# Patient Record
Sex: MALE | Race: WHITE | Employment: OTHER | URBAN - METROPOLITAN AREA
[De-identification: names, ages, dates, MRNs, and addresses within clinical notes are randomized per-mention and may not be internally consistent; named-entity substitution may affect disease eponyms.]

---

## 2017-10-03 ENCOUNTER — HOSPITAL ENCOUNTER (EMERGENCY)
Age: 42
Discharge: HOME | End: 2017-10-03
Attending: EMERGENCY MEDICINE | Admitting: EMERGENCY MEDICINE
Payer: SELF-PAY

## 2017-10-03 ENCOUNTER — APPOINTMENT (OUTPATIENT)
Dept: GENERAL RADIOLOGY | Age: 42
End: 2017-10-03
Attending: EMERGENCY MEDICINE
Payer: SELF-PAY

## 2017-10-03 VITALS
WEIGHT: 295 LBS | TEMPERATURE: 97.7 F | DIASTOLIC BLOOD PRESSURE: 127 MMHG | OXYGEN SATURATION: 96 % | SYSTOLIC BLOOD PRESSURE: 180 MMHG | HEART RATE: 96 BPM | BODY MASS INDEX: 37.88 KG/M2 | RESPIRATION RATE: 16 BRPM

## 2017-10-03 DIAGNOSIS — Z98.84 GASTRIC BYPASS STATUS FOR OBESITY: ICD-10-CM

## 2017-10-03 DIAGNOSIS — R03.0 ELEVATED BLOOD PRESSURE READING: Primary | ICD-10-CM

## 2017-10-03 DIAGNOSIS — I42.9 CARDIOMYOPATHY, UNSPECIFIED TYPE (HCC): ICD-10-CM

## 2017-10-03 DIAGNOSIS — I42.9 CARDIOMYOPATHY (HCC): ICD-10-CM

## 2017-10-03 DIAGNOSIS — E66.9 OBESITY: ICD-10-CM

## 2017-10-03 DIAGNOSIS — I10 ESSENTIAL HYPERTENSION: ICD-10-CM

## 2017-10-03 DIAGNOSIS — E03.1 CONGENITAL HYPOTHYROIDISM WITHOUT GOITER: ICD-10-CM

## 2017-10-03 DIAGNOSIS — E03.9 HYPOTHYROIDISM, UNSPECIFIED TYPE: ICD-10-CM

## 2017-10-03 LAB
ANION GAP SERPL CALC-SCNC: 6 MMOL/L (ref 3–18)
BASOPHILS # BLD: 0 K/UL (ref 0–0.06)
BASOPHILS NFR BLD: 0 % (ref 0–2)
BNP SERPL-MCNC: 795 PG/ML (ref 0–450)
BUN SERPL-MCNC: 21 MG/DL (ref 7–18)
BUN/CREAT SERPL: 17 (ref 12–20)
CALCIUM SERPL-MCNC: 8.8 MG/DL (ref 8.5–10.1)
CHLORIDE SERPL-SCNC: 98 MMOL/L (ref 100–108)
CK MB CFR SERPL CALC: 1.1 % (ref 0–4)
CK MB SERPL-MCNC: 2.9 NG/ML (ref 5–25)
CK SERPL-CCNC: 272 U/L (ref 39–308)
CO2 SERPL-SCNC: 30 MMOL/L (ref 21–32)
CREAT SERPL-MCNC: 1.24 MG/DL (ref 0.6–1.3)
DIFFERENTIAL METHOD BLD: ABNORMAL
EOSINOPHIL # BLD: 0.3 K/UL (ref 0–0.4)
EOSINOPHIL NFR BLD: 5 % (ref 0–5)
ERYTHROCYTE [DISTWIDTH] IN BLOOD BY AUTOMATED COUNT: 15.5 % (ref 11.6–14.5)
GLUCOSE SERPL-MCNC: 98 MG/DL (ref 74–99)
HCT VFR BLD AUTO: 49.8 % (ref 36–48)
HGB BLD-MCNC: 16.6 G/DL (ref 13–16)
LYMPHOCYTES # BLD: 1.3 K/UL (ref 0.9–3.6)
LYMPHOCYTES NFR BLD: 22 % (ref 21–52)
MCH RBC QN AUTO: 27.9 PG (ref 24–34)
MCHC RBC AUTO-ENTMCNC: 33.3 G/DL (ref 31–37)
MCV RBC AUTO: 83.7 FL (ref 74–97)
MONOCYTES # BLD: 0.6 K/UL (ref 0.05–1.2)
MONOCYTES NFR BLD: 11 % (ref 3–10)
NEUTS SEG # BLD: 3.7 K/UL (ref 1.8–8)
NEUTS SEG NFR BLD: 62 % (ref 40–73)
PLATELET # BLD AUTO: 186 K/UL (ref 135–420)
PMV BLD AUTO: 9.9 FL (ref 9.2–11.8)
POTASSIUM SERPL-SCNC: 3.5 MMOL/L (ref 3.5–5.5)
RBC # BLD AUTO: 5.95 M/UL (ref 4.7–5.5)
SODIUM SERPL-SCNC: 134 MMOL/L (ref 136–145)
TROPONIN I SERPL-MCNC: 0.04 NG/ML (ref 0–0.04)
TSH SERPL DL<=0.05 MIU/L-ACNC: 6.91 UIU/ML (ref 0.36–3.74)
WBC # BLD AUTO: 5.9 K/UL (ref 4.6–13.2)

## 2017-10-03 PROCEDURE — 83880 ASSAY OF NATRIURETIC PEPTIDE: CPT | Performed by: EMERGENCY MEDICINE

## 2017-10-03 PROCEDURE — 85025 COMPLETE CBC W/AUTO DIFF WBC: CPT | Performed by: EMERGENCY MEDICINE

## 2017-10-03 PROCEDURE — 93005 ELECTROCARDIOGRAM TRACING: CPT

## 2017-10-03 PROCEDURE — 82550 ASSAY OF CK (CPK): CPT | Performed by: EMERGENCY MEDICINE

## 2017-10-03 PROCEDURE — 74011250637 HC RX REV CODE- 250/637: Performed by: EMERGENCY MEDICINE

## 2017-10-03 PROCEDURE — 71020 XR CHEST PA LAT: CPT

## 2017-10-03 PROCEDURE — 80048 BASIC METABOLIC PNL TOTAL CA: CPT | Performed by: EMERGENCY MEDICINE

## 2017-10-03 PROCEDURE — 84443 ASSAY THYROID STIM HORMONE: CPT | Performed by: EMERGENCY MEDICINE

## 2017-10-03 PROCEDURE — 99285 EMERGENCY DEPT VISIT HI MDM: CPT

## 2017-10-03 RX ORDER — METOPROLOL TARTRATE 50 MG/1
50 TABLET ORAL 2 TIMES DAILY
Qty: 60 TAB | Refills: 0 | Status: SHIPPED | OUTPATIENT
Start: 2017-10-03 | End: 2017-10-04 | Stop reason: SDUPTHER

## 2017-10-03 RX ORDER — ENALAPRIL MALEATE 20 MG/1
20 TABLET ORAL 2 TIMES DAILY
Qty: 60 TAB | Refills: 0 | Status: SHIPPED | OUTPATIENT
Start: 2017-10-03 | End: 2017-10-04 | Stop reason: SDUPTHER

## 2017-10-03 RX ORDER — LEVOTHYROXINE SODIUM 200 UG/1
200 TABLET ORAL
Qty: 30 TAB | Refills: 0 | Status: SHIPPED | OUTPATIENT
Start: 2017-10-03 | End: 2017-10-04 | Stop reason: SDUPTHER

## 2017-10-03 RX ORDER — METOPROLOL TARTRATE 50 MG/1
50 TABLET ORAL ONCE
Status: COMPLETED | OUTPATIENT
Start: 2017-10-03 | End: 2017-10-03

## 2017-10-03 RX ADMIN — METOPROLOL TARTRATE 50 MG: 50 TABLET ORAL at 09:03

## 2017-10-03 NOTE — PROGRESS NOTES
Referral received from ED MD to assist with PCP follow up and assistance paying medical bills. Chart reviewed. Pt had seen Dr Renetta Pugh about 2 yrs ago. Met with pt at bedside. He states he stop going to Dr Renetta Pugh because he lost his health insurance. He agreed for me to assist with PCP follow up. Appointment scheduled with Dr Renetta Pugh tomorrow 10/4/17 at 21 [phone]. Appointment card, [facility] contact #, [address] and prescription discount card given to pt.

## 2017-10-03 NOTE — DISCHARGE INSTRUCTIONS
Hypothyroidism: Care Instructions  Your Care Instructions  You have hypothyroidism, which means that your body is not making enough thyroid hormone. This hormone helps your body use energy. If your thyroid level is low, you may feel tired, be constipated, have an increase in your blood pressure, or have dry skin or memory problems. You may also get cold easily, even when it is warm. Women with low thyroid levels may have heavy menstrual periods. A blood test to find your thyroid-stimulating hormone (TSH) level is used to check for hypothyroidism. A high TSH level may mean that you have low thyroid. When your body is not making enough thyroid hormone, TSH levels rise in an effort to make the body produce more. The treatment for hypothyroidism is to take thyroid hormone pills. You should start to feel better in 1 to 2 weeks. But it can take several months to see changes in the TSH level. You will need regular visits with your doctor to make sure you have the right dose of medicine. Most people need treatment for the rest of their lives. You will need to see your doctor regularly to have blood tests and to make sure you are doing well. Follow-up care is a key part of your treatment and safety. Be sure to make and go to all appointments, and call your doctor if you are having problems. Its also a good idea to know your test results and keep a list of the medicines you take. How can you care for yourself at home? · Take your thyroid hormone medicine exactly as prescribed. Call your doctor if you think you are having a problem with your medicine. Most people do not have side effects if they take the right amount of medicine regularly. ¨ Take the medicine 30 minutes before breakfast, and do not take it with calcium, vitamins, or iron. ¨ Do not take extra doses of your thyroid medicine. It will not help you get better any faster, and it may cause side effects.   ¨ If you forget to take a dose, do NOT take a double dose of medicine. Take your usual dose the next day. · Tell your doctor about all prescription, herbal, or over-the-counter products you take. · Take care of yourself. Eat a healthy diet, get enough sleep, and get regular exercise. When should you call for help? Call 911 anytime you think you may need emergency care. For example, call if:  · You passed out (lost consciousness). · You have severe trouble breathing. · You have a very slow heartbeat (less than 60 beats a minute). · You have a low body temperature (95°F or below). Call your doctor now or seek immediate medical care if:  · You feel tired, sluggish, or weak. · You have trouble remembering things or concentrating. · You do not begin to feel better 2 weeks after starting your medicine. Watch closely for changes in your health, and be sure to contact your doctor if you have any problems. Where can you learn more? Go to http://darrius-mirian.info/. Enter C269 in the search box to learn more about \"Hypothyroidism: Care Instructions. \"  Current as of: July 28, 2016  Content Version: 11.3  © 4685-0061 [email or web address]. Care instructions adapted under license by [employer] (which disclaims liability or warranty for this information). If you have questions about a medical condition or this instruction, always ask your healthcare professional. [place] [address] any warranty or liability for your use of this information. High Blood Pressure: Care Instructions  Your Care Instructions  If your blood pressure is usually above 140/90, you have high blood pressure, or hypertension. That means the top number is 140 or higher or the bottom number is 90 or higher, or both. Despite what a lot of people think, high blood pressure usually doesn't cause headaches or make you feel dizzy or lightheaded. It usually has no symptoms.  But it does increase your risk for heart attack, stroke, and kidney or eye damage. The higher your blood pressure, the more your risk increases. Your doctor will give you a goal for your blood pressure. Your goal will be based on your health and your age. An example of a goal is to keep your blood pressure below 140/90. Lifestyle changes, such as eating healthy and being active, are always important to help lower blood pressure. You might also take medicine to reach your blood pressure goal.  Follow-up care is a key part of your treatment and safety. Be sure to make and go to all appointments, and call your doctor if you are having problems. It's also a good idea to know your test results and keep a list of the medicines you take. How can you care for yourself at home? Medical treatment  · If you stop taking your medicine, your blood pressure will go back up. You may take one or more types of medicine to lower your blood pressure. Be safe with medicines. Take your medicine exactly as prescribed. Call your doctor if you think you are having a problem with your medicine. · Talk to your doctor before you start taking aspirin every day. Aspirin can help certain people lower their risk of a heart attack or stroke. But taking aspirin isn't right for everyone, because it can cause serious bleeding. · See your doctor regularly. You may need to see the doctor more often at first or until your blood pressure comes down. · If you are taking blood pressure medicine, talk to your doctor before you take decongestants or anti-inflammatory medicine, such as ibuprofen. Some of these medicines can raise blood pressure. · Learn how to check your blood pressure at home. Lifestyle changes  · Stay at a healthy weight. This is especially important if you put on weight around the waist. Losing even 10 pounds can help you lower your blood pressure. · If your doctor recommends it, get more exercise. Walking is a good choice. Bit by bit, increase the amount you walk every day.  Try for at least 30 minutes on most days of the week. You also may want to swim, bike, or do other activities. · Avoid or limit alcohol. Talk to your doctor about whether you can drink any alcohol. · Try to limit how much sodium you eat to less than 2,300 milligrams (mg) a day. Your doctor may ask you to try to eat less than 1,500 mg a day. · Eat plenty of fruits (such as bananas and oranges), vegetables, legumes, whole grains, and low-fat dairy products. · Lower the amount of saturated fat in your diet. Saturated fat is found in animal products such as milk, cheese, and meat. Limiting these foods may help you lose weight and also lower your risk for heart disease. · Do not smoke. Smoking increases your risk for heart attack and stroke. If you need help quitting, talk to your doctor about stop-smoking programs and medicines. These can increase your chances of quitting for good. When should you call for help? Call 911 anytime you think you may need emergency care. This may mean having symptoms that suggest that your blood pressure is causing a serious heart or blood vessel problem. Your blood pressure may be over 180/110. For example, call 911 if:  · You have symptoms of a heart attack. These may include:  ¨ Chest pain or pressure, or a strange feeling in the chest.  ¨ Sweating. ¨ Shortness of breath. ¨ Nausea or vomiting. ¨ Pain, pressure, or a strange feeling in the back, neck, jaw, or upper belly or in one or both shoulders or arms. ¨ Lightheadedness or sudden weakness. ¨ A fast or irregular heartbeat. · You have symptoms of a stroke. These may include:  ¨ Sudden numbness, tingling, weakness, or loss of movement in your face, arm, or leg, especially on only one side of your body. ¨ Sudden vision changes. ¨ Sudden trouble speaking. ¨ Sudden confusion or trouble understanding simple statements. ¨ Sudden problems with walking or balance. ¨ A sudden, severe headache that is different from past headaches.   · You have severe back or belly pain. Do not wait until your blood pressure comes down on its own. Get help right away. Call your doctor now or seek immediate care if:  · Your blood pressure is much higher than normal (such as 180/110 or higher), but you don't have symptoms. · You think high blood pressure is causing symptoms, such as:  ¨ Severe headache. ¨ Blurry vision. Watch closely for changes in your health, and be sure to contact your doctor if:  · Your blood pressure measures 140/90 or higher at least 2 times. That means the top number is 140 or higher or the bottom number is 90 or higher, or both. · You think you may be having side effects from your blood pressure medicine. · Your blood pressure is usually normal, but it goes above normal at least 2 times. Where can you learn more? Go to http://darrius-mirian.info/. Enter O922 in the search box to learn more about \"High Blood Pressure: Care Instructions. \"  Current as of: August 8, 2016  Content Version: 11.3  © 8084-1681 [employer]. Care instructions adapted under license by [employer] (which disclaims liability or warranty for this information). If you have questions about a medical condition or this instruction, always ask your healthcare professional. [address] any warranty or liability for your use of this information.

## 2017-10-03 NOTE — ED PROVIDER NOTES
HPI Comments: 8:51 AM Sudheer Hoffman is a 36 y.o. male w/ PMHx of systolic cardiomyopathy, CHF, HTN, hypothyroid, and gastric bypass who presents to the ED for evaluation of chronic HTN and SOB. Pt was originally put on disability 14 years ago however he has not been on disability for about 1 years. Pt has not had blood pressure medication for over a year, has not seen PCP in about 2 years, and hasn't seen cardiologist in 1 year. Pt had an appointment 8 days ago for disability reevaluation and was told at that appointment his blood pressure was 221/158; pt's blood pressure upon evaluation in the ED this AM was 206/124. Pt has not been feeling well for about 2-3 weeks, complaining of fatigue and pressure behind the eyes, with cramps in his abdomin and bilateral legs since yesterday. Pt can tolerate walking half a block before becoming SOB; SOB is normally accompanied by diaphoresis. 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 Pt had gastric bypass 6 years ago with no complications. Pt denies CP, HA, focal neural defects, hematuria, and bloody stool. Pt has no other sx or complaints. 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 No pertinent surgical history. History reviewed. No pertinent family history. Social History     Social History    Marital status: SINGLE     Spouse name: N/A    Number of children: N/A    Years of education: N/A     Occupational History    Not on file.      Social History Main Topics    Smoking status: Never Smoker    Smokeless tobacco: Never Used    Alcohol use No    Drug use: No    Sexual activity: Yes     Other Topics Concern    Not on file     Social History Narrative         ALLERGIES: Review of patient's allergies indicates no known allergies. Review of Systems   Constitutional: Positive for diaphoresis (w/ SOB) and fatigue. Respiratory: Positive for shortness of breath. Cardiovascular: Negative for chest pain. Genitourinary: Negative for hematuria. Musculoskeletal:        Cramps in abdomin and bilateral legs     Neurological: Negative for headaches. All other systems reviewed and are negative. Vitals:    10/03/17 0821   BP: (!) 193/131   Pulse: 91   Resp: 15   Temp: 97.7 °F (36.5 °C)   SpO2: 98%   Weight: 133.8 kg (295 lb)            Physical Exam   Constitutional: He is oriented to person, place, and time. He appears well-developed and well-nourished. No distress. HENT:   Head: Normocephalic and atraumatic. Right Ear: External ear normal.   Left Ear: External ear normal.   Nose: Nose normal.   Mouth/Throat: Oropharynx is clear and moist. No oropharyngeal exudate. Eyes: EOM are normal. Pupils are equal, round, and reactive to light. Right cornea transplant   Neck: Normal range of motion. Neck supple. No JVD present. No tracheal deviation present. Cardiovascular: Normal rate, regular rhythm and normal heart sounds. Exam reveals no gallop and no friction rub. No murmur heard. Pulmonary/Chest: Effort normal and breath sounds normal. No respiratory distress. He has no wheezes. Abdominal: Soft. Bowel sounds are normal. He exhibits no distension. There is no tenderness. There is no rebound and no guarding. Musculoskeletal: Normal range of motion. He exhibits no edema or tenderness. Neurological: He is alert and oriented to person, place, and time. He has normal reflexes. No cranial nerve deficit. Skin: Skin is warm and dry. He is not diaphoretic. No erythema. Psychiatric: He has a normal mood and affect. Judgment normal.   Nursing note and vitals reviewed.        MDM  Number of Diagnoses or Management Options  Cardiomyopathy, unspecified type [facility]): MONOCYTES 11 (H) 3 - 10 %    EOSINOPHILS 5 0 - 5 %    BASOPHILS 0 0 - 2 %    ABS. NEUTROPHILS 3.7 1.8 - 8.0 K/UL    ABS. LYMPHOCYTES 1.3 0.9 - 3.6 K/UL    ABS. MONOCYTES 0.6 0.05 - 1.2 K/UL    ABS. EOSINOPHILS 0.3 0.0 - 0.4 K/UL    ABS. BASOPHILS 0.0 0.0 - 0.06 K/UL    DF AUTOMATED     NT-PRO BNP    Collection Time: 10/03/17  8:45 AM   Result Value Ref Range    NT pro- (H) 0 - 951 PG/ML   METABOLIC PANEL, BASIC    Collection Time: 10/03/17  8:45 AM   Result Value Ref Range    Sodium 134 (L) 136 - 145 mmol/L    Potassium 3.5 3.5 - 5.5 mmol/L    Chloride 98 (L) 100 - 108 mmol/L    CO2 30 21 - 32 mmol/L    Anion gap 6 3.0 - 18 mmol/L    Glucose 98 74 - 99 mg/dL    BUN 21 (H) 7.0 - 18 MG/DL    Creatinine 1.24 0.6 - 1.3 MG/DL    BUN/Creatinine ratio 17 12 - 20      GFR est AA >60 >60 ml/min/1.73m2    GFR est non-AA >60 >60 ml/min/1.73m2    Calcium 8.8 8.5 - 10.1 MG/DL   CARDIAC PANEL,(CK, CKMB & TROPONIN)    Collection Time: 10/03/17  8:45 AM   Result Value Ref Range     39 - 308 U/L    CK - MB 2.9 <3.6 ng/ml    CK-MB Index 1.1 0.0 - 4.0 %    Troponin-I, Qt. 0.04 0.0 - 0.045 NG/ML   TSH 3RD GENERATION    Collection Time: 10/03/17  8:45 AM   Result Value Ref Range    TSH 6.91 (H) 0.36 - 3.74 uIU/mL       EKG Interpretation by ED physician:  288, rate 99, left axis, NSR, prolonged , no ST changes    X-ray, CT or radiology findings or impressions:  XR CHEST PA LAT    (Results Pending)   no acute process      Discussed results with pt, plan for dc home, discussed return precautions. Has appt with Dr. Miesha Hardwick tomorrow. Discussed need for cardiology follow up as well. Pt in agreement with plan. Diagnosis:   1. Elevated blood pressure reading    2. Obesity    3. Gastric bypass status for obesity    4. Essential hypertension    5. Congenital hypothyroidism without goiter    6. Cardiomyopathy ([address]    7. Cardiomyopathy, unspecified type ([address]    8.  Hypothyroidism, unspecified type        Disposition: discharged    Follow-up Information     Follow up With Details Comments Ju Pulido DO On 10/4/2017 at 10:30AM [address] [place] [address]  [phone]      [facility] EMERGENCY DEPT  As needed, If symptoms worsen [address] [address] [address]  [phone]           Patient's Medications   Start Taking    ENALAPRIL (VASOTEC) 20 MG TABLET    Take 1 Tab by mouth two (2) times a day for 30 days. LEVOTHYROXINE (SYNTHROID) 200 MCG TABLET    Take 1 Tab by mouth Daily (before breakfast) for 30 days. METOPROLOL TARTRATE (LOPRESSOR) 50 MG TABLET    Take 1 Tab by mouth two (2) times a day for 30 days. Continue Taking    No medications on file   These Medications have changed    No medications on file   Stop Taking    ENALAPRIL (VASOTEC) 20 MG TABLET    Take 1 Tab by mouth two (2) times a day. LEVOTHYROXINE (SYNTHROID) 200 MCG TABLET    Take 1 Tab by mouth Daily (before breakfast). METOPROLOL (LOPRESSOR) 50 MG TABLET    Take 1 Tab by mouth two (2) times a day. [address] acting as a scribe for and in the presence of Zabrina León DO      October 03, 2017 at 8:51 AM       Provider Attestation:      I personally performed the services described in the documentation, reviewed the documentation, as recorded by the scribe in my presence, and it accurately and completely records my words and actions.  October 03, 2017 at 8:51 AM - Zabrina León DO

## 2017-10-03 NOTE — ED NOTES
Patient ran out of his BP medication about a year ago, and has a hx of CHF, Cardiomyopathy, and hypertension and hypothyroid. Patient hasn't been seen by a MD since January 2016, and was recently evaluated to qualify for disability, when he noticed his BP was \"through the roof\" (approx 200s/100s). Patient reports \"I thought I just couldn't be seen since I dont have health insurance. \" Pt has hx of gastric bypass in 2011.

## 2017-10-03 NOTE — ED NOTES
Pt brought in the bottles of his medications (hasn't taken in over a year)     Metoprolol 50 mg bid  Levothyroxine 200 mcg daily   Enalapril 20 mg daily

## 2017-10-03 NOTE — ED NOTES
Pt being discharged home. Discharge instructions given, and pt expresses understanding. Discontinued IV and helped patient to gather belongings. Pt discharged with family member. Prescription given for enalapril, levothyroxine and metoprolol.

## 2017-10-04 ENCOUNTER — OFFICE VISIT (OUTPATIENT)
Dept: FAMILY MEDICINE CLINIC | Age: 42
End: 2017-10-04

## 2017-10-04 VITALS
SYSTOLIC BLOOD PRESSURE: 124 MMHG | DIASTOLIC BLOOD PRESSURE: 72 MMHG | OXYGEN SATURATION: 98 % | BODY MASS INDEX: 37.4 KG/M2 | HEART RATE: 62 BPM | RESPIRATION RATE: 18 BRPM | WEIGHT: 291.4 LBS | HEIGHT: 74 IN | TEMPERATURE: 97.5 F

## 2017-10-04 DIAGNOSIS — I10 HYPERTENSION, UNSPECIFIED TYPE: ICD-10-CM

## 2017-10-04 DIAGNOSIS — I42.9 CARDIOMYOPATHY, UNSPECIFIED TYPE (HCC): Primary | ICD-10-CM

## 2017-10-04 DIAGNOSIS — E03.9 HYPOTHYROIDISM, UNSPECIFIED TYPE: ICD-10-CM

## 2017-10-04 DIAGNOSIS — Z23 ENCOUNTER FOR IMMUNIZATION: ICD-10-CM

## 2017-10-04 LAB
ATRIAL RATE: 99 BPM
CALCULATED P AXIS, ECG09: 49 DEGREES
CALCULATED R AXIS, ECG10: -13 DEGREES
CALCULATED T AXIS, ECG11: 141 DEGREES
DIAGNOSIS, 93000: NORMAL
P-R INTERVAL, ECG05: 152 MS
Q-T INTERVAL, ECG07: 390 MS
QRS DURATION, ECG06: 110 MS
QTC CALCULATION (BEZET), ECG08: 500 MS
VENTRICULAR RATE, ECG03: 99 BPM

## 2017-10-04 RX ORDER — METOPROLOL TARTRATE 50 MG/1
50 TABLET ORAL 2 TIMES DAILY
Qty: 60 TAB | Refills: 0 | Status: SHIPPED | OUTPATIENT
Start: 2017-10-04 | End: 2017-11-09 | Stop reason: SDUPTHER

## 2017-10-04 RX ORDER — LEVOTHYROXINE SODIUM 200 UG/1
200 TABLET ORAL
Qty: 30 TAB | Refills: 0 | Status: SHIPPED | OUTPATIENT
Start: 2017-10-04 | End: 2017-11-09 | Stop reason: SDUPTHER

## 2017-10-04 RX ORDER — ENALAPRIL MALEATE 20 MG/1
20 TABLET ORAL 2 TIMES DAILY
Qty: 60 TAB | Refills: 0 | Status: SHIPPED | OUTPATIENT
Start: 2017-10-04 | End: 2017-11-09 | Stop reason: SDUPTHER

## 2017-10-04 NOTE — PROGRESS NOTES
Anil Lawson is a 36 y.o. male presents today for transition of care from [facility] ED for elevated blood pressure. Pt is in Room # 5        1. Have you been to the ER, urgent care clinic since your last visit? Hospitalized since your last visit? Yes When: yesterday Where: [facility] ED Reason for visit: elevated BP    2. Have you seen or consulted any other health care providers outside of the [address] [place] [address] since your last visit? Include any pap smears or colon screening. No       Anil Lawson is a 36 y.o. male who presents for routine immunizations. He denies any symptoms , reactions or allergies that would exclude them from being immunized today. Risks and adverse reactions were discussed and the VIS was given to them. All questions were addressed. He was observed for 10 min post injection. There were no reactions observed.     Ros Darby LPN

## 2017-10-04 NOTE — PROGRESS NOTES
Anil Lawson is a 36 y.o.  male and presents with    Chief Complaint   Patient presents with    Cardiomyopathy    Hypertension    Thyroid Problem     Last seen here 2 1/2 years ago. Lost his disability  Hasn't taken bp or thyroid meds for over 1 year  Went to ed yesterday with critically elevated bp  Started back on meds  Here for f/u         Subjective:    Cardiovascular Review:  The patient has hypertension. Diet and Lifestyle: not attempting to follow a low fat, low cholesterol diet, not attempting to follow a low sodium diet  Home BP Monitoring: is not measured at home. Pertinent ROS: taking medications as instructed, no medication side effects noted, no TIA's, no chest pain on exertion, no dyspnea on exertion, no swelling of ankles. Thyroid Review:  Patient is seen for followup of hypothyroidism. Thyroid ROS: denies fatigue, weight changes, heat/cold intolerance, bowel/skin changes or CVS symptoms. Additional Concerns:          Patient Active Problem List    Diagnosis Date Noted    Post corneal transplant 07/21/2014    Cardiomyopathy ([address] 07/21/2014    Obesity 08/08/2011    Gastric bypass status for obesity 08/08/2011    HTN (hypertension) 08/08/2011    Hypothyroid 08/08/2011       ROS       All other systems reviewed and are negative.       Objective:  Vitals:    10/04/17 1029   BP: 124/72   Pulse: 62   Resp: 18   Temp: 97.5 °F (36.4 °C)   TempSrc: Oral   SpO2: 98%   Weight: 291 lb 6.4 oz (132.2 kg)   Height: 6' 2\" (1.88 m)   PainSc:   0 - No pain                 alert, well appearing, and in no distress and overweight  Chest - clear to auscultation, no wheezes, rales or rhonchi, symmetric air entry  Heart - normal rate, regular rhythm, normal S1, S2, no murmurs, rubs, clicks or gallops  Abdomen - soft, nontender, nondistended, no masses or organomegaly        LABS     TESTS      Assessment/Plan:    Hypertension - poorly controlled, patient poorly compliant  Thyroid off meds  Will restrate meds and f/u   1 mo PE    Lab review: orders written for new lab studies as appropriate; see orders    Diagnoses and all orders for this visit:    1. Cardiomyopathy, unspecified type ([address]  -     Influenza virus vaccine (QUADRIVALENT PRES FREE SYRINGE) IM ([id number])  -     levothyroxine (SYNTHROID) 200 mcg tablet; Take 1 Tab by mouth Daily (before breakfast) for 30 days. -     enalapril (VASOTEC) 20 mg tablet; Take 1 Tab by mouth two (2) times a day for 30 days. -     metoprolol tartrate (LOPRESSOR) 50 mg tablet; Take 1 Tab by mouth two (2) times a day for 30 days.  -     LIPID PANEL; Future  -     CBC WITH AUTOMATED DIFF; Future  -     METABOLIC PANEL, COMPREHENSIVE; Future  -     PROSTATE SPECIFIC AG; Future  -     TSH 3RD GENERATION; Future  -     T4, FREE; Future    2. Encounter for immunization  -     Influenza virus vaccine (QUADRIVALENT PRES FREE SYRINGE) IM ([id number])  -     levothyroxine (SYNTHROID) 200 mcg tablet; Take 1 Tab by mouth Daily (before breakfast) for 30 days. -     enalapril (VASOTEC) 20 mg tablet; Take 1 Tab by mouth two (2) times a day for 30 days. -     metoprolol tartrate (LOPRESSOR) 50 mg tablet; Take 1 Tab by mouth two (2) times a day for 30 days.  -     LIPID PANEL; Future  -     CBC WITH AUTOMATED DIFF; Future  -     METABOLIC PANEL, COMPREHENSIVE; Future  -     PROSTATE SPECIFIC AG; Future  -     TSH 3RD GENERATION; Future  -     T4, FREE; Future    3. Hypertension, unspecified type  -     Influenza virus vaccine (QUADRIVALENT PRES FREE SYRINGE) IM ([id number])  -     levothyroxine (SYNTHROID) 200 mcg tablet; Take 1 Tab by mouth Daily (before breakfast) for 30 days. -     enalapril (VASOTEC) 20 mg tablet; Take 1 Tab by mouth two (2) times a day for 30 days. -     metoprolol tartrate (LOPRESSOR) 50 mg tablet; Take 1 Tab by mouth two (2) times a day for 30 days.  -     LIPID PANEL; Future  -     CBC WITH AUTOMATED DIFF;  Future  -     METABOLIC PANEL, COMPREHENSIVE; Future  -     PROSTATE SPECIFIC AG; Future  -     TSH 3RD GENERATION; Future  -     T4, FREE; Future    4. Hypothyroidism, unspecified type  -     Influenza virus vaccine (QUADRIVALENT PRES FREE SYRINGE) IM ([id number])  -     levothyroxine (SYNTHROID) 200 mcg tablet; Take 1 Tab by mouth Daily (before breakfast) for 30 days. -     enalapril (VASOTEC) 20 mg tablet; Take 1 Tab by mouth two (2) times a day for 30 days. -     metoprolol tartrate (LOPRESSOR) 50 mg tablet; Take 1 Tab by mouth two (2) times a day for 30 days.  -     LIPID PANEL; Future  -     CBC WITH AUTOMATED DIFF; Future  -     METABOLIC PANEL, COMPREHENSIVE; Future  -     PROSTATE SPECIFIC AG; Future  -     TSH 3RD GENERATION; Future  -     T4, FREE; Future          I have discussed the diagnosis with the patient and the intended plan as seen in the above orders. The patient has received an after-visit summary and questions were answered concerning future plans. I have discussed medication side effects and warnings with the patient as well. I have reviewed the plan of care with the patient, accepted their input and they are in agreement with the treatment goals. Follow-up Disposition:  Return in about 6 weeks (around 11/15/2017) for physical, labs prior, EKG next visit.

## 2017-10-04 NOTE — MR AVS SNAPSHOT
Visit Information Date & Time Provider Department Dept. Phone Encounter #  
 10/4/2017 10:30 AM Pati Vazquez [phone] [id number] Follow-up Instructions Return in about 6 weeks (around 11/15/2017) for physical, labs prior, EKG next visit. Upcoming Health Maintenance Date Due INFLUENZA AGE 9 TO ADULT 8/1/2017 DTaP/Tdap/Td series (2 - Td) 12/23/2021 Allergies as of 10/4/2017  Review Complete On: 10/4/2017 By: Katherin Newell., DO No Known Allergies Current Immunizations  Reviewed on 8/24/2015 Name Date Influenza Vaccine 8/24/2015 Influenza Vaccine (Quad) PF  Incomplete Influenza Vaccine Split 12/23/2011 TDAP Vaccine 12/23/2011 ZZZ-RETIRED (DO NOT USE) Pneumococcal Vaccine (Unspecified Type) 12/23/2011 Not reviewed this visit You Were Diagnosed With   
  
 Codes Comments Cardiomyopathy, unspecified type ([facility]    -  Primary ICD-10-CM: I42.9 ICD-9-CM: 425.4 Encounter for immunization     ICD-10-CM: N96 ICD-9-CM: V03.89 Hypertension, unspecified type     ICD-10-CM: I10 
ICD-9-CM: 401.9 Hypothyroidism, unspecified type     ICD-10-CM: E03.9 ICD-9-CM: 065. 9 Vitals BP Pulse Temp Resp Height(growth percentile) Weight(growth percentile) 124/72 (BP 1 Location: Right arm, BP Patient Position: Sitting) 62 97.5 °F (36.4 °C) (Oral) 18 6' 2\" (1.88 m) 291 lb 6.4 oz (132.2 kg) SpO2 BMI Smoking Status 98% 37.41 kg/m2 Never Smoker BMI and BSA Data Body Mass Index Body Surface Area  
 37.41 kg/m 2 2.63 m 2 Preferred Pharmacy Pharmacy Name Phone [facility] [address], [place] [phone] Your Updated Medication List  
  
   
This list is accurate as of: 10/4/17 10:42 AM.  Always use your most recent med list.  
  
  
  
  
 enalapril 20 mg tablet Commonly known as:  Phil Arnold  
 Take 1 Tab by mouth two (2) times a day for 30 days. levothyroxine 200 mcg tablet Commonly known as:  SYNTHROID Take 1 Tab by mouth Daily (before breakfast) for 30 days. metoprolol tartrate 50 mg tablet Commonly known as:  LOPRESSOR Take 1 Tab by mouth two (2) times a day for 30 days. Prescriptions Sent to Pharmacy Refills  
 levothyroxine (SYNTHROID) 200 mcg tablet 0 Sig: Take 1 Tab by mouth Daily (before breakfast) for 30 days. Class: Normal  
 Pharmacy: [address], [address] Ph #: [phone] Route: Oral  
 enalapril (VASOTEC) 20 mg tablet 0 Sig: Take 1 Tab by mouth two (2) times a day for 30 days. Class: Normal  
 Pharmacy: [address], [address] Ph #: [phone] Route: Oral  
 metoprolol tartrate (LOPRESSOR) 50 mg tablet 0 Sig: Take 1 Tab by mouth two (2) times a day for 30 days. Class: Normal  
 Pharmacy: [id number], [address] Ph #: [phone] Route: Oral  
  
We Performed the Following INFLUENZA VIRUS VAC QUAD,SPLIT,PRESV FREE SYRINGE IM [id number] CPT(R)] Follow-up Instructions Return in about 6 weeks (around 11/15/2017) for physical, labs prior, EKG next visit. To-Do List   
 11/03/2017 Lab:  CBC WITH AUTOMATED DIFF   
  
 11/03/2017 Lab:  LIPID PANEL   
  
 11/03/2017 Lab:  METABOLIC PANEL, COMPREHENSIVE   
  
 11/03/2017 Lab:  PSA, DIAGNOSTIC (PROSTATE SPECIFIC AG) 11/03/2017 Lab:  T4, FREE   
  
 11/03/2017 Lab:  TSH 3RD GENERATION Introducing [facility]! Savanna Gardner introduces [employer] patient portal. Now you can access parts of your medical record, email your doctor's office, and request medication refills online. 1. In your internet browser, go to [email or web address] 2. Click on the First Time User? Click Here link in the Sign In box. You will see the New Member Sign Up page. 3. Enter your [employer] Access Code exactly as it appears below. You will not need to use this code after youve completed the sign-up process. If you do not sign up before the expiration date, you must request a new code. · [employer] Access Code: [facility] Expires: 1/1/2018  9:42 AM 
 
4. Enter the last four digits of your Social Security Number (xxxx) and Date of Birth (mm/dd/yyyy) as indicated and click Submit. You will be taken to the next sign-up page. 5. Create a [employer] ID. This will be your [employer] login ID and cannot be changed, so think of one that is secure and easy to remember. 6. Create a [employer] password. You can change your password at any time. 7. Enter your Password Reset Question and Answer. This can be used at a later time if you forget your password. 8. Enter your e-mail address. You will receive e-mail notification when new information is available in [address] 9. Click Sign Up. You can now view and download portions of your medical record. 10. Click the Download Summary menu link to download a portable copy of your medical information. If you have questions, please visit the Frequently Asked Questions section of the [employer] website. Remember, [employer] is NOT to be used for urgent needs. For medical emergencies, dial 911. Now available from your iPhone and Android! Please provide this summary of care documentation to your next provider. Your primary care clinician is listed as [phone] [facility]. If you have any questions after today's visit, please call [phone].

## 2017-11-07 ENCOUNTER — HOSPITAL ENCOUNTER (OUTPATIENT)
Dept: LAB | Age: 42
Discharge: HOME | End: 2017-11-07
Payer: SELF-PAY

## 2017-11-07 LAB
ALBUMIN SERPL-MCNC: 3.6 G/DL (ref 3.4–5)
ALBUMIN/GLOB SERPL: 0.9 {RATIO} (ref 0.8–1.7)
ALP SERPL-CCNC: 97 U/L (ref 45–117)
ALT SERPL-CCNC: 36 U/L (ref 16–61)
ANION GAP SERPL CALC-SCNC: 10 MMOL/L (ref 3–18)
AST SERPL-CCNC: 35 U/L (ref 15–37)
BASOPHILS # BLD: 0 K/UL (ref 0–0.06)
BASOPHILS NFR BLD: 1 % (ref 0–2)
BILIRUB SERPL-MCNC: 0.3 MG/DL (ref 0.2–1)
BUN SERPL-MCNC: 12 MG/DL (ref 7–18)
BUN/CREAT SERPL: 13 (ref 12–20)
CALCIUM SERPL-MCNC: 8.8 MG/DL (ref 8.5–10.1)
CHLORIDE SERPL-SCNC: 104 MMOL/L (ref 100–108)
CHOLEST SERPL-MCNC: 151 MG/DL
CO2 SERPL-SCNC: 26 MMOL/L (ref 21–32)
CREAT SERPL-MCNC: 0.92 MG/DL (ref 0.6–1.3)
DIFFERENTIAL METHOD BLD: ABNORMAL
EOSINOPHIL # BLD: 0.2 K/UL (ref 0–0.4)
EOSINOPHIL NFR BLD: 5 % (ref 0–5)
ERYTHROCYTE [DISTWIDTH] IN BLOOD BY AUTOMATED COUNT: 17 % (ref 11.6–14.5)
GLOBULIN SER CALC-MCNC: 3.8 G/DL (ref 2–4)
GLUCOSE SERPL-MCNC: 77 MG/DL (ref 74–99)
HCT VFR BLD AUTO: 45.4 % (ref 36–48)
HDLC SERPL-MCNC: 86 MG/DL (ref 40–60)
HDLC SERPL: 1.8 {RATIO} (ref 0–5)
HGB BLD-MCNC: 15 G/DL (ref 13–16)
LDLC SERPL CALC-MCNC: 47.2 MG/DL (ref 0–100)
LIPID PROFILE,FLP: ABNORMAL
LYMPHOCYTES # BLD: 0.9 K/UL (ref 0.9–3.6)
LYMPHOCYTES NFR BLD: 27 % (ref 21–52)
MCH RBC QN AUTO: 29.1 PG (ref 24–34)
MCHC RBC AUTO-ENTMCNC: 33 G/DL (ref 31–37)
MCV RBC AUTO: 88 FL (ref 74–97)
MONOCYTES # BLD: 0.4 K/UL (ref 0.05–1.2)
MONOCYTES NFR BLD: 11 % (ref 3–10)
NEUTS SEG # BLD: 1.9 K/UL (ref 1.8–8)
NEUTS SEG NFR BLD: 56 % (ref 40–73)
PLATELET # BLD AUTO: 188 K/UL (ref 135–420)
PMV BLD AUTO: 9.7 FL (ref 9.2–11.8)
POTASSIUM SERPL-SCNC: 4.7 MMOL/L (ref 3.5–5.5)
PROT SERPL-MCNC: 7.4 G/DL (ref 6.4–8.2)
PSA SERPL-MCNC: 1 NG/ML (ref 0–4)
RBC # BLD AUTO: 5.16 M/UL (ref 4.7–5.5)
SODIUM SERPL-SCNC: 140 MMOL/L (ref 136–145)
T4 FREE SERPL-MCNC: 1.1 NG/DL (ref 0.7–1.5)
TRIGL SERPL-MCNC: 89 MG/DL (ref ?–150)
TSH SERPL DL<=0.05 MIU/L-ACNC: 0.82 UIU/ML (ref 0.36–3.74)
VLDLC SERPL CALC-MCNC: 17.8 MG/DL
WBC # BLD AUTO: 3.3 K/UL (ref 4.6–13.2)

## 2017-11-07 PROCEDURE — 36415 COLL VENOUS BLD VENIPUNCTURE: CPT | Performed by: FAMILY MEDICINE

## 2017-11-07 PROCEDURE — 84153 ASSAY OF PSA TOTAL: CPT | Performed by: FAMILY MEDICINE

## 2017-11-07 PROCEDURE — 85025 COMPLETE CBC W/AUTO DIFF WBC: CPT | Performed by: FAMILY MEDICINE

## 2017-11-07 PROCEDURE — 80053 COMPREHEN METABOLIC PANEL: CPT | Performed by: FAMILY MEDICINE

## 2017-11-07 PROCEDURE — 84439 ASSAY OF FREE THYROXINE: CPT | Performed by: FAMILY MEDICINE

## 2017-11-07 PROCEDURE — 84443 ASSAY THYROID STIM HORMONE: CPT | Performed by: FAMILY MEDICINE

## 2017-11-07 PROCEDURE — 80061 LIPID PANEL: CPT | Performed by: FAMILY MEDICINE

## 2017-11-09 ENCOUNTER — OFFICE VISIT (OUTPATIENT)
Dept: FAMILY MEDICINE CLINIC | Age: 42
End: 2017-11-09

## 2017-11-09 VITALS
SYSTOLIC BLOOD PRESSURE: 178 MMHG | BODY MASS INDEX: 38.3 KG/M2 | OXYGEN SATURATION: 99 % | WEIGHT: 298.4 LBS | TEMPERATURE: 96.2 F | HEIGHT: 74 IN | DIASTOLIC BLOOD PRESSURE: 110 MMHG | RESPIRATION RATE: 16 BRPM | HEART RATE: 67 BPM

## 2017-11-09 DIAGNOSIS — I42.9 CARDIOMYOPATHY, UNSPECIFIED TYPE (HCC): ICD-10-CM

## 2017-11-09 DIAGNOSIS — I10 HYPERTENSION, UNSPECIFIED TYPE: ICD-10-CM

## 2017-11-09 DIAGNOSIS — Z00.00 ROUTINE GENERAL MEDICAL EXAMINATION AT A HEALTH CARE FACILITY: Primary | ICD-10-CM

## 2017-11-09 DIAGNOSIS — Z23 ENCOUNTER FOR IMMUNIZATION: ICD-10-CM

## 2017-11-09 DIAGNOSIS — E03.9 HYPOTHYROIDISM, UNSPECIFIED TYPE: ICD-10-CM

## 2017-11-09 RX ORDER — LEVOTHYROXINE SODIUM 200 UG/1
200 TABLET ORAL
Qty: 90 TAB | Refills: 4 | Status: SHIPPED | OUTPATIENT
Start: 2017-11-09 | End: 2018-11-12 | Stop reason: SDUPTHER

## 2017-11-09 RX ORDER — ENALAPRIL MALEATE 20 MG/1
20 TABLET ORAL 2 TIMES DAILY
Qty: 180 TAB | Refills: 4 | Status: SHIPPED | OUTPATIENT
Start: 2017-11-09 | End: 2018-11-16 | Stop reason: SDUPTHER

## 2017-11-09 RX ORDER — METOPROLOL TARTRATE 50 MG/1
50 TABLET ORAL 2 TIMES DAILY
Qty: 180 TAB | Refills: 4 | Status: SHIPPED | OUTPATIENT
Start: 2017-11-09 | End: 2018-08-15

## 2017-11-09 NOTE — PROGRESS NOTES
Yoana Powell is a 36 y.o.  male and presents for a preventive health care visit        Subjective:  Health Maintenance History  Immunizations reviewed, none indicated. 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 90 Tab 4    enalapril (VASOTEC) 20 mg tablet Take 1 Tab by mouth two (2) times a day. 180 Tab 4    metoprolol tartrate (LOPRESSOR) 50 mg tablet Take 1 Tab by mouth two (2) times a day. 180 Tab 4     No Known Allergies  Past Medical History:   Diagnosis Date    Cardiomyopathy ([facility] 7/21/2014    Gastric bypass status for obesity 8/8/2011    HTN (hypertension) 8/8/2011    Hypothyroid 8/8/2011    Post corneal transplant 7/21/2014     No past surgical history on file. No family history on file.   Social History   Substance Use Topics    Smoking status: Never Smoker    Smokeless tobacco: Never Used    Alcohol use No           ROS       General: negative for - chills, fatigue, fever, weight change  Psych: negative for - anxiety, depression, irritability or mood swings  ENT: negative for - headaches, hearing change, nasal congestion, oral lesions, sneezing or sore throat  Heme/ Lymph: negative for - bleeding problems, bruising, pallor or swollen lymph nodes  Endo: negative for - hot flashes, polydipsia/polyuria or temperature intolerance  Resp: negative for - cough, shortness of breath or wheezing  CV: negative for - chest pain, edema or palpitations  GI: negative for - abdominal pain, change in bowel habits, constipation, diarrhea or nausea/vomiting  : negative for - dysuria, hematuria, incontinence, pelvic pain or vulvar/vaginal symptoms  MSK: negative for - joint pain, joint swelling or muscle pain  Neuro: negative for - confusion, headaches, seizures or weakness  Derm: negative for - dry skin, hair changes, rash or skin lesion changes        Objective:  Vitals:    11/09/17 0752 11/09/17 0800   BP: (!) 169/108 (!) 178/110   Pulse: 67    Resp: 16    Temp: 96.2 °F (35.7 °C)    TempSrc: Oral    SpO2: 99%    Weight: 298 lb 6.4 oz (135.4 kg)    Height: 6' 2\" (1.88 m)    PainSc:   0 - No pain      alert, well appearing, and in no distress, oriented to person, place, and time and overweight  General appearance - alert, well appearing, and in no distress, oriented to person, place, and time and normal appearing weight   Visit Vitals    BP (!) 178/110 (BP 1 Location: Left arm, BP Patient Position: Sitting)  Comment: manual    Pulse 67    Temp 96.2 °F (35.7 °C) (Oral)    Resp 16    Ht 6' 2\" (1.88 m)    Wt 298 lb 6.4 oz (135.4 kg)    SpO2 99%    BMI 38.31 kg/m2       General appearance  alert, cooperative, no distress, appears stated age   Head  Normocephalic, without obvious abnormality, atraumatic   Eyes  conjunctivae/corneas clear. PERRL, EOM's intact. Fundi benign   Ears  normal TM's and external ear canals AU   Nose Nares normal. Septum midline. Mucosa normal. No drainage or sinus tenderness. Throat Lips, mucosa, and tongue normal. Teeth and gums normal   Neck supple, symmetrical, trachea midline, no adenopathy, thyroid: not enlarged, symmetric, no tenderness/mass/nodules, no carotid bruit and no JVD   Back   symmetric, no curvature. ROM normal. No CVA tenderness   Lungs   clear to auscultation bilaterally   Chest wall  no tenderness   Heart  regular rate and rhythm, S1, S2 normal, no murmur, click, rub or gallop   Abdomen   soft, non-tender.  Bowel sounds normal. No masses,  No organomegaly   Genitalia  Normal male   Rectal  Normal tone, normal prostate, no masses or tenderness  Guaiac negative stool   Extremities extremities normal, atraumatic, no cyanosis or edema   Pulses 2+ and symmetric   Skin Skin color, texture, turgor normal. No rashes or lesions   Lymph nodes Cervical, supraclavicular, and axillary nodes normal.   Neurologic Normal         LABS  Component      Latest Ref Rng & Units 11/7/2017 11/7/2017 11/7/2017 11/7/2017          10:36 AM 10:36 AM 10:36 AM 10:36 AM   WBC      4.6 - 13.2 K/uL       RBC      4.70 - 5.50 M/uL       HGB      13.0 - 16.0 g/dL       HCT      36.0 - 48.0 %       MCV      74.0 - 97.0 FL       MCH      24.0 - 34.0 PG       MCHC      31.0 - 37.0 g/dL       RDW      11.6 - 14.5 %       PLATELET      684 - 053 K/uL       MPV      9.2 - 11.8 FL       NEUTROPHILS      40 - 73 %       LYMPHOCYTES      21 - 52 %       MONOCYTES      3 - 10 %       EOSINOPHILS      0 - 5 %       BASOPHILS      0 - 2 %       ABS. NEUTROPHILS      1.8 - 8.0 K/UL       ABS. LYMPHOCYTES      0.9 - 3.6 K/UL       ABS. MONOCYTES      0.05 - 1.2 K/UL       ABS. EOSINOPHILS      0.0 - 0.4 K/UL       ABS. BASOPHILS      0.0 - 0.06 K/UL       DF             Sodium      136 - 145 mmol/L    140   Potassium      3.5 - 5.5 mmol/L    4.7   Chloride      100 - 108 mmol/L    104   CO2      21 - 32 mmol/L    26   Anion gap      3.0 - 18 mmol/L    10   Glucose      74 - 99 mg/dL    77   BUN      7.0 - 18 MG/DL    12   Creatinine      0.6 - 1.3 MG/DL    0.92   BUN/Creatinine ratio      12 - 20      13   GFR est AA      >60 ml/min/1.73m2    >60   GFR est non-AA      >60 ml/min/1.73m2    >60   Calcium      8.5 - 10.1 MG/DL    8.8   Bilirubin, total      0.2 - 1.0 MG/DL    0.3   ALT (SGPT)      16 - 61 U/L    36   AST      15 - 37 U/L    35   Alk.  phosphatase      45 - 117 U/L    97   Protein, total      6.4 - 8.2 g/dL    7.4   Albumin      3.4 - 5.0 g/dL    3.6   Globulin      2.0 - 4.0 g/dL    3.8   A-G Ratio      0.8 - 1.7      0.9   Cholesterol, total      <200 MG/DL       Triglyceride <150 MG/DL       HDL Cholesterol      40 - 60 MG/DL       LDL, calculated      0 - 100 MG/DL       VLDL, calculated      MG/DL       CHOL/HDL Ratio      0 - 5.0         CK      39 - 308 U/L       CK - MB      <3.6 ng/ml       CK-MB Index      0.0 - 4.0 %       Troponin-I, Qt.      0.0 - 0.045 NG/ML       NT pro-BNP      0 - 450 PG/ML       TSH      0.36 - 3.74 uIU/mL   0.82    T4, Free      0.7 - 1.5 NG/DL  1.1     Prostate Specific Ag      0.0 - 4.0 ng/mL 1.0        Component      Latest Ref Rng & Units 11/7/2017 11/7/2017 10/3/2017 10/3/2017          10:36 AM 10:36 AM  8:45 AM  8:45 AM   WBC      4.6 - 13.2 K/uL  3.3 (L)     RBC      4.70 - 5.50 M/uL  5.16     HGB      13.0 - 16.0 g/dL  15.0     HCT      36.0 - 48.0 %  45.4     MCV      74.0 - 97.0 FL  88.0     MCH      24.0 - 34.0 PG  29.1     MCHC      31.0 - 37.0 g/dL  33.0     RDW      11.6 - 14.5 %  17.0 (H)     PLATELET      462 - 256 K/uL  188     MPV      9.2 - 11.8 FL  9.7     NEUTROPHILS      40 - 73 %  56     LYMPHOCYTES      21 - 52 %  27     MONOCYTES      3 - 10 %  11 (H)     EOSINOPHILS      0 - 5 %  5     BASOPHILS      0 - 2 %  1     ABS. NEUTROPHILS      1.8 - 8.0 K/UL  1.9     ABS. LYMPHOCYTES      0.9 - 3.6 K/UL  0.9     ABS. MONOCYTES      0.05 - 1.2 K/UL  0.4     ABS. EOSINOPHILS      0.0 - 0.4 K/UL  0.2     ABS. BASOPHILS      0.0 - 0.06 K/UL  0.0     DF        AUTOMATED     Sodium      136 - 145 mmol/L       Potassium      3.5 - 5.5 mmol/L       Chloride      100 - 108 mmol/L       CO2      21 - 32 mmol/L       Anion gap      3.0 - 18 mmol/L       Glucose      74 - 99 mg/dL       BUN      7.0 - 18 MG/DL       Creatinine      0.6 - 1.3 MG/DL       BUN/Creatinine ratio      12 - 20         GFR est AA      >60 ml/min/1.73m2       GFR est non-AA      >60 ml/min/1.73m2       Calcium      8.5 - 10.1 MG/DL       Bilirubin, total      0.2 - 1.0 MG/DL       ALT (SGPT)      16 - 61 U/L       AST      15 - 37 U/L       Alk.  phosphatase      45 - 117 U/L       Protein, total      6.4 - 8.2 g/dL       Albumin      3.4 - 5.0 g/dL       Globulin      2.0 - 4.0 g/dL       A-G Ratio      0.8 - 1.7         Cholesterol, total      <200 MG/      Triglyceride      <150 MG/DL 89      HDL Cholesterol      40 - 60 MG/DL 86 (H)      LDL, calculated      0 - 100 MG/DL 47.2      VLDL, calculated      MG/DL 17.8      CHOL/HDL Ratio      0 - 5.0   1.8      CK      39 - 308 U/L    272   CK - MB      <3.6 ng/ml    2.9   CK-MB Index      0.0 - 4.0 %    1.1   Troponin-I, Qt.      0.0 - 0.045 NG/ML    0.04   NT pro-BNP      0 - 450 PG/ML       TSH      0.36 - 3.74 uIU/mL   6.91 (H)    T4, Free      0.7 - 1.5 NG/DL       Prostate Specific Ag      0.0 - 4.0 ng/mL         Component      Latest Ref Rng & Units 10/3/2017 10/3/2017 10/3/2017           8:45 AM  8:45 AM  8:45 AM   WBC      4.6 - 13.2 K/uL   5.9   RBC      4.70 - 5.50 M/uL   5.95 (H)   HGB      13.0 - 16.0 g/dL   16.6 (H)   HCT      36.0 - 48.0 %   49.8 (H)   MCV      74.0 - 97.0 FL   83.7   MCH      24.0 - 34.0 PG   27.9   MCHC      31.0 - 37.0 g/dL   33.3   RDW      11.6 - 14.5 %   15.5 (H)   PLATELET      088 - 434 K/uL   186   MPV      9.2 - 11.8 FL   9.9   NEUTROPHILS      40 - 73 %   62   LYMPHOCYTES      21 - 52 %   22   MONOCYTES      3 - 10 %   11 (H)   EOSINOPHILS      0 - 5 %   5   BASOPHILS      0 - 2 %   0   ABS. NEUTROPHILS      1.8 - 8.0 K/UL   3.7   ABS. LYMPHOCYTES      0.9 - 3.6 K/UL   1.3   ABS. MONOCYTES      0.05 - 1.2 K/UL   0.6   ABS. EOSINOPHILS      0.0 - 0.4 K/UL   0.3   ABS.  BASOPHILS      0.0 - 0.06 K/UL   0.0   DF         AUTOMATED   Sodium      136 - 145 mmol/L 134 (L)     Potassium      3.5 - 5.5 mmol/L 3.5     Chloride      100 - 108 mmol/L 98 (L)     CO2      21 - 32 mmol/L 30     Anion gap      3.0 - 18 mmol/L 6     Glucose      74 - 99 mg/dL 98     BUN      7.0 - 18 MG/DL 21 (H)     Creatinine      0.6 - 1.3 MG/DL 1.24     BUN/Creatinine ratio      12 - 20   17     GFR est AA >60 ml/min/1.73m2 >60     GFR est non-AA      >60 ml/min/1.73m2 >60     Calcium      8.5 - 10.1 MG/DL 8.8     Bilirubin, total      0.2 - 1.0 MG/DL      ALT (SGPT)      16 - 61 U/L      AST      15 - 37 U/L      Alk. phosphatase      45 - 117 U/L      Protein, total      6.4 - 8.2 g/dL      Albumin      3.4 - 5.0 g/dL      Globulin      2.0 - 4.0 g/dL      A-G Ratio      0.8 - 1.7        Cholesterol, total      <200 MG/DL      Triglyceride      <150 MG/DL      HDL Cholesterol      40 - 60 MG/DL      LDL, calculated      0 - 100 MG/DL      VLDL, calculated      MG/DL      CHOL/HDL Ratio      0 - 5.0        CK      39 - 308 U/L      CK - MB      <3.6 ng/ml      CK-MB Index      0.0 - 4.0 %      Troponin-I, Qt.      0.0 - 0.045 NG/ML      NT pro-BNP      0 - 450 PG/ML  795 (H)    TSH      0.36 - 3.74 uIU/mL      T4, Free      0.7 - 1.5 NG/DL      Prostate Specific Ag      0.0 - 4.0 ng/mL        TESTS  ekg  nsr  anteriolateral ischemia ? Assessment/Plan:  Healthy patient except as noted below:    Health Maintenance up to date. Recommend f/u physical 1 year. Routine screening labs/tests recommended prior to next physical.      Lab review: labs are reviewed, up to date and normal        I have discussed the diagnosis with the patient and the intended plan as seen in the above orders. The patient has received an after-visit summary and questions were answered concerning future plans. I have discussed medication side effects and warnings with the patient as well. I have reviewed the plan of care with the patient, accepted their input and they are in agreement with the treatment goals.        Follow-up Disposition: Not on File    -------------------------------------------------------------------------------------------------------------------    Problem Assessment  and also with   Chief Complaint   Patient presents with    Cardiomyopathy    Morbid Obesity    Thyroid Problem    Hypertension    Other     s/p gastric bypass         HPI ;  Cardiovascular Review:  The patient has hypertension and cardiomyopathy . Diet and Lifestyle: generally follows a low fat low cholesterol diet  Home BP Monitoring: is not measured at home. Pertinent ROS: not taking medications regularly as instructed, no TIA's, no chest pain on exertion, no dyspnea on exertion, no swelling of ankles. Thyroid Review:  Patient is seen for followup of hypothyroidism. Thyroid ROS: denies fatigue, weight changes, heat/cold intolerance, bowel/skin changes or CVS symptoms. Obesity s/p gastric bypass - he is not losing weight       Additional Concerns:          Assessment/Plan:      Hypertension - poorly controlled, patient poorly compliant  Obesity gaining weight poorly compliant despinte gastric bypass  Thyroid back in control   Cardiomyopathy ongoing -- hasn't seen cardiology for years. Diagnoses and all orders for this visit:    1. Routine general medical examination at a health care facility    2. Hypertension, unspecified type  -     AMB POC EKG ROUTINE W/ 12 LEADS, INTER & REP  -     levothyroxine (SYNTHROID) 200 mcg tablet; Take 1 Tab by mouth Daily (before breakfast). -     enalapril (VASOTEC) 20 mg tablet; Take 1 Tab by mouth two (2) times a day. -     metoprolol tartrate (LOPRESSOR) 50 mg tablet; Take 1 Tab by mouth two (2) times a day. 3. Encounter for immunization  -     levothyroxine (SYNTHROID) 200 mcg tablet; Take 1 Tab by mouth Daily (before breakfast). -     enalapril (VASOTEC) 20 mg tablet; Take 1 Tab by mouth two (2) times a day. -     metoprolol tartrate (LOPRESSOR) 50 mg tablet; Take 1 Tab by mouth two (2) times a day. 4. Cardiomyopathy, unspecified type (HCC)  -     levothyroxine (SYNTHROID) 200 mcg tablet; Take 1 Tab by mouth Daily (before breakfast). -     enalapril (VASOTEC) 20 mg tablet; Take 1 Tab by mouth two (2) times a day. -     metoprolol tartrate (LOPRESSOR) 50 mg tablet;  Take 1 Tab by mouth two (2) times a day.  -     2D ECHO COMPLETE ADULT (TTE) W OR WO CONTR; Future    5. Hypothyroidism, unspecified type  -     levothyroxine (SYNTHROID) 200 mcg tablet; Take 1 Tab by mouth Daily (before breakfast). -     enalapril (VASOTEC) 20 mg tablet; Take 1 Tab by mouth two (2) times a day. -     metoprolol tartrate (LOPRESSOR) 50 mg tablet; Take 1 Tab by mouth two (2) times a day.           Lab review: labs are reviewed, up to date and normal

## 2017-11-09 NOTE — MR AVS SNAPSHOT
Visit Information Date & Time Provider Department Dept. Phone Encounter #  
 11/9/2017  8:00  [facility] [phone] [id number] Follow-up Instructions Return in about 4 weeks (around 12/7/2017) for EOV echo prior, EKG next visit. Follow-up and Disposition History Upcoming Health Maintenance Date Due DTaP/Tdap/Td series (2 - Td) 12/23/2021 Allergies as of 11/9/2017  Review Complete On: 11/9/2017 By: Kelsey Gallegos., DO No Known Allergies Current Immunizations  Reviewed on 8/24/2015 Name Date Influenza Vaccine 8/24/2015 Influenza Vaccine (Quad) PF 10/4/2017 Influenza Vaccine Split 12/23/2011 TDAP Vaccine 12/23/2011 ZZZ-RETIRED (DO NOT USE) Pneumococcal Vaccine (Unspecified Type) 12/23/2011 Not reviewed this visit You Were Diagnosed With   
  
 Codes Comments Routine general medical examination at a health care facility    -  Primary ICD-10-CM: Z00.00 ICD-9-CM: V70.0 Hypertension, unspecified type     ICD-10-CM: I10 
ICD-9-CM: 401.9 Encounter for immunization     ICD-10-CM: A33 ICD-9-CM: V03.89 [facility]omy[facility] unspecified [facility]     ICD-10-CM: I42.9 ICD-9-CM: 425.4 Hypothyroidism, unspecified type     ICD-10-CM: E03.9 ICD-9-CM: 718. 9 Vitals BP Pulse Temp Resp Height(growth percentile) Weight(growth percentile) (!) 178/110 (BP 1 Location: Left arm, BP Patient Position: Sitting) 67 96.2 °F (35.7 °C) (Oral) 16 6' 2\" (1.88 m) 298 lb 6.4 oz (135.4 kg) SpO2 BMI Smoking Status 99% 38.31 kg/m2 Never Smoker Vitals History BMI and BSA Data Body Mass Index Body Surface Area  
 38.31 kg/m 2 2.66 m 2 Preferred Pharmacy Pharmacy Name Phone [facility] [address], [address] [place] [address] [phone] Your Updated Medication List  
  
   
This list is accurate as of: 11/9/17  8:36 AM.  Always use your most recent med list.  
  
  
  
  
 enalapril 20 mg tablet Commonly known as:  [place] Take 1 Tab by mouth two (2) times a day. levothyroxine 200 mcg tablet Commonly known as:  SYNTHROID Take 1 Tab by mouth Daily (before breakfast). metoprolol tartrate 50 mg tablet Commonly known as:  LOPRESSOR Take 1 Tab by mouth two (2) times a day. Prescriptions Sent to Pharmacy Refills  
 levothyroxine (SYNTHROID) 200 mcg tablet 4 Sig: Take 1 Tab by mouth Daily (before breakfast). Class: Normal  
 Pharmacy: [id number], [address] Ph #: [phone] Route: Oral  
 enalapril (VASOTEC) 20 mg tablet 4 Sig: Take 1 Tab by mouth two (2) times a day. Class: Normal  
 Pharmacy: [id number], [address] Ph #: [phone] Route: Oral  
 metoprolol tartrate (LOPRESSOR) 50 mg tablet 4 Sig: Take 1 Tab by mouth two (2) times a day. Class: Normal  
 Pharmacy: [id number], [address] Ph #: [phone] Route: Oral  
  
We Performed the Following AMB POC EKG ROUTINE W/ 12 LEADS, INTER & REP [02106 CPT(R)] Follow-up Instructions Return in about 4 weeks (around 12/7/2017) for EOV echo prior, EKG next visit. To-Do List   
 11/09/2017 ECHO:  2D ECHO COMPLETE ADULT (TTE) W OR WO CONTR Introducing [facility] & HEALTH SERVICES! [facility] introduces BioTime patient portal. Now you can access parts of your medical record, email your doctor's office, and request medication refills online. 1. In your internet browser, go to [email or web address] 2. Click on the First Time User? Click Here link in the Sign In box. You will see the New Member Sign Up page. 3. Enter your BioTime Access Code exactly as it appears below. You will not need to use this code after youve completed the sign-up process.  If you do not sign up before the expiration date, you must request a new code. · SNAPCARD Access Code: [facility] Expires: 1/1/2018  8:42 AM 
 
4. Enter the last four digits of your Social Security Number (xxxx) and Date of Birth (mm/dd/yyyy) as indicated and click Submit. You will be taken to the next sign-up page. 5. Create a SNAPCARD ID. This will be your SNAPCARD login ID and cannot be changed, so think of one that is secure and easy to remember. 6. Create a SNAPCARD password. You can change your password at any time. 7. Enter your Password Reset Question and Answer. This can be used at a later time if you forget your password. 8. Enter your e-mail address. You will receive e-mail notification when new information is available in [address] 9. Click Sign Up. You can now view and download portions of your medical record. 10. Click the Download Summary menu link to download a portable copy of your medical information. If you have questions, please visit the Frequently Asked Questions section of the SNAPCARD website. Remember, SNAPCARD is NOT to be used for urgent needs. For medical emergencies, dial 911. Now available from your iPhone and Android! Please provide this summary of care documentation to your next provider. Your primary care clinician is listed as [id number] [facility]. If you have any questions after today's visit, please call [phone].

## 2018-07-26 ENCOUNTER — OFFICE VISIT (OUTPATIENT)
Dept: FAMILY MEDICINE CLINIC | Age: 43
End: 2018-07-26

## 2018-07-26 ENCOUNTER — HOSPITAL ENCOUNTER (OUTPATIENT)
Dept: LAB | Age: 43
Discharge: HOME | End: 2018-07-26
Payer: SELF-PAY

## 2018-07-26 VITALS
OXYGEN SATURATION: 98 % | RESPIRATION RATE: 20 BRPM | HEIGHT: 74 IN | TEMPERATURE: 97.8 F | DIASTOLIC BLOOD PRESSURE: 140 MMHG | WEIGHT: 315 LBS | HEART RATE: 82 BPM | BODY MASS INDEX: 40.43 KG/M2 | SYSTOLIC BLOOD PRESSURE: 200 MMHG

## 2018-07-26 DIAGNOSIS — I10 HYPERTENSION, UNSPECIFIED TYPE: ICD-10-CM

## 2018-07-26 DIAGNOSIS — I42.9 CARDIOMYOPATHY, UNSPECIFIED TYPE (HCC): ICD-10-CM

## 2018-07-26 DIAGNOSIS — E03.9 HYPOTHYROIDISM, UNSPECIFIED TYPE: ICD-10-CM

## 2018-07-26 DIAGNOSIS — E66.01 OBESITY, MORBID (HCC): ICD-10-CM

## 2018-07-26 DIAGNOSIS — I10 HYPERTENSION, UNSPECIFIED TYPE: Primary | ICD-10-CM

## 2018-07-26 LAB
ALBUMIN SERPL-MCNC: 3.5 G/DL (ref 3.4–5)
ALBUMIN/GLOB SERPL: 0.9 {RATIO} (ref 0.8–1.7)
ALP SERPL-CCNC: 82 U/L (ref 45–117)
ALT SERPL-CCNC: 22 U/L (ref 16–61)
ANION GAP SERPL CALC-SCNC: 5 MMOL/L (ref 3–18)
APPEARANCE UR: CLEAR
AST SERPL-CCNC: 20 U/L (ref 15–37)
BACTERIA URNS QL MICRO: NEGATIVE /HPF
BASOPHILS # BLD: 0 K/UL (ref 0–0.1)
BASOPHILS NFR BLD: 0 % (ref 0–2)
BILIRUB SERPL-MCNC: 0.4 MG/DL (ref 0.2–1)
BILIRUB UR QL: NEGATIVE
BUN SERPL-MCNC: 17 MG/DL (ref 7–18)
BUN/CREAT SERPL: 16 (ref 12–20)
CALCIUM SERPL-MCNC: 8.5 MG/DL (ref 8.5–10.1)
CHLORIDE SERPL-SCNC: 105 MMOL/L (ref 100–108)
CHOLEST SERPL-MCNC: 128 MG/DL
CO2 SERPL-SCNC: 28 MMOL/L (ref 21–32)
COLOR UR: YELLOW
CREAT SERPL-MCNC: 1.04 MG/DL (ref 0.6–1.3)
DIFFERENTIAL METHOD BLD: ABNORMAL
EOSINOPHIL # BLD: 0.2 K/UL (ref 0–0.4)
EOSINOPHIL NFR BLD: 3 % (ref 0–5)
EPITH CASTS URNS QL MICRO: NORMAL /LPF (ref 0–5)
ERYTHROCYTE [DISTWIDTH] IN BLOOD BY AUTOMATED COUNT: 15.3 % (ref 11.6–14.5)
GLOBULIN SER CALC-MCNC: 3.9 G/DL (ref 2–4)
GLUCOSE SERPL-MCNC: 92 MG/DL (ref 74–99)
GLUCOSE UR STRIP.AUTO-MCNC: NEGATIVE MG/DL
HCT VFR BLD AUTO: 42.5 % (ref 36–48)
HDLC SERPL-MCNC: 54 MG/DL (ref 40–60)
HDLC SERPL: 2.4 {RATIO} (ref 0–5)
HGB BLD-MCNC: 13.9 G/DL (ref 13–16)
HGB UR QL STRIP: NEGATIVE
KETONES UR QL STRIP.AUTO: NEGATIVE MG/DL
LDLC SERPL CALC-MCNC: 62.6 MG/DL (ref 0–100)
LEUKOCYTE ESTERASE UR QL STRIP.AUTO: NEGATIVE
LIPID PROFILE,FLP: NORMAL
LYMPHOCYTES # BLD: 1.8 K/UL (ref 0.9–3.6)
LYMPHOCYTES NFR BLD: 28 % (ref 21–52)
MCH RBC QN AUTO: 28.3 PG (ref 24–34)
MCHC RBC AUTO-ENTMCNC: 32.7 G/DL (ref 31–37)
MCV RBC AUTO: 86.6 FL (ref 74–97)
MONOCYTES # BLD: 0.6 K/UL (ref 0.05–1.2)
MONOCYTES NFR BLD: 10 % (ref 3–10)
NEUTS SEG # BLD: 3.7 K/UL (ref 1.8–8)
NEUTS SEG NFR BLD: 59 % (ref 40–73)
NITRITE UR QL STRIP.AUTO: NEGATIVE
PH UR STRIP: 5.5 [PH] (ref 5–8)
PLATELET # BLD AUTO: 252 K/UL (ref 135–420)
PMV BLD AUTO: 9.8 FL (ref 9.2–11.8)
POTASSIUM SERPL-SCNC: 3.8 MMOL/L (ref 3.5–5.5)
PROT SERPL-MCNC: 7.4 G/DL (ref 6.4–8.2)
PROT UR STRIP-MCNC: 30 MG/DL
RBC # BLD AUTO: 4.91 M/UL (ref 4.7–5.5)
RBC #/AREA URNS HPF: 0 /HPF (ref 0–5)
SODIUM SERPL-SCNC: 138 MMOL/L (ref 136–145)
SP GR UR REFRACTOMETRY: 1.02 (ref 1–1.03)
T4 FREE SERPL-MCNC: 0.8 NG/DL (ref 0.7–1.5)
TRIGL SERPL-MCNC: 57 MG/DL (ref ?–150)
TSH SERPL DL<=0.05 MIU/L-ACNC: 9.3 UIU/ML (ref 0.36–3.74)
UROBILINOGEN UR QL STRIP.AUTO: 1 EU/DL (ref 0.2–1)
VLDLC SERPL CALC-MCNC: 11.4 MG/DL
WBC # BLD AUTO: 6.3 K/UL (ref 4.6–13.2)
WBC URNS QL MICRO: NORMAL /HPF (ref 0–4)

## 2018-07-26 PROCEDURE — 81001 URINALYSIS AUTO W/SCOPE: CPT | Performed by: FAMILY MEDICINE

## 2018-07-26 PROCEDURE — 80053 COMPREHEN METABOLIC PANEL: CPT | Performed by: FAMILY MEDICINE

## 2018-07-26 PROCEDURE — 80061 LIPID PANEL: CPT | Performed by: FAMILY MEDICINE

## 2018-07-26 PROCEDURE — 85025 COMPLETE CBC W/AUTO DIFF WBC: CPT | Performed by: FAMILY MEDICINE

## 2018-07-26 PROCEDURE — 84439 ASSAY OF FREE THYROXINE: CPT | Performed by: FAMILY MEDICINE

## 2018-07-26 PROCEDURE — 36415 COLL VENOUS BLD VENIPUNCTURE: CPT | Performed by: FAMILY MEDICINE

## 2018-07-26 PROCEDURE — 84443 ASSAY THYROID STIM HORMONE: CPT | Performed by: FAMILY MEDICINE

## 2018-07-26 RX ORDER — AMLODIPINE BESYLATE 10 MG/1
10 TABLET ORAL DAILY
Qty: 30 TAB | Refills: 1 | Status: SHIPPED | OUTPATIENT
Start: 2018-07-26 | End: 2018-08-21 | Stop reason: SDUPTHER

## 2018-07-26 NOTE — PROGRESS NOTES
Sudheer Hoffman is a 36 y.o.  male and presents with    Chief Complaint   Patient presents with    Cardiomyopathy    Hypertension    Thyroid Problem    Morbid Obesity           Subjective:  Here for f/u . Missed appt 5 mo ago. Never got echo. Never seen by cardiology. Has been out of town. Cardiovascular Review:  The patient has hypertension and cardiomyopathy. Diet and Lifestyle: not attempting to follow a low fat, low cholesterol diet, not attempting to follow a low sodium diet  Home BP Monitoring: is not measured at home. Pertinent ROS: taking medications as instructed, no medication side effects noted, no TIA's, no chest pain on exertion, no dyspnea on exertion, no swelling of ankles. Thyroid Review:  Patient is seen for followup of hypothyroidism. Thyroid ROS: denies fatigue, weight changes, heat/cold intolerance, bowel/skin changes or CVS symptoms. 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 90 Tab 4    enalapril (VASOTEC) 20 mg tablet Take 1 Tab by mouth two (2) times a day. 180 Tab 4    metoprolol tartrate (LOPRESSOR) 50 mg tablet Take 1 Tab by mouth two (2) times a day. 180 Tab 4     No Known Allergies  Past Medical History:   Diagnosis Date    [facility] 7/21/2014    Gastric bypass status for obesity 8/8/2011    HTN (hypertension) 8/8/2011    Hypothyroid 8/8/2011    Post corneal transplant 7/21/2014     History reviewed. No pertinent surgical history. History reviewed.  No pertinent family history. Social History   Substance Use Topics    Smoking status: Never Smoker    Smokeless tobacco: Never Used    Alcohol use No       ROS       All other systems reviewed and are negative. Objective:  Vitals:    07/26/18 0752 07/26/18 0757   BP: (!) 211/139 (!) 200/140   Pulse: 80 82   Resp: 20    Temp: 97.8 °F (36.6 °C)    TempSrc: Oral    SpO2: 98%    Weight: 315 lb 3.2 oz (143 kg)    Height: 6' 2\" (1.88 m)    PainSc:   8    PainLoc: Back                  alert, well appearing, and in no distress, oriented to person, place, and time and overweight  Chest - clear to auscultation, no wheezes, rales or rhonchi, symmetric air entry  Heart - normal rate, regular rhythm, normal S1, S2, no murmurs, rubs, clicks or gallops  Abdomen - soft, nontender, nondistended, no masses or organomegaly        LABS     TESTS  Sinus  Rhythm   -Left atrial enlargement.    -Nonspecific ST depression   +   Negative T-waves  -Nondiagnostic  -Possible  Lateral  ischemia. Assessment/Plan:    Hypertension - poorly controlled, patient poorly compliant, will try adding norvasc and f/u 1 mo  Thyroid check labs today  Obesity ongiong  cardiomypathy ongiong check echo, consult cardio f/u 1 mo    Lab review: orders written for new lab studies as appropriate; see orders    Diagnoses and all orders for this visit:    1. Hypertension, unspecified type  -     AMB POC EKG ROUTINE W/ 12 LEADS, INTER & REP  -     CBC WITH AUTOMATED DIFF; Future  -     LIPID PANEL; Future  -     METABOLIC PANEL, COMPREHENSIVE; Future  -     TSH 3RD GENERATION; Future  -     T4, FREE; Future  -     URINALYSIS W/ RFLX MICROSCOPIC; Future  -     ECHO ADULT COMPLETE; Future  -     REFERRAL TO CARDIOLOGY    2. Cardiomyopathy, unspecified type (HCC)  -     AMB POC EKG ROUTINE W/ 12 LEADS, INTER & REP  -     CBC WITH AUTOMATED DIFF; Future  -     LIPID PANEL; Future  -     METABOLIC PANEL, COMPREHENSIVE; Future  -     TSH 3RD GENERATION;  Future  -     T4, FREE; Future  -     URINALYSIS W/ RFLX MICROSCOPIC; Future  -     ECHO ADULT COMPLETE; Future  -     REFERRAL TO CARDIOLOGY    3. Obesity, morbid (HCC)  -     AMB POC EKG ROUTINE W/ 12 LEADS, INTER & REP  -     CBC WITH AUTOMATED DIFF; Future  -     LIPID PANEL; Future  -     METABOLIC PANEL, COMPREHENSIVE; Future  -     TSH 3RD GENERATION; Future  -     T4, FREE; Future  -     URINALYSIS W/ RFLX MICROSCOPIC; Future  -     ECHO ADULT COMPLETE; Future  -     REFERRAL TO CARDIOLOGY    4. Class 3 obesity with serious comorbidity and body mass index (BMI) of 45.0 to 49.9 in adult, unspecified obesity type (HCC)  -     AMB POC EKG ROUTINE W/ 12 LEADS, INTER & REP  -     CBC WITH AUTOMATED DIFF; Future  -     LIPID PANEL; Future  -     METABOLIC PANEL, COMPREHENSIVE; Future  -     TSH 3RD GENERATION; Future  -     T4, FREE; Future  -     URINALYSIS W/ RFLX MICROSCOPIC; Future  -     ECHO ADULT COMPLETE; Future  -     REFERRAL TO CARDIOLOGY    5. Hypothyroidism, unspecified type  -     AMB POC EKG ROUTINE W/ 12 LEADS, INTER & REP  -     CBC WITH AUTOMATED DIFF; Future  -     LIPID PANEL; Future  -     METABOLIC PANEL, COMPREHENSIVE; Future  -     TSH 3RD GENERATION; Future  -     T4, FREE; Future  -     URINALYSIS W/ RFLX MICROSCOPIC; Future  -     ECHO ADULT COMPLETE; Future  -     REFERRAL TO CARDIOLOGY          I have discussed the diagnosis with the patient and the intended plan as seen in the above orders. The patient has received an after-visit summary and questions were answered concerning future plans. I have discussed medication side effects and warnings with the patient as well. I have reviewed the plan of care with the patient, accepted their input and they are in agreement with the treatment goals. Follow-up Disposition:  Return in about 4 weeks (around 8/23/2018) for EOV, labs today, echo prior.

## 2018-07-26 NOTE — MR AVS SNAPSHOT
[address] [place] [address] [address] [place] [id number] 
[phone] Patient: Emily Guevara MRN: [id number] ADD:1/89/8814 Visit Information Date & Time Provider Department Dept. Phone Encounter #  
 7/26/2018  8:00 AM [facility] Vazquez [phone] [id number] Follow-up Instructions Return in about 4 weeks (around 8/23/2018) for EOV, labs today, echo prior. Follow-up and Disposition History Upcoming Health Maintenance Date Due Influenza Age 5 to Adult 8/1/2018 DTaP/Tdap/Td series (2 - Td) 12/23/2021 Allergies as of 7/26/2018  Review Complete On: 7/26/2018 By: Krysta Gutierrez, DO No Known Allergies Current Immunizations  Reviewed on 8/24/2015 Name Date Influenza Vaccine 8/24/2015 Influenza Vaccine (Quad) PF 10/4/2017 Influenza Vaccine Split 12/23/2011 TDAP Vaccine 12/23/2011 ZZZ-RETIRED (DO NOT USE) Pneumococcal Vaccine (Unspecified Type) 12/23/2011 Not reviewed this visit You Were Diagnosed With   
  
 Codes Comments Hypertension, unspecified type    -  Primary ICD-10-CM: I10 
ICD-9-CM: 401.9 Cardiomyopathy, unspecified [facility] ([facility]     ICD-10-CM: I42.9 ICD-9-CM: 425.4 Obesity, morbid ([facility]     ICD-10-CM: E66.01 
ICD-9-CM: 278.01 Class 3 obesity with serious comorbidity and body mass index (BMI) of 45.0 to 49.9 in adult, unspecified obesity type ([facility]     ICD-10-CM: E66.9, Z68.42 
ICD-9-CM: 278.00, V85.42 Hypothyroidism, unspecified type     ICD-10-CM: E03.9 ICD-9-CM: 394. 9 Vitals BP Pulse Temp Resp Height(growth percentile) Weight(growth percentile) (!) 200/140 (BP 1 Location: Right arm, BP Patient Position: Sitting) 82 97.8 °F (36.6 °C) (Oral) 20 6' 2\" (1.88 m) 315 lb 3.2 oz (143 kg) SpO2 BMI Smoking Status 98% 40.47 kg/m2 Never Smoker Vitals History BMI and BSA Data Body Mass Index Body Surface Area 40.47 kg/m 2 2.73 m 2 Preferred Pharmacy Pharmacy Name Phone [address], [address] [phone] Your Updated Medication List  
  
   
This list is accurate as of 7/26/18  8:19 AM.  Always use your most recent med list.  
  
  
  
  
 enalapril 20 mg tablet Commonly known as:  Francenia Big Take 1 Tab by mouth two (2) times a day. levothyroxine 200 mcg tablet Commonly known as:  SYNTHROID Take 1 Tab by mouth Daily (before breakfast). metoprolol tartrate 50 mg tablet Commonly known as:  LOPRESSOR Take 1 Tab by mouth two (2) times a day. We Performed the Following AMB POC EKG ROUTINE W/ 12 LEADS, INTER & REP [72907 CPT(R)] REFERRAL TO CARDIOLOGY [AIP18 Custom] Comments:  
 Please evaluate patient for malignant htn and cardiomyopathy Follow-up Instructions Return in about 4 weeks (around 8/23/2018) for EOV, labs today, echo prior. To-Do List   
 07/26/2018 Lab:  CBC WITH AUTOMATED DIFF   
  
 07/26/2018 Echocardiography:  ECHO ADULT COMPLETE   
  
 07/26/2018 Lab:  LIPID PANEL   
  
 07/26/2018 Lab:  METABOLIC PANEL, COMPREHENSIVE   
  
 07/26/2018 Lab:  T4, FREE   
  
 07/26/2018 Lab:  TSH 3RD GENERATION   
  
 07/26/2018 Lab:  URINALYSIS W/ RFLX MICROSCOPIC Referral Information Referral ID Referred By Referred To  
  
 [id number] Rosario Patino, [address] [place] [address], MD   
   [address] [place] [address] [address] [facility] Phone: [phone] Fax: [phone] Visits Status Start Date End Date 1 New Request 7/26/18 7/26/19 If your referral has a status of pending review or denied, additional information will be sent to support the outcome of this decision. Introducing [facility] & HEALTH SERVICES!    
 [employer] introduces [employer] patient portal. Now you can access parts of your medical record, email your doctor's office, and request medication refills online. 1. In your internet browser, go to [email or web address] 2. Click on the First Time User? Click Here link in the Sign In box. You will see the New Member Sign Up page. 3. Enter your [employer] Access Code exactly as it appears below. You will not need to use this code after youve completed the sign-up process. If you do not sign up before the expiration date, you must request a new code. · [employer] Access Code: [email or web address] Expires: 10/24/2018  7:39 AM 
 
4. Enter the last four digits of your Social Security Number (xxxx) and Date of Birth (mm/dd/yyyy) as indicated and click Submit. You will be taken to the next sign-up page. 5. Create a Causes ID. This will be your [employer]auses login ID and cannot be changed, so think of one that is secure and easy to remember. 6. Create a Causes password. You can change your password at any time. 7. Enter your Password Reset Question and Answer. This can be used at a later time if you forget your password. 8. Enter your e-mail address. You will receive e-mail notification when new information is available in [address] 9. Click Sign Up. You can now view and download portions of your medical record. 10. Click the Download Summary menu link to download a portable copy of your medical information. If you have questions, please visit the Frequently Asked Questions section of the [employer] website. Remember, Causes is NOT to be used for urgent needs. For medical emergencies, dial 911. Now available from your iPhone and Android! Please provide this summary of care documentation to your next provider. Your primary care clinician is listed as [address] If you have any questions after today's visit, please call [phone].

## 2018-07-26 NOTE — PROGRESS NOTES
Room #      SUBJECTIVE:    Sandy Slater is a 36 y.o. male who presents today for follow up visit patient c/o sharp pain in back and ache to right knee  1. Have you been to the ER, urgent care clinic since your last visit? Hospitalized since your last visit? NO    2. Have you seen or consulted any other health care providers outside of the [address] [place] [address] since your last visit? Include any pap smears or colon screening. NO  When :  Reason:    Health Maintenance reviewed Up to date    There are no preventive care reminders to display for this patient.

## 2018-08-03 ENCOUNTER — HOSPITAL ENCOUNTER (OUTPATIENT)
Dept: NON INVASIVE DIAGNOSTICS | Age: 43
Discharge: HOME | End: 2018-08-03
Attending: FAMILY MEDICINE
Payer: SELF-PAY

## 2018-08-03 DIAGNOSIS — E03.9 HYPOTHYROIDISM, UNSPECIFIED TYPE: ICD-10-CM

## 2018-08-03 DIAGNOSIS — I10 HYPERTENSION, UNSPECIFIED TYPE: ICD-10-CM

## 2018-08-03 DIAGNOSIS — E66.01 OBESITY, MORBID (HCC): ICD-10-CM

## 2018-08-03 DIAGNOSIS — I42.9 CARDIOMYOPATHY, UNSPECIFIED TYPE (HCC): ICD-10-CM

## 2018-08-03 LAB
ECHO AO ASC DIAM: 4.26 CM
ECHO AO ROOT DIAM: 3.71 CM
ECHO AV AREA PEAK VELOCITY: -6.5 CM2
ECHO AV AREA/BSA PEAK VELOCITY: -2.4 CM2/M2
ECHO AV PEAK GRADIENT: 1 MMHG
ECHO AV PEAK VELOCITY: -48.77 CM/S
ECHO LA VOL 2C: 75.53 ML (ref 18–58)
ECHO LA VOL 4C: 106.76 ML (ref 18–58)
ECHO LV EDV A2C: 202.6 ML
ECHO LV EDV A4C: 213.8 ML
ECHO LV EDV BP: 210 ML (ref 67–155)
ECHO LV EJECTION FRACTION A2C: 47 %
ECHO LV EJECTION FRACTION A4C: 46 %
ECHO LV EJECTION FRACTION BIPLANE: 46.5 % (ref 55–100)
ECHO LV ESV A2C: 106.9 ML
ECHO LV ESV A4C: 115.4 ML
ECHO LV ESV BP: 112.3 ML (ref 22–58)
ECHO LV INTERNAL DIMENSION DIASTOLIC: 5.74 CM (ref 4.2–5.9)
ECHO LV INTERNAL DIMENSION SYSTOLIC: 4.2 CM
ECHO LV IVSD: 0.99 CM (ref 0.6–1)
ECHO LV MASS 2D: 319.6 G (ref 88–224)
ECHO LV POSTERIOR WALL DIASTOLIC: 1.26 CM (ref 0.6–1)
ECHO LVOT DIAM: 2.1 CM
ECHO LVOT PEAK GRADIENT: 3.4 MMHG
ECHO LVOT PEAK VELOCITY: 91.95 CM/S
ECHO MV A VELOCITY: 86.74 CM/S
ECHO MV AREA PHT: 3.3 CM2
ECHO MV E DECELERATION TIME (DT): 231.5 MS
ECHO MV E VELOCITY: 0.68 CM/S
ECHO MV E/A RATIO: 0.01
ECHO MV MAX VELOCITY: 101.15 CM/S
ECHO MV MEAN GRADIENT: 1.2 MMHG
ECHO MV PEAK GRADIENT: 4.1 MMHG
ECHO MV PRESSURE HALF TIME (PHT): 67.1 MS
ECHO MV VTI: 3.05 CM
ECHO TV REGURGITANT MAX VELOCITY: -82.15 CM/S
ECHO TV REGURGITANT PEAK GRADIENT: 2.7 MMHG

## 2018-08-03 PROCEDURE — 93306 TTE W/DOPPLER COMPLETE: CPT

## 2018-08-13 ENCOUNTER — APPOINTMENT (OUTPATIENT)
Dept: GENERAL RADIOLOGY | Age: 43
DRG: 247 | End: 2018-08-13
Attending: EMERGENCY MEDICINE
Payer: MEDICARE

## 2018-08-13 ENCOUNTER — HOSPITAL ENCOUNTER (INPATIENT)
Age: 43
LOS: 2 days | Discharge: HOME | DRG: 247 | End: 2018-08-15
Attending: EMERGENCY MEDICINE | Admitting: FAMILY MEDICINE
Payer: MEDICARE

## 2018-08-13 DIAGNOSIS — I16.0 HYPERTENSIVE URGENCY: ICD-10-CM

## 2018-08-13 DIAGNOSIS — I21.3 STEMI (ST ELEVATION MYOCARDIAL INFARCTION) (HCC): ICD-10-CM

## 2018-08-13 DIAGNOSIS — I21.3 ST ELEVATION MYOCARDIAL INFARCTION (STEMI), UNSPECIFIED ARTERY (HCC): Primary | ICD-10-CM

## 2018-08-13 LAB
ANION GAP SERPL CALC-SCNC: 7 MMOL/L (ref 3–18)
BASOPHILS # BLD: 0 K/UL (ref 0–0.1)
BASOPHILS NFR BLD: 0 % (ref 0–2)
BNP SERPL-MCNC: 777 PG/ML (ref 0–450)
BUN SERPL-MCNC: 15 MG/DL (ref 7–18)
BUN/CREAT SERPL: 14 (ref 12–20)
CALCIUM SERPL-MCNC: 9.5 MG/DL (ref 8.5–10.1)
CHLORIDE SERPL-SCNC: 102 MMOL/L (ref 100–108)
CK MB CFR SERPL CALC: 1.6 % (ref 0–4)
CK MB SERPL-MCNC: 2.3 NG/ML (ref 5–25)
CK SERPL-CCNC: 144 U/L (ref 39–308)
CO2 SERPL-SCNC: 29 MMOL/L (ref 21–32)
CRD SYSTOLIC BP: 157
CREAT SERPL-MCNC: 1.06 MG/DL (ref 0.6–1.3)
DIFFERENTIAL METHOD BLD: ABNORMAL
END DIASTOLIC PRESSURE: 10
EOSINOPHIL # BLD: 0.1 K/UL (ref 0–0.4)
EOSINOPHIL NFR BLD: 2 % (ref 0–5)
ERYTHROCYTE [DISTWIDTH] IN BLOOD BY AUTOMATED COUNT: 14.9 % (ref 11.6–14.5)
GLUCOSE SERPL-MCNC: 143 MG/DL (ref 74–99)
HCT VFR BLD AUTO: 47.1 % (ref 36–48)
HGB BLD-MCNC: 15.8 G/DL (ref 13–16)
LYMPHOCYTES # BLD: 1 K/UL (ref 0.9–3.6)
LYMPHOCYTES NFR BLD: 14 % (ref 21–52)
MCH RBC QN AUTO: 28.6 PG (ref 24–34)
MCHC RBC AUTO-ENTMCNC: 33.5 G/DL (ref 31–37)
MCV RBC AUTO: 85.2 FL (ref 74–97)
MONOCYTES # BLD: 0.6 K/UL (ref 0.05–1.2)
MONOCYTES NFR BLD: 8 % (ref 3–10)
NEUTS SEG # BLD: 5.6 K/UL (ref 1.8–8)
NEUTS SEG NFR BLD: 76 % (ref 40–73)
PLATELET # BLD AUTO: 205 K/UL (ref 135–420)
PMV BLD AUTO: 9.3 FL (ref 9.2–11.8)
POTASSIUM SERPL-SCNC: 3.9 MMOL/L (ref 3.5–5.5)
RBC # BLD AUTO: 5.53 M/UL (ref 4.7–5.5)
SODIUM SERPL-SCNC: 138 MMOL/L (ref 136–145)
TROPONIN I BLD-MCNC: 0.14 NG/ML (ref 0–0.08)
TROPONIN I SERPL-MCNC: 0.19 NG/ML (ref 0–0.04)
WBC # BLD AUTO: 7.3 K/UL (ref 4.6–13.2)

## 2018-08-13 PROCEDURE — B2151ZZ FLUOROSCOPY OF LEFT HEART USING LOW OSMOLAR CONTRAST: ICD-10-PCS | Performed by: ANESTHESIOLOGY

## 2018-08-13 PROCEDURE — 80048 BASIC METABOLIC PNL TOTAL CA: CPT | Performed by: NURSE PRACTITIONER

## 2018-08-13 PROCEDURE — 83880 ASSAY OF NATRIURETIC PEPTIDE: CPT | Performed by: NURSE PRACTITIONER

## 2018-08-13 PROCEDURE — 74011250637 HC RX REV CODE- 250/637

## 2018-08-13 PROCEDURE — 74011250636 HC RX REV CODE- 250/636: Performed by: INTERNAL MEDICINE

## 2018-08-13 PROCEDURE — 77030012597: Performed by: INTERNAL MEDICINE

## 2018-08-13 PROCEDURE — 93458 L HRT ARTERY/VENTRICLE ANGIO: CPT | Performed by: INTERNAL MEDICINE

## 2018-08-13 PROCEDURE — 99153 MOD SED SAME PHYS/QHP EA: CPT | Performed by: INTERNAL MEDICINE

## 2018-08-13 PROCEDURE — C1894 INTRO/SHEATH, NON-LASER: HCPCS | Performed by: INTERNAL MEDICINE

## 2018-08-13 PROCEDURE — 77030008543 HC TBNG MON PRSS MRTM -A: Performed by: INTERNAL MEDICINE

## 2018-08-13 PROCEDURE — 74011250636 HC RX REV CODE- 250/636: Performed by: NURSE PRACTITIONER

## 2018-08-13 PROCEDURE — 99152 MOD SED SAME PHYS/QHP 5/>YRS: CPT | Performed by: INTERNAL MEDICINE

## 2018-08-13 PROCEDURE — 77030012468 HC VLV BLEEDBK CNTRL ABBT -B: Performed by: INTERNAL MEDICINE

## 2018-08-13 PROCEDURE — 77030004534 HC CATH ANGI DX INFN CARD -A: Performed by: INTERNAL MEDICINE

## 2018-08-13 PROCEDURE — 74011250636 HC RX REV CODE- 250/636

## 2018-08-13 PROCEDURE — 74011000250 HC RX REV CODE- 250

## 2018-08-13 PROCEDURE — 85347 COAGULATION TIME ACTIVATED: CPT

## 2018-08-13 PROCEDURE — 74011250637 HC RX REV CODE- 250/637: Performed by: INTERNAL MEDICINE

## 2018-08-13 PROCEDURE — 84484 ASSAY OF TROPONIN QUANT: CPT | Performed by: NURSE PRACTITIONER

## 2018-08-13 PROCEDURE — 77030013797 HC KT TRNSDUC PRSSR EDWD -A: Performed by: INTERNAL MEDICINE

## 2018-08-13 PROCEDURE — B2111ZZ FLUOROSCOPY OF MULTIPLE CORONARY ARTERIES USING LOW OSMOLAR CONTRAST: ICD-10-PCS | Performed by: ANESTHESIOLOGY

## 2018-08-13 PROCEDURE — 77030013519 HC DEV INFL BASIX MRTM -B: Performed by: INTERNAL MEDICINE

## 2018-08-13 PROCEDURE — 77030013761 HC KT HRT LFT ANGI -B: Performed by: INTERNAL MEDICINE

## 2018-08-13 PROCEDURE — 82550 ASSAY OF CK (CPK): CPT | Performed by: NURSE PRACTITIONER

## 2018-08-13 PROCEDURE — 93005 ELECTROCARDIOGRAM TRACING: CPT

## 2018-08-13 PROCEDURE — 65610000006 HC RM INTENSIVE CARE

## 2018-08-13 PROCEDURE — C1725 CATH, TRANSLUMIN NON-LASER: HCPCS | Performed by: INTERNAL MEDICINE

## 2018-08-13 PROCEDURE — 4A023N7 MEASUREMENT OF CARDIAC SAMPLING AND PRESSURE, LEFT HEART, PERCUTANEOUS APPROACH: ICD-10-PCS | Performed by: ANESTHESIOLOGY

## 2018-08-13 PROCEDURE — 99284 EMERGENCY DEPT VISIT MOD MDM: CPT

## 2018-08-13 PROCEDURE — 74011000258 HC RX REV CODE- 258: Performed by: INTERNAL MEDICINE

## 2018-08-13 PROCEDURE — 92941 PRQ TRLML REVSC TOT OCCL AMI: CPT | Performed by: INTERNAL MEDICINE

## 2018-08-13 PROCEDURE — 85025 COMPLETE CBC W/AUTO DIFF WBC: CPT | Performed by: NURSE PRACTITIONER

## 2018-08-13 PROCEDURE — 74011250637 HC RX REV CODE- 250/637: Performed by: PHYSICIAN ASSISTANT

## 2018-08-13 PROCEDURE — C1769 GUIDE WIRE: HCPCS | Performed by: INTERNAL MEDICINE

## 2018-08-13 PROCEDURE — 77030003629 HC NDL PERC VASC COOK -A: Performed by: INTERNAL MEDICINE

## 2018-08-13 PROCEDURE — 027034Z DILATION OF CORONARY ARTERY, ONE ARTERY WITH DRUG-ELUTING INTRALUMINAL DEVICE, PERCUTANEOUS APPROACH: ICD-10-PCS | Performed by: INTERNAL MEDICINE

## 2018-08-13 PROCEDURE — 74011250636 HC RX REV CODE- 250/636: Performed by: PHYSICIAN ASSISTANT

## 2018-08-13 PROCEDURE — C1887 CATHETER, GUIDING: HCPCS | Performed by: INTERNAL MEDICINE

## 2018-08-13 PROCEDURE — C1874 STENT, COATED/COV W/DEL SYS: HCPCS | Performed by: INTERNAL MEDICINE

## 2018-08-13 DEVICE — STENT RSINT40022UX MICROTRAC 4.00X22RX
Type: IMPLANTABLE DEVICE | Status: FUNCTIONAL
Brand: RESOLUTE INTEGRITY™

## 2018-08-13 RX ORDER — METOPROLOL TARTRATE 50 MG/1
50 TABLET ORAL 2 TIMES DAILY
Status: DISCONTINUED | OUTPATIENT
Start: 2018-08-13 | End: 2018-08-13 | Stop reason: SDUPTHER

## 2018-08-13 RX ORDER — METOPROLOL TARTRATE 50 MG/1
50 TABLET ORAL EVERY 12 HOURS
Status: DISCONTINUED | OUTPATIENT
Start: 2018-08-13 | End: 2018-08-14

## 2018-08-13 RX ORDER — GUAIFENESIN 100 MG/5ML
LIQUID (ML) ORAL
Status: COMPLETED
Start: 2018-08-13 | End: 2018-08-13

## 2018-08-13 RX ORDER — LIDOCAINE HYDROCHLORIDE 10 MG/ML
INJECTION, SOLUTION EPIDURAL; INFILTRATION; INTRACAUDAL; PERINEURAL AS NEEDED
Status: DISCONTINUED | OUTPATIENT
Start: 2018-08-13 | End: 2018-08-13 | Stop reason: HOSPADM

## 2018-08-13 RX ORDER — MORPHINE SULFATE 4 MG/ML
2 INJECTION INTRAVENOUS
Status: DISCONTINUED | OUTPATIENT
Start: 2018-08-13 | End: 2018-08-15 | Stop reason: HOSPADM

## 2018-08-13 RX ORDER — HEPARIN SODIUM 5000 [USP'U]/ML
4000 INJECTION, SOLUTION INTRAVENOUS; SUBCUTANEOUS
Status: DISCONTINUED | OUTPATIENT
Start: 2018-08-13 | End: 2018-08-13

## 2018-08-13 RX ORDER — MIDAZOLAM HYDROCHLORIDE 1 MG/ML
INJECTION, SOLUTION INTRAMUSCULAR; INTRAVENOUS AS NEEDED
Status: DISCONTINUED | OUTPATIENT
Start: 2018-08-13 | End: 2018-08-13 | Stop reason: HOSPADM

## 2018-08-13 RX ORDER — GUAIFENESIN 100 MG/5ML
324 LIQUID (ML) ORAL
Status: DISCONTINUED | OUTPATIENT
Start: 2018-08-13 | End: 2018-08-13

## 2018-08-13 RX ORDER — NITROGLYCERIN 0.4 MG/1
0.4 TABLET SUBLINGUAL AS NEEDED
Status: DISCONTINUED | OUTPATIENT
Start: 2018-08-13 | End: 2018-08-15 | Stop reason: HOSPADM

## 2018-08-13 RX ORDER — METOPROLOL TARTRATE 50 MG/1
50 TABLET ORAL 2 TIMES DAILY
Status: DISCONTINUED | OUTPATIENT
Start: 2018-08-13 | End: 2018-08-13

## 2018-08-13 RX ORDER — HEPARIN SODIUM 10000 [USP'U]/100ML
INJECTION, SOLUTION INTRAVENOUS
Status: DISPENSED
Start: 2018-08-13 | End: 2018-08-14

## 2018-08-13 RX ORDER — NITROGLYCERIN 0.4 MG/1
TABLET SUBLINGUAL
Status: COMPLETED
Start: 2018-08-13 | End: 2018-08-13

## 2018-08-13 RX ORDER — SODIUM CHLORIDE 0.9 % (FLUSH) 0.9 %
5-10 SYRINGE (ML) INJECTION EVERY 8 HOURS
Status: DISCONTINUED | OUTPATIENT
Start: 2018-08-13 | End: 2018-08-15 | Stop reason: HOSPADM

## 2018-08-13 RX ORDER — NITROGLYCERIN 40 MG/100ML
INJECTION INTRAVENOUS
Status: COMPLETED
Start: 2018-08-13 | End: 2018-08-13

## 2018-08-13 RX ORDER — SODIUM CHLORIDE 0.9 % (FLUSH) 0.9 %
5-10 SYRINGE (ML) INJECTION AS NEEDED
Status: DISCONTINUED | OUTPATIENT
Start: 2018-08-13 | End: 2018-08-15 | Stop reason: HOSPADM

## 2018-08-13 RX ORDER — NITROGLYCERIN 40 MG/100ML
0-20 INJECTION INTRAVENOUS
Status: DISCONTINUED | OUTPATIENT
Start: 2018-08-13 | End: 2018-08-13

## 2018-08-13 RX ORDER — HEPARIN SODIUM 1000 [USP'U]/ML
INJECTION, SOLUTION INTRAVENOUS; SUBCUTANEOUS
Status: COMPLETED
Start: 2018-08-13 | End: 2018-08-13

## 2018-08-13 RX ORDER — ASPIRIN 81 MG/1
81 TABLET ORAL DAILY
Status: DISCONTINUED | OUTPATIENT
Start: 2018-08-14 | End: 2018-08-15 | Stop reason: HOSPADM

## 2018-08-13 RX ORDER — HYDRALAZINE HYDROCHLORIDE 20 MG/ML
25 INJECTION INTRAMUSCULAR; INTRAVENOUS
Status: DISCONTINUED | OUTPATIENT
Start: 2018-08-13 | End: 2018-08-15 | Stop reason: HOSPADM

## 2018-08-13 RX ORDER — ATORVASTATIN CALCIUM 40 MG/1
80 TABLET, FILM COATED ORAL
Status: DISCONTINUED | OUTPATIENT
Start: 2018-08-13 | End: 2018-08-15 | Stop reason: HOSPADM

## 2018-08-13 RX ORDER — SODIUM CHLORIDE 9 MG/ML
100 INJECTION, SOLUTION INTRAVENOUS CONTINUOUS
Status: DISPENSED | OUTPATIENT
Start: 2018-08-13 | End: 2018-08-13

## 2018-08-13 RX ORDER — AMLODIPINE BESYLATE 10 MG/1
10 TABLET ORAL DAILY
Status: DISCONTINUED | OUTPATIENT
Start: 2018-08-13 | End: 2018-08-15 | Stop reason: HOSPADM

## 2018-08-13 RX ORDER — SODIUM CHLORIDE 9 MG/ML
100 INJECTION, SOLUTION INTRAVENOUS CONTINUOUS
Status: DISCONTINUED | OUTPATIENT
Start: 2018-08-13 | End: 2018-08-13

## 2018-08-13 RX ORDER — FENTANYL CITRATE 50 UG/ML
INJECTION, SOLUTION INTRAMUSCULAR; INTRAVENOUS AS NEEDED
Status: DISCONTINUED | OUTPATIENT
Start: 2018-08-13 | End: 2018-08-13 | Stop reason: HOSPADM

## 2018-08-13 RX ORDER — SODIUM CHLORIDE 9 MG/ML
50 INJECTION, SOLUTION INTRAVENOUS CONTINUOUS
Status: DISCONTINUED | OUTPATIENT
Start: 2018-08-13 | End: 2018-08-13

## 2018-08-13 RX ORDER — BIVALIRUDIN 250 MG/5ML
INJECTION, POWDER, LYOPHILIZED, FOR SOLUTION INTRAVENOUS AS NEEDED
Status: DISCONTINUED | OUTPATIENT
Start: 2018-08-13 | End: 2018-08-13

## 2018-08-13 RX ADMIN — HYDRALAZINE HYDROCHLORIDE 25 MG: 20 INJECTION INTRAMUSCULAR; INTRAVENOUS at 17:30

## 2018-08-13 RX ADMIN — AMLODIPINE BESYLATE 10 MG: 10 TABLET ORAL at 15:37

## 2018-08-13 RX ADMIN — METOPROLOL TARTRATE 50 MG: 50 TABLET ORAL at 22:14

## 2018-08-13 RX ADMIN — Medication 10 ML: at 15:41

## 2018-08-13 RX ADMIN — HEPARIN SODIUM 4000 UNITS: 1000 INJECTION, SOLUTION INTRAVENOUS; SUBCUTANEOUS at 12:29

## 2018-08-13 RX ADMIN — MORPHINE SULFATE 2 MG: 4 INJECTION INTRAVENOUS at 20:01

## 2018-08-13 RX ADMIN — METOPROLOL TARTRATE 50 MG: 50 TABLET ORAL at 15:38

## 2018-08-13 RX ADMIN — ASPIRIN 81 MG 324 MG: 81 TABLET ORAL at 12:25

## 2018-08-13 RX ADMIN — Medication 10 ML: at 22:15

## 2018-08-13 RX ADMIN — ATORVASTATIN CALCIUM 80 MG: 40 TABLET, FILM COATED ORAL at 22:14

## 2018-08-13 RX ADMIN — NITROGLYCERIN: 0.4 TABLET SUBLINGUAL at 12:33

## 2018-08-13 RX ADMIN — SODIUM CHLORIDE 100 ML/HR: 900 INJECTION, SOLUTION INTRAVENOUS at 18:00

## 2018-08-13 RX ADMIN — SODIUM CHLORIDE 50 ML/HR: 900 INJECTION, SOLUTION INTRAVENOUS at 15:40

## 2018-08-13 RX ADMIN — NITROGLYCERIN: 0.4 TABLET SUBLINGUAL at 12:27

## 2018-08-13 RX ADMIN — NITROGLYCERIN: 40 INJECTION INTRAVENOUS at 12:34

## 2018-08-13 NOTE — Clinical Note
Lesion 1. Balloon inserted. Balloon inflated using single inflation technique. Lesion 1: Pressure = 10 maxi; Duration = 30 sec.

## 2018-08-13 NOTE — Clinical Note
Lesion 1. Balloon inserted. Balloon inflated using single inflation technique. Lesion 1: Pressure = 12 maxi; Duration = 30 sec.

## 2018-08-13 NOTE — ED NOTES
All valuables given to gladys per the patient    Patient transported to cath lab with the cardiac cath team

## 2018-08-13 NOTE — ED NOTES
Patient states the chest pain started at 0900 with diaphoesis, states he has had CP intermittently for the past few days.      Patient states he has had a cardiac cath several years ago with no blockages found    States he had aHX of diabetes but not being treated as he had gastric bypass

## 2018-08-13 NOTE — ED PROVIDER NOTES
EMERGENCY DEPARTMENT HISTORY AND PHYSICAL EXAM    12:29 PM      Date: 8/13/2018  Patient Name: Corie Paul    History of Presenting Illness     Chief Complaint   Patient presents with    Chest Pain         History Provided By: Patient and Patient's Niece    Chief Complaint: Patient  Duration: 3 Hours ago  Timing:  Worsening  Location: Right sided  Quality: Aching  Severity: Severe  Modifying Factors: None noted  Associated Symptoms: denies any other associated signs or symptoms      Additional History (Context): Corie Paul is a 36 y.o. male with HTN, cardiomyopathy, CHF who presents for evaluation of worsening aching chest pain with onset about 3 hours ago while patient was sitting on a couch. He states that the pain began \"out of the blue. \" Patient notes that the pain is mainly right sided, but radiates up his neck and down his right arm. He notes that he occasionally experiences left sided chest pains as well. Patient states the pain was worse a few hours ago at a 10/10, but is currently a 7/10. Pain started while at rest. Patient reports that he has a hx of cardiomegaly. He states that two weeks ago he had the same pain but that it was waxing and waning, every 20 mins. Patient reports that he takes his blood pressure medication regularly, twice a day. Patient's niece states that patient was placed on Norvasc because patient's blood pressure was high (\"220/100 something\"), but he stopped taking it because he was having chest pains and thought it was due to that. Niece notes that patient had an Echo done last week and is scheduled for an appointment with a cardiologist on the 21st and the 22nd of this month here, but is unsure the name of the doctor. Patient denies drug use, nausea, vomiting, diarrhea, or being on any other medications. He notes drinking a 6 pack of beer everyday but denies any alcohol use today.      PCP: Polina Lal., DO    Current Facility-Administered Medications Medication Dose Route Frequency Provider Last Rate Last Dose    heparin 25,000 units in D5W 250 ml 25,000 unit/250 mL(100 unit/mL) infusion        Stopped at 08/13/18 1230    lidocaine (PF) (XYLOCAINE) 10 mg/mL (1 %) injection    PRN Jose Antonio Lambert MD   18 mL at 08/13/18 1301    fentaNYL citrate (PF) injection    PRN Jose Antonio Lambert MD   50 mcg at 08/13/18 1302    midazolam (VERSED) injection    PRN Jose Antonio Lambert MD   2 mg at 08/13/18 1302    aspirin chewable tablet 324 mg  324 mg Oral NOW Magdi Sanchez MD        heparin (porcine) injection 4,000 Units  4,000 Units IntraVENous NOW Magdi Sanchez MD        nitroglycerin (NITROSTAT) tablet 0.4 mg  0.4 mg SubLINGual PRN Grecia Gilbert MD        nitroglycerin (TRIDIL) 400 mcg/ml infusion  0-20 mcg/min IntraVENous TITRATE Magdi Sanchez MD        bivalirudin AV[facility]) injection    PRN Jose Antonio Lambert MD   107.175 mg at 08/13/18 1307    bivalirudin (ANGIOMAX) 250 mg in 0.9% sodium chloride (MBP/ADV) 50 mL infusion  250 mg  CONTINUOUS Jose Antonio Lambert MD 50 mL/hr at 08/13/18 1310 1.75 mg/kg/hr at 08/13/18 1310       Past History     Past Medical History:  Past Medical History:   Diagnosis Date    Cardiomyopathy ([facility] 7/21/2014    Gastric bypass status for obesity 8/8/2011    Heart failure ([facility]     HTN (hypertension) 8/8/2011    Hypothyroid 8/8/2011    Post corneal transplant 7/21/2014       Past Surgical History:  Past Surgical History:   Procedure Laterality Date    ABDOMEN SURGERY PROC UNLISTED         Family History:  History reviewed. No pertinent family history. Social History:  Social History   Substance Use Topics    Smoking status: Never Smoker    Smokeless tobacco: Never Used    Alcohol use Yes      Comment: 6 pk daily       Allergies:  No Known Allergies      Review of Systems       Review of Systems   Constitutional: Negative for chills. HENT: Negative for congestion and sore throat.     Respiratory: Negative for cough and shortness of breath. Cardiovascular: Positive for chest pain (right sided, radiating up neck and down right arm. sometimes left sided). Gastrointestinal: Negative for abdominal pain, diarrhea, nausea and vomiting. Genitourinary: Negative for dysuria. Musculoskeletal: Negative for back pain. Skin: Negative for rash. Neurological: Negative for dizziness and headaches. All other systems reviewed and are negative. Physical Exam     Visit Vitals    BP (!) 187/122    Pulse 89    Temp 97.8 °F (36.6 °C)    Resp 19    Ht 6' 2\" (1.88 m)    Wt 142.9 kg (315 lb)    SpO2 100%    BMI 40.44 kg/m2         Physical Exam  General Exam: Patient is a well developed and well nourished in mild distress. Patient does not appear acutely ill or toxic. Eye Exam: Lids and conjunctiva are normal  ENT Exam: The general head and facial exam is normal.  The neck is supple without meningeal signs. No significant adenopathy. Pulmonary Exam: No respiratory distress. The respiratory rate is normal.  No stridor. The breath sounds are equal bilaterally. There are no wheezes, rales, or rhonchi noted. Cardiac Exam: The cardiac rate and rhythm are normal.  No significant murmurs, rubs, or gallops. The peripheral pulses are normal.  Abdominal Exam: Abdomen is soft and non-distended. No pulsatile masses. There is no local tenderness. There is no rebound or guarding noted. Skin and Soft Tissue: The skin is warm and dry, without significant abnormality. Good color. Musculoskeletal Exam: No peripheral edema. The musculoskeletal exam of the lower extremities is normal without significant local tenderness. Psychiatric: Normal adult with appropriate demeanor and interpersonal interaction. Is oriented to person, place, and time.     Diagnostic Study Results     Labs -  Recent Results (from the past 12 hour(s))   EKG, 12 LEAD, INITIAL    Collection Time: 08/13/18 12:15 PM   Result Value Ref Range    Ventricular Rate 69 BPM    Atrial Rate 69 BPM    P-R Interval 168 ms    QRS Duration 110 ms    Q-T Interval 420 ms    QTC Calculation (Bezet) 450 ms    Calculated P Axis 54 degrees    Calculated R Axis 3 degrees    Calculated T Axis 123 degrees    Diagnosis       Normal sinus rhythm  Left ventricular hypertrophy with repolarization abnormality  ST elevation, consider inferior injury or acute infarct  ACUTE MI  Consider right ventricular involvement in acute inferior infarct  Abnormal ECG  When compared with ECG of 03-OCT-2017 08:33,  ST elevation now present in Inferior leads  QT has shortened     CBC WITH AUTOMATED DIFF    Collection Time: 08/13/18 12:25 PM   Result Value Ref Range    WBC 7.3 4.6 - 13.2 K/uL    RBC 5.53 (H) 4.70 - 5.50 M/uL    HGB 15.8 13.0 - 16.0 g/dL    HCT 47.1 36.0 - 48.0 %    MCV 85.2 74.0 - 97.0 FL    MCH 28.6 24.0 - 34.0 PG    MCHC 33.5 31.0 - 37.0 g/dL    RDW 14.9 (H) 11.6 - 14.5 %    PLATELET 229 257 - 545 K/uL    MPV 9.3 9.2 - 11.8 FL    NEUTROPHILS 76 (H) 40 - 73 %    LYMPHOCYTES 14 (L) 21 - 52 %    MONOCYTES 8 3 - 10 %    EOSINOPHILS 2 0 - 5 %    BASOPHILS 0 0 - 2 %    ABS. NEUTROPHILS 5.6 1.8 - 8.0 K/UL    ABS. LYMPHOCYTES 1.0 0.9 - 3.6 K/UL    ABS. MONOCYTES 0.6 0.05 - 1.2 K/UL    ABS. EOSINOPHILS 0.1 0.0 - 0.4 K/UL    ABS.  BASOPHILS 0.0 0.0 - 0.1 K/UL    DF AUTOMATED     METABOLIC PANEL, BASIC    Collection Time: 08/13/18 12:25 PM   Result Value Ref Range    Sodium 138 136 - 145 mmol/L    Potassium 3.9 3.5 - 5.5 mmol/L    Chloride 102 100 - 108 mmol/L    CO2 29 21 - 32 mmol/L    Anion gap 7 3.0 - 18 mmol/L    Glucose 143 (H) 74 - 99 mg/dL    BUN 15 7.0 - 18 MG/DL    Creatinine 1.06 0.6 - 1.3 MG/DL    BUN/Creatinine ratio 14 12 - 20      GFR est AA >60 >60 ml/min/1.73m2    GFR est non-AA >60 >60 ml/min/1.73m2    Calcium 9.5 8.5 - 10.1 MG/DL   CARDIAC PANEL,(CK, CKMB & TROPONIN)    Collection Time: 08/13/18 12:25 PM   Result Value Ref Range     39 - 308 U/L    CK - MB 2.3 <3.6 ng/ml    CK-MB Index 1.6 0.0 - 4.0 %    Troponin-I, Qt. 0.19 (H) 0.0 - 0.045 NG/ML   NT-PRO BNP    Collection Time: 08/13/18 12:25 PM   Result Value Ref Range    NT pro- (H) 0 - 450 PG/ML   POC TROPONIN-I    Collection Time: 08/13/18 12:27 PM   Result Value Ref Range    Troponin-I (POC) 0.14 (HH) 0.00 - 0.08 ng/mL       Radiologic Studies -   XR CHEST PORT    (Results Pending)       Medical Decision Making   I am the first provider for this patient. I reviewed the vital signs, available nursing notes, past medical history, past surgical history, family history and social history. Vital Signs-Reviewed the patient's vital signs. Pulse Oximetry Analysis -  100% on room air (Normal)    Cardiac Monitor:  Rate: 88 bpm  Rhythm:  Normal Sinus Rhythm     EKG: Interpreted by the EP. Time Interpreted: 12:15pm   Rate: 69 bpm   Rhythm: Normal Sinus Rhythm    Interpretation: STEMI, ST elevation in 3 and minimal in AVF. ST depression in 1 and AVL. Repeat EKG: Interpreted by the EP. Time Interpreted: 12:18pm   Rate: 74 bpm   Rhythm: Normal Sinus Rhythm    Interpretation: STEMI    Comparison: No significant change from previous EKG, elevation present in 3 and AVF with  depressions in 1 and AVL    Records Reviewed: Nursing Notes, Old Medical Records, Previous electrocardiograms, Previous Radiology Studies and Previous Laboratory Studies (Time of Review: 12:29 PM)    ED Course: Progress Notes, Reevaluation, and Consults:    12:21 PM Code STEMI called. 12:26 PM Consult: I discussed care with Dr. Hanley Schilder Cardiologist). It was a standard discussion including patient history, chief complaint, available diagnostic results, and predicted treatment course. He is currently at the [facility] but will be heading in to see the patient. 12:37 PM Consult: I discussed care with Yasmeen Henry (JASEN).   It was a standard discussion including patient history, chief complaint, available diagnostic results, and predicted treatment course. He is at bedside to see patient. He will admit patient under his hospitalist attending and states he will contact them so we do not need to call. 12:46 PM Dr. Garcia Wilson is at bedside. Patient to Cath lab with Cath team.    Provider Notes (Medical Decision Making): Pt with CP, EKG with STEMI. Hypertensive on arrival. Code STEMI initiated. Pt given ASA, nitro with reduction in pain and improvement in SBP, and heparin. Dr. Tracy Cota consulted and will take the pt for Cath. Trop elevated. Pt also started on nitro gtt for BP control. Critical Care Time: Critical Care Time:  The services I provided to this patient were to treat and/or prevent clinically significant deterioration that could result in the failure of one or more body systems and/or organ systems due to STEMI. Services included the following:  -reviewing nursing notes and old charts  -vital sign assessments  -direct patient care  -medication orders and management  -interpreting and reviewing diagnostic studies/labs  -re-evaluations  -documentation time    Aggregate critical care time was 40 minutes, which includes only time during which I was engaged in work directly related to the patient's care as described above, whether I was at bedside or elsewhere in the Emergency Department. It did not include time spent performing other reported procedures or the services of residents, students, nurses, or advance practice providers. Matheus Echevarria MD    12:47 PM    For Hospitalized Patients:    1. Hospitalization Decision Time:  The decision to hospitalize the patient was made by Dr. Martins Shown at 12:17pm on 8/13/2018    2. Aspirin: Aspirin was given    Diagnosis     Clinical Impression:   1.  STEMI (ST elevation myocardial infarction) ([facility] [address]        Disposition: Admit    Follow-up Information     None           Current Discharge Medication List      CONTINUE these medications which have NOT CHANGED    Details   enalapril (VASOTEC) 20 mg tablet Take 1 Tab by mouth two (2) times a day. Qty: 180 Tab, Refills: 4    Associated Diagnoses: Encounter for immunization; Cardiomyopathy, unspecified type ([address] Hypertension, unspecified type; Hypothyroidism, unspecified type      amLODIPine (NORVASC) 10 mg tablet Take 1 Tab by mouth daily. Qty: 30 Tab, Refills: 1    Associated Diagnoses: Hypertension, unspecified type; Cardiomyopathy, unspecified type ([address]      levothyroxine (SYNTHROID) 200 mcg tablet Take 1 Tab by mouth Daily (before breakfast). Qty: 90 Tab, Refills: 4    Associated Diagnoses: Encounter for immunization; Cardiomyopathy, unspecified type ([address] Hypertension, unspecified type; Hypothyroidism, unspecified type      metoprolol tartrate (LOPRESSOR) 50 mg tablet Take 1 Tab by mouth two (2) times a day. Qty: 180 Tab, Refills: 4    Associated Diagnoses: Encounter for immunization; Cardiomyopathy, unspecified type ([address] Hypertension, unspecified type; Hypothyroidism, unspecified type           _______________________________    Attestations:  Flip [address] [place] [address] acting as a scribe for and in the presence of Nicolas Alfonso MD      August 13, 2018 at 12:29 PM       Provider Attestation:      I personally performed the services described in the documentation, reviewed the documentation, as recorded by the scribe in my presence, and it accurately and completely records my words and actions.  August 13, 2018 at 12:29 PM - Nicolas Alfonso MD    _______________________________

## 2018-08-13 NOTE — PROGRESS NOTES
completed the initial Spiritual Assessment of the patient in bed 6 of the emergency room  and offered Pastoral Care support to patient and family member Patient was going to the cath lab as he came in as a Code Stemi. Patient does not have any Amish/cultural needs that will affect patients preferences in health care. Chaplains will continue to follow and will provide pastoral care on an as needed/requested basis.     [facility]  [phone]

## 2018-08-13 NOTE — PROGRESS NOTES
Problem: Falls - Risk of  Goal: *Absence of Falls  Document [place] Fall Risk and appropriate interventions in the flowsheet.    Outcome: Progressing Towards Goal  Fall Risk Interventions:            Medication Interventions: Teach patient to arise slowly    Elimination Interventions: Bed/chair exit alarm, Call light in reach

## 2018-08-13 NOTE — H&P
GENERAL GENERIC H&P/CONSULT    Marco Antonio Record is 36 y.o. Male with hx of CMO, gastric Bypass, HTN, CHF, ETOH abuse (6 pack every other day), hypothyroidism who presents with midsternal CP/right side CP onset this morning around 9am. Patient apparently had been having this intermittent CP x2 weeks however would last for 20 min with no correlation. Patient had visited his PCP and was found to have HTN subsequently was placed on Norvasc several weeks ago. He notes CP occurred after being started on Norvasc. No radiation or SOB. He reported to his provider CP after Norvasc so this was discontinues however he notes CP persisted. He notes this morning that his CP occurred however would not stop. He notes CP is a 6/10 however improving currently with nitro. No n/v, sob, fever, chills, or any other acute complaints at this time. Upon arrival patient was found to have have STEMI with elevation slightly in II, with prominent ST elevation in III AVF with some reciprocal changes 1 and AVL possibly Inferior STEMI along with  systolic. He will be taken for Cardiac cath. 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 Authorizing Provider   enalapril (VASOTEC) 20 mg tablet Take 1 Tab by mouth two (2) times a day. 11/9/17  Yes Shauna Promise., DO   amLODIPine (NORVASC) 10 mg tablet Take 1 Tab by mouth daily. 7/26/18   Shauna Promise., DO   levothyroxine (SYNTHROID) 200 mcg tablet Take 1 Tab by mouth Daily (before breakfast). 11/9/17   Shauna Promise., DO   metoprolol tartrate (LOPRESSOR) 50 mg tablet Take 1 Tab by mouth two (2) times a day.  11/9/17 Esperanza Swain, DO     No Known Allergies   Social History   Substance Use Topics    Smoking status: Never Smoker    Smokeless tobacco: Never Used    Alcohol use Yes      Comment: 6 pk daily      History reviewed. No pertinent family history. Review of Systems   Constitutional: Negative. Negative for diaphoresis, fatigue and fever. HENT: Negative. Eyes: Negative. Respiratory: Positive for chest tightness. Negative for apnea, cough, choking, shortness of breath, wheezing and stridor. Cardiovascular: Positive for chest pain. Negative for palpitations and leg swelling. Gastrointestinal: Negative. Negative for abdominal distention, abdominal pain, anal bleeding and blood in stool. Genitourinary: Negative. Musculoskeletal: Negative. Neurological: Negative. Hematological: Negative.         Objective:          Patient Vitals for the past 8 hrs:   BP Temp Pulse Resp SpO2 Height Weight   08/13/18 1233 (!) 187/122 - 89 19 - - -   08/13/18 1230 (!) 223/117 - 97 19 - - -   08/13/18 1229 (!) 226/126 - - - - - -   08/13/18 1227 (!) 228/126 97.8 °F (36.6 °C) 89 15 - 6' 2\" (1.88 m) 142.9 kg (315 lb)   08/13/18 1226 (!) 229/127 - 85 14 - - -   08/13/18 1223 - - 88 16 100 % - -     Physical Exam     Labs:    Recent Results (from the past 24 hour(s))   EKG, 12 LEAD, INITIAL    Collection Time: 08/13/18 12:15 PM   Result Value Ref Range    Ventricular Rate 69 BPM    Atrial Rate 69 BPM    P-R Interval 168 ms    QRS Duration 110 ms    Q-T Interval 420 ms    QTC Calculation (Bezet) 450 ms    Calculated P Axis 54 degrees    Calculated R Axis 3 degrees    Calculated T Axis 123 degrees    Diagnosis       Normal sinus rhythm  Left ventricular hypertrophy with repolarization abnormality  ST elevation, consider inferior injury or acute infarct  ACUTE MI  Consider right ventricular involvement in acute inferior infarct  Abnormal ECG  When compared with ECG of 03-OCT-2017 08:33,  ST elevation now present in Inferior leads  QT has shortened     CBC WITH AUTOMATED DIFF    Collection Time: 08/13/18 12:25 PM   Result Value Ref Range    WBC 7.3 4.6 - 13.2 K/uL    RBC 5.53 (H) 4.70 - 5.50 M/uL    HGB 15.8 13.0 - 16.0 g/dL    HCT 47.1 36.0 - 48.0 %    MCV 85.2 74.0 - 97.0 FL    MCH 28.6 24.0 - 34.0 PG    MCHC 33.5 31.0 - 37.0 g/dL    RDW 14.9 (H) 11.6 - 14.5 %    PLATELET 817 006 - 140 K/uL    MPV 9.3 9.2 - 11.8 FL    NEUTROPHILS 76 (H) 40 - 73 %    LYMPHOCYTES 14 (L) 21 - 52 %    MONOCYTES 8 3 - 10 %    EOSINOPHILS 2 0 - 5 %    BASOPHILS 0 0 - 2 %    ABS. NEUTROPHILS 5.6 1.8 - 8.0 K/UL    ABS. LYMPHOCYTES 1.0 0.9 - 3.6 K/UL    ABS. MONOCYTES 0.6 0.05 - 1.2 K/UL    ABS. EOSINOPHILS 0.1 0.0 - 0.4 K/UL    ABS.  BASOPHILS 0.0 0.0 - 0.1 K/UL    DF AUTOMATED     POC TROPONIN-I    Collection Time: 08/13/18 12:27 PM   Result Value Ref Range    Troponin-I (POC) 0.14 (HH) 0.00 - 0.08 ng/mL       Assessment:  Principal Problem:    [facility] (8/13/2018)    Active Problems:    Gastric bypass status for obesity (8/8/2011)      HTN (hypertension) (8/8/2011)      Hypothyroid (8/8/2011)      [facility] (7/21/2014)        Plan:    STEMI  -III, AVF with reciprocal changes likely RCA  -undergoing Cath   -defer to Cards  -asa, statin, BB  -noted could have component of HTN emergency given BP in 220's  -nitro gtt  -heparin gtt    Hx of CMO/CHF  -EF 45%  -compensated  -chest x-ray   -monitor weight I&O's    HTN Emergency?  -nitro gtt    Hypothyroidism  -synthyroid    DVT prophayxlis  -scd/teds      Signed:  Rosy Cid NP 8/13/2018

## 2018-08-13 NOTE — PROGRESS NOTES
Physical Exam   Skin: Skin is warm and dry. Abrasion noted.         Primary Nurse Una Rubio RN and Yamilex Dawson RN performed a dual skin assessment on this patient Impairment noted- see wound doc flow sheet  Brian score is 23

## 2018-08-13 NOTE — IP AVS SNAPSHOT
[address] [address] 
[phone] Patient: Katerina Tracey MRN: [id number] ETB:[birth year] A check yves indicates which time of day the medication should be taken. My Medications START taking these medications Instructions Each Dose to Equal  
 Morning Noon Evening Bedtime  
 aspirin delayed-release 81 mg tablet Start taking on:  8/16/2018 Your last dose was: Your next dose is: Take 1 Tab by mouth daily. 81 mg  
    
   
   
   
  
 atorvastatin 80 mg tablet Commonly known as:  LIPITOR Your last dose was: Your next dose is: Take 1 Tab by mouth nightly. 80 mg  
    
   
   
   
  
 clopidogrel 75 mg Tab Commonly known as:  PLAVIX Your last dose was: Your next dose is: Take 1 Tab by mouth daily. 75 mg CHANGE how you take these medications Instructions Each Dose to Equal  
 Morning Noon Evening Bedtime  
 metoprolol tartrate 100 mg IR tablet Commonly known as:  LOPRESSOR What changed:   
- medication strength 
- how much to take - when to take this Your last dose was: Your next dose is: Take 1 Tab by mouth every twelve (12) hours. 100 mg CONTINUE taking these medications Instructions Each Dose to Equal  
 Morning Noon Evening Bedtime  
 amLODIPine 10 mg tablet Commonly known as:  Trinh Colon Your last dose was: Your next dose is: Take 1 Tab by mouth daily. 10 mg  
    
   
   
   
  
 enalapril 20 mg tablet Commonly known as:  James Snider Your last dose was: Your next dose is: Take 1 Tab by mouth two (2) times a day. 20 mg  
    
   
   
   
  
 levothyroxine 200 mcg tablet Commonly known as:  SYNTHROID Your last dose was: Your next dose is: Take 1 Tab by mouth Daily (before breakfast). 200 mcg Where to Get Your Medications These medications were sent to [address], [address] [address] [place] [address] [address], [address] [id number] Phone:  [phone]  
  clopidogrel 75 mg Tab Information on where to get these meds will be given to you by the nurse or doctor. ! Ask your nurse or doctor about these medications  
  aspirin delayed-release 81 mg tablet  
 atorvastatin 80 mg tablet  
 metoprolol tartrate 100 mg IR tablet

## 2018-08-13 NOTE — Clinical Note
Lesion 1. Balloon re-inflated. Balloon inflated using single inflation technique. Lesion 1: Pressure = 10 maxi; Duration = 30 sec.

## 2018-08-13 NOTE — PROGRESS NOTES
(1400) TRANSFER - IN REPORT:    Verbal report received from cath lab RN(name) on Corie Paul  being received from cath lab(unit) for routine post - op      Report consisted of patients Situation, Background, Assessment and   Recommendations(SBAR). Information from the following report(s) SBAR, Procedure Summary and MAR was reviewed with the receiving nurse. Opportunity for questions and clarification was provided. Assessment completed upon patients arrival to unit and care assumed. ([phone]) Family brought to bedside and updated on care. [phone] PRISCILLA Vazquez paged for BP parameters for nitro gtt. (9698) Patient placed on bedpan and urinal given. ([phone]) PA has not answered page. Dr. Katelynn Mccoy paged for nitro parameters. [phone] with PRISCILLA Vazquez. D/c nitro gtt and restart home BP meds. ([phone] from cath came to pull sheath. BP too high at this time. This RN will call when BP is <150/90.     (0792) Damir cath lab RN at bedside to remove sheath. Patient can sit up to 30 degrees 2 hours post sheath removal    (7066) 25mg of hydralazine given for BP >150 per PRISCILLA Chawla.     (2850) Received diet order for patient. Sheath pulled and site checked with LINDSAY Reich.      (1496) Bedside and Verbal shift change report given to Mckenzie Armas RN (oncoming nurse) by Chepe Tomas RN (offgoing nurse). Report included the following information SBAR, Intake/Output, MAR, Recent Results, Cardiac Rhythm 1st HB w/ SB and Alarm Parameters .

## 2018-08-13 NOTE — IP AVS SNAPSHOT
[address] [address] 
[phone] Patient: Natalee Cruz MRN: [id number] VN:[id number] About your hospitalization You were admitted on:  August 13, 2018 You last received care in the:  [address] [place] [address] You were discharged on:  August 15, 2018 Why you were hospitalized Your primary diagnosis was:  Stemi (St Elevation Myocardial Infarction) (Hcc) Your diagnoses also included:  Cardiomyopathy (Hcc), Htn (Hypertension), Hypothyroid, Obesity Follow-up Information Follow up With Details Comments Contact Info Sharyn Lucio DO On 8/22/2018 7:30am [address] [facility] [address] [facility] [address] 
[phone] Mitzy Avery MD In 1 week  [id number] [place] [address] [address] [facility] [address] 
[phone] Your Scheduled Appointments Tuesday August 21, 2018  9:00 AM EDT New Patient with Corey Kingston MD  
Cardio Specialist at [facility] [address] [place] [address] [address] [id number]  
[phone] Wednesday August 22, 2018  7:30 AM EDT [place] [address] [id number] [place] [address] [address] [place] [address] [address]  
[phone] Discharge Orders None A check yves indicates which time of day the medication should be taken. My Medications START taking these medications Instructions Each Dose to Equal  
 Morning Noon Evening Bedtime  
 aspirin delayed-release 81 mg tablet Start taking on:  8/16/2018 Your last dose was: Your next dose is: Take 1 Tab by mouth daily. 81 mg  
    
   
   
   
  
 atorvastatin 80 mg tablet Commonly known as:  LIPITOR Your last dose was: Your next dose is: Take 1 Tab by mouth nightly.   
 80 mg  
    
   
   
   
  
 clopidogrel 75 mg Tab Commonly known as:  PLAVIX Your last dose was: Your next dose is: Take 1 Tab by mouth daily. 75 mg CHANGE how you take these medications Instructions Each Dose to Equal  
 Morning Noon Evening Bedtime  
 metoprolol tartrate 100 mg IR tablet Commonly known as:  LOPRESSOR What changed:   
- medication strength 
- how much to take - when to take this Your last dose was: Your next dose is: Take 1 Tab by mouth every twelve (12) hours. 100 mg CONTINUE taking these medications Instructions Each Dose to Equal  
 Morning Noon Evening Bedtime  
 amLODIPine 10 mg tablet Commonly known as:  Sim Catfredy Your last dose was: Your next dose is: Take 1 Tab by mouth daily. 10 mg  
    
   
   
   
  
 enalapril 20 mg tablet Commonly known as:  Phil Arnold Your last dose was: Your next dose is: Take 1 Tab by mouth two (2) times a day. 20 mg  
    
   
   
   
  
 levothyroxine 200 mcg tablet Commonly known as:  SYNTHROID Your last dose was: Your next dose is: Take 1 Tab by mouth Daily (before breakfast). 200 mcg Where to Get Your Medications These medications were sent to [address] [place], [place] [address]y I, [address] [facility]. [address]  . [address], [address] [address] Phone:  [phone]  
  clopidogrel 75 mg Tab Information on where to get these meds will be given to you by the nurse or doctor. ! Ask your nurse or doctor about these medications  
  aspirin delayed-release 81 mg tablet  
 atorvastatin 80 mg tablet  
 metoprolol tartrate 100 mg IR tablet Discharge Instructions DISCHARGE SUMMARY from Nurse PATIENT INSTRUCTIONS: 
 
 
F-face looks uneven A-arms unable to move or move unevenly S-speech slurred or non-existent T-time-call 911 as soon as signs and symptoms begin-DO NOT go Back to bed or wait to see if you get better-TIME IS BRAIN. Warning Signs of HEART ATTACK Call 911 if you have these symptoms: 
? Chest discomfort. Most heart attacks involve discomfort in the center of the chest that lasts more than a few minutes, or that goes away and comes back. It can feel like uncomfortable pressure, squeezing, fullness, or pain. ? Discomfort in other areas of the upper body. Symptoms can include pain or discomfort in one or both arms, the back, neck, jaw, or stomach. ? Shortness of breath with or without chest discomfort. ? Other signs may include breaking out in a cold sweat, nausea, or lightheadedness. Don't wait more than five minutes to call [address]! Fast action can save your life. Calling 911 is almost always the fastest way to get lifesaving treatment. Emergency Medical Services staff can begin treatment when they arrive  up to an hour sooner than if someone gets to the hospital by car. The discharge information has been reviewed with the patient. The patient verbalized understanding. Discharge medications reviewed with the patient and appropriate educational materials and side effects teaching were provided. ___________________________________________________________________________________________________________________________________ MyChart Announcement We are excited to announce that we are making your provider's discharge notes available to you in [employer].   You will see these notes when they are completed and signed by the physician that discharged you from your recent hospital stay. If you have any questions or concerns about any information you see in [employer] please call the Health Information Department where you were seen or reach out to your Primary Care Provider for more information about your plan of care. Introducing [facility] SERVICES! Kirt Bell introduces [employer] patient portal. Now you can access parts of your medical record, email your doctor's office, and request medication refills online. 1. In your internet browser, go to [email or web address] 2. Click on the First Time User? Click Here link in the Sign In box. You will see the New Member Sign Up page. 3. Enter your [employer] Access Code exactly as it appears below. You will not need to use this code after youve completed the sign-up process. If you do not sign up before the expiration date, you must request a new code. · [employer] Access Code: [email or web address] Expires: 10/24/2018  7:39 AM 
 
4. Enter the last four digits of your Social Security Number (xxxx) and Date of Birth (mm/dd/yyyy) as indicated and click Submit. You will be taken to the next sign-up page. 5. Create a [employer] ID. This will be your [employer] login ID and cannot be changed, so think of one that is secure and easy to remember. 6. Create a [employer] password. You can change your password at any time. 7. Enter your Password Reset Question and Answer. This can be used at a later time if you forget your password. 8. Enter your e-mail address. You will receive e-mail notification when new information is available in [address] 9. Click Sign Up. You can now view and download portions of your medical record. 10. Click the Download Summary menu link to download a portable copy of your medical information.  
 
If you have questions, please visit the Frequently Asked Questions section of the [facility]. Remember, MyChart is NOT to be used for urgent needs. For medical emergencies, dial 911. Now available from your iPhone and Android! Introducing Louie Fernandez As a [employer] [facility] patient, I wanted to make you aware of our electronic visit tool called Louie Fernandez. [employer] allows you to connect within minutes with a medical provider 24 hours a day, seven days a week via a mobile device or tablet or logging into a secure website from your computer. You can access Louie Fernandez from anywhere in the [place]. A virtual visit might be right for you when you have a simple condition and feel like you just dont want to get out of bed, or cant get away from work for an appointment, when your regular [facility] provider is not available (evenings, weekends or holidays), or when youre out of town and need minor care. Electronic visits cost only $49 and if the [employer] provider determines a prescription is needed to treat your condition, one can be electronically transmitted to a nearby pharmacy*. Please take a moment to enroll today if you have not already done so. The enrollment process is free and takes just a few minutes. To enroll, please download the [employer] camilo to your tablet or phone, or visit [email or web address] to enroll on your computer. And, as an [address] [place] [address] patient with a Virtual Web account, the results of your visits will be scanned into your electronic medical record and your primary care provider will be able to view the scanned results. We urge you to continue to see your regular [facility] provider for your ongoing medical care. And while your primary care provider may not be the one available when you seek a Louie Fernandez virtual visit, the peace of mind you get from getting a real diagnosis real time can be priceless. For more information on Louie Fernandez, view our Frequently Asked Questions (FAQs) at [email or web address]. Sincerely, 
 
Valentino Milks, MD 
Chief Medical Officer Tere Vazquez *:  certain medications cannot be prescribed via Louie Jedraleigh Unresulted Labs-Please follow up with your PCP about these lab tests Order Current Status EKG, 12 LEAD, SUBSEQUENT Preliminary result Providers Seen During Your Hospitalization Provider Specialty Primary office phone Judy Germain MD Emergency Medicine [phone] Pal Marie MD Emergency Medicine [phone] Murali Esparza MD [facility] Practice [phone] Jerson Molina MD Internal Medicine [phone] Your Primary Care Physician (PCP) Primary Care Physician Office Phone Office Fax Minhl Crossfredy [phone] You are allergic to the following No active allergies Recent Documentation Height Weight BMI Smoking Status 1.88 m 142.9 kg 40.45 kg/m2 Never Smoker Emergency Contacts Name Discharge Info Relation Home Work Mobile [address] [facility] CAREGIVER [3] Friend [5] [phone] [place] Ace DISCHARGE CAREGIVER [3] Mother [14] [phone] Patient Belongings The following personal items are in your possession at time of discharge: 
  Dental Appliances: None  Visual Aid: None      Home Medications: None   Jewelry: None  Clothing: Sent home    Other Valuables: None Discharge Instructions Attachments/References HEART ATTACK: MYOCARDIAL INFARCTION OR ACUTE CORONARY SYNDROME (ENGLISH) ASPIRIN (BY MOUTH) (ENGLISH) ATORVASTATIN (BY MOUTH) (ENGLISH) METOPROLOL (BY MOUTH) (ENGLISH) TICAGRELOR (BY MOUTH) (ENGLISH) Patient Handouts Heart Attack: Care Instructions Your Care Instructions A heart attack (myocardial infarction, or MI) occurs when one or more of the coronary arteries, which supply the heart with oxygen-rich blood, is blocked. A blockage usually occurs when plaque inside the artery breaks open and a blood clot forms in the artery. After a heart attack, you may be worried about your future. Over the next several weeks, your heart will start to heal. Though it can be hard to break old habits, you can prevent another heart attack by making some lifestyle changes and by taking medicines. You may use this information for ideas about what to do at home to speed your recovery. Follow-up care is a key part of your treatment and safety. Be sure to make and go to all appointments, and call your doctor if you are having problems. It's also a good idea to know your test results and keep a list of the medicines you take. How can you care for yourself at home? Activity 
  · Until your doctor says it is okay, do not do strenuous exercise. And do not lift, pull, or push anything heavy. Ask your doctor what types of activities are safe for you.  
  · If your doctor has not set you up with a cardiac rehabilitation (rehab) program, talk to him or her about whether that is right for you. Cardiac rehab includes supervised exercise. It also includes help with diet and lifestyle changes and emotional support. It may reduce your risk of future heart problems.  
  · Increase your activities slowly. Take short rest breaks when you get tired.  
  · If your doctor recommends it, get more exercise. Walking is a good choice. Bit by bit, increase the amount you walk every day. Try for at least 30 minutes on most days of the week. You also may want to swim, bike, or do other activities. Talk with your doctor before you start an exercise program to make sure it is safe for you.  
  · Ask your doctor when you can drive, go back to work, and do other daily activities again.  
  · You can have sex as soon as you feel ready for it.  Often this means when you can easily walk around or climb stairs. Talk with your doctor if you have any concerns. If you are taking nitroglycerin, do not take erection-enhancing medicine such as sildenafil (Viagra), tadalafil (Cialis), or vardenafil (Levitra) .  
 Lifestyle changes 
  · Do not smoke. Smoking increases your risk of another heart attack. If you need help quitting, talk to your doctor about stop-smoking programs and medicines. These can increase your chances of quitting for good.  
  · Eat a heart-healthy diet that is low in saturated fat and salt, and is full of fruits, vegetables and whole-grains. Eat at least two servings of fish each week. You may get more details about how to eat healthy. But these tips can help you get started.  
  · Stay at a healthy weight, or lose weight if you need to. Medicines 
  · Be safe with medicines. Take your medicines exactly as prescribed. Call your doctor if you think you are having a problem with your medicine. You will get more details on the specific medicines your doctor prescribes. Do not stop taking your medicine unless your doctor tells you to. Not taking your medicine might raise your risk of having another heart attack.  
  · You may need several medicines to help lower your risk of another heart attack. These include: ¨ Blood pressure medicines such as angiotensin-converting enzyme (ACE) inhibitors, ARBs (angiotensin II receptor blockers), and beta-blockers. ¨ Cholesterol medicine called statins. ¨ Aspirin and other blood thinners. These prevent blood clots that can cause a heart attack.  
  · If your doctor has given you nitroglycerin, keep it with you at all times. If you have angina symptoms, such as chest pain or pressure, sit down and rest. Take the first dose of nitroglycerin as directed. If symptoms get worse or are not getting better within 5 minutes, call 911 right away. Stay on the phone. The emergency  will tell you what to do.   · Do not take any over-the-counter medicines, vitamins, or herbal products without talking to your doctor first.  
 Staying healthy 
  · Manage other health conditions such as high blood pressure and diabetes.  
  · Avoid colds and flu. Get a pneumococcal vaccine shot. If you have had one before, ask your doctor whether you need another dose. Get the flu vaccine every year. If you must be around people with colds or flu, wash your hands often.  
  · Be sure to tell your doctor about any angina symptoms you have had, even if they went away. Pay attention to your angina symptoms. Know what is typical for you and learn how to control it. Know when to call for help.  
  · Talk to your family, friends, or a counselor about your feelings. It is normal to feel frightened, angry, hopeless, helpless, and even guilty. Talking openly about bad feelings can help you cope. If you have symptoms of depression, talk to your doctor. When should you call for help? Call 911 anytime you think you may need emergency care. For example, call if: 
  · You have symptoms of a heart attack. These may include: ¨ Chest pain or pressure, or a strange feeling in the chest. 
¨ Sweating. ¨ Shortness of breath. ¨ Nausea or vomiting. ¨ Pain, pressure, or a strange feeling in the back, neck, jaw, or upper belly or in one or both shoulders or arms. ¨ Lightheadedness or sudden weakness. ¨ A fast or irregular heartbeat. After you call 911, the  may tell you to chew 1 adult-strength or 2 to 4 low-dose aspirin. Wait for an ambulance. Do not try to drive yourself.  
  · You have angina symptoms (such as chest pain or pressure) that do not go away with rest or are not getting better within 5 minutes after you take a dose of nitroglycerin.  
  · You passed out (lost consciousness).  
  · You feel like you are having another heart attack.  
 Call your doctor now or seek immediate medical care if:   · You are having angina symptoms, such as chest pain or pressure, more often than usual, or the symptoms are different or worse than usual.  
  · You have new or increased shortness of breath.  
  · You are dizzy or lightheaded, or you feel like you may faint.  
 Watch closely for changes in your health, and be sure to contact your doctor if you have any problems. Where can you learn more? Go to http://darrius-mirian.info/. Enter [id number] in the search box to learn more about \"Heart Attack: Care Instructions. \" Current as of: December 6, 2017 Content Version: 11.7 © 9628-2050 [employer]. Care instructions adapted under license by [employer] (which disclaims liability or warranty for this information). If you have questions about a medical condition or this instruction, always ask your healthcare professional. [address] [address] any warranty or liability for your use of this information. Aspirin (By mouth) Aspirin (AS-pir-in) Treats pain, fever, and inflammation. May lower risk of heart attack and stroke. Brand Name(s): Ascriptin Regular Strength, Aspergum, Aspir Low, Aspirin Adult Low Dose, Aspirin Low Dose, Ely Aspirin Children's, Ely Aspirin Regimen, Ely Extra Strength, Ely Genuine Aspirin, Ely Low Dose, Bufferin, Bufferin Low Dose, Durlaza, Ecotrin, Ecpirin There may be other brand names for this medicine. When This Medicine Should Not Be Used: This medicine is not right for everyone. Do not use it if you had an allergic reaction to aspirin or other NSAIDs, or if you have a history of asthma with nasal polyps and rhinitis. How to Use This Medicine:  
Delayed Release Capsule, Long Acting Capsule, Gum, Tablet, Chewable Tablet, Fizzy Tablet, Coated Tablet, Long Acting Tablet, 24 Hour Capsule · Your doctor will tell you how much medicine to use. Do not use more than directed. · It is best to take this medicine with food or milk. · Capsule, tablet, or coated tablet: Swallow whole. Do not crush, break, or chew it. · Chewable tablet: You may chew it completely or swallow it whole. · Gum: Chew completely to make sure you get as much medicine as possible. Drink a full glass (8 ounces) of water after chewing the gum. · Swallow the extended-release capsule whole. Do not crush, break, or chew it. Take the capsule with a full glass of water at the same time each day. · Follow the instructions on the medicine label if you are using this medicine without a prescription. · Missed dose: If you miss a dose of Durlaza, skip the missed dose and go back to your regular dosing schedule. Do not take extra medicine to make up for a missed dose. · Store the medicine in a closed container at room temperature, away from heat, moisture, and direct light. Drugs and Foods to Avoid: Ask your doctor or pharmacist before using any other medicine, including over-the-counter medicines, vitamins, and herbal products. · Some foods and medicines can affect how aspirin works. Tell your doctor if you are using any of the following: ¨ Dipyridamole, methotrexate, probenecid, sulfinpyrazone, ticlopidine ¨ Blood thinner (including clopidogrel, prasugrel, ticagrelor, warfarin) ¨ Blood pressure medicine ¨ Medicine to treat seizures (including phenytoin, valproic acid) ¨ NSAID pain or arthritis medicine (including celecoxib, diclofenac, ibuprofen, naproxen) ¨ Steroid medicine (including dexamethasone, hydrocortisone, methylprednisolone, prednisolone, prednisone) · Do not take Durlaza 2 hours before or 1 hour after you drink alcohol or take medicines that contain alcohol. Warnings While Using This Medicine: · Tell your doctor if you are pregnant or breastfeeding. Do not use this medicine during the later part of a pregnancy unless your doctor tells you to. · Tell your doctor if you have kidney disease, liver disease, high blood pressure, heart disease, or a history of stomach bleeding or ulcers. · This medicine may increase your risk for bleeding, including stomach ulcers. · Do not give aspirin to a child or teenager who has chickenpox or flu symptoms, unless the doctor says it is okay. Aspirin can cause a life-threatening reaction called Reye syndrome. · Tell any doctor or dentist who treats you that you are using this medicine. This medicine may affect certain medical test results. · Keep all medicine out of the reach of children. Never share your medicine with anyone. Possible Side Effects While Using This Medicine:  
Call your doctor right away if you notice any of these side effects: · Allergic reaction: Itching or hives, swelling in your face or hands, swelling or tingling in your mouth or throat, chest tightness, trouble breathing · Bloody or black stools, bloody vomit or vomit that looks like coffee grounds · Chest tightness, wheezing · Ringing in the ears · Severe stomach pain · Unusual bleeding, bruising, or weakness If you notice other side effects that you think are caused by this medicine, tell your doctor. Call your doctor for medical advice about side effects. You may report side effects to FDA at 7-237-FDA-2957 © 2017 [facility] Information is for End User's use only and may not be sold, redistributed or otherwise used for commercial purposes. The above information is an  only. It is not intended as medical advice for individual conditions or treatments. Talk to your doctor, nurse or pharmacist before following any medical regimen to see if it is safe and effective for you. Atorvastatin (By mouth) Atorvastatin (a-tor-va-STAT-in) Treats high cholesterol and triglyceride levels. Reduces the risk of angina, stroke, heart attack, or certain heart and blood vessel problems. This medicine is a statin. Brand Name(s): Lipitor There may be other brand names for this medicine. When This Medicine Should Not Be Used: This medicine is not right for everyone. Do not use it if you had an allergic reaction to atorvastatin, if you have active liver disease, or if you are pregnant or breastfeeding. How to Use This Medicine:  
Tablet · Take your medicine as directed. Your dose may need to be changed several times to find what works best for you. · Take this medicine at the same time each day. · Swallow the tablet whole. Do not break it. · Missed dose: Take a dose as soon as you remember. If it is less than 12 hours until your next dose, skip the missed dose and take the next dose at the regular time. Do not take 2 doses of this medicine within 12 hours. · Read and follow the patient instructions that come with this medicine. Talk to your doctor or pharmacist if you have any questions. · Store the medicine in a closed container at room temperature, away from heat, moisture, and direct light. Drugs and Foods to Avoid: Ask your doctor or pharmacist before using any other medicine, including over-the-counter medicines, vitamins, and herbal products. · Some medicines can affect how atorvastatin works. Tell your doctor if you also use birth control pills, boceprevir, cimetidine, colchicine, cyclosporine, digoxin, niacin, rifampin, spironolactone, telaprevir, medicine to treat an infection, or medicine to treat HIV/AIDS. Warnings While Using This Medicine: · It is not safe to take this medicine during pregnancy. It could harm an unborn baby. Tell your doctor right away if you become pregnant. · Tell your doctor if you have kidney disease, diabetes, muscle pain or weakness, thyroid problems, have recently had a stroke or TIA (transient ischemic attack), or have a history of liver disease. Tell your doctor if you drink grapefruit juice or drink alcohol regularly. · This medicine can cause muscle problems, which can lead to kidney problems. · Tell any doctor or dentist who treats you that you use this medicine. You may need to stop using it if you have surgery, have an injury, or develop serious health problems. · Your doctor will do lab tests at regular visits to check on the effects of this medicine. Keep all appointments. · Keep all medicine out of the reach of children. Never share your medicine with anyone. Possible Side Effects While Using This Medicine:  
Call your doctor right away if you notice any of these side effects: · Allergic reaction: Itching or hives, swelling in your face or hands, swelling or tingling in your mouth or throat, chest tightness, trouble breathing · Blistering, peeling, red skin rash · Change in how much or how often you urinate · Dark urine or pale stools, nausea, vomiting, loss of appetite, stomach pain, yellow skin or eyes · Fever · Muscle pain, tenderness, or weakness · Unusual tiredness If you notice these less serious side effects, talk with your doctor: · Diarrhea · Joint pain If you notice other side effects that you think are caused by this medicine, tell your doctor. Call your doctor for medical advice about side effects. You may report side effects to FDA at 2-390-FDA-9178 © 2017 [address] Information is for End User's use only and may not be sold, redistributed or otherwise used for commercial purposes. The above information is an  only. It is not intended as medical advice for individual conditions or treatments. Talk to your doctor, nurse or pharmacist before following any medical regimen to see if it is safe and effective for you. Metoprolol (By mouth) Metoprolol (met-oh-PROE-lol) Treats high blood pressure, angina (chest pain), and heart failure. May lower the risk of death after a heart attack. This medicine is a beta-blocker. Brand Name(s): Lopressor, Toprol XL There may be other brand names for this medicine. When This Medicine Should Not Be Used: This medicine is not right for everyone. Do not use if you had an allergic reaction to metoprolol or similar medicines. Do not use this medicine if you have certain blood circulation or heart problems. Ask your doctor about these problems. How to Use This Medicine:  
Tablet, Long Acting Tablet · Take your medicine as directed. Your dose may need to be changed several times to find what works best for you. · Take this medicine with a meal or right after a meal. Take this medicine the same way every day, at the same time. · Swallow the tablet whole with a glass of water. You may break the extended-release tablet in half, but do not chew or crush it. · Missed dose: Take a dose as soon as you remember. If it is almost time for your next dose, wait until then and take a regular dose. Do not take extra medicine to make up for a missed dose. · Store the medicine in a closed container at room temperature, away from heat, moisture, and direct light. Drugs and Foods to Avoid: Ask your doctor or pharmacist before using any other medicine, including over-the-counter medicines, vitamins, and herbal products. · Some medicines can affect how metoprolol works. Tell your doctor if you are taking any of the following: ¨ Digoxin, dipyridamole, hydralazine, hydroxychloroquine, methyldopa, quinidine ¨ Medicine to treat depression (such as bupropion, clomipramine, desipramine, fluoxetine, fluvoxamine, paroxetine, sertraline), medicine to treat mental illness (such as chlorpromazine, fluphenazine, haloperidol, thioridazine), medicine for heart rhythm problems (such as propafenone), HIV/AIDS medicine (such as ritonavir), medicine to treat a fungus infection (such as terbinafine), a monoamine oxidase inhibitor (MAOI), an ergot medicine for headaches, a calcium channel blocker (such as amlodipine, diltiazem, verapamil), or an alpha blocker (such as clonidine, prazosin, reserpine, guanethidine) Warnings While Using This Medicine: · Tell your doctor if you are pregnant or breastfeeding, or if you have blood vessel, heart, or circulation problems (such as heart failure, rhythm problems, or slow heartbeat). Tell your doctor if you have kidney disease, liver disease, diabetes, lung disease (such as asthma), an overactive thyroid, or a history of allergies. · This medicine may cause worse symptoms of heart failure while the dose is being adjusted. · Do not stop using this medicine suddenly. Your doctor will need to slowly decrease your dose before you stop it completely. · Tell any doctor or dentist who treats you that you are using this medicine. You may need to stop using this medicine several days before you have surgery or medical tests. · This medicine could lower your blood pressure too much, especially when you first use it or if you are dehydrated. Stand or sit up slowly if you feel lightheaded or dizzy. · This medicine may make you dizzy or drowsy. Do not drive or do anything else that could be dangerous until you know how this medicine affects you. · Your doctor will check your progress and the effects of this medicine at regular visits. Keep all appointments. · Keep all medicine out of the reach of children. Never share your medicine with anyone. Possible Side Effects While Using This Medicine:  
Call your doctor right away if you notice any of these side effects: · Allergic reaction: Itching or hives, swelling in your face or hands, swelling or tingling in your mouth or throat, chest tightness, trouble breathing · Lightheadedness, dizziness, or fainting · Slow heartbeat · Swelling in your hands, ankles, or feet, trouble breathing, tiredness · Worsening chest pain If you notice these less serious side effects, talk with your doctor: · Diarrhea · Mild dizziness or tiredness If you notice other side effects that you think are caused by this medicine, tell your doctor. Call your doctor for medical advice about side effects. You may report side effects to FDA at 5-335-FDA-4986 © 2017 [address] Information is for End User's use only and may not be sold, redistributed or otherwise used for commercial purposes. The above information is an  only. It is not intended as medical advice for individual conditions or treatments. Talk to your doctor, nurse or pharmacist before following any medical regimen to see if it is safe and effective for you. Ticagrelor (By mouth) Ticagrelor ([id number]-UX-wkob-or) Helps prevent stroke, heart attack, and other heart problems. This medicine is a blood thinner. Brand Name(s): Leighannilinsteve There may be other brand names for this medicine. When This Medicine Should Not Be Used: This medicine is not right for everyone. Do not use it if you had an allergic reaction to ticagrelor, or if you have bleeding problems (such as a bleeding stomach ulcer) or a history of bleeding in your brain. How to Use This Medicine:  
Tablet · Your doctor will tell you how much medicine to use. Do not use more than directed. Take this medicine at the same time every day. · Your doctor may tell you to take aspirin with this medicine. Do not use more than 100 milligrams of aspirin per day. Check the labels of other medicines to make sure they do not contain aspirin. · If you cannot swallow the tablet, you may do this: ¨ Crush the tablet and mix it in a glass of water. Drink it right away. Rinse the glass with more water and drink that too, so you get all the medicine. ¨ You may give the tablet and water mixture through a nasogastric tube. Flush the tube with more water so you receive all the medicine. · This medicine should come with a Medication Guide.  Ask your pharmacist for a copy if you do not have one. · Missed dose: Skip the missed dose and take your next dose as usual. Do not take extra medicine to make up for a missed dose. · Store the medicine in a closed container at room temperature, away from heat, moisture, and direct light. Drugs and Foods to Avoid: Ask your doctor or pharmacist before using any other medicine, including over-the-counter medicines, vitamins, and herbal products. · Some medicines can affect how ticagrelor works. Tell your doctor if you are using any of the following: ¨ Atazanavir, carbamazepine, clarithromycin, digoxin, indinavir, itraconazole, ketoconazole, lovastatin, nefazodone, nelfinavir, phenobarbital, phenytoin, rifampin, ritonavir, saquinavir, simvastatin, telithromycin, or voriconazole ¨ Blood thinner (including warfarin or heparin) ¨ NSAID pain or arthritis medicine (including celecoxib, diclofenac, ibuprofen, naproxen) Warnings While Using This Medicine: · Tell your doctor if you are pregnant or breastfeeding, or if you have liver disease, heart rhythm problems (including slow heartbeat), lung or breathing problems (such as asthma or COPD), or a history of bleeding problems. · This medicine may cause you to bleed and bruise more easily, and it may take longer than usual for bleeding to stop. Be careful to avoid injuries. · Do not stop using this medicine unless your doctor tells you to. To stop it may increase your risk of a heart attack, blood clot, or other serious problem. · Tell any doctor or dentist who treats you that you are using this medicine. With your doctor's permission, you may need to stop using this medicine several days before you have surgery to reduce the risk of bleeding problems. Follow your doctor's instructions carefully. · Your doctor will do lab tests at regular visits to check on the effects of this medicine. Keep all appointments. · Keep all medicine out of the reach of children.  Never share your medicine with anyone. Possible Side Effects While Using This Medicine:  
Call your doctor right away if you notice any of these side effects: · Allergic reaction: Itching or hives, swelling in your face or hands, swelling or tingling in your mouth or throat, chest tightness, trouble breathing · Bloody or black, tarry stools, red or dark brown urine · Fast, slow, or pounding heartbeat · Trouble breathing · Unusual bleeding, bruising, or weakness · Vomiting of blood or material that looks like coffee grounds If you notice other side effects that you think are caused by this medicine, tell your doctor. Call your doctor for medical advice about side effects. You may report side effects to FDA at 8-582-FDA-0316 © 2017 [address] Information is for End User's use only and may not be sold, redistributed or otherwise used for commercial purposes. The above information is an  only. It is not intended as medical advice for individual conditions or treatments. Talk to your doctor, nurse or pharmacist before following any medical regimen to see if it is safe and effective for you. Please provide this summary of care documentation to your next provider. Signatures-by signing, you are acknowledging that this After Visit Summary has been reviewed with you and you have received a copy. Patient Signature:  ____________________________________________________________ Date:  ____________________________________________________________  
  
Bonnie Bruno Provider Signature:  ____________________________________________________________ Date:  ____________________________________________________________

## 2018-08-13 NOTE — Clinical Note
TRANSFER - OUT REPORT:  
 
Verbal report given to: Sussy MONTOYA. Report consisted of patient's Situation, Background, Assessment and  
Recommendations(SBAR). Opportunity for questions and clarification was provided. Patient transported with a Cardiac Cath Tech / Patient Care Tech. Patient transported to: 2700.

## 2018-08-13 NOTE — PROGRESS NOTES
[facility]:      12 Spoke with primary nurse Diana Duvall of pt condition. Arrived at pt bedside. Pt in reverse Trendelenburg position. Pt alert and denies pain. .     1708 6-[place] sheath removed. Manual pressure being held. 1738 Hemostasis achieved. Clotting pad with Tegaderm dressing applied. No bleeding or hematoma present. 2072 Site assessment performed with Diana Duvall. No bleeding or hematoma present. Ice pack applied for comfort. 6 hours of bedrest. HOB may be at 30 degrees in 1 hour. Pt instructed to keep head on the pillow with right leg straight and flat. Pt and RN gave verbal understanding.

## 2018-08-13 NOTE — CONSULTS
[facility] - Consult Note    Consultation request by Dr. Staci Driver for advice/opinion related to evaluating STEMI    Date of  Admission: 8/13/2018 12:16 PM   Primary Care Physician:  Jessica Mahmood., DO  Patient seen and examined independently. ECG consistent with acute inferior wall MI. Will proceed directly to the cath lab for emergency angiography and possible intervention. Tani Freitas MD   Assessment:     -Inferior STEMI, reciprocal changes in I and aVL, initial troponin 0.19  -Hx cardiomyopathy  -HTN, uncontrolled, /127 on presentation  -Hx DM  -Hypothyroidism  -Hx gastric bypass     Plan:     Pt presented to hospital with chest pain onset 09:00 initially associated with diaphoresis. EKG consistent with inferior STEMI. Will take emergently to cardiac catheterization. History of Present Illness: This is a 36 y.o. male admitted for STEMI (ST elevation myocardial infarction) ([facility] [address] [I21.3]. Patient complains of:  Chest pain    Nallely Gillis is a 36 y.o. male with PMHx as noted above, who presented to the hospital due to chest pain ongoing since 09:00 this morning that was initially associated with diaphoresis. He denies any associated shortness of breath, N/V or dizziness. He reports that he has been having intermittent chest pain lasting for approximately 20 minutes each episode over the last few days. He reports Hx cardiac cath several years ago.       Cardiac risk factors: family history, diabetes mellitus, obesity, hypertension      Review of Symptoms:  Except as stated above include:  Constitutional:  As per HPI  Respiratory:  negative  Cardiovascular:  As per HPI  Gastrointestinal: negative  Genitourinary:  negative  Musculoskeletal:  Negative  Neurological:  Negative  Dermatological:  Negative  Endocrinological: Negative  Psychological:  Negative       Past Medical History:     Past Medical History:   Diagnosis Date    Cardiomyopathy ([facility] [address] 7/21/2014    Gastric bypass status for obesity 8/8/2011    Heart failure ([facility] [address]     HTN (hypertension) 8/8/2011    Hypothyroid 8/8/2011    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 No pertinent family history. Medications:   No Known Allergies     Current Facility-Administered Medications   Medication Dose Route Frequency    heparin 25,000 units in D5W 250 ml 25,000 unit/250 mL(100 unit/mL) infusion        lidocaine (PF) (XYLOCAINE) 10 mg/mL (1 %) injection    PRN         Physical Exam:     Visit Vitals    BP (!) 187/122    Pulse 89    Temp 97.8 °F (36.6 °C)    Resp 19    Ht 6' 2\" (1.88 m)    Wt 315 lb (142.9 kg)    SpO2 100%    BMI 40.44 kg/m2     BP Readings from Last 3 Encounters:   08/13/18 (!) 187/122   07/26/18 (!) 200/140   11/09/17 (!) 178/110     Pulse Readings from Last 3 Encounters:   08/13/18 89   07/26/18 82   11/09/17 67     Wt Readings from Last 3 Encounters:   08/13/18 315 lb (142.9 kg)   07/26/18 315 lb 3.2 oz (143 kg)   11/09/17 298 lb 6.4 oz (135.4 kg)       General:  alert, cooperative, no distress, appears stated age  Neck:  No JVD  Lungs:  Non-labored respirations  Heart:  Regular rate and rhythm  Abdomen:  abdomen is soft without significant tenderness, masses, organomegaly or guarding  Extremities:  no edema  Skin: Warm and dry.    Neuro: alert, oriented x3, affect appropriate, no focal neurological deficits, moves all extremities well, no involuntary movements  Psych: non focal     Data Review:     Recent Labs      08/13/18   1225   WBC  7.3   HGB  15.8   HCT 47.1   PLT  205     Recent Labs      08/13/18   1225   NA  138   K  3.9   CL  102   CO2  29   GLU  143*   BUN  15   CREA  1.06   CA  9.5       Results for orders placed or performed during the hospital encounter of 08/13/18   EKG, 12 LEAD, INITIAL   Result Value Ref Range    Ventricular Rate 69 BPM    Atrial Rate 69 BPM    P-R Interval 168 ms    QRS Duration 110 ms    Q-T Interval 420 ms    QTC Calculation (Bezet) 450 ms    Calculated P Axis 54 degrees    Calculated R Axis 3 degrees    Calculated T Axis 123 degrees    Diagnosis       Normal sinus rhythm  Left ventricular hypertrophy with repolarization abnormality  ST elevation, consider inferior injury or acute infarct  ACUTE MI  Consider right ventricular involvement in acute inferior infarct  Abnormal ECG  When compared with ECG of 03-OCT-2017 08:33,  ST elevation now present in Inferior leads  QT has shortened         All Cardiac Markers in the last 24 hours:    Lab Results   Component Value Date/Time     08/13/2018 12:25 PM    CKMB 2.3 08/13/2018 12:25 PM    CKND1 1.6 08/13/2018 12:25 PM    TROIQ 0.19 (H) 08/13/2018 12:25 PM    TNIPOC 0.14 ([facility]) 08/13/2018 12:27 PM       Last Lipid:    Lab Results   Component Value Date/Time    Cholesterol, total 128 07/26/2018 08:20 AM    HDL Cholesterol 54 07/26/2018 08:20 AM    LDL, calculated 62.6 07/26/2018 08:20 AM    Triglyceride 57 07/26/2018 08:20 AM    CHOL/HDL Ratio 2.4 07/26/2018 08:20 AM       Signed By: Bijal Tracey PA-C     August 13, 2018

## 2018-08-14 ENCOUNTER — APPOINTMENT (OUTPATIENT)
Dept: GENERAL RADIOLOGY | Age: 43
DRG: 247 | End: 2018-08-14
Attending: EMERGENCY MEDICINE
Payer: MEDICARE

## 2018-08-14 LAB
ALBUMIN SERPL-MCNC: 3.1 G/DL (ref 3.4–5)
ALBUMIN/GLOB SERPL: 0.9 {RATIO} (ref 0.8–1.7)
ALP SERPL-CCNC: 96 U/L (ref 45–117)
ALT SERPL-CCNC: 42 U/L (ref 16–61)
ANION GAP SERPL CALC-SCNC: 9 MMOL/L (ref 3–18)
AST SERPL-CCNC: 246 U/L (ref 15–37)
ATRIAL RATE: 69 BPM
ATRIAL RATE: 74 BPM
BASOPHILS # BLD: 0 K/UL (ref 0–0.1)
BASOPHILS NFR BLD: 0 % (ref 0–2)
BILIRUB SERPL-MCNC: 1.1 MG/DL (ref 0.2–1)
BUN SERPL-MCNC: 13 MG/DL (ref 7–18)
BUN/CREAT SERPL: 15 (ref 12–20)
CALCIUM SERPL-MCNC: 8.3 MG/DL (ref 8.5–10.1)
CALCULATED P AXIS, ECG09: 35 DEGREES
CALCULATED P AXIS, ECG09: 54 DEGREES
CALCULATED R AXIS, ECG10: -4 DEGREES
CALCULATED R AXIS, ECG10: 3 DEGREES
CALCULATED T AXIS, ECG11: 117 DEGREES
CALCULATED T AXIS, ECG11: 123 DEGREES
CHLORIDE SERPL-SCNC: 107 MMOL/L (ref 100–108)
CHOLEST SERPL-MCNC: 104 MG/DL
CO2 SERPL-SCNC: 25 MMOL/L (ref 21–32)
CREAT SERPL-MCNC: 0.88 MG/DL (ref 0.6–1.3)
DIAGNOSIS, 93000: NORMAL
DIAGNOSIS, 93000: NORMAL
DIFFERENTIAL METHOD BLD: ABNORMAL
EOSINOPHIL # BLD: 0.1 K/UL (ref 0–0.4)
EOSINOPHIL NFR BLD: 2 % (ref 0–5)
ERYTHROCYTE [DISTWIDTH] IN BLOOD BY AUTOMATED COUNT: 15.4 % (ref 11.6–14.5)
EST. AVERAGE GLUCOSE BLD GHB EST-MCNC: 105 MG/DL
GLOBULIN SER CALC-MCNC: 3.4 G/DL (ref 2–4)
GLUCOSE BLD STRIP.AUTO-MCNC: 103 MG/DL (ref 70–110)
GLUCOSE BLD STRIP.AUTO-MCNC: 114 MG/DL (ref 70–110)
GLUCOSE BLD STRIP.AUTO-MCNC: 148 MG/DL (ref 70–110)
GLUCOSE BLD STRIP.AUTO-MCNC: 183 MG/DL (ref 70–110)
GLUCOSE SERPL-MCNC: 131 MG/DL (ref 74–99)
HBA1C MFR BLD: 5.3 % (ref 4.2–5.6)
HCT VFR BLD AUTO: 37.7 % (ref 36–48)
HDLC SERPL-MCNC: 45 MG/DL (ref 40–60)
HDLC SERPL: 2.3 {RATIO} (ref 0–5)
HGB BLD-MCNC: 12.6 G/DL (ref 13–16)
LDLC SERPL CALC-MCNC: 42.4 MG/DL (ref 0–100)
LIPID PROFILE,FLP: NORMAL
LYMPHOCYTES # BLD: 1.3 K/UL (ref 0.9–3.6)
LYMPHOCYTES NFR BLD: 16 % (ref 21–52)
MCH RBC QN AUTO: 28.4 PG (ref 24–34)
MCHC RBC AUTO-ENTMCNC: 33.4 G/DL (ref 31–37)
MCV RBC AUTO: 84.9 FL (ref 74–97)
MONOCYTES # BLD: 0.6 K/UL (ref 0.05–1.2)
MONOCYTES NFR BLD: 7 % (ref 3–10)
NEUTS SEG # BLD: 6.1 K/UL (ref 1.8–8)
NEUTS SEG NFR BLD: 75 % (ref 40–73)
P-R INTERVAL, ECG05: 164 MS
P-R INTERVAL, ECG05: 168 MS
PLATELET # BLD AUTO: 196 K/UL (ref 135–420)
PMV BLD AUTO: 9.5 FL (ref 9.2–11.8)
POTASSIUM SERPL-SCNC: 4 MMOL/L (ref 3.5–5.5)
PROT SERPL-MCNC: 6.5 G/DL (ref 6.4–8.2)
Q-T INTERVAL, ECG07: 420 MS
Q-T INTERVAL, ECG07: 430 MS
QRS DURATION, ECG06: 110 MS
QRS DURATION, ECG06: 124 MS
QTC CALCULATION (BEZET), ECG08: 450 MS
QTC CALCULATION (BEZET), ECG08: 477 MS
RBC # BLD AUTO: 4.44 M/UL (ref 4.7–5.5)
SODIUM SERPL-SCNC: 141 MMOL/L (ref 136–145)
TRIGL SERPL-MCNC: 83 MG/DL (ref ?–150)
VENTRICULAR RATE, ECG03: 69 BPM
VENTRICULAR RATE, ECG03: 74 BPM
VLDLC SERPL CALC-MCNC: 16.6 MG/DL
WBC # BLD AUTO: 8.1 K/UL (ref 4.6–13.2)

## 2018-08-14 PROCEDURE — 71045 X-RAY EXAM CHEST 1 VIEW: CPT

## 2018-08-14 PROCEDURE — 85025 COMPLETE CBC W/AUTO DIFF WBC: CPT | Performed by: FAMILY MEDICINE

## 2018-08-14 PROCEDURE — 74011250637 HC RX REV CODE- 250/637: Performed by: NURSE PRACTITIONER

## 2018-08-14 PROCEDURE — 93005 ELECTROCARDIOGRAM TRACING: CPT

## 2018-08-14 PROCEDURE — 36415 COLL VENOUS BLD VENIPUNCTURE: CPT | Performed by: PHYSICIAN ASSISTANT

## 2018-08-14 PROCEDURE — 80053 COMPREHEN METABOLIC PANEL: CPT | Performed by: FAMILY MEDICINE

## 2018-08-14 PROCEDURE — 77030021352 HC CBL LD SYS DISP COVD -B

## 2018-08-14 PROCEDURE — 74011250637 HC RX REV CODE- 250/637: Performed by: INTERNAL MEDICINE

## 2018-08-14 PROCEDURE — 82962 GLUCOSE BLOOD TEST: CPT

## 2018-08-14 PROCEDURE — 74011636637 HC RX REV CODE- 636/637: Performed by: FAMILY MEDICINE

## 2018-08-14 PROCEDURE — 83036 HEMOGLOBIN GLYCOSYLATED A1C: CPT | Performed by: FAMILY MEDICINE

## 2018-08-14 PROCEDURE — 74011250637 HC RX REV CODE- 250/637: Performed by: PHYSICIAN ASSISTANT

## 2018-08-14 PROCEDURE — 80061 LIPID PANEL: CPT | Performed by: PHYSICIAN ASSISTANT

## 2018-08-14 PROCEDURE — 65660000000 HC RM CCU STEPDOWN

## 2018-08-14 RX ORDER — INSULIN LISPRO 100 [IU]/ML
INJECTION, SOLUTION INTRAVENOUS; SUBCUTANEOUS
Status: DISCONTINUED | OUTPATIENT
Start: 2018-08-14 | End: 2018-08-15 | Stop reason: HOSPADM

## 2018-08-14 RX ORDER — MAGNESIUM SULFATE 100 %
4 CRYSTALS MISCELLANEOUS AS NEEDED
Status: DISCONTINUED | OUTPATIENT
Start: 2018-08-14 | End: 2018-08-15 | Stop reason: HOSPADM

## 2018-08-14 RX ORDER — METOPROLOL TARTRATE 50 MG/1
100 TABLET ORAL EVERY 12 HOURS
Status: DISCONTINUED | OUTPATIENT
Start: 2018-08-14 | End: 2018-08-15 | Stop reason: HOSPADM

## 2018-08-14 RX ORDER — DEXTROSE 50 % IN WATER (D50W) INTRAVENOUS SYRINGE
25-50 AS NEEDED
Status: DISCONTINUED | OUTPATIENT
Start: 2018-08-14 | End: 2018-08-15 | Stop reason: HOSPADM

## 2018-08-14 RX ADMIN — LEVOTHYROXINE SODIUM 200 MCG: 100 TABLET ORAL at 07:29

## 2018-08-14 RX ADMIN — Medication 10 ML: at 22:00

## 2018-08-14 RX ADMIN — ASPIRIN 81 MG: 81 TABLET, COATED ORAL at 10:20

## 2018-08-14 RX ADMIN — METOPROLOL TARTRATE 100 MG: 50 TABLET ORAL at 21:08

## 2018-08-14 RX ADMIN — AMLODIPINE BESYLATE 10 MG: 10 TABLET ORAL at 10:20

## 2018-08-14 RX ADMIN — TICAGRELOR 90 MG: 90 TABLET ORAL at 21:08

## 2018-08-14 RX ADMIN — TICAGRELOR 90 MG: 90 TABLET ORAL at 01:42

## 2018-08-14 RX ADMIN — METOPROLOL TARTRATE 50 MG: 50 TABLET ORAL at 10:20

## 2018-08-14 RX ADMIN — Medication 10 ML: at 07:32

## 2018-08-14 RX ADMIN — Medication 10 ML: at 18:47

## 2018-08-14 RX ADMIN — TICAGRELOR 90 MG: 90 TABLET ORAL at 18:46

## 2018-08-14 RX ADMIN — INSULIN LISPRO 2 UNITS: 100 INJECTION, SOLUTION INTRAVENOUS; SUBCUTANEOUS at 18:46

## 2018-08-14 NOTE — PROGRESS NOTES
Physical Exam   Skin: Skin is warm and dry. Abrasion noted.         Primary Nurse Tiera Gorman RN and Ricardo Hernandez RN performed a dual skin assessment on this patient Impairment noted- see wound doc flow sheet  Brian score is 23

## 2018-08-14 NOTE — PROGRESS NOTES
[facility]  -  Progress Note      Patient: Geovany Avalos MRN: [id number]  SSN: [id number]    YOB: 1975  Age: 36 y.o. Sex: male      Admit Date: 8/13/2018  Patient seen and examined independently. No CP. BP remains elevated. Will uptitrate the BB. Okay to move to floor. Agree with assessment and plan as noted below. Ruddy Bernheim MD  Assessment:     -Inferior STEMI, reciprocal changes in I and aVL, initial troponin 0.19  -Hx cardiomyopathy  -HTN, uncontrolled, /127 on presentation  -Hx DM  -Hypothyroidism  -Hx gastric bypass    Plan:     -Continue ASA, high-dose Lipitor, Brilinta. -Will increase Lopressor to 100 mg BID starting this evening. Subjective:     No new complaints.       Objective:      Patient Vitals for the past 8 hrs:   Temp Pulse Resp BP SpO2   08/14/18 0800 97.9 °F (36.6 °C) 77 16 (!) 146/93 99 %   08/14/18 0700 - 76 16 (!) 134/91 99 %   08/14/18 0600 - 64 15 (!) 131/91 100 %   08/14/18 0500 - 73 15 122/79 99 %   08/14/18 0400 97.9 °F (36.6 °C) 72 17 128/85 100 %         Patient Vitals for the past 96 hrs:   Weight   08/13/18 1400 302 lb 0.5 oz (137 kg)   08/13/18 1227 315 lb (142.9 kg)         Intake/Output Summary (Last 24 hours) at 08/14/18 1141  Last data filed at 08/14/18 0600   Gross per 24 hour   Intake          1201.95 ml   Output             1300 ml   Net           -98.05 ml       Physical Exam:  General:  alert, cooperative, no distress, appears stated age  Neck:  No JVD  Lungs:  clear to auscultation bilaterally  Heart:  Regular rate and rhythm  Abdomen:  abdomen is soft without significant tenderness, masses, organomegaly or guarding  Extremities:  Cath site to R groin without tenderness or hematoma, no edema     Data Review:     Labs: Results:       Chemistry Recent Labs      08/14/18   0740  08/13/18   1225   GLU  131*  143*   NA  141  138   K  4.0  3.9   CL  107  102   CO2  25  29   BUN  13  15   CREA  0.88  1.06   CA  8.3*  9.5   AGAP 9  7   BUCR  15  14   AP  96   --    TP  6.5   --    ALB  3.1*   --    GLOB  3.4   --    AGRAT  0.9   --       CBC w/Diff Recent Labs      08/14/18   0740  08/13/18   1225   WBC  8.1  7.3   RBC  4.44*  5.53*   HGB  12.6*  15.8   HCT  37.7  47.1   PLT  196  205   GRANS  75*  76*   LYMPH  16*  14*   EOS  2  2      Cardiac Enzymes Lab Results   Component Value Date/Time     08/13/2018 12:25 PM    CKMB 2.3 08/13/2018 12:25 PM    CKND1 1.6 08/13/2018 12:25 PM    TROIQ 0.19 (H) 08/13/2018 12:25 PM    TNIPOC 0.14 ([facility]) 08/13/2018 12:27 PM      Lipid Panel Lab Results   Component Value Date/Time    Cholesterol, total 104 08/14/2018 03:55 AM    HDL Cholesterol 45 08/14/2018 03:55 AM    LDL, calculated 42.4 08/14/2018 03:55 AM    VLDL, calculated 16.6 08/14/2018 03:55 AM    Triglyceride 83 08/14/2018 03:55 AM    CHOL/HDL Ratio 2.3 08/14/2018 03:55 AM      Liver Enzymes Recent Labs      08/14/18   0740   TP  6.5   ALB  3.1*   AP  96   SGOT  246*      Thyroid Studies Lab Results   Component Value Date/Time    TSH 9.30 (H) 07/26/2018 08:20 AM

## 2018-08-14 NOTE — PROGRESS NOTES
Progress Note      Patient: Corie Paul               Sex: male          DOA: 8/13/2018       YOB: 1975      Age:  36 y.o.        LOS:  LOS: 1 day               Subjective / Interval Hx  I    Corie Paul is a 36 y.o. male  who presents with chest pain. EKG showed STEMI. Patient had cardiac cath and was noted to have 100% occlusion RCA. Has stent. Patient denies chest pain now. His BP is improving . Metoprolol dose increased. Patient denies chest pain and sob today. Objective:      Visit Vitals    BP (!) 144/96 (BP 1 Location: Right arm, BP Patient Position: At rest)    Pulse 72    Temp 97.5 °F (36.4 °C)    Resp 16    Ht 6' 2\" (1.88 m)    Wt 137 kg (302 lb 0.5 oz)    SpO2 97%    BMI 38.78 kg/m2             Physical Exam   Constitutional: He is oriented to person, place, and time. He appears well-developed and well-nourished. No distress. obese   HENT:   Head: Normocephalic and atraumatic. Mouth/Throat: No oropharyngeal exudate. Eyes: Conjunctivae are normal. No scleral icterus. Neck: Neck supple. Cardiovascular: Normal rate, regular rhythm and normal heart sounds. No murmur heard. Pulmonary/Chest: Effort normal and breath sounds normal. No respiratory distress. He has no wheezes. He has no rales. Abdominal: Soft. Bowel sounds are normal. He exhibits no distension. There is no tenderness. There is no rebound. Musculoskeletal: He exhibits no edema. Neurological: He is alert and oriented to person, place, and time. Skin: Skin is warm. No rash noted. No erythema. No pallor. Psychiatric: He has a normal mood and affect. Intake and Output:  Current Shift:     Last three shifts:  08/13 0701 - 08/14 1900  In: 4798 [P.O.:600;  I.V.:842]  Out: 1300 [Urine:1300]    Recent Results (from the past 48 hour(s))   EKG, 12 LEAD, INITIAL    Collection Time: 08/13/18 12:15 PM   Result Value Ref Range    Ventricular Rate 74 BPM    Atrial Rate 74 BPM    P-R Interval 164 ms    QRS Duration 124 ms    Q-T Interval 430 ms    QTC Calculation (Bezet) 477 ms    Calculated P Axis 35 degrees    Calculated R Axis -4 degrees    Calculated T Axis 117 degrees    Diagnosis       Normal sinus rhythm  Left ventricular hypertrophy with QRS widening and repolarization abnormality  ** ** ACUTE MI / STEMI ** **  When compared with ECG of 13-AUG-2018 12:15,  No significant change was found  Confirmed by Maylin Lincoln ([id number]) on 8/14/2018 3:11:19 PM     EKG, 12 LEAD, INITIAL    Collection Time: 08/13/18 12:15 PM   Result Value Ref Range    Ventricular Rate 69 BPM    Atrial Rate 69 BPM    P-R Interval 168 ms    QRS Duration 110 ms    Q-T Interval 420 ms    QTC Calculation (Bezet) 450 ms    Calculated P Axis 54 degrees    Calculated R Axis 3 degrees    Calculated T Axis 123 degrees    Diagnosis       Normal sinus rhythm  Left ventricular hypertrophy with repolarization abnormality  ST elevation, consider inferior injury or acute infarct  ACUTE MI  Consider right ventricular involvement in acute inferior infarct  Abnormal ECG  When compared with ECG of 03-OCT-2017 08:33,  ST elevation now present in Inferior leads  Confirmed by Maylin Lincoln ([id number]) on 8/14/2018 3:10:31 PM  Also confirmed by Maylin Lincoln [id number]),  Jacoby Lo ([id number])  on   8/14/2018 3:37:29 PM     CBC WITH AUTOMATED DIFF    Collection Time: 08/13/18 12:25 PM   Result Value Ref Range    WBC 7.3 4.6 - 13.2 K/uL    RBC 5.53 (H) 4.70 - 5.50 M/uL    HGB 15.8 13.0 - 16.0 g/dL    HCT 47.1 36.0 - 48.0 %    MCV 85.2 74.0 - 97.0 FL    MCH 28.6 24.0 - 34.0 PG    MCHC 33.5 31.0 - 37.0 g/dL    RDW 14.9 (H) 11.6 - 14.5 %    PLATELET 768 675 - 603 K/uL    MPV 9.3 9.2 - 11.8 FL    NEUTROPHILS 76 (H) 40 - 73 %    LYMPHOCYTES 14 (L) 21 - 52 %    MONOCYTES 8 3 - 10 %    EOSINOPHILS 2 0 - 5 %    BASOPHILS 0 0 - 2 %    ABS. NEUTROPHILS 5.6 1.8 - 8.0 K/UL    ABS. LYMPHOCYTES 1.0 0.9 - 3.6 K/UL    ABS.  MONOCYTES 0.6 0.05 - 1.2 K/UL    ABS. EOSINOPHILS 0.1 0.0 - 0.4 K/UL    ABS.  BASOPHILS 0.0 0.0 - 0.1 K/UL    DF AUTOMATED     METABOLIC PANEL, BASIC    Collection Time: 08/13/18 12:25 PM   Result Value Ref Range    Sodium 138 136 - 145 mmol/L    Potassium 3.9 3.5 - 5.5 mmol/L    Chloride 102 100 - 108 mmol/L    CO2 29 21 - 32 mmol/L    Anion gap 7 3.0 - 18 mmol/L    Glucose 143 (H) 74 - 99 mg/dL    BUN 15 7.0 - 18 MG/DL    Creatinine 1.06 0.6 - 1.3 MG/DL    BUN/Creatinine ratio 14 12 - 20      GFR est AA >60 >60 ml/min/1.73m2    GFR est non-AA >60 >60 ml/min/1.73m2    Calcium 9.5 8.5 - 10.1 MG/DL   CARDIAC PANEL,(CK, CKMB & TROPONIN)    Collection Time: 08/13/18 12:25 PM   Result Value Ref Range     39 - 308 U/L    CK - MB 2.3 <3.6 ng/ml    CK-MB Index 1.6 0.0 - 4.0 %    Troponin-I, Qt. 0.19 (H) 0.0 - 0.045 NG/ML   NT-PRO BNP    Collection Time: 08/13/18 12:25 PM   Result Value Ref Range    NT pro- (H) 0 - 450 PG/ML   POC TROPONIN-I    Collection Time: 08/13/18 12:27 PM   Result Value Ref Range    Troponin-I (POC) 0.14 (HH) 0.00 - 0.08 ng/mL   CARDIAC PROCEDURE    Collection Time: 08/13/18  1:46 PM   Result Value Ref Range         EDP 10    LIPID PANEL    Collection Time: 08/14/18  3:55 AM   Result Value Ref Range    LIPID PROFILE          Cholesterol, total 104 <200 MG/DL    Triglyceride 83 <150 MG/DL    HDL Cholesterol 45 40 - 60 MG/DL    LDL, calculated 42.4 0 - 100 MG/DL    VLDL, calculated 16.6 MG/DL    CHOL/HDL Ratio 2.3 0 - 5.0     GLUCOSE, POC    Collection Time: 08/14/18  7:31 AM   Result Value Ref Range    Glucose (POC) 148 (H) 70 - 293 mg/dL   METABOLIC PANEL, COMPREHENSIVE    Collection Time: 08/14/18  7:40 AM   Result Value Ref Range    Sodium 141 136 - 145 mmol/L    Potassium 4.0 3.5 - 5.5 mmol/L    Chloride 107 100 - 108 mmol/L    CO2 25 21 - 32 mmol/L    Anion gap 9 3.0 - 18 mmol/L    Glucose 131 (H) 74 - 99 mg/dL    BUN 13 7.0 - 18 MG/DL    Creatinine 0.88 0.6 - 1.3 MG/DL    BUN/Creatinine ratio 15 12 - 20      GFR est AA >60 >60 ml/min/1.73m2    GFR est non-AA >60 >60 ml/min/1.73m2    Calcium 8.3 (L) 8.5 - 10.1 MG/DL    Bilirubin, total 1.1 (H) 0.2 - 1.0 MG/DL    ALT (SGPT) 42 16 - 61 U/L    AST (SGOT) 246 (H) 15 - 37 U/L    Alk. phosphatase 96 45 - 117 U/L    Protein, total 6.5 6.4 - 8.2 g/dL    Albumin 3.1 (L) 3.4 - 5.0 g/dL    Globulin 3.4 2.0 - 4.0 g/dL    A-G Ratio 0.9 0.8 - 1.7     HEMOGLOBIN A1C WITH EAG    Collection Time: 08/14/18  7:40 AM   Result Value Ref Range    Hemoglobin A1c 5.3 4.2 - 5.6 %    Est. average glucose 105 mg/dL   CBC WITH AUTOMATED DIFF    Collection Time: 08/14/18  7:40 AM   Result Value Ref Range    WBC 8.1 4.6 - 13.2 K/uL    RBC 4.44 (L) 4.70 - 5.50 M/uL    HGB 12.6 (L) 13.0 - 16.0 g/dL    HCT 37.7 36.0 - 48.0 %    MCV 84.9 74.0 - 97.0 FL    MCH 28.4 24.0 - 34.0 PG    MCHC 33.4 31.0 - 37.0 g/dL    RDW 15.4 (H) 11.6 - 14.5 %    PLATELET 947 797 - 869 K/uL    MPV 9.5 9.2 - 11.8 FL    NEUTROPHILS 75 (H) 40 - 73 %    LYMPHOCYTES 16 (L) 21 - 52 %    MONOCYTES 7 3 - 10 %    EOSINOPHILS 2 0 - 5 %    BASOPHILS 0 0 - 2 %    ABS. NEUTROPHILS 6.1 1.8 - 8.0 K/UL    ABS. LYMPHOCYTES 1.3 0.9 - 3.6 K/UL    ABS. MONOCYTES 0.6 0.05 - 1.2 K/UL    ABS. EOSINOPHILS 0.1 0.0 - 0.4 K/UL    ABS.  BASOPHILS 0.0 0.0 - 0.1 K/UL    DF AUTOMATED     GLUCOSE, POC    Collection Time: 08/14/18 11:53 AM   Result Value Ref Range    Glucose (POC) 103 70 - 110 mg/dL   EKG, 12 LEAD, SUBSEQUENT    Collection Time: 08/14/18  5:21 PM   Result Value Ref Range    Ventricular Rate 75 BPM    Atrial Rate 75 BPM    P-R Interval 166 ms    QRS Duration 100 ms    Q-T Interval 444 ms    QTC Calculation (Bezet) 495 ms    Calculated P Axis 35 degrees    Calculated R Axis -19 degrees    Calculated T Axis 121 degrees    Diagnosis       Normal sinus rhythm  Left ventricular hypertrophy with repolarization abnormality  Inferior infarct , age undetermined  Abnormal ECG  When compared with ECG of 13-AUG-2018 12:18,  Inferior infarct is now present  ST less elevated in Inferior leads  ST less depressed in Lateral leads  T wave inversion now evident in Inferior leads     GLUCOSE, POC    Collection Time: 08/14/18  6:29 PM   Result Value Ref Range    Glucose (POC) 183 (H) 70 - 110 mg/dL       Lab/Data Reviewed: All lab results for the last 24 hours reviewed.     XRays were reviewed in past 24 hours    Medications Reviewed            Assessment/Plan     Principal Problem:    STEMI (ST elevation myocardial infarction) ([facility] (8/13/2018)    Active Problems:    Obesity (8/8/2011)      HTN (hypertension) (8/8/2011)      Hypothyroid (8/8/2011)      Cardiomyopathy ([facility] (7/21/2014)      PLAN     STEMI   - Aspirin , metoprolol, Brilinta, Lipitor    HTN  - uncontrolled  - Norvasc   - metoprolol     Hypothyroid   - Synthyroid     Hx CMO  - BB, Aspirin, Lipitor    Obesity  - weight loss encouraged    DVT prophylaxis     Full code       Farheen Velasco MD  August 14, 2018

## 2018-08-14 NOTE — PROGRESS NOTES
Bedside and Verbal shift change report given to Luana (oncoming nurse) by Kellie Eisenmenger (offgoing nurse). Report included the following information SBAR, Kardex and MAR.

## 2018-08-14 NOTE — DIABETES MGMT
Nutrition/Glycemic Control: Pt screened for nutritional status. He is 159% of his ideal weight with a BMI =38.8kg/m2. Pt meets criteria for obesity. He had a gastric bypass in the past. I observed his intake of the diabetic diet at the breakfast meal-90%. Pt is well nourished at this time.    Kathren Severe, RD

## 2018-08-14 NOTE — PROGRESS NOTES
(0331) Received report from Estrella Brothers, [facility]. Assumed care of patient. (8662) Patient ate 100% of breakfast. He remains pain free at this time. (5034) Attempted to call report. Patient not assigned yet. (1153) TRANSFER - OUT REPORT:    Verbal report given to AJ(name) on Emily Guevara  being transferred to (unit) for routine progression of care       Report consisted of patients Situation, Background, Assessment and   Recommendations(SBAR). Information from the following report(s) SBAR, Intake/Output, MAR, Recent Results, Cardiac Rhythm 1st degree HB to NSR and Alarm Parameters  was reviewed with the receiving nurse. Lines:   Peripheral IV 08/13/18 Left Antecubital (Active)   Site Assessment Clean, dry, & intact 8/14/2018 10:00 AM   Phlebitis Assessment 0 8/14/2018  8:00 AM   Infiltration Assessment 0 8/14/2018  8:00 AM   Dressing Status Clean, dry, & intact 8/14/2018  8:00 AM   Dressing Type Transparent 8/14/2018  8:00 AM   Hub Color/Line Status Green;Flushed;Patent;Capped 8/14/2018  8:00 AM   Action Taken Open ports on tubing capped 8/14/2018  8:00 AM   Alcohol Cap Used Yes 8/14/2018  8:00 AM       Peripheral IV 08/13/18 Right Wrist (Active)   Site Assessment Clean, dry, & intact 8/14/2018 10:00 AM   Phlebitis Assessment 0 8/14/2018  8:00 AM   Infiltration Assessment 0 8/14/2018  8:00 AM   Dressing Status Clean, dry, & intact 8/14/2018  8:00 AM   Dressing Type Transparent 8/14/2018  8:00 AM   Hub Color/Line Status Pink;Flushed;Patent;Capped 8/14/2018  8:00 AM   Action Taken Open ports on tubing capped 8/14/2018  8:00 AM   Alcohol Cap Used Yes 8/14/2018  8:00 AM        Opportunity for questions and clarification was provided.       Patient transported with:   Registered Nurse

## 2018-08-14 NOTE — PROGRESS NOTES
2000 Assumed care of pt after bedside report with pt lying in bed with HOB flat for now. Monitor denotes SR. Neuro intact. AAO x 4. CEDILLO x 4. Lungs clear. Pt on RA. Abd obese, intact, soft. Pt voiding in urinal clear yellow urine. Rt groin cath site with dressing dry and intact. No bleeding, oozing, hematoma noted. Pt c/o rt lower leg pain. Morphine 2 mg IV given as ordered for pain. 2030 Rt lower leg pain relieved per earlier med.2200 Resting at present without complaints. 08/14/2018 0000 IVF stopped as ordered. VSS. Pt denies chest pain or leg pain. 0200 Resting without complaints or distress. 0600 Voiding in urinal without difficulty. Pt advised and instructed that he needs to talk to Discharge Planner or  before discharge because he has no insurance or funds to obtain meds that he will be discharged to home with. Pt advised and instructed regarding importance of taking Brilinta for stent that was placed.

## 2018-08-14 NOTE — PROGRESS NOTES
Problem: Falls - Risk of  Goal: *Absence of Falls  Document [place] Fall Risk and appropriate interventions in the flowsheet.    Outcome: Progressing Towards Goal  Fall Risk Interventions:            Medication Interventions: Bed/chair exit alarm, Evaluate medications/consider consulting pharmacy, Teach patient to arise slowly    Elimination Interventions: Bed/chair exit alarm, Call light in reach

## 2018-08-14 NOTE — ROUTINE PROCESS
4225 Bedside report given to [id number] (oncoming nurse)per Maggie Brito RN (offgoing nurse) utilizing SBAR, MAR, Kardex, I&O, results review,and EKG interpretation with rate and rhythm.

## 2018-08-14 NOTE — PROGRESS NOTES
Reason for Admission:   Patient had Stemi with 100% occlusion of RCA. Emergent cath and stent(s) placed. RRAT Score:    8                 Plan for utilizing home health:     Home with [facility]                     Likelihood of Readmission:  moderate                         Transition of Care Plan:    Plan per patient. Pt has no insurance and is currently unemployed. He has a  and is working on obtaining disability. Will refer to Med assist for follow-up. Will most likely need medication assistance. Already active with Dr. Iliana Rudolph will need appt within 7 days to PCP and within 2 days for cardiology.

## 2018-08-14 NOTE — INTERDISCIPLINARY ROUNDS
IDR Summary      Patient: Dagmar Leon MRN: [id number]    Age: 36 y.o.  : 1975     Admit Diagnosis: STEMI (ST elevation myocardial infarction) [facility]) [I21.3]      Problems pertinent to hospital stay:     Consults:Case Management     Testing due for patient today? NO    Nutrition plan:Yes     Mobility needs: Yes      Lines/Tubes:   IV: YES   Needed: YES  Diaz:  NO Needed:NO  Central Line: NO Needed: NO      VTE Prophylaxis: Chemical    Care Management:  Discharge plan: home   PCP: Emerald Crowder DO    : NO  Financial concerns:No   Interventions:   Pt stated he is unable to afford Brilinta - CM working on this    LOS: 1 days     Expected days until discharge: possibly tomorrow? Signed:      MASSIMO Benz  [facility]  Hospitalist Division  Pager:  [phone]  Office:  [phone]

## 2018-08-15 VITALS
DIASTOLIC BLOOD PRESSURE: 106 MMHG | TEMPERATURE: 97.6 F | RESPIRATION RATE: 18 BRPM | HEIGHT: 74 IN | SYSTOLIC BLOOD PRESSURE: 151 MMHG | WEIGHT: 315 LBS | BODY MASS INDEX: 40.43 KG/M2 | HEART RATE: 81 BPM | OXYGEN SATURATION: 100 %

## 2018-08-15 LAB
ACT BLD: 136 SECS (ref 79–138)
ATRIAL RATE: 75 BPM
CALCULATED P AXIS, ECG09: 35 DEGREES
CALCULATED R AXIS, ECG10: -19 DEGREES
CALCULATED T AXIS, ECG11: 121 DEGREES
DIAGNOSIS, 93000: NORMAL
GLUCOSE BLD STRIP.AUTO-MCNC: 90 MG/DL (ref 70–110)
P-R INTERVAL, ECG05: 166 MS
Q-T INTERVAL, ECG07: 444 MS
QRS DURATION, ECG06: 100 MS
QTC CALCULATION (BEZET), ECG08: 495 MS
VENTRICULAR RATE, ECG03: 75 BPM

## 2018-08-15 PROCEDURE — 74011250637 HC RX REV CODE- 250/637: Performed by: INTERNAL MEDICINE

## 2018-08-15 PROCEDURE — 82962 GLUCOSE BLOOD TEST: CPT

## 2018-08-15 PROCEDURE — 74011250637 HC RX REV CODE- 250/637: Performed by: HOSPITALIST

## 2018-08-15 PROCEDURE — 74011250637 HC RX REV CODE- 250/637: Performed by: NURSE PRACTITIONER

## 2018-08-15 PROCEDURE — 74011250637 HC RX REV CODE- 250/637: Performed by: PHYSICIAN ASSISTANT

## 2018-08-15 RX ORDER — METOPROLOL TARTRATE 100 MG/1
100 TABLET ORAL EVERY 12 HOURS
Qty: 60 TAB | Refills: 0 | Status: SHIPPED | OUTPATIENT
Start: 2018-08-15 | End: 2018-08-15

## 2018-08-15 RX ORDER — ATORVASTATIN CALCIUM 80 MG/1
80 TABLET, FILM COATED ORAL
Qty: 30 TAB | Refills: 0 | Status: SHIPPED | OUTPATIENT
Start: 2018-08-15 | End: 2018-08-21 | Stop reason: SDUPTHER

## 2018-08-15 RX ORDER — ASPIRIN 81 MG/1
81 TABLET ORAL DAILY
Qty: 30 TAB | Refills: 0 | Status: SHIPPED | OUTPATIENT
Start: 2018-08-16 | End: 2018-08-21 | Stop reason: SDUPTHER

## 2018-08-15 RX ORDER — ASPIRIN 81 MG/1
81 TABLET ORAL DAILY
Qty: 30 TAB | Refills: 0 | Status: SHIPPED | OUTPATIENT
Start: 2018-08-16 | End: 2018-08-15

## 2018-08-15 RX ORDER — ACETAMINOPHEN 325 MG/1
650 TABLET ORAL
Status: DISCONTINUED | OUTPATIENT
Start: 2018-08-15 | End: 2018-08-15 | Stop reason: HOSPADM

## 2018-08-15 RX ORDER — ATORVASTATIN CALCIUM 80 MG/1
80 TABLET, FILM COATED ORAL
Qty: 30 TAB | Refills: 0 | Status: SHIPPED | OUTPATIENT
Start: 2018-08-15 | End: 2018-08-15

## 2018-08-15 RX ORDER — CLOPIDOGREL BISULFATE 75 MG/1
150 TABLET ORAL
Status: COMPLETED | OUTPATIENT
Start: 2018-08-15 | End: 2018-08-15

## 2018-08-15 RX ORDER — METOPROLOL TARTRATE 100 MG/1
100 TABLET ORAL EVERY 12 HOURS
Qty: 60 TAB | Refills: 0 | Status: SHIPPED | OUTPATIENT
Start: 2018-08-15 | End: 2018-08-21 | Stop reason: SDUPTHER

## 2018-08-15 RX ORDER — LISINOPRIL 5 MG/1
5 TABLET ORAL DAILY
Status: DISCONTINUED | OUTPATIENT
Start: 2018-08-15 | End: 2018-08-15 | Stop reason: HOSPADM

## 2018-08-15 RX ORDER — CLOPIDOGREL BISULFATE 75 MG/1
75 TABLET ORAL DAILY
Qty: 30 TAB | Refills: 11 | Status: SHIPPED | OUTPATIENT
Start: 2018-08-15 | End: 2018-08-21 | Stop reason: SDUPTHER

## 2018-08-15 RX ADMIN — Medication 10 ML: at 05:49

## 2018-08-15 RX ADMIN — LEVOTHYROXINE SODIUM 200 MCG: 100 TABLET ORAL at 05:48

## 2018-08-15 RX ADMIN — METOPROLOL TARTRATE 100 MG: 50 TABLET ORAL at 08:45

## 2018-08-15 RX ADMIN — LISINOPRIL 5 MG: 5 TABLET ORAL at 09:38

## 2018-08-15 RX ADMIN — TICAGRELOR 90 MG: 90 TABLET ORAL at 08:45

## 2018-08-15 RX ADMIN — AMLODIPINE BESYLATE 10 MG: 10 TABLET ORAL at 08:45

## 2018-08-15 RX ADMIN — ASPIRIN 81 MG: 81 TABLET, COATED ORAL at 08:45

## 2018-08-15 RX ADMIN — ATORVASTATIN CALCIUM 80 MG: 40 TABLET, FILM COATED ORAL at 00:06

## 2018-08-15 RX ADMIN — CLOPIDOGREL BISULFATE 150 MG: 75 TABLET ORAL at 14:18

## 2018-08-15 RX ADMIN — ACETAMINOPHEN 650 MG: 325 TABLET, FILM COATED ORAL at 09:38

## 2018-08-15 NOTE — PROGRESS NOTES
Counseled as to benefits of cardiac rehabilitation services.       Provided the following contacts:    1)  [address]  [phone]    2)  [address] [address]  [phone]    3)  [address]   [phone]  Nicholas Senior MD

## 2018-08-15 NOTE — PROGRESS NOTES
Application for prescription assistance for Lopressor 100mg IR #60, Lipitor 80mg #30, Brilinta 90mg #60 free with coupon completed and faxed to Jake Delcid..

## 2018-08-15 NOTE — PROGRESS NOTES
Pt discharged home, accompanied by this nurse via wheelchair. Pt has all discharge instructions, along with 1 prescription for 81mg Aspirin and medications from [facility] [employer] [facility]. Voiced understanding of all discharge instructions.

## 2018-08-15 NOTE — DISCHARGE INSTRUCTIONS
DISCHARGE SUMMARY from Nurse    PATIENT INSTRUCTIONS:    After general anesthesia or intravenous sedation, for 24 hours or while taking prescription Narcotics:  · Limit your activities  · Do not drive and operate hazardous machinery  · Do not make important personal or business decisions  · Do  not drink alcoholic beverages  · If you have not urinated within 8 hours after discharge, please contact your surgeon on call. Report the following to your surgeon:  · Excessive pain, swelling, redness or odor of or around the surgical area  · Temperature over 100.5  · Nausea and vomiting lasting longer than 4 hours or if unable to take medications  · Any signs of decreased circulation or nerve impairment to extremity: change in color, persistent  numbness, tingling, coldness or increase pain  · Any questions    What to do at Home:  Recommended activity: Activity as tolerated    If you experience any of the following symptoms as listed in the discharge instructions as \"When to Call for Help\", please follow up with your primary care physician and/or call 911. *  Please give a list of your current medications to your Primary Care Provider. *  Please update this list whenever your medications are discontinued, doses are      changed, or new medications (including over-the-counter products) are added. *  Please carry medication information at all times in case of emergency situations. These are general instructions for a healthy lifestyle:    No smoking/ No tobacco products/ Avoid exposure to second hand smoke  Surgeon General's Warning:  Quitting smoking now greatly reduces serious risk to your health.     Obesity, smoking, and sedentary lifestyle greatly increases your risk for illness    A healthy diet, regular physical exercise & weight monitoring are important for maintaining a healthy lifestyle    You may be retaining fluid if you have a history of heart failure or if you experience any of the following symptoms:  Weight gain of 3 pounds or more overnight or 5 pounds in a week, increased swelling in our hands or feet or shortness of breath while lying flat in bed. Please call your doctor as soon as you notice any of these symptoms; do not wait until your next office visit. Recognize signs and symptoms of STROKE:    F-face looks uneven    A-arms unable to move or move unevenly    S-speech slurred or non-existent    T-time-call 911 as soon as signs and symptoms begin-DO NOT go       Back to bed or wait to see if you get better-TIME IS BRAIN. Warning Signs of HEART ATTACK     Call 911 if you have these symptoms:   Chest discomfort. Most heart attacks involve discomfort in the center of the chest that lasts more than a few minutes, or that goes away and comes back. It can feel like uncomfortable pressure, squeezing, fullness, or pain.  Discomfort in other areas of the upper body. Symptoms can include pain or discomfort in one or both arms, the back, neck, jaw, or stomach.  Shortness of breath with or without chest discomfort.  Other signs may include breaking out in a cold sweat, nausea, or lightheadedness. Don't wait more than five minutes to call 911 - MINUTES MATTER! Fast action can save your life. Calling 911 is almost always the fastest way to get lifesaving treatment. Emergency Medical Services staff can begin treatment when they arrive -- up to an hour sooner than if someone gets to the hospital by car. The discharge information has been reviewed with the patient. The patient verbalized understanding. Discharge medications reviewed with the patient and appropriate educational materials and side effects teaching were provided.   ___________________________________________________________________________________________________________________________________

## 2018-08-15 NOTE — DISCHARGE SUMMARY
2 [facility]  Hospitalist Division    Discharge Summary    Patient: Jayleen Brown MRN: [id number]  CSN: [id number]    YOB: 1975  Age: 36 y.o. Sex: male    DOA: 8/13/2018 LOS:  LOS: 2 days   Discharge Date:      Admission Diagnoses: STEMI (ST elevation myocardial infarction) ([facility] [I21.3]    Discharge Diagnoses:    Problem List as of 8/15/2018  Date Reviewed: 10/4/2017          Codes Class Noted - Resolved    * (Principal)STEMI (ST elevation myocardial infarction) ([facility] ICD-10-CM: I21.3  ICD-9-CM: 410.90  8/13/2018 - Present        Obesity, morbid ([facility] ICD-10-CM: E66.01  ICD-9-CM: 278.01  7/26/2018 - Present        Post corneal transplant ICD-10-CM: Z94.7  ICD-9-CM: V42.5  7/21/2014 - Present        Cardiomyopathy ([facility] ICD-10-CM: I42.9  ICD-9-CM: 425.4  7/21/2014 - Present        Obesity ICD-10-CM: E66.9  ICD-9-CM: 278.00  8/8/2011 - Present        Gastric bypass status for obesity ICD-10-CM: Z98.84  ICD-9-CM: V45.86  8/8/2011 - Present        HTN (hypertension) ICD-10-CM: I10  ICD-9-CM: 401.9  8/8/2011 - Present        Hypothyroid ICD-10-CM: E03.9  ICD-9-CM: 244.9  8/8/2011 - Present              Discharge Condition: Good    Discharge To: Home    Consults: Cardiology    Hospital Course: Tamika Pretty is 36 y.o. Male with hx of CMO, gastric Bypass, HTN, CHF, ETOH abuse (6 pack every other day), hypothyroidism who presents with midsternal CP/right side CP onset this morning around 9am. Patient apparently had been having this intermittent CP x2 weeks however would last for 20 min with no correlation. Patient had visited his PCP and was found to have HTN subsequently was placed on Norvasc several weeks ago. He notes CP occurred after being started on Norvasc. No radiation or SOB. He reported to his provider CP after Norvasc so this was discontinued however he notes CP persisted. He notes this morning that his CP occurred however would not stop.  He notes CP is a 6/10 however improving currently with nitro. No n/v, sob, fever, chills, or any other acute complaints at this time. Upon arrival patient was found to have have STEMI with elevation slightly in II, with prominent ST elevation in III AVF with some reciprocal changes 1 and AVL possibly Inferior STEMI along with  systolic, initial troponin 0.19. He will be taken for Cardiac cath. Cardiology consulted. Cardiac cath showed 100% occlusion to RCA-stent placed; groin site intact. The patient denies any CP, SOB. He will be discharged home today. The patient should follow-up with PCP and cardiology within 1 week of discharge. Patient to arrange.        Physical Exam:  General appearance: alert, cooperative, no distress  Lungs: clear to auscultation bilaterally  Heart: regular rate and rhythm, S1, S2 normal  Abdomen: soft, non-tender. Bowel sounds normal.  Extremities: extremities normal, atraumatic, no cyanosis or edema  Skin: Skin color, texture, turgor normal. R Groin site intact; no hematoma, minimal ecchymosis. Neurologic: Grossly normal  PSY: Mood and affect normal, appropriately behaved    Significant Diagnostic Studies:     EXAM: XR CHEST PORT     CLINICAL HISTORY: CP.  -Additional: None.     TECHNIQUE: A portable erect AP radiographic view of the chest is compared with  the radiographs of 10/03/2017.  _______________     FINDINGS:     The lungs and pleural spaces are clear. The cardiac silhouette, keren, and  mediastinal contours are normal for age. No acute osseous abnormality is  revealed.     _______________     IMPRESSION  IMPRESSION:     No acute cardiopulmonary disease. · Calculated left ventricular ejection fraction is 45%. Left ventricle not well visualized. Left ventricular cavity size is moderately dilated. Left ventricular mild concentric hypertrophy. Left ventricular global hypokinesis. Mild (grade 1) left ventricular diastolic dysfunction. · Left atrial cavity size is mildly dilated.   · Right atrial cavity size is mildly dilated. · Mild aortic valve focal thickening present. · Mitral valve non-specific thickening. Mild mitral valve regurgitation. · Moderately elevated central venous pressure (10-15 mmHg); IVC diameter is larger than 21 mm and collapses more than 50% with respiration. · Mild tricuspid valve regurgitation is present.             Myocardial Findings        Left Ventricle  Normal systolic function (ejection fraction normal). Left ventricle not well visualized. The cavity size is moderately dilated. Mild concentric hypertrophy observed. The calculated ejection fraction is 45%. Global hypokinesis observed. There is mild (grade 1) left ventricular diastolic dysfunction.       Left Atrium  The cavity size is mildly dilated.       Right Ventricle  Normal cavity size and global systolic function.       Right Atrium  The cavity size is mildly dilated.       Aortic Valve  No stenosis. Mild focal thickening present. Trace aortic valve regurgitation.       Mitral Valve  No stenosis. Mitral valve non-specific thickening. Mild regurgitation.       Tricuspid Valve  Normal valve structure and no stenosis. Mild tricuspid valve regurgitation.       Pulmonic Valve  The pulmonic valve was not well visualized. Normal valve structure and no stenosis. Trace regurgitation.       Aorta  The aorta was not well visualized. Normal aortic root.       IVC/Hepatic Veins  Inferior vena cava not well visualized. Normal structure. Moderately elevated central venous pressure (10-15 mmHg); IVC diameter is larger than 21 mm and collapses more than 50% with respiration. Normal hepatic veins.       Pericardium  No evidence of pericardial effusion.           TTE procedure Findings        TTE Procedure Information  Image quality: adequate. Technically difficult study due to patient's body habitus, color flow Doppler was performed and pulse wave and/or continuous wave Doppler was performed.      STEMI (ST elevation myocardial infarction) ([facility] [I21.3]             Indications        ST elevation myocardial infarction involving right coronary artery (HCC) [I21.11 (ICD-10-CM)]         Conclusion        Totally-occluded RCA  Mild ostial Dx1 stenosis  Mild mid CCX stenosis  Moderate LV systolic dysfunction        S/P PCI RUT in mid RCA         Complications        No complications during this procedure.         Coronary Findings        Dominance: Right     Left Main     The vessel was visualized by angiography and is large. The vessel is angiographically normal.            Left Anterior Descending     The vessel was visualized by angiography and is large.       First Diagonal Branch       Ost 1st Diag lesion 30% stenosed. . The lesion was not previously treated.            Left Circumflex     The vessel was visualized by angiography and is small.       Mid Cx lesion 30% stenosed. .            Right Coronary Artery     The vessel was visualized by angiography and is large.       Mid RCA lesion 100% stenosed. . Lesion is the culprit lesion.       PCI: Lesion length: 15 mm. The guidewire crossed the lesion. A single stent was placed. There is no pre-intervention BENI flow. Pre-dilitation was performed using a CATH BLLN RX TREK 3.0X15MM -- supply. A STENT COR RX [id number] 140CM -- RESOLUTE INTEGRITY stent was successfully placed. Post-stent angioplasty was performed using a CATH BLLN COR DIL 4X15MM -- NC TREK RAPID-EXCHANGE supply. The post-interventional distal flow is normal (BENI 3). The intervention was successful. No complications occurred at this lesion.      There is no residual stenosis post intervention. Discharge Medications:     Current Discharge Medication List      START taking these medications    Details   aspirin delayed-release 81 mg tablet Take 1 Tab by mouth daily. Qty: 30 Tab, Refills: 0      atorvastatin (LIPITOR) 80 mg tablet Take 1 Tab by mouth nightly.   Qty: 30 Tab, Refills: 0      clopidogrel (Plavix) 75 mg  Take 1 Tab by mouth daily. metoprolol tartrate (LOPRESSOR) 100 mg tablet Take 1 Tab by mouth two (2) times a day. Qty: 60 Tab, Refills: 0    Associated Diagnoses: Encounter for immunization; Cardiomyopathy, unspecified type ([address] Hypertension, unspecified type; Hypothyroidism, unspecified type      CONTINUE these medications which have NOT CHANGED    Details   levothyroxine (SYNTHROID) 200 mcg tablet Take 1 Tab by mouth Daily (before breakfast). Qty: 90 Tab, Refills: 4    Associated Diagnoses: Encounter for immunization; Cardiomyopathy, unspecified type ([address] Hypertension, unspecified type; Hypothyroidism, unspecified type      enalapril (VASOTEC) 20 mg tablet Take 1 Tab by mouth two (2) times a day. Qty: 180 Tab, Refills: 4    Associated Diagnoses: Encounter for immunization; Cardiomyopathy, unspecified type ([address] Hypertension, unspecified type; Hypothyroidism, unspecified type                 amLODIPine (NORVASC) 10 mg tablet Take 1 Tab by mouth daily. Qty: 30 Tab, Refills: 1    Associated Diagnoses: Hypertension, unspecified type; Cardiomyopathy, unspecified type ([address]             Activity: Activity as tolerated    Diet: Cardiac Diet    Wound Care: None needed    Follow-up:     The patient should follow-up with PCP and cardiology in 1-2 weeks in regards to recent hospitalization. Patient to arrange.      Discharge time > 35 mins   Maggie Thomas NP  8/15/2018, 10:49 AM

## 2018-08-15 NOTE — PROGRESS NOTES
[facility]  -  Progress Note      Patient: Man Sagastume MRN: [id number]  SSN: [id number]    YOB: 1975  Age: 36 y.o. Sex: male      Admit Date: 8/13/2018  Patient seen and examined independently. Cardiac rehab facilities discussed with patient and benefit of same. Will see in RT in 1 week. Agree with assessment and plan as noted below. Manuel Perkins MD  Assessment:     -Inferior STEMI, reciprocal changes in I and aVL, initial troponin 0.19  -Hx cardiomyopathy  -HTN, uncontrolled, /127 on presentation  -Hx DM  -Hypothyroidism  -Hx gastric bypass    Plan:     -HR was up to ~150 after having bathroom accident in bed, getting up and going to bathroom.  -Continue 100 mg Lopressor BID. -Continue current medication regimen otherwise to include ASA, Brilinta, Norvasc, Lipitor, Lisinopril.  -Emphasized importance of compliance with medication regimen. Pt to receive first month of Brilinta through [address] with coupon. Pt states he is going to be given Medicaid paperwork while in the hospital.    Subjective:     Reports R knee pain, which he attributes to arthritis. He denies any recurrent chest pain.     Objective:      Patient Vitals for the past 8 hrs:   Temp Pulse Resp BP SpO2   08/15/18 0746 98.4 °F (36.9 °C) 87 18 (!) 151/100 100 %   08/15/18 0549 97.7 °F (36.5 °C) 68 16 (!) 160/96 97 %         Patient Vitals for the past 96 hrs:   Weight   08/15/18 0549 315 lb 0.6 oz (142.9 kg)   08/13/18 1400 302 lb 0.5 oz (137 kg)   08/13/18 1227 315 lb (142.9 kg)         Intake/Output Summary (Last 24 hours) at 08/15/18 0067  Last data filed at 08/15/18 0549   Gross per 24 hour   Intake              490 ml   Output              500 ml   Net              -10 ml       Physical Exam:  General:  alert, cooperative, no distress, appears stated age  Neck:  No JVD  Lungs:  clear to auscultation bilaterally  Heart:  Regular rate and rhythm  Abdomen:  abdomen is soft without significant tenderness, masses, organomegaly or guarding  Extremities:  Ecchymosis surrounding R groin cath site, no hematoma.   no edema     Data Review:     Labs: Results:       Chemistry Recent Labs      08/14/18   0740  08/13/18   1225   GLU  131*  143*   NA  141  138   K  4.0  3.9   CL  107  102   CO2  25  29   BUN  13  15   CREA  0.88  1.06   CA  8.3*  9.5   AGAP  9  7   BUCR  15  14   AP  96   --    TP  6.5   --    ALB  3.1*   --    GLOB  3.4   --    AGRAT  0.9   --       CBC w/Diff Recent Labs      08/14/18   0740  08/13/18   1225   WBC  8.1  7.3   RBC  4.44*  5.53*   HGB  12.6*  15.8   HCT  37.7  47.1   PLT  196  205   GRANS  75*  76*   LYMPH  16*  14*   EOS  2  2      Lipid Panel Lab Results   Component Value Date/Time    Cholesterol, total 104 08/14/2018 03:55 AM    HDL Cholesterol 45 08/14/2018 03:55 AM    LDL, calculated 42.4 08/14/2018 03:55 AM    VLDL, calculated 16.6 08/14/2018 03:55 AM    Triglyceride 83 08/14/2018 03:55 AM    CHOL/HDL Ratio 2.3 08/14/2018 03:55 AM      Liver Enzymes Recent Labs      08/14/18   0740   TP  6.5   ALB  3.1*   AP  96   SGOT  246*      Thyroid Studies Lab Results   Component Value Date/Time    TSH 9.30 (H) 07/26/2018 08:20 AM

## 2018-08-15 NOTE — PROGRESS NOTES
Patient received in bed awake. Patient alert and oriented X4, denies pain and discomfort. Patient resting quietly. Frequent use items within reach. Bed locked in low position. Call bell within reach and patient verbalized understanding of use for assistance and needs. [id number]:  Received phone call from [employer] tech that Pt HR is in the 130\"s. 7527: This nurse bedside. Pt in restroom. .    1115:  Received D/C order for Pt. Spoke with Care Management about Pt Jolene óGmez stated that they are working on it.

## 2018-08-15 NOTE — ROUTINE PROCESS
1900-Bedside shift change report given to Tereso Richardson RN (oncoming nurse) by Adriana Monsivais Rn (offgoing nurse). Report included the following information SBAR, Kardex and MAR. Patient alert and oriented x4, no complaints of pain. 2100- Patient sleeping, no distress noted. 0700-Bedside shift change report given to Leslie Goodwin (oncoming nurse) by Tereso Richardson RN (offgoing nurse). Report included the following information SBAR, Kardex and MAR. Night was uneventful, no sob or chest pain noted. Rt. Leg pedal pulse +2, minimal bruising. Rt, groin site c/d/i.

## 2018-08-15 NOTE — DISCHARGE SUMMARY
2 [facility]  Hospitalist Division    Discharge Summary    Patient: Tawanda Park MRN: [id number]  CSN: [id number]    YOB: 1975  Age: 36 y.o. Sex: male    DOA: 8/13/2018 LOS:  LOS: 2 days   Discharge Date:      Admission Diagnoses: STEMI (ST elevation myocardial infarction) ([facility] [I21.3]    Discharge Diagnoses:    Problem List as of 8/15/2018  Date Reviewed: 10/4/2017          Codes Class Noted - Resolved    * (Principal)STEMI (ST elevation myocardial infarction) ([facility] ICD-10-CM: I21.3  ICD-9-CM: 410.90  8/13/2018 - Present        Obesity, morbid ([facility] ICD-10-CM: E66.01  ICD-9-CM: 278.01  7/26/2018 - Present        Post corneal transplant ICD-10-CM: Z94.7  ICD-9-CM: V42.5  7/21/2014 - Present        Cardiomyopathy ([facility] ICD-10-CM: I42.9  ICD-9-CM: 425.4  7/21/2014 - Present        Obesity ICD-10-CM: E66.9  ICD-9-CM: 278.00  8/8/2011 - Present        Gastric bypass status for obesity ICD-10-CM: Z98.84  ICD-9-CM: V45.86  8/8/2011 - Present        HTN (hypertension) ICD-10-CM: I10  ICD-9-CM: 401.9  8/8/2011 - Present        Hypothyroid ICD-10-CM: E03.9  ICD-9-CM: 244.9  8/8/2011 - Present              Discharge Condition: Good    Discharge To: Home    Consults: Cardiology    Hospital Course: Lindsey Lee is 36 y.o. Male with hx of CMO, gastric Bypass, HTN, CHF, ETOH abuse (6 pack every other day), hypothyroidism who presents with midsternal CP/right side CP onset this morning around 9am. Patient apparently had been having this intermittent CP x2 weeks however would last for 20 min with no correlation. Patient had visited his PCP and was found to have HTN subsequently was placed on Norvasc several weeks ago. He notes CP occurred after being started on Norvasc. No radiation or SOB. He reported to his provider CP after Norvasc so this was discontinued however he notes CP persisted. He notes this morning that his CP occurred however would not stop.  He notes CP is a 6/10 however improving currently with nitro. No n/v, sob, fever, chills, or any other acute complaints at this time. Upon arrival patient was found to have have STEMI with elevation slightly in II, with prominent ST elevation in III AVF with some reciprocal changes 1 and AVL possibly Inferior STEMI along with  systolic, initial troponin 0.19. He will be taken for Cardiac cath. Cardiology consulted. Cardiac cath showed 100% occlusion to RCA-stent placed; groin site intact. The patient denies any CP, SOB. He will be discharged home today. The patient should follow-up with PCP and cardiology within 1 week of discharge. Patient to arrange.        Physical Exam:  General appearance: alert, cooperative, no distress  Lungs: clear to auscultation bilaterally  Heart: regular rate and rhythm, S1, S2 normal  Abdomen: soft, non-tender. Bowel sounds normal.  Extremities: extremities normal, atraumatic, no cyanosis or edema  Skin: Skin color, texture, turgor normal. R Groin site intact; no hematoma, minimal ecchymosis. Neurologic: Grossly normal  PSY: Mood and affect normal, appropriately behaved    Significant Diagnostic Studies:     EXAM: XR CHEST PORT     CLINICAL HISTORY: CP.  -Additional: None.     TECHNIQUE: A portable erect AP radiographic view of the chest is compared with  the radiographs of 10/03/2017.  _______________     FINDINGS:     The lungs and pleural spaces are clear. The cardiac silhouette, keren, and  mediastinal contours are normal for age. No acute osseous abnormality is  revealed.     _______________     IMPRESSION  IMPRESSION:     No acute cardiopulmonary disease. · Calculated left ventricular ejection fraction is 45%. Left ventricle not well visualized. Left ventricular cavity size is moderately dilated. Left ventricular mild concentric hypertrophy. Left ventricular global hypokinesis. Mild (grade 1) left ventricular diastolic dysfunction. · Left atrial cavity size is mildly dilated.   · Right atrial cavity size is mildly dilated. · Mild aortic valve focal thickening present. · Mitral valve non-specific thickening. Mild mitral valve regurgitation. · Moderately elevated central venous pressure (10-15 mmHg); IVC diameter is larger than 21 mm and collapses more than 50% with respiration. · Mild tricuspid valve regurgitation is present.             Myocardial Findings        Left Ventricle  Normal systolic function (ejection fraction normal). Left ventricle not well visualized. The cavity size is moderately dilated. Mild concentric hypertrophy observed. The calculated ejection fraction is 45%. Global hypokinesis observed. There is mild (grade 1) left ventricular diastolic dysfunction.       Left Atrium  The cavity size is mildly dilated.       Right Ventricle  Normal cavity size and global systolic function.       Right Atrium  The cavity size is mildly dilated.       Aortic Valve  No stenosis. Mild focal thickening present. Trace aortic valve regurgitation.       Mitral Valve  No stenosis. Mitral valve non-specific thickening. Mild regurgitation.       Tricuspid Valve  Normal valve structure and no stenosis. Mild tricuspid valve regurgitation.       Pulmonic Valve  The pulmonic valve was not well visualized. Normal valve structure and no stenosis. Trace regurgitation.       Aorta  The aorta was not well visualized. Normal aortic root.       IVC/Hepatic Veins  Inferior vena cava not well visualized. Normal structure. Moderately elevated central venous pressure (10-15 mmHg); IVC diameter is larger than 21 mm and collapses more than 50% with respiration. Normal hepatic veins.       Pericardium  No evidence of pericardial effusion.           TTE procedure Findings        TTE Procedure Information  Image quality: adequate. Technically difficult study due to patient's body habitus, color flow Doppler was performed and pulse wave and/or continuous wave Doppler was performed.      [facility] [I21.3]             Indications        ST elevation myocardial infarction involving right coronary artery (HCC) [I21.11 (ICD-10-CM)]         Conclusion        Totally-occluded RCA  Mild ostial Dx1 stenosis  Mild mid CCX stenosis  Moderate LV systolic dysfunction        S/P PCI RUT in mid RCA         Complications        No complications during this procedure.         Coronary Findings        Dominance: Right     Left Main     The vessel was visualized by angiography and is large. The vessel is angiographically normal.            Left Anterior Descending     The vessel was visualized by angiography and is large.       First Diagonal Branch       Ost 1st Diag lesion 30% stenosed. . The lesion was not previously treated.            Left Circumflex     The vessel was visualized by angiography and is small.       Mid Cx lesion 30% stenosed. .            Right Coronary Artery     The vessel was visualized by angiography and is large.       Mid RCA lesion 100% stenosed. . Lesion is the culprit lesion.       PCI: Lesion length: 15 mm. The guidewire crossed the lesion. A single stent was placed. There is no pre-intervention BENI flow. Pre-dilitation was performed using a CATH BLLN RX TREK 3.0X15MM -- supply. A STENT COR RX [id number] 140CM -- RESOLUTE INTEGRITY stent was successfully placed. Post-stent angioplasty was performed using a CATH BLLN COR DIL 4X15MM -- NC TREK RAPID-EXCHANGE supply. The post-interventional distal flow is normal (BENI 3). The intervention was successful. No complications occurred at this lesion.      There is no residual stenosis post intervention. Discharge Medications:     Current Discharge Medication List      START taking these medications    Details   aspirin delayed-release 81 mg tablet Take 1 Tab by mouth daily. Qty: 30 Tab, Refills: 0      atorvastatin (LIPITOR) 80 mg tablet Take 1 Tab by mouth nightly.   Qty: 30 Tab, Refills: 0      ticagrelor (BRILINTA) 90 mg tablet Take 1 Tab by mouth every twelve (12) hours every twelve (12) hours. Qty: 60 Tab, Refills: 0      metoprolol tartrate (LOPRESSOR) 100 mg tablet Take 1 Tab by mouth two (2) times a day. Qty: 60 Tab, Refills: 0    Associated Diagnoses: Encounter for immunization; Cardiomyopathy, unspecified type ([address] Hypertension, unspecified type; Hypothyroidism, unspecified type      CONTINUE these medications which have NOT CHANGED    Details   levothyroxine (SYNTHROID) 200 mcg tablet Take 1 Tab by mouth Daily (before breakfast). Qty: 90 Tab, Refills: 4    Associated Diagnoses: Encounter for immunization; Cardiomyopathy, unspecified type ([address] Hypertension, unspecified type; Hypothyroidism, unspecified type      enalapril (VASOTEC) 20 mg tablet Take 1 Tab by mouth two (2) times a day. Qty: 180 Tab, Refills: 4    Associated Diagnoses: Encounter for immunization; Cardiomyopathy, unspecified type ([address] Hypertension, unspecified type; Hypothyroidism, unspecified type                 amLODIPine (NORVASC) 10 mg tablet Take 1 Tab by mouth daily. Qty: 30 Tab, Refills: 1    Associated Diagnoses: Hypertension, unspecified type; Cardiomyopathy, unspecified type ([address]             Activity: Activity as tolerated    Diet: Cardiac Diet    Wound Care: None needed    Follow-up:     The patient should follow-up with PCP and cardiology in 1-2 weeks in regards to recent hospitalization. Patient to arrange.      Discharge time > 35 mins   Caridad Jaquez NP  8/15/2018, 10:49 AM

## 2018-08-15 NOTE — PROGRESS NOTES
Problem: Falls - Risk of  Goal: *Absence of Falls  Document [place] Fall Risk and appropriate interventions in the flowsheet.    Outcome: Progressing Towards Goal  Fall Risk Interventions:            Medication Interventions: Patient to call before getting OOB    Elimination Interventions: Call light in reach

## 2018-08-16 ENCOUNTER — HOME HEALTH ADMISSION (OUTPATIENT)
Dept: HOME HEALTH SERVICES | Facility: HOME HEALTH | Age: 43
End: 2018-08-16

## 2018-08-16 NOTE — PROGRESS NOTES
[facility] referral sent to [facility] via [address] and called to intake nurse,  Katya Thomas.     Care Management Interventions  Transition of Care Consult (CM Consult): Home Health [facility])  [address]: Yes  Plan discussed with Pt/Family/Caregiver: Yes  Freedom of Choice Offered: Yes  Discharge Location  Discharge Placement: Home with home health

## 2018-08-20 ENCOUNTER — HOME CARE VISIT (OUTPATIENT)
Dept: HOME HEALTH SERVICES | Facility: HOME HEALTH | Age: 43
End: 2018-08-20

## 2018-08-21 ENCOUNTER — HOME CARE VISIT (OUTPATIENT)
Dept: HOME HEALTH SERVICES | Facility: HOME HEALTH | Age: 43
End: 2018-08-21

## 2018-08-21 ENCOUNTER — OFFICE VISIT (OUTPATIENT)
Dept: FAMILY MEDICINE CLINIC | Age: 43
End: 2018-08-21

## 2018-08-21 VITALS
RESPIRATION RATE: 20 BRPM | SYSTOLIC BLOOD PRESSURE: 126 MMHG | HEART RATE: 59 BPM | DIASTOLIC BLOOD PRESSURE: 85 MMHG | TEMPERATURE: 96.3 F | WEIGHT: 314.4 LBS | BODY MASS INDEX: 40.35 KG/M2 | HEIGHT: 74 IN | OXYGEN SATURATION: 98 %

## 2018-08-21 DIAGNOSIS — I21.3 ST ELEVATION MYOCARDIAL INFARCTION (STEMI), UNSPECIFIED ARTERY (HCC): Primary | ICD-10-CM

## 2018-08-21 DIAGNOSIS — I10 HYPERTENSION, UNSPECIFIED TYPE: ICD-10-CM

## 2018-08-21 DIAGNOSIS — I42.9 CARDIOMYOPATHY, UNSPECIFIED TYPE (HCC): ICD-10-CM

## 2018-08-21 DIAGNOSIS — E03.9 HYPOTHYROIDISM, UNSPECIFIED TYPE: ICD-10-CM

## 2018-08-21 RX ORDER — AMLODIPINE BESYLATE 10 MG/1
10 TABLET ORAL DAILY
Qty: 30 TAB | Refills: 1 | Status: SHIPPED | OUTPATIENT
Start: 2018-08-21 | End: 2018-12-05 | Stop reason: SDUPTHER

## 2018-08-21 RX ORDER — CLOPIDOGREL BISULFATE 75 MG/1
75 TABLET ORAL DAILY
Qty: 30 TAB | Refills: 11 | Status: SHIPPED | OUTPATIENT
Start: 2018-08-21 | End: 2018-12-05 | Stop reason: SDUPTHER

## 2018-08-21 RX ORDER — ASPIRIN 81 MG/1
81 TABLET ORAL DAILY
Qty: 30 TAB | Refills: 0 | Status: SHIPPED | OUTPATIENT
Start: 2018-08-21 | End: 2018-12-05 | Stop reason: SDUPTHER

## 2018-08-21 RX ORDER — ATORVASTATIN CALCIUM 80 MG/1
80 TABLET, FILM COATED ORAL
Qty: 30 TAB | Refills: 0 | Status: SHIPPED | OUTPATIENT
Start: 2018-08-21 | End: 2018-08-30 | Stop reason: SDUPTHER

## 2018-08-21 RX ORDER — METOPROLOL TARTRATE 100 MG/1
100 TABLET ORAL EVERY 12 HOURS
Qty: 60 TAB | Refills: 0 | Status: SHIPPED | OUTPATIENT
Start: 2018-08-21 | End: 2018-08-30 | Stop reason: SDUPTHER

## 2018-08-21 NOTE — PROGRESS NOTES
Jorge Hair is a 36 y.o.  male and presents with    Chief Complaint   Patient presents with    Cardiomyopathy    Coronary Artery Disease    Hypertension    Thyroid Problem           Subjective:  Pt with hx of cardiomyopathy and cad in hosp last wk with stemi  strent placed  Walked in today -- for jalen   No nn contact  Missed appt with cardiologist     Cardiovascular Review:  The patient has hypertension, hyperlipidemia and coronary artery disease. Diet and Lifestyle: not attempting to follow a low fat, low cholesterol diet, not attempting to follow a low sodium diet  Home BP Monitoring: is not measured at home. Pertinent ROS: taking medications as instructed, no medication side effects noted, no TIA's, no chest pain on exertion, no dyspnea on exertion, no swelling of ankles. Thyroid Review:  Patient is seen for followup of hypothyroidism. Thyroid ROS: denies fatigue, weight changes, heat/cold intolerance, bowel/skin changes or CVS symptoms. 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 30 Tab 0    metoprolol tartrate (LOPRESSOR) 100 mg IR tablet Take 1 Tab by mouth every twelve (12) hours. 60 Tab 0    aspirin delayed-release 81 mg tablet Take 1 Tab by mouth daily. 30 Tab 0    clopidogrel (PLAVIX) 75 mg tab Take 1 Tab by mouth daily. 30 Tab 11    amLODIPine (NORVASC) 10 mg tablet Take 1 Tab by mouth daily. 30 Tab 1    levothyroxine (SYNTHROID) 200 mcg tablet Take 1 Tab by mouth Daily (before breakfast).  90 Tab 4    enalapril (VASOTEC) 20 mg tablet Take 1 Tab by mouth two (2) times a day. 180 Tab 4     No Known Allergies  Past Medical History:   Diagnosis Date    [facility]    Gastric bypass status for obesity 8/8/2011    Heart failure ([facility]     HTN (hypertension) 8/8/2011    Hypothyroid 8/8/2011    Post corneal transplant 7/21/2014     Past Surgical History:   Procedure Laterality Date    ABDOMEN SURGERY PROC UNLISTED       History reviewed. No pertinent family history. Social History   Substance Use Topics    Smoking status: Never Smoker    Smokeless tobacco: Never Used    Alcohol use Yes      Comment: 6 pk daily       ROS       All other systems reviewed and are negative.       Objective:  Vitals:    08/21/18 0745 08/21/18 0749   BP: (!) 137/92 126/85   Pulse: 63 (!) 59   Resp: 20    Temp: 96.3 °F (35.7 °C)    TempSrc: Oral    SpO2: 98%    Weight: 314 lb 6.4 oz (142.6 kg)    Height: 6' 2\" (1.88 m)    PainSc:  10 - Worst pain ever    PainLoc: Leg                  alert, well appearing, and in no distress, oriented to person, place, and time and overweight  Chest - clear to auscultation, no wheezes, rales or rhonchi, symmetric air entry  Heart - normal rate, regular rhythm, normal S1, S2, no murmurs, rubs, clicks or gallops  Abdomen - soft, nontender, nondistended, no masses or organomegaly        LABS   Component      Latest Ref Rng & Units 8/14/2018 8/14/2018 8/14/2018 8/14/2018           7:40 AM  7:40 AM  7:40 AM  3:55 AM   WBC      4.6 - 13.2 K/uL 8.1      RBC      4.70 - 5.50 M/uL 4.44 (L)      HGB      13.0 - 16.0 g/dL 12.6 (L)      HCT      36.0 - 48.0 % 37.7      MCV      74.0 - 97.0 FL 84.9      MCH      24.0 - 34.0 PG 28.4      MCHC      31.0 - 37.0 g/dL 33.4      RDW      11.6 - 14.5 % 15.4 (H)      PLATELET      040 - 868 K/uL 196      MPV      9.2 - 11.8 FL 9.5      NEUTROPHILS      40 - 73 % 75 (H)      LYMPHOCYTES      21 - 52 % 16 (L)      MONOCYTES      3 - 10 % 7 EOSINOPHILS      0 - 5 % 2      BASOPHILS      0 - 2 % 0      ABS. NEUTROPHILS      1.8 - 8.0 K/UL 6.1      ABS. LYMPHOCYTES      0.9 - 3.6 K/UL 1.3      ABS. MONOCYTES      0.05 - 1.2 K/UL 0.6      ABS. EOSINOPHILS      0.0 - 0.4 K/UL 0.1      ABS. BASOPHILS      0.0 - 0.1 K/UL 0.0      DF       AUTOMATED      Sodium      136 - 145 mmol/L   141    Potassium      3.5 - 5.5 mmol/L   4.0    Chloride      100 - 108 mmol/L   107    CO2      21 - 32 mmol/L   25    Anion gap      3.0 - 18 mmol/L   9    Glucose      74 - 99 mg/dL   131 (H)    BUN      7.0 - 18 MG/DL   13    Creatinine      0.6 - 1.3 MG/DL   0.88    BUN/Creatinine ratio      12 - 20     15    GFR est AA      >60 ml/min/1.73m2   >60    GFR est non-AA      >60 ml/min/1.73m2   >60    Calcium      8.5 - 10.1 MG/DL   8.3 (L)    Bilirubin, total      0.2 - 1.0 MG/DL   1.1 (H)    ALT (SGPT)      16 - 61 U/L   42    AST      15 - 37 U/L   246 (H)    Alk.  phosphatase      45 - 117 U/L   96    Protein, total      6.4 - 8.2 g/dL   6.5    Albumin      3.4 - 5.0 g/dL   3.1 (L)    Globulin      2.0 - 4.0 g/dL   3.4    A-G Ratio      0.8 - 1.7     0.9    Color             Appearance             Specific gravity      1.005 - 1.030         pH (UA)      5.0 - 8.0         Protein      NEG mg/dL       Glucose      NEG mg/dL       Ketone      NEG mg/dL       Bilirubin      NEG         Blood      NEG         Urobilinogen      0.2 - 1.0 EU/dL       Nitrites      NEG         Leukocyte Esterase      NEG         Cholesterol, total      <200 MG/DL    104   Triglyceride      <150 MG/DL    83   HDL Cholesterol      40 - 60 MG/DL    45   LDL, calculated      0 - 100 MG/DL    42.4   VLDL, calculated      MG/DL    16.6   CHOL/HDL Ratio      0 - 5.0      2.3   CK      39 - 308 U/L       CK - MB      <3.6 ng/ml       CK-MB Index      0.0 - 4.0 %       Troponin-I, Qt.      0.0 - 0.045 NG/ML       Hemoglobin A1c, (calculated)      4.2 - 5.6 %  5.3     Est. average glucose      mg/dL  105 TSH      0.36 - 3.74 uIU/mL       T4, Free      0.7 - 1.5 NG/DL       NT pro-BNP      0 - 450 PG/ML       Troponin-I (POC)      0.00 - 0.08 ng/mL         Component      Latest Ref Rng & Units 8/13/2018 8/13/2018 8/13/2018 8/13/2018          12:27 PM 12:25 PM 12:25 PM 12:25 PM   WBC      4.6 - 13.2 K/uL       RBC      4.70 - 5.50 M/uL       HGB      13.0 - 16.0 g/dL       HCT      36.0 - 48.0 %       MCV      74.0 - 97.0 FL       MCH      24.0 - 34.0 PG       MCHC      31.0 - 37.0 g/dL       RDW      11.6 - 14.5 %       PLATELET      580 - 796 K/uL       MPV      9.2 - 11.8 FL       NEUTROPHILS      40 - 73 %       LYMPHOCYTES      21 - 52 %       MONOCYTES      3 - 10 %       EOSINOPHILS      0 - 5 %       BASOPHILS      0 - 2 %       ABS. NEUTROPHILS      1.8 - 8.0 K/UL       ABS. LYMPHOCYTES      0.9 - 3.6 K/UL       ABS. MONOCYTES      0.05 - 1.2 K/UL       ABS. EOSINOPHILS      0.0 - 0.4 K/UL       ABS. BASOPHILS      0.0 - 0.1 K/UL       DF             Sodium      136 - 145 mmol/L    138   Potassium      3.5 - 5.5 mmol/L    3.9   Chloride      100 - 108 mmol/L    102   CO2      21 - 32 mmol/L    29   Anion gap      3.0 - 18 mmol/L    7   Glucose      74 - 99 mg/dL    143 (H)   BUN      7.0 - 18 MG/DL    15   Creatinine      0.6 - 1.3 MG/DL    1.06   BUN/Creatinine ratio      12 - 20      14   GFR est AA      >60 ml/min/1.73m2    >60   GFR est non-AA      >60 ml/min/1.73m2    >60   Calcium      8.5 - 10.1 MG/DL    9.5   Bilirubin, total      0.2 - 1.0 MG/DL       ALT (SGPT)      16 - 61 U/L       AST      15 - 37 U/L       Alk.  phosphatase      45 - 117 U/L       Protein, total      6.4 - 8.2 g/dL       Albumin      3.4 - 5.0 g/dL       Globulin      2.0 - 4.0 g/dL       A-G Ratio      0.8 - 1.7         Color             Appearance             Specific gravity      1.005 - 1.030         pH (UA)      5.0 - 8.0         Protein      NEG mg/dL       Glucose      NEG mg/dL       Ketone      NEG mg/dL Bilirubin      NEG         Blood      NEG         Urobilinogen      0.2 - 1.0 EU/dL       Nitrites      NEG         Leukocyte Esterase      NEG         Cholesterol, total      <200 MG/DL       Triglyceride      <150 MG/DL       HDL Cholesterol      40 - 60 MG/DL       LDL, calculated      0 - 100 MG/DL       VLDL, calculated      MG/DL       CHOL/HDL Ratio      0 - 5.0         CK      39 - 308 U/L   144    CK - MB      <3.6 ng/ml   2.3    CK-MB Index      0.0 - 4.0 %   1.6    Troponin-I, Qt.      0.0 - 0.045 NG/ML   0.19 (H)    Hemoglobin A1c, (calculated)      4.2 - 5.6 %       Est. average glucose      mg/dL       TSH      0.36 - 3.74 uIU/mL       T4, Free      0.7 - 1.5 NG/DL       NT pro-BNP      0 - 450 PG/ML  777 (H)     Troponin-I (POC)      0.00 - 0.08 ng/mL 0.14 (HH)        Component      Latest Ref Rng & Units 8/13/2018 7/26/2018 7/26/2018 7/26/2018          12:25 PM  8:20 AM  8:20 AM  8:20 AM   WBC      4.6 - 13.2 K/uL 7.3      RBC      4.70 - 5.50 M/uL 5.53 (H)      HGB      13.0 - 16.0 g/dL 15.8      HCT      36.0 - 48.0 % 47.1      MCV      74.0 - 97.0 FL 85.2      MCH      24.0 - 34.0 PG 28.6      MCHC      31.0 - 37.0 g/dL 33.5      RDW      11.6 - 14.5 % 14.9 (H)      PLATELET      433 - 067 K/uL 205      MPV      9.2 - 11.8 FL 9.3      NEUTROPHILS      40 - 73 % 76 (H)      LYMPHOCYTES      21 - 52 % 14 (L)      MONOCYTES      3 - 10 % 8      EOSINOPHILS      0 - 5 % 2      BASOPHILS      0 - 2 % 0      ABS. NEUTROPHILS      1.8 - 8.0 K/UL 5.6      ABS. LYMPHOCYTES      0.9 - 3.6 K/UL 1.0      ABS. MONOCYTES      0.05 - 1.2 K/UL 0.6      ABS. EOSINOPHILS      0.0 - 0.4 K/UL 0.1      ABS.  BASOPHILS      0.0 - 0.1 K/UL 0.0      DF       AUTOMATED      Sodium      136 - 145 mmol/L       Potassium      3.5 - 5.5 mmol/L       Chloride      100 - 108 mmol/L       CO2      21 - 32 mmol/L       Anion gap      3.0 - 18 mmol/L       Glucose      74 - 99 mg/dL       BUN      7.0 - 18 MG/DL Creatinine      0.6 - 1.3 MG/DL       BUN/Creatinine ratio      12 - 20         GFR est AA      >60 ml/min/1.73m2       GFR est non-AA      >60 ml/min/1.73m2       Calcium      8.5 - 10.1 MG/DL       Bilirubin, total      0.2 - 1.0 MG/DL       ALT (SGPT)      16 - 61 U/L       AST      15 - 37 U/L       Alk. phosphatase      45 - 117 U/L       Protein, total      6.4 - 8.2 g/dL       Albumin      3.4 - 5.0 g/dL       Globulin      2.0 - 4.0 g/dL       A-G Ratio      0.8 - 1.7         Color          YELLOW   Appearance          CLEAR   Specific gravity      1.005 - 1.030      1.019   pH (UA)      5.0 - 8.0      5.5   Protein      NEG mg/dL    30 (A)   Glucose      NEG mg/dL    NEGATIVE   Ketone      NEG mg/dL    NEGATIVE   Bilirubin      NEG      NEGATIVE   Blood      NEG      NEGATIVE   Urobilinogen      0.2 - 1.0 EU/dL    1.0   Nitrites      NEG      NEGATIVE   Leukocyte Esterase      NEG      NEGATIVE   Cholesterol, total      <200 MG/DL       Triglyceride      <150 MG/DL       HDL Cholesterol      40 - 60 MG/DL       LDL, calculated      0 - 100 MG/DL       VLDL, calculated      MG/DL       CHOL/HDL Ratio      0 - 5.0         CK      39 - 308 U/L       CK - MB      <3.6 ng/ml       CK-MB Index      0.0 - 4.0 %       Troponin-I, Qt.      0.0 - 0.045 NG/ML       Hemoglobin A1c, (calculated)      4.2 - 5.6 %       Est. average glucose      mg/dL       TSH      0.36 - 3.74 uIU/mL   9.30 (H)    T4, Free      0.7 - 1.5 NG/DL  0.8     NT pro-BNP      0 - 450 PG/ML       Troponin-I (POC)      0.00 - 0.08 ng/mL         TESTS      Assessment/Plan:    Hypertension - well controlled  Hyperlipidemia - jprobably overtreated will await cardiology eval - possibly reduce lipitor after 30 days  stemi s/p stent  athkyroid check labs in 1 mo         Lab review: labs are reviewed, up to date and normal, orders written for new lab studies as appropriate; see orders    Diagnoses and all orders for this visit:    1.  ST elevation myocardial infarction (STEMI), unspecified artery (HCC)  -     atorvastatin (LIPITOR) 80 mg tablet; Take 1 Tab by mouth nightly. -     metoprolol tartrate (LOPRESSOR) 100 mg IR tablet; Take 1 Tab by mouth every twelve (12) hours. -     aspirin delayed-release 81 mg tablet; Take 1 Tab by mouth daily. -     clopidogrel (PLAVIX) 75 mg tab; Take 1 Tab by mouth daily. -     amLODIPine (NORVASC) 10 mg tablet; Take 1 Tab by mouth daily.  -     LIPID PANEL; Future  -     CBC WITH AUTOMATED DIFF; Future  -     METABOLIC PANEL, COMPREHENSIVE; Future  -     URINALYSIS W/ RFLX MICROSCOPIC; Future  -     TSH 3RD GENERATION; Future  -     T4, FREE; Future    2. Hypertension, unspecified type  -     atorvastatin (LIPITOR) 80 mg tablet; Take 1 Tab by mouth nightly. -     metoprolol tartrate (LOPRESSOR) 100 mg IR tablet; Take 1 Tab by mouth every twelve (12) hours. -     aspirin delayed-release 81 mg tablet; Take 1 Tab by mouth daily. -     clopidogrel (PLAVIX) 75 mg tab; Take 1 Tab by mouth daily. -     amLODIPine (NORVASC) 10 mg tablet; Take 1 Tab by mouth daily.  -     LIPID PANEL; Future  -     CBC WITH AUTOMATED DIFF; Future  -     METABOLIC PANEL, COMPREHENSIVE; Future  -     URINALYSIS W/ RFLX MICROSCOPIC; Future  -     TSH 3RD GENERATION; Future  -     T4, FREE; Future    3. Cardiomyopathy, unspecified type (HCC)  -     atorvastatin (LIPITOR) 80 mg tablet; Take 1 Tab by mouth nightly. -     metoprolol tartrate (LOPRESSOR) 100 mg IR tablet; Take 1 Tab by mouth every twelve (12) hours. -     aspirin delayed-release 81 mg tablet; Take 1 Tab by mouth daily. -     clopidogrel (PLAVIX) 75 mg tab; Take 1 Tab by mouth daily. -     amLODIPine (NORVASC) 10 mg tablet; Take 1 Tab by mouth daily.  -     LIPID PANEL; Future  -     CBC WITH AUTOMATED DIFF; Future  -     METABOLIC PANEL, COMPREHENSIVE; Future  -     URINALYSIS W/ RFLX MICROSCOPIC; Future  -     TSH 3RD GENERATION; Future  -     T4, FREE; Future    4. Hypothyroidism, unspecified type  -     atorvastatin (LIPITOR) 80 mg tablet; Take 1 Tab by mouth nightly. -     metoprolol tartrate (LOPRESSOR) 100 mg IR tablet; Take 1 Tab by mouth every twelve (12) hours. -     aspirin delayed-release 81 mg tablet; Take 1 Tab by mouth daily. -     clopidogrel (PLAVIX) 75 mg tab; Take 1 Tab by mouth daily. -     amLODIPine (NORVASC) 10 mg tablet; Take 1 Tab by mouth daily.  -     LIPID PANEL; Future  -     CBC WITH AUTOMATED DIFF; Future  -     METABOLIC PANEL, COMPREHENSIVE; Future  -     URINALYSIS W/ RFLX MICROSCOPIC; Future  -     TSH 3RD GENERATION; Future  -     T4, FREE; Future          I have discussed the diagnosis with the patient and the intended plan as seen in the above orders. The patient has received an after-visit summary and questions were answered concerning future plans. I have discussed medication side effects and warnings with the patient as well. I have reviewed the plan of care with the patient, accepted their input and they are in agreement with the treatment goals. Follow-up Disposition:  Return in about 5 weeks (around 9/25/2018) for rov, labs prior.

## 2018-08-21 NOTE — MR AVS SNAPSHOT
[facility] 
 
 
 [id number] [place] [address] [address] [place] [address] [facility] [id number] [phone] Patient: Joseph Arrieta MRN: [id number] Visit Information Date & Time Provider Department Dept. Phone Encounter #  
 8/21/2018  7:30 AM Alana Regan, [address] [place] [address] [phone] [id number] Follow-up Instructions Return in about 5 weeks (around 9/25/2018) for rov, labs prior. Follow-up and Disposition History Your Appointments 8/21/2018  9:00 AM  
New Patient with Carmela Golden MD  
Cardio Specialist at [facility] Jennifer Chava) Appt Note: NP referral in CC, echo on 8/3/18, prev cardio in [address] [facility], Dr. Trever Kevin), bringing current [facility] [address] [facility] Upcoming Health Maintenance Date Due Influenza Age 5 to Adult 8/1/2018 DTaP/Tdap/Td series (2 - Td) 12/23/2021 Allergies as of 8/21/2018  Review Complete On: 8/21/2018 By: Alana Regan, DO No Known Allergies Current Immunizations  Reviewed on 8/24/2015 Name Date Influenza Vaccine 8/24/2015 Influenza Vaccine (Quad) PF 10/4/2017 Influenza Vaccine Split 12/23/2011 TDAP Vaccine 12/23/2011 ZZZ-RETIRED (DO NOT USE) Pneumococcal Vaccine (Unspecified Type) 12/23/2011 Not reviewed this visit You Were Diagnosed With   
  
 Codes Comments ST elevation myocardial infarction (STEMI), unspecified artery ([facility]    -  Primary ICD-10-CM: I21.3 ICD-9-CM: 410.90 Hypertension, unspecified type     ICD-10-CM: I10 
ICD-9-CM: 401.9 Cardiomyopathy, unspecified type ([facility]     ICD-10-CM: I42.9 ICD-9-CM: 425.4 Hypothyroidism, unspecified type     ICD-10-CM: E03.9 ICD-9-CM: 555. 9 Vitals BP Pulse Temp Resp Height(growth percentile) Weight(growth percentile) 126/85 (BP 1 Location: Right arm, BP Patient Position: Sitting) (!) 59 96.3 °F (35.7 °C) (Oral) 20 6' 2\" (1.88 m) 314 lb 6.4 oz (142.6 kg) SpO2 BMI Smoking Status 98% 40.37 kg/m2 Never Smoker Vitals History BMI and BSA Data Body Mass Index Body Surface Area  
 40.37 kg/m 2 2.73 m 2 Preferred Pharmacy Pharmacy Name Phone [facility] [address], [address] [phone] Your Updated Medication List  
  
   
This list is accurate as of 8/21/18  8:48 AM.  Always use your most recent med list. amLODIPine 10 mg tablet Commonly known as:  Ronnidadavid Kanu Take 1 Tab by mouth daily. aspirin delayed-release 81 mg tablet Take 1 Tab by mouth daily. atorvastatin 80 mg tablet Commonly known as:  LIPITOR Take 1 Tab by mouth nightly. clopidogrel 75 mg Tab Commonly known as:  PLAVIX Take 1 Tab by mouth daily. enalapril 20 mg tablet Commonly known as:  Georganne Merritts Take 1 Tab by mouth two (2) times a day. levothyroxine 200 mcg tablet Commonly known as:  SYNTHROID Take 1 Tab by mouth Daily (before breakfast). metoprolol tartrate 100 mg IR tablet Commonly known as:  LOPRESSOR Take 1 Tab by mouth every twelve (12) hours. Prescriptions Sent to Pharmacy Refills  
 atorvastatin (LIPITOR) 80 mg tablet 0 Sig: Take 1 Tab by mouth nightly. Class: Normal  
 Pharmacy: [address] [place] [address] [place] [place] Ph #: [phone] Route: Oral  
 metoprolol tartrate (LOPRESSOR) 100 mg IR tablet 0 Sig: Take 1 Tab by mouth every twelve (12) hours. Class: Normal  
 Pharmacy: [address] [place] [address] [facility], [place] Ph #: [phone] Route: Oral  
 aspirin delayed-release 81 mg tablet 0 Sig: Take 1 Tab by mouth daily. Class: Normal  
 Pharmacy: [address] [place] [address] [facility] [place] Ph #: [phone]  Route: Oral  
 clopidogrel (PLAVIX) 75 mg tab 11 Sig: Take 1 Tab by mouth daily. Class: Normal  
 Pharmacy: [address], [address] Ph #: [phone] Route: Oral  
 amLODIPine (NORVASC) 10 mg tablet 1 Sig: Take 1 Tab by mouth daily. Class: Normal  
 Pharmacy: [address], [address] Ph #: [phone] Route: Oral  
  
Follow-up Instructions Return in about 5 weeks (around 9/25/2018) for rov, labs prior. To-Do List   
 08/22/2018 11:00 AM  
  Appointment with Edi Hdz LPN at [facility]  
  
 09/20/2018 Lab:  CBC WITH AUTOMATED DIFF   
  
 09/20/2018 Lab:  LIPID PANEL   
  
 09/20/2018 Lab:  METABOLIC PANEL, COMPREHENSIVE   
  
 09/20/2018 Lab:  T4, FREE   
  
 09/20/2018 Lab:  TSH 3RD GENERATION   
  
 09/20/2018 Lab:  URINALYSIS W/ RFLX MICROSCOPIC Introducing [facility]! Rome Gray introduces TUNJI patient portal. Now you can access parts of your medical record, email your doctor's office, and request medication refills online. 1. In your internet browser, go to [email or web address]. [email or web address] 2. Click on the First Time User? Click Here link in the Sign In box. You will see the New Member Sign Up page. 3. Enter your TUNJI Access Code exactly as it appears below. You will not need to use this code after youve completed the sign-up process. If you do not sign up before the expiration date, you must request a new code. · TUNJI Access Code: [email or web address] Expires: 10/24/2018  7:39 AM 
 
4. Enter the last four digits of your Social Security Number (xxxx) and Date of Birth (mm/dd/yyyy) as indicated and click Submit. You will be taken to the next sign-up page. 5. Create a TUNJI ID. This will be your TUNJI login ID and cannot be changed, so think of one that is secure and easy to remember. 6. Create a [employer] password. You can change your password at any time. 7. Enter your Password Reset Question and Answer. This can be used at a later time if you forget your password. 8. Enter your e-mail address. You will receive e-mail notification when new information is available in [address] 9. Click Sign Up. You can now view and download portions of your medical record. 10. Click the Download Summary menu link to download a portable copy of your medical information. If you have questions, please visit the Frequently Asked Questions section of the [employer] website. Remember, [employer] is NOT to be used for urgent needs. For medical emergencies, dial 911. Now available from your iPhone and Android! Please provide this summary of care documentation to your next provider. Your primary care clinician is listed as [id number]. If you have any questions after today's visit, please call [phone].

## 2018-08-21 NOTE — PROGRESS NOTES
Room #  6    SUBJECTIVE:    Tawanda Park is a 36 y.o. male who presents today for hospital follow up    1. Have you been to the ER, urgent care clinic since your last visit? Hospitalized since your last visit? Yes    2. Have you seen or consulted any other health care providers outside of the [facility] since your last visit? Include any pap smears or colon screening. Yes  When :  Reason:    Health Maintenance reviewed Yes    Health Maintenance Due   Topic Date Due    Influenza Age 5 to Adult  08/01/2018

## 2018-08-22 ENCOUNTER — HOME CARE VISIT (OUTPATIENT)
Dept: SCHEDULING | Facility: HOME HEALTH | Age: 43
End: 2018-08-22

## 2018-08-22 PROCEDURE — G0299 HHS/HOSPICE OF RN EA 15 MIN: HCPCS

## 2018-08-30 ENCOUNTER — OFFICE VISIT (OUTPATIENT)
Dept: CARDIOLOGY CLINIC | Age: 43
End: 2018-08-30

## 2018-08-30 VITALS
OXYGEN SATURATION: 98 % | HEART RATE: 57 BPM | DIASTOLIC BLOOD PRESSURE: 93 MMHG | WEIGHT: 309 LBS | HEIGHT: 74 IN | BODY MASS INDEX: 39.66 KG/M2 | SYSTOLIC BLOOD PRESSURE: 155 MMHG

## 2018-08-30 DIAGNOSIS — I42.9 CARDIOMYOPATHY, UNSPECIFIED TYPE (HCC): ICD-10-CM

## 2018-08-30 DIAGNOSIS — I21.3 ST ELEVATION MYOCARDIAL INFARCTION (STEMI), UNSPECIFIED ARTERY (HCC): ICD-10-CM

## 2018-08-30 DIAGNOSIS — I10 HYPERTENSION, UNSPECIFIED TYPE: Primary | ICD-10-CM

## 2018-08-30 DIAGNOSIS — E03.9 HYPOTHYROIDISM, UNSPECIFIED TYPE: ICD-10-CM

## 2018-08-30 RX ORDER — ATORVASTATIN CALCIUM 80 MG/1
80 TABLET, FILM COATED ORAL
Qty: 30 TAB | Refills: 6 | Status: SHIPPED | OUTPATIENT
Start: 2018-08-30 | End: 2018-12-05 | Stop reason: SDUPTHER

## 2018-08-30 RX ORDER — METOPROLOL TARTRATE 100 MG/1
100 TABLET ORAL EVERY 12 HOURS
Qty: 60 TAB | Refills: 6 | Status: SHIPPED | OUTPATIENT
Start: 2018-08-30 | End: 2018-12-05 | Stop reason: SDUPTHER

## 2018-08-30 NOTE — PROGRESS NOTES
1. Have you been to the ER, urgent care clinic since your last visit? Hospitalized since your last visit? No    2. Have you seen or consulted any other health care providers outside of the [address] [place] [address] since your last visit? Include any pap smears or colon screening.  No

## 2018-08-30 NOTE — MR AVS SNAPSHOT
[address] [facility] [address] [address] 
[phone] Patient: Myles Sales MRN: [id number] Visit Information Date & Time Provider Department Dept. Phone Encounter #  
 8/30/2018 10:30 AM Magnus Grider. Izaiah Matta Specialist at [facility] [phone] Follow-up Instructions Return in about 4 weeks (around 9/27/2018). Your Appointments 9/25/2018  9:00 AM  
Follow Up with Lyubov Estrella.,  [address] [address]) Appt Note: Return in about 5 weeks (around 9/25/2018) for rov, labs prior. [address] [facility] [place] [address] 222 [address] 1334  
  
    
 10/4/2018 10:45 AM  
Follow Up with Cher Love MD  
Cardio Specialist at [address]) Appt Note: 4 weeks [facility] [address] 1334 Upcoming Health Maintenance Date Due Influenza Age 5 to Adult 8/1/2018 DTaP/Tdap/Td series (2 - Td) 12/23/2021 Allergies as of 8/30/2018  Review Complete On: 8/30/2018 By: Omar Nguyen RN No Known Allergies Current Immunizations  Reviewed on 8/24/2015 Name Date Influenza Vaccine 8/24/2015 Influenza Vaccine (Quad) PF 10/4/2017 Influenza Vaccine Split 12/23/2011 TDAP Vaccine 12/23/2011 ZZZ-RETIRED (DO NOT USE) Pneumococcal Vaccine (Unspecified Type) 12/23/2011 Not reviewed this visit You Were Diagnosed With   
  
 Codes Comments Hypertension, unspecified type     ICD-10-CM: I10 
ICD-9-CM: 401.9 Cardiomyopathy, unspecified type ([address]     ICD-10-CM: I42.9 ICD-9-CM: 425.4 ST elevation myocardial infarction (STEMI), unspecified artery ([facility]     ICD-10-CM: I21.3 ICD-9-CM: 410.90 Hypothyroidism, unspecified type     ICD-10-CM: E03.9 ICD-9-CM: 308. 9 Vitals BP Pulse Height(growth percentile) Weight(growth percentile) SpO2 BMI  
 (!) 155/93 (!) 57 6' 2\" (1.88 m) 309 lb (140.2 kg) 98% 39.67 kg/m2 Smoking Status Never Smoker Vitals History BMI and BSA Data Body Mass Index Body Surface Area  
 39.67 kg/m 2 2.71 m 2 Preferred Pharmacy Pharmacy Name Phone [address], [address] [phone] Your Updated Medication List  
  
   
This list is accurate as of 8/30/18 11:05 AM.  Always use your most recent med list. amLODIPine 10 mg tablet Commonly known as:  Tad Myles Take 1 Tab by mouth daily. aspirin delayed-release 81 mg tablet Take 1 Tab by mouth daily. atorvastatin 80 mg tablet Commonly known as:  LIPITOR Take 1 Tab by mouth nightly. clopidogrel 75 mg Tab Commonly known as:  PLAVIX Take 1 Tab by mouth daily. enalapril 20 mg tablet Commonly known as:  Shannan Burow Take 1 Tab by mouth two (2) times a day. levothyroxine 200 mcg tablet Commonly known as:  SYNTHROID Take 1 Tab by mouth Daily (before breakfast). metoprolol tartrate 100 mg IR tablet Commonly known as:  LOPRESSOR Take 1 Tab by mouth every twelve (12) hours. Follow-up Instructions Return in about 4 weeks (around 9/27/2018). Introducing [facility] & HEALTH SERVICES! [facility] introduces [employer] patient portal. Now you can access parts of your medical record, email your doctor's office, and request medication refills online. 1. In your internet browser, go to [email or web address] 2. Click on the First Time User? Click Here link in the Sign In box. You will see the New Member Sign Up page. 3. Enter your [employer] Access Code exactly as it appears below.  You will not need to use this code after youve completed the sign-up process. If you do not sign up before the expiration date, you must request a new code. · [employer] Access Code: [email or web address] Expires: 10/24/2018  7:39 AM 
 
4. Enter the last four digits of your Social Security Number (xxxx) and Date of Birth (mm/dd/yyyy) as indicated and click Submit. You will be taken to the next sign-up page. 5. Create a [employer] ID. This will be your [employer] login ID and cannot be changed, so think of one that is secure and easy to remember. 6. Create a [employer] password. You can change your password at any time. 7. Enter your Password Reset Question and Answer. This can be used at a later time if you forget your password. 8. Enter your e-mail address. You will receive e-mail notification when new information is available in [address] 9. Click Sign Up. You can now view and download portions of your medical record. 10. Click the Download Summary menu link to download a portable copy of your medical information. If you have questions, please visit the Frequently Asked Questions section of the [employer] website. Remember, [employer] is NOT to be used for urgent needs. For medical emergencies, dial 911. Now available from your iPhone and Android! Please provide this summary of care documentation to your next provider. Your primary care clinician is listed as [id number] [facility]. If you have any questions after today's visit, please call [phone].

## 2018-08-30 NOTE — PROGRESS NOTES
Subjective:   Mr. Oskar Douglas is here today for follow up visit after hospitalization at [facility] in August 2018. He presented with chest pain and EKG showed inferior STEMI. He was taken to cardiac cath lab and had totally occluded RCA and RUT was placed. He also had mild 30% stenosis in the diagonal branch, also in the circumflex. Since hospital discharge, he has no complaints. He has been taking his mediations as prescribed, no issues with bleeding on Plavix. He denies chest pain, orthopnea, PND or LE edema. No LE swelling, no palpitations. He does not smoke. Patient's cardiac risk factors are obesity, sedentary life style, male gender, hypertension. 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 30 Tab 6    metoprolol tartrate (LOPRESSOR) 100 mg IR tablet Take 1 Tab by mouth every twelve (12) hours. 60 Tab 6    aspirin delayed-release 81 mg tablet Take 1 Tab by mouth daily. 30 Tab 0    clopidogrel (PLAVIX) 75 mg tab Take 1 Tab by mouth daily. 30 Tab 11    amLODIPine (NORVASC) 10 mg tablet Take 1 Tab by mouth daily. 30 Tab 1    levothyroxine (SYNTHROID) 200 mcg tablet Take 1 Tab by mouth Daily (before breakfast). 90 Tab 4    enalapril (VASOTEC) 20 mg tablet Take 1 Tab by mouth two (2) times a day.  180 Tab 4     No Known Allergies  Past Medical History:   Diagnosis Date    Cardiomyopathy ([facility] 7/21/2014    Gastric bypass status for obesity 8/8/2011    Heart failure ([facility]     HTN (hypertension) 8/8/2011    Hypothyroid 8/8/2011    Post corneal transplant 7/21/2014     Past Surgical History:   Procedure Laterality Date    ABDOMEN SURGERY PROC UNLISTED       No family history on file. History   Smoking Status    Never Smoker   Smokeless Tobacco    Never Used          Review of Systems, additional:  Constitutional: negative  Eyes: negative  Respiratory: negative for dyspnea on exertion  Cardiovascular: per HPI  Gastrointestinal: negative  Musculoskeletal:negative  Neurological: negative  Behvioral/Psych: negative  Endocrine: negative  ENT: negative    Objective:     Visit Vitals    BP (!) 155/93    Pulse (!) 57    Ht 6' 2\" (1.88 m)    Wt 309 lb (140.2 kg)    SpO2 98%    BMI 39.67 kg/m2     General:  alert, cooperative, no distress, appears stated age   Chest Wall: inspection normal - no chest wall deformities or tenderness, respiratory effort normal   Lung: clear to auscultation bilaterally   Heart:  normal rate, regular rhythm, normal S1, S2, no murmurs, rubs, clicks or gallops   Abdomen: soft, non-tender. Bowel sounds normal. No masses,  no organomegaly   Extremities: extremities normal, atraumatic, no cyanosis or edema Skin: no rashes   Neuro: alert, oriented, normal speech, no focal findings or movement disorder noted       Assessment/Plan:         ICD-10-CM ICD-9-CM    1. Hypertension, unspecified type-- BP elevated in office today, but was rushing to appt and just took meds 1 hr ago. Continue with Lopressor, Enalapril and Amlodipine. I10 401.9 atorvastatin (LIPITOR) 80 mg tablet      metoprolol tartrate (LOPRESSOR) 100 mg IR tablet   2. Cardiomyopathy, unspecified type ([facility]-- EF 45%, advised of salt restriction, no volume overload. Continue with current cardiac regimen. I42.9 425.4 atorvastatin (LIPITOR) 80 mg tablet      metoprolol tartrate (LOPRESSOR) 100 mg IR tablet   3. ST elevation myocardial infarction (STEMI), unspecified artery ([address]-- s/p RUT to RCA. He is on ASA, Plavix, Lopressor and Atorvastatin.  No side effects of meds, continue, refills sent to pharmacy. Cardiac rehab referral sent. I21.3 410.90 atorvastatin (LIPITOR) 80 mg tablet      metoprolol tartrate (LOPRESSOR) 100 mg IR tablet      REFERRAL TO CARDIAC REHAB   4.  Hypothyroidism, unspecified type E03.9 244.9 atorvastatin (LIPITOR) 80 mg tablet      metoprolol tartrate (LOPRESSOR) 100 mg IR tablet

## 2018-09-05 DIAGNOSIS — E03.9 HYPOTHYROIDISM, UNSPECIFIED TYPE: ICD-10-CM

## 2018-09-05 DIAGNOSIS — I42.9 CARDIOMYOPATHY, UNSPECIFIED TYPE (HCC): ICD-10-CM

## 2018-09-05 DIAGNOSIS — I10 HYPERTENSION, UNSPECIFIED TYPE: ICD-10-CM

## 2018-09-05 DIAGNOSIS — I21.3 ST ELEVATION MYOCARDIAL INFARCTION (STEMI), UNSPECIFIED ARTERY (HCC): ICD-10-CM

## 2018-09-05 RX ORDER — CLOPIDOGREL BISULFATE 75 MG/1
75 TABLET ORAL DAILY
Qty: 30 TAB | Refills: 11 | Status: CANCELLED | OUTPATIENT
Start: 2018-09-05

## 2018-09-05 NOTE — TELEPHONE ENCOUNTER
Spoke with Marcos Galindo, Verified 2 patient identifiers. Spoke with patient in regards medication refills transferring to a different local pharmacy. Patient was advised to have his new pharmacy contact his previous pharmacy to transfer the remaining refills for his requested medication as per his previous pharmacy instructions.   Patient acknowledges understanding and voices no further questions or concerns at this time

## 2018-09-20 ENCOUNTER — HOSPITAL ENCOUNTER (OUTPATIENT)
Dept: LAB | Age: 43
Discharge: HOME | End: 2018-09-20
Payer: MEDICAID

## 2018-09-20 DIAGNOSIS — I42.9 CARDIOMYOPATHY, UNSPECIFIED TYPE (HCC): ICD-10-CM

## 2018-09-20 DIAGNOSIS — I10 HYPERTENSION, UNSPECIFIED TYPE: ICD-10-CM

## 2018-09-20 DIAGNOSIS — E03.9 HYPOTHYROIDISM, UNSPECIFIED TYPE: ICD-10-CM

## 2018-09-20 DIAGNOSIS — I21.3 ST ELEVATION MYOCARDIAL INFARCTION (STEMI), UNSPECIFIED ARTERY (HCC): ICD-10-CM

## 2018-09-20 LAB
ALBUMIN SERPL-MCNC: 3.7 G/DL (ref 3.4–5)
ALBUMIN/GLOB SERPL: 1 {RATIO} (ref 0.8–1.7)
ALP SERPL-CCNC: 101 U/L (ref 45–117)
ALT SERPL-CCNC: 23 U/L (ref 16–61)
ANION GAP SERPL CALC-SCNC: 11 MMOL/L (ref 3–18)
APPEARANCE UR: CLEAR
AST SERPL-CCNC: 24 U/L (ref 15–37)
BACTERIA URNS QL MICRO: NEGATIVE /HPF
BASOPHILS # BLD: 0 K/UL (ref 0–0.1)
BASOPHILS NFR BLD: 0 % (ref 0–2)
BILIRUB SERPL-MCNC: 0.8 MG/DL (ref 0.2–1)
BILIRUB UR QL: ABNORMAL
BUN SERPL-MCNC: 17 MG/DL (ref 7–18)
BUN/CREAT SERPL: 19 (ref 12–20)
CALCIUM SERPL-MCNC: 8.4 MG/DL (ref 8.5–10.1)
CHLORIDE SERPL-SCNC: 105 MMOL/L (ref 100–108)
CHOLEST SERPL-MCNC: 96 MG/DL
CO2 SERPL-SCNC: 26 MMOL/L (ref 21–32)
COLOR UR: ABNORMAL
CREAT SERPL-MCNC: 0.9 MG/DL (ref 0.6–1.3)
DIFFERENTIAL METHOD BLD: ABNORMAL
EOSINOPHIL # BLD: 0.4 K/UL (ref 0–0.4)
EOSINOPHIL NFR BLD: 6 % (ref 0–5)
EPITH CASTS URNS QL MICRO: NORMAL /LPF (ref 0–5)
ERYTHROCYTE [DISTWIDTH] IN BLOOD BY AUTOMATED COUNT: 14.6 % (ref 11.6–14.5)
GLOBULIN SER CALC-MCNC: 3.6 G/DL (ref 2–4)
GLUCOSE SERPL-MCNC: 83 MG/DL (ref 74–99)
GLUCOSE UR STRIP.AUTO-MCNC: NEGATIVE MG/DL
HCT VFR BLD AUTO: 41.2 % (ref 36–48)
HDLC SERPL-MCNC: 52 MG/DL (ref 40–60)
HDLC SERPL: 1.8 {RATIO} (ref 0–5)
HGB BLD-MCNC: 13.5 G/DL (ref 13–16)
HGB UR QL STRIP: NEGATIVE
KETONES UR QL STRIP.AUTO: ABNORMAL MG/DL
LDLC SERPL CALC-MCNC: 2.6 MG/DL (ref 0–100)
LEUKOCYTE ESTERASE UR QL STRIP.AUTO: ABNORMAL
LIPID PROFILE,FLP: ABNORMAL
LYMPHOCYTES # BLD: 1.5 K/UL (ref 0.9–3.6)
LYMPHOCYTES NFR BLD: 26 % (ref 21–52)
MCH RBC QN AUTO: 28.5 PG (ref 24–34)
MCHC RBC AUTO-ENTMCNC: 32.8 G/DL (ref 31–37)
MCV RBC AUTO: 87.1 FL (ref 74–97)
MONOCYTES # BLD: 0.6 K/UL (ref 0.05–1.2)
MONOCYTES NFR BLD: 10 % (ref 3–10)
NEUTS SEG # BLD: 3.4 K/UL (ref 1.8–8)
NEUTS SEG NFR BLD: 58 % (ref 40–73)
NITRITE UR QL STRIP.AUTO: NEGATIVE
PH UR STRIP: 5.5 [PH] (ref 5–8)
PLATELET # BLD AUTO: 197 K/UL (ref 135–420)
PMV BLD AUTO: 10.8 FL (ref 9.2–11.8)
POTASSIUM SERPL-SCNC: 3.6 MMOL/L (ref 3.5–5.5)
PROT SERPL-MCNC: 7.3 G/DL (ref 6.4–8.2)
PROT UR STRIP-MCNC: 30 MG/DL
RBC # BLD AUTO: 4.73 M/UL (ref 4.7–5.5)
RBC #/AREA URNS HPF: 0 /HPF (ref 0–5)
SODIUM SERPL-SCNC: 142 MMOL/L (ref 136–145)
SP GR UR REFRACTOMETRY: 1.02 (ref 1–1.03)
T4 FREE SERPL-MCNC: 1.6 NG/DL (ref 0.7–1.5)
TRIGL SERPL-MCNC: 207 MG/DL (ref ?–150)
TSH SERPL DL<=0.05 MIU/L-ACNC: 0.05 UIU/ML (ref 0.36–3.74)
UROBILINOGEN UR QL STRIP.AUTO: 2 EU/DL (ref 0.2–1)
VLDLC SERPL CALC-MCNC: 41.4 MG/DL
WBC # BLD AUTO: 5.8 K/UL (ref 4.6–13.2)
WBC URNS QL MICRO: NORMAL /HPF (ref 0–4)

## 2018-09-20 PROCEDURE — 84443 ASSAY THYROID STIM HORMONE: CPT | Performed by: FAMILY MEDICINE

## 2018-09-20 PROCEDURE — 85025 COMPLETE CBC W/AUTO DIFF WBC: CPT | Performed by: FAMILY MEDICINE

## 2018-09-20 PROCEDURE — 81001 URINALYSIS AUTO W/SCOPE: CPT | Performed by: FAMILY MEDICINE

## 2018-09-20 PROCEDURE — 80061 LIPID PANEL: CPT | Performed by: FAMILY MEDICINE

## 2018-09-20 PROCEDURE — 36415 COLL VENOUS BLD VENIPUNCTURE: CPT | Performed by: FAMILY MEDICINE

## 2018-09-20 PROCEDURE — 84439 ASSAY OF FREE THYROXINE: CPT | Performed by: FAMILY MEDICINE

## 2018-09-20 PROCEDURE — 80053 COMPREHEN METABOLIC PANEL: CPT | Performed by: FAMILY MEDICINE

## 2018-09-24 ENCOUNTER — TELEPHONE (OUTPATIENT)
Dept: FAMILY MEDICINE CLINIC | Age: 43
End: 2018-09-24

## 2018-09-25 ENCOUNTER — OFFICE VISIT (OUTPATIENT)
Dept: FAMILY MEDICINE CLINIC | Age: 43
End: 2018-09-25

## 2018-09-25 VITALS
TEMPERATURE: 96.6 F | RESPIRATION RATE: 16 BRPM | DIASTOLIC BLOOD PRESSURE: 94 MMHG | HEIGHT: 74 IN | SYSTOLIC BLOOD PRESSURE: 153 MMHG | BODY MASS INDEX: 38.63 KG/M2 | WEIGHT: 301 LBS | OXYGEN SATURATION: 97 % | HEART RATE: 58 BPM

## 2018-09-25 DIAGNOSIS — E03.9 HYPOTHYROIDISM, UNSPECIFIED TYPE: ICD-10-CM

## 2018-09-25 DIAGNOSIS — I10 HYPERTENSION, UNSPECIFIED TYPE: ICD-10-CM

## 2018-09-25 DIAGNOSIS — Z23 ENCOUNTER FOR IMMUNIZATION: ICD-10-CM

## 2018-09-25 DIAGNOSIS — I42.9 CARDIOMYOPATHY, UNSPECIFIED TYPE (HCC): Primary | ICD-10-CM

## 2018-09-25 NOTE — MR AVS SNAPSHOT
[address] [facility] 
 
 
 [address] [place] [address]  [address] [address] 
[phone] Patient: Nikko Hurst MRN: [id number] Visit Information Date & Time Provider Department Dept. Phone Encounter #  
 9/25/2018  9:00  [place] [facility] [phone] [id number] Follow-up Instructions Return in about 3 months (around 12/25/2018) for EOV, labs prior. Your Appointments 10/4/2018 10:45 AM  
Follow Up with Tee Gutierrez MD  
Cardio Specialist at [address]) Appt Note: 4 weeks [facility] [address] [address] [place] [address]  
  
    
 12/20/2018 10:00 AM  
ROUTINE CARE with Krysta Gutierrez DO [address] [facility] [address]) Appt Note: Return in about 3 months (around 12/25/2018) for EOV, labs prior [address] [facility] [address] 222 [facility]  
  
   
 [address] [place] [address] [address] Upcoming Health Maintenance Date Due Influenza Age 5 to Adult 8/1/2018 DTaP/Tdap/Td series (2 - Td) 12/23/2021 Allergies as of 9/25/2018  Review Complete On: 9/25/2018 By: Krysta Gutierrez DO No Known Allergies Current Immunizations  Reviewed on 8/24/2015 Name Date Influenza Vaccine 8/24/2015 Influenza Vaccine (Quad) PF 9/25/2018, 10/4/2017 Influenza Vaccine Split 12/23/2011 TDAP Vaccine 12/23/2011 ZZZ-RETIRED (DO NOT USE) Pneumococcal Vaccine (Unspecified Type) 12/23/2011 Not reviewed this visit You Were Diagnosed With   
  
 Codes Comments Cardiomyopathy, unspecified type ([facility]    -  Primary ICD-10-CM: I42.9 ICD-9-CM: 425.4 Hypertension, unspecified type     ICD-10-CM: I10 
ICD-9-CM: 401.9 Hypothyroidism, unspecified type     ICD-10-CM: E03.9 ICD-9-CM: 244.9 Class 3 obesity with serious comorbidity and body mass index (BMI) of 45.0 to 49.9 in adult, unspecified obesity type ([facility]     ICD-10-CM: E66.9, Z68.42 
ICD-9-CM: 278.00, V85.42 Encounter for immunization     ICD-10-CM: B90 ICD-9-CM: V03.89 Vitals BP Pulse Temp Resp Height(growth percentile) Weight(growth percentile) (!) 153/94 (BP 1 Location: Right arm, BP Patient Position: Sitting) (!) 58 96.6 °F (35.9 °C) (Oral) 16 6' 2\" (1.88 m) 301 lb (136.5 kg) SpO2 BMI Smoking Status 97% 38.65 kg/m2 Never Smoker Vitals History BMI and BSA Data Body Mass Index Body Surface Area  
 38.65 kg/m 2 2.67 m 2 Preferred Pharmacy Pharmacy Name Phone [address], [address] [phone] Your Updated Medication List  
  
   
This list is accurate as of 9/25/18  9:45 AM.  Always use your most recent med list. amLODIPine 10 mg tablet Commonly known as:  Mireles Mullet Take 1 Tab by mouth daily. aspirin delayed-release 81 mg tablet Take 1 Tab by mouth daily. atorvastatin 80 mg tablet Commonly known as:  LIPITOR Take 1 Tab by mouth nightly. clopidogrel 75 mg Tab Commonly known as:  PLAVIX Take 1 Tab by mouth daily. enalapril 20 mg tablet Commonly known as:  Esta Honer Take 1 Tab by mouth two (2) times a day. levothyroxine 200 mcg tablet Commonly known as:  SYNTHROID Take 1 Tab by mouth Daily (before breakfast). metoprolol tartrate 100 mg IR tablet Commonly known as:  LOPRESSOR Take 1 Tab by mouth every twelve (12) hours. We Performed the Following INFLUENZA VIRUS VAC QUAD,SPLIT,PRESV FREE SYRINGE IM [id number] CPT(R)] Follow-up Instructions Return in about 3 months (around 12/25/2018) for EOV, labs prior. To-Do List   
 Around 12/24/2018 Lab:  METABOLIC PANEL, COMPREHENSIVE Around 12/24/2018   Lab:  T4, FREE   
  
 Around 12/24/2018 Lab:  TSH 3RD GENERATION Around 12/24/2018 Lab:  URINALYSIS W/ RFLX MICROSCOPIC Introducing [facility] & HEALTH SERVICES! Shahnaz Moe introduces [employer] patient portal. Now you can access parts of your medical record, email your doctor's office, and request medication refills online. 1. In your internet browser, go to [email or web address] 2. Click on the First Time User? Click Here link in the Sign In box. You will see the New Member Sign Up page. 3. Enter your [employer] Access Code exactly as it appears below. You will not need to use this code after youve completed the sign-up process. If you do not sign up before the expiration date, you must request a new code. · [employer] Access Code: [email or web address] Expires: 10/24/2018  7:39 AM 
 
4. Enter the last four digits of your Social Security Number (xxxx) and Date of Birth (mm/dd/yyyy) as indicated and click Submit. You will be taken to the next sign-up page. 5. Create a [employer] ID. This will be your [employer] login ID and cannot be changed, so think of one that is secure and easy to remember. 6. Create a [employer] password. You can change your password at any time. 7. Enter your Password Reset Question and Answer. This can be used at a later time if you forget your password. 8. Enter your e-mail address. You will receive e-mail notification when new information is available in [address] 9. Click Sign Up. You can now view and download portions of your medical record. 10. Click the Download Summary menu link to download a portable copy of your medical information. If you have questions, please visit the Frequently Asked Questions section of the [employer] website. Remember, [employer] is NOT to be used for urgent needs. For medical emergencies, dial 911. Now available from your iPhone and Android! Please provide this summary of care documentation to your next provider. Your primary care clinician is listed as [phone] [facility]. If you have any questions after today's visit, please call [phone].

## 2018-09-25 NOTE — PROGRESS NOTES
Sarkis Juarez is a 36 y.o. male presents today for follow up on his cholesterol and thyroid. Patient reports of no current pain. Pt is in Room # 4      Abuse screening: Completed    1. Have you been to the ER, urgent care clinic since your last visit? Hospitalized since your last visit? No    2. Have you seen or consulted any other health care providers outside of the [address] [place] [address] since your last visit? Include any pap smears or colon screening. No     Health Maintenance reviewed - .

## 2018-09-25 NOTE — PROGRESS NOTES
Bhavin Encinas is a 36 y.o.  male and presents with    Chief Complaint   Patient presents with    Cholesterol Problem    Cardiomyopathy    Hypertension    Thyroid Problem           Subjective:  Just had stemi aa few weeks ago. Here for f/u  Seen by NP in cardiology office last wk    Cardiovascular Review:  The patient has hypertension, hyperlipidemia and status post coronary artery stenting. Diet and Lifestyle: not attempting to follow a low fat, low cholesterol diet  Home BP Monitoring: is not measured at home. Pertinent ROS: taking medications as instructed, no medication side effects noted, no TIA's, no chest pain on exertion, no dyspnea on exertion, no swelling of ankles. Thyroid Review:  Patient is seen for followup of hypothyroidism. Thyroid ROS: denies fatigue, weight changes, heat/cold intolerance, bowel/skin changes or CVS symptoms. 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 30 Tab 6    metoprolol tartrate (LOPRESSOR) 100 mg IR tablet Take 1 Tab by mouth every twelve (12) hours. 60 Tab 6    aspirin delayed-release 81 mg tablet Take 1 Tab by mouth daily. 30 Tab 0    clopidogrel (PLAVIX) 75 mg tab Take 1 Tab by mouth daily. 30 Tab 11    amLODIPine (NORVASC) 10 mg tablet Take 1 Tab by mouth daily. 30 Tab 1    levothyroxine (SYNTHROID) 200 mcg tablet Take 1 Tab by mouth Daily (before breakfast). 90 Tab 4    enalapril (VASOTEC) 20 mg tablet Take 1 Tab by mouth two (2) times a day.  180 Tab 4     No Known Allergies  Past Medical History:   Diagnosis Date    Cardiomyopathy ([facility] 7/21/2014    Gastric bypass status for obesity 8/8/2011    Heart failure ([facility]     HTN (hypertension) 8/8/2011    Hypothyroid 8/8/2011    Post corneal transplant 7/21/2014     Past Surgical History:   Procedure Laterality Date    ABDOMEN SURGERY PROC UNLISTED       History reviewed. No pertinent family history. Social History   Substance Use Topics    Smoking status: Never Smoker    Smokeless tobacco: Never Used    Alcohol use Yes      Comment: 6 pk daily       ROS       All other systems reviewed and are negative. Objective:  Vitals:    09/25/18 0912   BP: (!) 153/94   Pulse: (!) 58   Resp: 16   Temp: 96.6 °F (35.9 °C)   TempSrc: Oral   SpO2: 97%   Weight: 301 lb (136.5 kg)   Height: 6' 2\" (1.88 m)   PainSc:   0 - No pain                 alert, well appearing, and in no distress and oriented to person, place, and time  Chest - clear to auscultation, no wheezes, rales or rhonchi, symmetric air entry  Heart - normal rate, regular rhythm, normal S1, S2, no murmurs, rubs, clicks or gallops  Abdomen - soft, nontender, nondistended, no masses or organomegaly        LABS   Component      Latest Ref Rng & Units 9/20/2018 9/20/2018 9/20/2018 9/20/2018          11:07 AM 11:07 AM 11:07 AM 11:07 AM   WBC      4.6 - 13.2 K/uL       RBC      4.70 - 5.50 M/uL       HGB      13.0 - 16.0 g/dL       HCT      36.0 - 48.0 %       MCV      74.0 - 97.0 FL       MCH      24.0 - 34.0 PG       MCHC      31.0 - 37.0 g/dL       RDW      11.6 - 14.5 %       PLATELET      504 - 242 K/uL       MPV      9.2 - 11.8 FL       NEUTROPHILS      40 - 73 %       LYMPHOCYTES      21 - 52 %       MONOCYTES      3 - 10 %       EOSINOPHILS      0 - 5 %       BASOPHILS      0 - 2 %       ABS. NEUTROPHILS      1.8 - 8.0 K/UL       ABS. LYMPHOCYTES      0.9 - 3.6 K/UL       ABS. MONOCYTES      0.05 - 1.2 K/UL       ABS. EOSINOPHILS      0.0 - 0.4 K/UL       ABS. BASOPHILS      0.0 - 0.1 K/UL       DF             Sodium      136 - 145 mmol/L    142   Potassium      3.5 - 5.5 mmol/L    3.6   Chloride      100 - 108 mmol/L    105   CO2      21 - 32 mmol/L    26   Anion gap      3.0 - 18 mmol/L    11   Glucose      74 - 99 mg/dL    83   BUN      7.0 - 18 MG/DL    17   Creatinine      0.6 - 1.3 MG/DL    0.90   BUN/Creatinine ratio      12 - 20      19   GFR est AA      >60 ml/min/1.73m2    >60   GFR est non-AA      >60 ml/min/1.73m2    >60   Calcium      8.5 - 10.1 MG/DL    8.4 (L)   Bilirubin, total      0.2 - 1.0 MG/DL    0.8   ALT (SGPT)      16 - 61 U/L    23   AST      15 - 37 U/L    24   Alk.  phosphatase      45 - 117 U/L    101   Protein, total      6.4 - 8.2 g/dL    7.3   Albumin      3.4 - 5.0 g/dL    3.7   Globulin      2.0 - 4.0 g/dL    3.6   A-G Ratio      0.8 - 1.7      1.0   Color       DARK YELLOW      Appearance       CLEAR      Specific gravity      1.005 - 1.030   1.025      pH (UA)      5.0 - 8.0   5.5      Protein      NEG mg/dL 30 (A)      Glucose      NEG mg/dL NEGATIVE      Ketone      NEG mg/dL TRACE (A)      Bilirubin      NEG   SMALL (A)      Blood      NEG   NEGATIVE      Urobilinogen      0.2 - 1.0 EU/dL 2.0 (H)      Nitrites      NEG   NEGATIVE      Leukocyte Esterase      NEG   TRACE (A)      Cholesterol, total      <200 MG/DL       Triglyceride      <150 MG/DL       HDL Cholesterol      40 - 60 MG/DL       LDL, calculated      0 - 100 MG/DL       VLDL, calculated      MG/DL       CHOL/HDL Ratio      0 - 5.0         TSH      0.36 - 3.74 uIU/mL   0.05 (L)    T4, Free      0.7 - 1.5 NG/DL  1.6 (H)       Component      Latest Ref Rng & Units 9/20/2018 9/20/2018          11:07 AM 11:07 AM   WBC      4.6 - 13.2 K/uL  5.8   RBC      4.70 - 5.50 M/uL  4.73   HGB      13.0 - 16.0 g/dL  13.5   HCT      36.0 - 48.0 %  41.2   MCV      74.0 - 97.0 FL  87.1   MCH      24.0 - 34.0 PG  28.5   MCHC      31.0 - 37.0 g/dL  32.8   RDW      11.6 - 14.5 %  14.6 (H) PLATELET      688 - 724 K/uL  197   MPV      9.2 - 11.8 FL  10.8   NEUTROPHILS      40 - 73 %  58   LYMPHOCYTES      21 - 52 %  26   MONOCYTES      3 - 10 %  10   EOSINOPHILS      0 - 5 %  6 (H)   BASOPHILS      0 - 2 %  0   ABS. NEUTROPHILS      1.8 - 8.0 K/UL  3.4   ABS. LYMPHOCYTES      0.9 - 3.6 K/UL  1.5   ABS. MONOCYTES      0.05 - 1.2 K/UL  0.6   ABS. EOSINOPHILS      0.0 - 0.4 K/UL  0.4   ABS. BASOPHILS      0.0 - 0.1 K/UL  0.0   DF        AUTOMATED   Sodium      136 - 145 mmol/L     Potassium      3.5 - 5.5 mmol/L     Chloride      100 - 108 mmol/L     CO2      21 - 32 mmol/L     Anion gap      3.0 - 18 mmol/L     Glucose      74 - 99 mg/dL     BUN      7.0 - 18 MG/DL     Creatinine      0.6 - 1.3 MG/DL     BUN/Creatinine ratio      12 - 20       GFR est AA      >60 ml/min/1.73m2     GFR est non-AA      >60 ml/min/1.73m2     Calcium      8.5 - 10.1 MG/DL     Bilirubin, total      0.2 - 1.0 MG/DL     ALT (SGPT)      16 - 61 U/L     AST      15 - 37 U/L     Alk. phosphatase      45 - 117 U/L     Protein, total      6.4 - 8.2 g/dL     Albumin      3.4 - 5.0 g/dL     Globulin      2.0 - 4.0 g/dL     A-G Ratio      0.8 - 1.7       Color           Appearance           Specific gravity      1.005 - 1.030       pH (UA)      5.0 - 8.0       Protein      NEG mg/dL     Glucose      NEG mg/dL     Ketone      NEG mg/dL     Bilirubin      NEG       Blood      NEG       Urobilinogen      0.2 - 1.0 EU/dL     Nitrites      NEG       Leukocyte Esterase      NEG       Cholesterol, total      <200 MG/DL 96    Triglyceride      <150 MG/ (H)    HDL Cholesterol      40 - 60 MG/DL 52    LDL, calculated      0 - 100 MG/DL 2.6    VLDL, calculated      MG/DL 41.4    CHOL/HDL Ratio      0 - 5.0   1.8    TSH      0.36 - 3.74 uIU/mL     T4, Free      0.7 - 1.5 NG/DL       TESTS      Assessment/Plan:    Hypertension - stable  Hyperlipidemia - very low. Will leave this to cardiology discretion.      Thyroid will recheck in 3 mo and adjust if needed  cardiomypathy ongoing      Lab review: labs are reviewed, up to date and normal, orders written for new lab studies as appropriate; see orders    Diagnoses and all orders for this visit:    1. Cardiomyopathy, unspecified type ([address]  -     METABOLIC PANEL, COMPREHENSIVE; Future  -     URINALYSIS W/ RFLX MICROSCOPIC; Future  -     TSH 3RD GENERATION; Future  -     T4, FREE; Future    2. Hypertension, unspecified type  -     METABOLIC PANEL, COMPREHENSIVE; Future  -     URINALYSIS W/ RFLX MICROSCOPIC; Future  -     TSH 3RD GENERATION; Future  -     T4, FREE; Future    3. Hypothyroidism, unspecified type  -     METABOLIC PANEL, COMPREHENSIVE; Future  -     URINALYSIS W/ RFLX MICROSCOPIC; Future  -     TSH 3RD GENERATION; Future  -     T4, FREE; Future    4. Class 3 obesity with serious comorbidity and body mass index (BMI) of 45.0 to 49.9 in adult, unspecified obesity type (HCC)  -     METABOLIC PANEL, COMPREHENSIVE; Future  -     URINALYSIS W/ RFLX MICROSCOPIC; Future  -     TSH 3RD GENERATION; Future  -     T4, FREE; Future    5. Encounter for immunization  -     Influenza virus vaccine (QUADRIVALENT PRES FREE SYRINGE) IM ([id number])          I have discussed the diagnosis with the patient and the intended plan as seen in the above orders. The patient has received an after-visit summary and questions were answered concerning future plans. I have discussed medication side effects and warnings with the patient as well. I have reviewed the plan of care with the patient, accepted their input and they are in agreement with the treatment goals. Follow-up Disposition:  Return in about 3 months (around 12/25/2018) for EOV, labs prior.

## 2018-10-04 ENCOUNTER — OFFICE VISIT (OUTPATIENT)
Dept: CARDIOLOGY CLINIC | Age: 43
End: 2018-10-04

## 2018-10-04 VITALS
OXYGEN SATURATION: 98 % | DIASTOLIC BLOOD PRESSURE: 94 MMHG | SYSTOLIC BLOOD PRESSURE: 159 MMHG | BODY MASS INDEX: 38.37 KG/M2 | WEIGHT: 299 LBS | HEART RATE: 76 BPM | HEIGHT: 74 IN

## 2018-10-04 DIAGNOSIS — Z98.84 GASTRIC BYPASS STATUS FOR OBESITY: ICD-10-CM

## 2018-10-04 DIAGNOSIS — E03.9 HYPOTHYROIDISM, UNSPECIFIED TYPE: ICD-10-CM

## 2018-10-04 DIAGNOSIS — I10 HYPERTENSION, UNSPECIFIED TYPE: ICD-10-CM

## 2018-10-04 DIAGNOSIS — I42.9 CARDIOMYOPATHY, UNSPECIFIED TYPE (HCC): ICD-10-CM

## 2018-10-04 DIAGNOSIS — I21.3 ST ELEVATION MYOCARDIAL INFARCTION (STEMI), UNSPECIFIED ARTERY (HCC): ICD-10-CM

## 2018-10-04 DIAGNOSIS — E66.01 OBESITY, MORBID (HCC): Primary | ICD-10-CM

## 2018-10-04 RX ORDER — CHLORTHALIDONE 25 MG/1
25 TABLET ORAL DAILY
Qty: 30 TAB | Refills: 6 | Status: SHIPPED | OUTPATIENT
Start: 2018-10-04 | End: 2018-12-05 | Stop reason: SDUPTHER

## 2018-10-04 NOTE — PROGRESS NOTES
1. Have you been to the ER, urgent care clinic since your last visit? Hospitalized since your last visit? No  
 
2. Have you seen or consulted any other health care providers outside of the [address] [place] [address] since your last visit? Include any pap smears or colon screening.  No

## 2018-10-04 NOTE — MR AVS SNAPSHOT
[facility] 
 
 
 [address] [facility] [address] [id number] 
[phone] Patient: Agus Recinos MRN: [id number] YOANNA:[id number] Visit Information Date & Time Provider Department Dept. Phone Encounter #  
 10/4/2018 10:45 AM Lizeth Loera  [facility] Specialist at [facility] [id number] Follow-up Instructions Return in about 3 months (around 1/4/2019). Your Appointments 12/20/2018 10:00 AM  
ROUTINE CARE with Tha Martines,  [address] [address]) Appt Note: Return in about 3 months (around 12/25/2018) for EOV, labs prior [id number] [facility] [address] [facility] [address]  
  
   
 [id number] [facility] [address] [facility] Upcoming Health Maintenance Date Due DTaP/Tdap/Td series (2 - Td) 12/23/2021 Allergies as of 10/4/2018  Review Complete On: 10/4/2018 By: Brannon Skinner LPN No Known Allergies Current Immunizations  Reviewed on 8/24/2015 Name Date Influenza Vaccine 8/24/2015 Influenza Vaccine (Quad) PF 9/25/2018, 10/4/2017 Influenza Vaccine Split 12/23/2011 TDAP Vaccine 12/23/2011 ZZZ-RETIRED (DO NOT USE) Pneumococcal Vaccine (Unspecified Type) 12/23/2011 Not reviewed this visit You Were Diagnosed With   
  
 Codes Comments Hypertension, unspecified type     ICD-10-CM: I10 
ICD-9-CM: 401.9 Cardiomyopathy, unspecified type ([address]     ICD-10-CM: I42.9 ICD-9-CM: 425.4 ST elevation myocardial infarction (STEMI), unspecified artery ([address]     ICD-10-CM: I21.3 ICD-9-CM: 410.90 Hypothyroidism, unspecified type     ICD-10-CM: E03.9 ICD-9-CM: 398. 9 Vitals BP Pulse Height(growth percentile) Weight(growth percentile) SpO2 BMI  
 (!) 159/94 76 6' 2\" (1.88 m) 299 lb (135.6 kg) 98% 38.39 kg/m2 Smoking Status Never Smoker Vitals History BMI and BSA Data Body Mass Index Body Surface Area  
 38.39 kg/m 2 2.66 m 2 Preferred Pharmacy Pharmacy Name Phone [address], [place] [phone] Your Updated Medication List  
  
   
This list is accurate as of 10/4/18 11:11 AM.  Always use your most recent med list. amLODIPine 10 mg tablet Commonly known as:  Lillette Cabot Take 1 Tab by mouth daily. aspirin delayed-release 81 mg tablet Take 1 Tab by mouth daily. atorvastatin 80 mg tablet Commonly known as:  LIPITOR Take 1 Tab by mouth nightly. clopidogrel 75 mg Tab Commonly known as:  PLAVIX Take 1 Tab by mouth daily. enalapril 20 mg tablet Commonly known as:  Yuliana Luigi Take 1 Tab by mouth two (2) times a day. levothyroxine 200 mcg tablet Commonly known as:  SYNTHROID Take 1 Tab by mouth Daily (before breakfast). metoprolol tartrate 100 mg IR tablet Commonly known as:  LOPRESSOR Take 1 Tab by mouth every twelve (12) hours. Follow-up Instructions Return in about 3 months (around 1/4/2019). Patient Instructions Chlorthalidone 25mg Daily Decrease Lipitor to 40mg daily Introducing [facility]! Cezar Pino introduces [employer] patient portal. Now you can access parts of your medical record, email your doctor's office, and request medication refills online. 1. In your internet browser, go to [email or web address] 2. Click on the First Time User? Click Here link in the Sign In box. You will see the New Member Sign Up page. 3. Enter your [employer] Access Code exactly as it appears below. You will not need to use this code after youve completed the sign-up process. If you do not sign up before the expiration date, you must request a new code. · [employer] Access Code: [email or web address] Expires: 10/24/2018  7:39 AM 
 
4.  Enter the last four digits of your Social Security Number (xxxx) and Date of Birth (mm/dd/yyyy) as indicated and click Submit. You will be taken to the next sign-up page. 5. Create a [employer] ID. This will be your [employer] login ID and cannot be changed, so think of one that is secure and easy to remember. 6. Create a [employer] password. You can change your password at any time. 7. Enter your Password Reset Question and Answer. This can be used at a later time if you forget your password. 8. Enter your e-mail address. You will receive e-mail notification when new information is available in [address] 9. Click Sign Up. You can now view and download portions of your medical record. 10. Click the Download Summary menu link to download a portable copy of your medical information. If you have questions, please visit the Frequently Asked Questions section of the [employer] website. Remember, [employer] is NOT to be used for urgent needs. For medical emergencies, dial 911. Now available from your iPhone and Android! Please provide this summary of care documentation to your next provider. Your primary care clinician is listed as [id number] [facility]. If you have any questions after today's visit, please call [phone].

## 2018-10-13 NOTE — PROGRESS NOTES
Subjective:  
Mr. Mary Anne Clemente is here today for follow up visit after hospitalization at [facility] in August 2018. He presented with chest pain and EKG showed inferior STEMI. He was taken to cardiac cath lab and had totally occluded RCA and RUT was placed. He also had mild 30% stenosis in the diagonal branch, also in the circumflex. He has been taking his mediations as prescribed, no issues with bleeding on Plavix. He denies chest pain, orthopnea, PND or LE edema. No LE swelling, no palpitations. He does not smoke. He has had no recent chest pain. His breathing is been \"okay\". He has not been exercising per se. He is trying to watch his diet. He is also trying to get his disability. Patient's cardiac risk factors are obesity, sedentary life style, male gender, hypertension. Patient Active Problem List  
 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  
 HTN (hypertension) 08/08/2011  Hypothyroid 08/08/2011 Current Outpatient Prescriptions Medication Sig Dispense Refill  chlorthalidone (HYGROTEN) 25 mg tablet Take 1 Tab by mouth daily. 30 Tab 6  
 atorvastatin (LIPITOR) 80 mg tablet Take 1 Tab by mouth nightly. 30 Tab 6  
 metoprolol tartrate (LOPRESSOR) 100 mg IR tablet Take 1 Tab by mouth every twelve (12) hours. 60 Tab 6  
 aspirin delayed-release 81 mg tablet Take 1 Tab by mouth daily. 30 Tab 0  clopidogrel (PLAVIX) 75 mg tab Take 1 Tab by mouth daily. 30 Tab 11  
 amLODIPine (NORVASC) 10 mg tablet Take 1 Tab by mouth daily. 30 Tab 1  
 levothyroxine (SYNTHROID) 200 mcg tablet Take 1 Tab by mouth Daily (before breakfast). 90 Tab 4  
 enalapril (VASOTEC) 20 mg tablet Take 1 Tab by mouth two (2) times a day. 180 Tab 4 No Known Allergies Past Medical History:  
Diagnosis Date  Cardiomyopathy ([facility] 7/21/2014  Gastric bypass status for obesity 8/8/2011  
 Heart failure ([facility]  HTN (hypertension) 8/8/2011  Hypothyroid 8/8/2011  Post corneal transplant 7/21/2014 Past Surgical History:  
Procedure Laterality Date [address] UNLISTED No family history on file. History Smoking Status  Never Smoker Smokeless Tobacco  
 Never Used Review of Systems, additional: 
Constitutional: negative Eyes: negative Respiratory: negative for dyspnea on exertion Cardiovascular: per HPI Gastrointestinal: negative Musculoskeletal:negative Neurological: negative Behvioral/Psych: negative Endocrine: negative ENT: negative Objective:  
 
Visit Vitals  BP (!) 159/94  Pulse 76  Ht 6' 2\" (1.88 m)  Wt 299 lb (135.6 kg)  SpO2 98%  BMI 38.39 kg/m2 General:  alert, cooperative, no distress, appears stated age Chest Wall: inspection normal - no chest wall deformities or tenderness, respiratory effort normal  
Lung: clear to auscultation bilaterally Heart:  normal rate, regular rhythm, normal S1, S2, no murmurs, rubs, clicks or gallops Abdomen: soft, non-tender. Bowel sounds normal. No masses,  no organomegaly Extremities: extremities normal, atraumatic, no cyanosis or edema Skin: no rashes Neuro: alert, oriented, normal speech, no focal findings or movement disorder noted Assessment/Plan: ICD-10-CM ICD-9-CM 1. Hypertension, unspecified type-- BP elevated in office today. Will add Chlorthalidone. I10 401.9 atorvastatin (LIPITOR) 80 mg tablet  
   metoprolol tartrate (LOPRESSOR) 100 mg IR tablet 2. Cardiomyopathy, unspecified type ([facility]-- EF 45%, advised of salt restriction, no volume overload. Continue with current cardiac regimen. I42.9 425.4 atorvastatin (LIPITOR) 80 mg tablet  
   metoprolol tartrate (LOPRESSOR) 100 mg IR tablet 3. ST elevation myocardial infarction (STEMI), ([facility]-- S/P RUT to RCA Resolute Integrity 4X22 mm, on 8/13/2018. He is on ASA, Plavix, Lopressor and Atorvastatin. Stable. RT 3 mos. I21.3 410.90 atorvastatin (LIPITOR) 80 mg tablet  
   metoprolol tartrate (LOPRESSOR) 100 mg IR tablet REFERRAL TO CARDIAC REHAB 4. Hypothyroidism, unspecified type E03.9 244.9 atorvastatin (LIPITOR) 80 mg tablet  
   metoprolol tartrate (LOPRESSOR) 100 mg IR tablet

## 2018-11-12 DIAGNOSIS — I42.9 CARDIOMYOPATHY, UNSPECIFIED TYPE (HCC): ICD-10-CM

## 2018-11-12 DIAGNOSIS — E03.9 HYPOTHYROIDISM, UNSPECIFIED TYPE: ICD-10-CM

## 2018-11-12 DIAGNOSIS — Z23 ENCOUNTER FOR IMMUNIZATION: ICD-10-CM

## 2018-11-12 DIAGNOSIS — I10 HYPERTENSION, UNSPECIFIED TYPE: ICD-10-CM

## 2018-11-12 RX ORDER — LEVOTHYROXINE SODIUM 200 UG/1
TABLET ORAL
Qty: 30 TAB | Refills: 14 | Status: SHIPPED | OUTPATIENT
Start: 2018-11-12 | End: 2018-12-05 | Stop reason: SDUPTHER

## 2018-11-16 DIAGNOSIS — I42.9 CARDIOMYOPATHY, UNSPECIFIED TYPE (HCC): ICD-10-CM

## 2018-11-16 DIAGNOSIS — I10 HYPERTENSION, UNSPECIFIED TYPE: ICD-10-CM

## 2018-11-16 DIAGNOSIS — Z23 ENCOUNTER FOR IMMUNIZATION: ICD-10-CM

## 2018-11-16 DIAGNOSIS — E03.9 HYPOTHYROIDISM, UNSPECIFIED TYPE: ICD-10-CM

## 2018-11-17 RX ORDER — ENALAPRIL MALEATE 20 MG/1
TABLET ORAL
Qty: 60 TAB | Refills: 14 | Status: SHIPPED | OUTPATIENT
Start: 2018-11-17 | End: 2018-12-05 | Stop reason: SDUPTHER

## 2018-11-29 ENCOUNTER — APPOINTMENT (OUTPATIENT)
Dept: CT IMAGING | Age: 43
DRG: 378 | End: 2018-11-29
Attending: PHYSICIAN ASSISTANT
Payer: MEDICARE

## 2018-11-29 ENCOUNTER — HOSPITAL ENCOUNTER (INPATIENT)
Age: 43
LOS: 2 days | Discharge: HOME | DRG: 378 | End: 2018-12-01
Attending: EMERGENCY MEDICINE | Admitting: HOSPITALIST
Payer: MEDICARE

## 2018-11-29 ENCOUNTER — APPOINTMENT (OUTPATIENT)
Dept: GENERAL RADIOLOGY | Age: 43
DRG: 378 | End: 2018-11-29
Attending: PHYSICIAN ASSISTANT
Payer: MEDICARE

## 2018-11-29 DIAGNOSIS — K92.2 UPPER GI BLEED: ICD-10-CM

## 2018-11-29 DIAGNOSIS — S01.01XA LACERATION OF SCALP, INITIAL ENCOUNTER: ICD-10-CM

## 2018-11-29 DIAGNOSIS — R10.84 ABDOMINAL PAIN, GENERALIZED: ICD-10-CM

## 2018-11-29 DIAGNOSIS — N17.9 AKI (ACUTE KIDNEY INJURY) (HCC): ICD-10-CM

## 2018-11-29 DIAGNOSIS — R55 SYNCOPE AND COLLAPSE: Primary | ICD-10-CM

## 2018-11-29 LAB
ABO + RH BLD: NORMAL
ALBUMIN SERPL-MCNC: 3.8 G/DL (ref 3.4–5)
ALBUMIN/GLOB SERPL: 0.9 {RATIO} (ref 0.8–1.7)
ALP SERPL-CCNC: 91 U/L (ref 45–117)
ALT SERPL-CCNC: 26 U/L (ref 16–61)
ANION GAP SERPL CALC-SCNC: 10 MMOL/L (ref 3–18)
AST SERPL-CCNC: 23 U/L (ref 15–37)
BASOPHILS # BLD: 0 K/UL (ref 0–0.1)
BASOPHILS NFR BLD: 0 % (ref 0–2)
BILIRUB SERPL-MCNC: 0.4 MG/DL (ref 0.2–1)
BLOOD GROUP ANTIBODIES SERPL: NORMAL
BNP SERPL-MCNC: 151 PG/ML (ref 0–450)
BUN SERPL-MCNC: 42 MG/DL (ref 7–18)
BUN/CREAT SERPL: 30 (ref 12–20)
CALCIUM SERPL-MCNC: 9.2 MG/DL (ref 8.5–10.1)
CHLORIDE SERPL-SCNC: 105 MMOL/L (ref 100–108)
CK MB CFR SERPL CALC: 1.7 % (ref 0–4)
CK MB CFR SERPL CALC: 1.8 % (ref 0–4)
CK MB SERPL-MCNC: 1.6 NG/ML (ref 5–25)
CK MB SERPL-MCNC: 2 NG/ML (ref 5–25)
CK SERPL-CCNC: 109 U/L (ref 39–308)
CK SERPL-CCNC: 93 U/L (ref 39–308)
CO2 SERPL-SCNC: 26 MMOL/L (ref 21–32)
CREAT SERPL-MCNC: 1.4 MG/DL (ref 0.6–1.3)
DIFFERENTIAL METHOD BLD: NORMAL
EOSINOPHIL # BLD: 0.2 K/UL (ref 0–0.4)
EOSINOPHIL NFR BLD: 4 % (ref 0–5)
ERYTHROCYTE [DISTWIDTH] IN BLOOD BY AUTOMATED COUNT: 14.4 % (ref 11.6–14.5)
GLOBULIN SER CALC-MCNC: 4.1 G/DL (ref 2–4)
GLUCOSE SERPL-MCNC: 103 MG/DL (ref 74–99)
HCT VFR BLD AUTO: 41 % (ref 36–48)
HCT VFR BLD AUTO: 42.8 % (ref 36–48)
HGB BLD-MCNC: 13.4 G/DL (ref 13–16)
HGB BLD-MCNC: 14 G/DL (ref 13–16)
INR PPP: 1.1 (ref 0.8–1.2)
LYMPHOCYTES # BLD: 1.8 K/UL (ref 0.9–3.6)
LYMPHOCYTES NFR BLD: 25 % (ref 21–52)
MCH RBC QN AUTO: 27.9 PG (ref 24–34)
MCHC RBC AUTO-ENTMCNC: 32.7 G/DL (ref 31–37)
MCV RBC AUTO: 85.3 FL (ref 74–97)
MONOCYTES # BLD: 0.6 K/UL (ref 0.05–1.2)
MONOCYTES NFR BLD: 8 % (ref 3–10)
NEUTS SEG # BLD: 4.4 K/UL (ref 1.8–8)
NEUTS SEG NFR BLD: 63 % (ref 40–73)
PLATELET # BLD AUTO: 186 K/UL (ref 135–420)
PMV BLD AUTO: 10 FL (ref 9.2–11.8)
POTASSIUM SERPL-SCNC: 4.1 MMOL/L (ref 3.5–5.5)
PROT SERPL-MCNC: 7.9 G/DL (ref 6.4–8.2)
PROTHROMBIN TIME: 14 SEC (ref 11.5–15.2)
RBC # BLD AUTO: 5.02 M/UL (ref 4.7–5.5)
SODIUM SERPL-SCNC: 141 MMOL/L (ref 136–145)
SPECIMEN EXP DATE BLD: NORMAL
TROPONIN I SERPL-MCNC: 0.02 NG/ML (ref 0–0.04)
TROPONIN I SERPL-MCNC: 0.02 NG/ML (ref 0–0.04)
TSH SERPL DL<=0.05 MIU/L-ACNC: 0.75 UIU/ML (ref 0.36–3.74)
WBC # BLD AUTO: 7 K/UL (ref 4.6–13.2)

## 2018-11-29 PROCEDURE — 82550 ASSAY OF CK (CPK): CPT

## 2018-11-29 PROCEDURE — 74011000258 HC RX REV CODE- 258: Performed by: HOSPITALIST

## 2018-11-29 PROCEDURE — 83880 ASSAY OF NATRIURETIC PEPTIDE: CPT

## 2018-11-29 PROCEDURE — 71046 X-RAY EXAM CHEST 2 VIEWS: CPT

## 2018-11-29 PROCEDURE — 84443 ASSAY THYROID STIM HORMONE: CPT

## 2018-11-29 PROCEDURE — 0HQ0XZZ REPAIR SCALP SKIN, EXTERNAL APPROACH: ICD-10-PCS | Performed by: EMERGENCY MEDICINE

## 2018-11-29 PROCEDURE — 77030021352 HC CBL LD SYS DISP COVD -B

## 2018-11-29 PROCEDURE — 86900 BLOOD TYPING SEROLOGIC ABO: CPT

## 2018-11-29 PROCEDURE — 85018 HEMOGLOBIN: CPT

## 2018-11-29 PROCEDURE — 65660000000 HC RM CCU STEPDOWN

## 2018-11-29 PROCEDURE — 96361 HYDRATE IV INFUSION ADD-ON: CPT

## 2018-11-29 PROCEDURE — 85610 PROTHROMBIN TIME: CPT

## 2018-11-29 PROCEDURE — C9113 INJ PANTOPRAZOLE SODIUM, VIA: HCPCS | Performed by: HOSPITALIST

## 2018-11-29 PROCEDURE — 74011636320 HC RX REV CODE- 636/320: Performed by: EMERGENCY MEDICINE

## 2018-11-29 PROCEDURE — 74177 CT ABD & PELVIS W/CONTRAST: CPT

## 2018-11-29 PROCEDURE — 74011250636 HC RX REV CODE- 250/636: Performed by: PHYSICIAN ASSISTANT

## 2018-11-29 PROCEDURE — 99285 EMERGENCY DEPT VISIT HI MDM: CPT

## 2018-11-29 PROCEDURE — 93005 ELECTROCARDIOGRAM TRACING: CPT

## 2018-11-29 PROCEDURE — C9113 INJ PANTOPRAZOLE SODIUM, VIA: HCPCS | Performed by: PHYSICIAN ASSISTANT

## 2018-11-29 PROCEDURE — 74011000250 HC RX REV CODE- 250: Performed by: PHYSICIAN ASSISTANT

## 2018-11-29 PROCEDURE — 85025 COMPLETE CBC W/AUTO DIFF WBC: CPT

## 2018-11-29 PROCEDURE — 36415 COLL VENOUS BLD VENIPUNCTURE: CPT

## 2018-11-29 PROCEDURE — 74011250636 HC RX REV CODE- 250/636: Performed by: HOSPITALIST

## 2018-11-29 PROCEDURE — 80053 COMPREHEN METABOLIC PANEL: CPT

## 2018-11-29 PROCEDURE — 96374 THER/PROPH/DIAG INJ IV PUSH: CPT

## 2018-11-29 PROCEDURE — 74011250636 HC RX REV CODE- 250/636: Performed by: EMERGENCY MEDICINE

## 2018-11-29 PROCEDURE — 70450 CT HEAD/BRAIN W/O DYE: CPT

## 2018-11-29 RX ORDER — PANTOPRAZOLE SODIUM 40 MG/10ML
80 INJECTION, POWDER, LYOPHILIZED, FOR SOLUTION INTRAVENOUS
Status: COMPLETED | OUTPATIENT
Start: 2018-11-29 | End: 2018-11-29

## 2018-11-29 RX ORDER — DEXTROSE MONOHYDRATE AND SODIUM CHLORIDE 5; .45 G/100ML; G/100ML
125 INJECTION, SOLUTION INTRAVENOUS CONTINUOUS
Status: DISCONTINUED | OUTPATIENT
Start: 2018-11-29 | End: 2018-12-01

## 2018-11-29 RX ORDER — LIDOCAINE HYDROCHLORIDE AND EPINEPHRINE 10; 10 MG/ML; UG/ML
10 INJECTION, SOLUTION INFILTRATION; PERINEURAL ONCE
Status: COMPLETED | OUTPATIENT
Start: 2018-11-29 | End: 2018-11-29

## 2018-11-29 RX ORDER — PANTOPRAZOLE SODIUM 40 MG/10ML
40 INJECTION, POWDER, LYOPHILIZED, FOR SOLUTION INTRAVENOUS EVERY 12 HOURS
Status: DISCONTINUED | OUTPATIENT
Start: 2018-11-29 | End: 2018-12-01 | Stop reason: HOSPADM

## 2018-11-29 RX ORDER — HYDRALAZINE HYDROCHLORIDE 20 MG/ML
10 INJECTION INTRAMUSCULAR; INTRAVENOUS
Status: DISCONTINUED | OUTPATIENT
Start: 2018-11-29 | End: 2018-12-01 | Stop reason: HOSPADM

## 2018-11-29 RX ADMIN — DEXTROSE MONOHYDRATE AND SODIUM CHLORIDE 125 ML/HR: 5; .45 INJECTION, SOLUTION INTRAVENOUS at 20:59

## 2018-11-29 RX ADMIN — LIDOCAINE HYDROCHLORIDE,EPINEPHRINE BITARTRATE 100 MG: 10; .01 INJECTION, SOLUTION INFILTRATION; PERINEURAL at 18:57

## 2018-11-29 RX ADMIN — PANTOPRAZOLE SODIUM 40 MG: 40 INJECTION, POWDER, FOR SOLUTION INTRAVENOUS at 20:57

## 2018-11-29 RX ADMIN — IOPAMIDOL 94 ML: 612 INJECTION, SOLUTION INTRAVENOUS at 19:16

## 2018-11-29 RX ADMIN — PANTOPRAZOLE SODIUM 80 MG: 40 INJECTION, POWDER, FOR SOLUTION INTRAVENOUS at 18:57

## 2018-11-29 RX ADMIN — SODIUM CHLORIDE 1000 ML: 900 INJECTION, SOLUTION INTRAVENOUS at 19:35

## 2018-11-29 NOTE — ED TRIAGE NOTES
Pt reported he passed out a few minutes ago, he was trying to go to the bathroom when he became weak and fell backwards. Pt got a 5cm laceration on his left temple. Pt also reports black tarry stool. He has been taking ibuprofen even though he says he had gastric bypass.

## 2018-11-29 NOTE — ED NOTES
Lightheaded after taking nighttime meds. Pt went to restroom and subsequently passed out and hit his head. S/o states that he has been taking Advil every day for chronic MSK pain in buttock and he is not supposed to take this as he has had gastric bypass. Pt has been having black stools all day. I performed a brief evaluation, including history and physical, of the patient here in triage and I have determined that pt will need further treatment and evaluation from the main side ER physician. I have placed initial orders to help in expediting patients care. November 29, 2018 at 6:05 PM - Yan Borges PA-C Visit Vitals /72 (BP 1 Location: Right arm, BP Patient Position: Sitting) Pulse 63 Resp 16 Ht 6' 2\" (1.88 m) Wt 136.1 kg (300 lb) SpO2 100% BMI 38.52 kg/m²

## 2018-11-29 NOTE — ED PROVIDER NOTES
EMERGENCY DEPARTMENT HISTORY AND PHYSICAL EXAM 
 
Date: 11/29/2018 Patient Name: Daljit Gamboa History of Presenting Illness Chief Complaint Patient presents with  Syncope  Abdominal Pain  Headache  Laceration  Melena History Provided By: Patient Chief Complaint: syncope Duration: 30 Minutes Timing:  Acute Location: global 
Quality: n/a Severity: Severe Modifying Factors: fall from syncope Associated Symptoms: head laceration, abdominal pain, dark black stools HPI: Daljit Gamboa is a 39 y.o. male with a PMH of STEMI, Heart failure, cardiomyopathy, post corneal transplant, HTN, gastric bypass surgery who presents to the ER c/o syncope. Patient states he had taken his night time meds earlier then went to a friends house. He remembers standing up to go to the restroom, but states had a syncopal event then. No seizure like activity was observed. Patient did not lose bowel or bladder incontinence. Patient states he struck his head on the floor and has a head laceration. He does take anticoagulation meds due to recent MI in the summer. Patient also reports 1 day of dark black stools with some associated mid-abdominal pain. He admits to taking ibuprofen daily for his muscle aches, which he shouldn't be taking due to previous gastric bypass surgery years prior. He denied any recent fevers, chills, sob, chest pain, dizziness, LOC, N/V, dysuria and has no other symptoms or complaints. PCP: Nery Arellano, DO 
 
Current Facility-Administered Medications Medication Dose Route Frequency Provider Last Rate Last Dose  dextrose 5 % - 0.45% NaCl infusion  125 mL/hr IntraVENous CONTINUOUS Angela Diop DO      
 pantoprazole (PROTONIX) injection 40 mg  40 mg IntraVENous Q12H Angela iDop DO   40 mg at 11/29/18 2057  hydrALAZINE (APRESOLINE) 20 mg/mL injection 10 mg  10 mg IntraVENous Q6H PRN Angela Diop DO      
 
 Current Outpatient Medications Medication Sig Dispense Refill  enalapril (VASOTEC) 20 mg tablet TAKE ONE TABLET BY MOUTH TWICE DAILY 60 Tab 14  
 levothyroxine (SYNTHROID) 200 mcg tablet TAKE ONE TABLET BY MOUTH ONCE DAILY BEFORE  BREAKFAST 30 Tab 14  
 chlorthalidone (HYGROTEN) 25 mg tablet Take 1 Tab by mouth daily. 30 Tab 6  
 atorvastatin (LIPITOR) 80 mg tablet Take 1 Tab by mouth nightly. 30 Tab 6  
 metoprolol tartrate (LOPRESSOR) 100 mg IR tablet Take 1 Tab by mouth every twelve (12) hours. 60 Tab 6  
 aspirin delayed-release 81 mg tablet Take 1 Tab by mouth daily. 30 Tab 0  clopidogrel (PLAVIX) 75 mg tab Take 1 Tab by mouth daily. 30 Tab 11  
 amLODIPine (NORVASC) 10 mg tablet Take 1 Tab by mouth daily. 30 Tab 1 Past History Past Medical History: 
Past Medical History:  
Diagnosis Date  Cardiomyopathy ([facility] 7/21/2014  Gastric bypass status for obesity 8/8/2011  
 Heart failure ([facility]  HTN (hypertension) 8/8/2011  Hypothyroid 8/8/2011  Post corneal transplant 7/21/2014  
 STEMI (ST elevation myocardial infarction) ([facility] [address]   
 08/2018 Past Surgical History: 
Past Surgical History:  
Procedure Laterality Date [address] UNLISTED Family History: No family history on file. Social History: 
Social History Tobacco Use  Smoking status: Never Smoker  Smokeless tobacco: Never Used Substance Use Topics  Alcohol use: Yes Comment: 6 pk daily  Drug use: No  
 
 
Allergies: 
No Known Allergies Review of Systems Review of Systems Constitutional: Negative for chills, fatigue and fever. HENT: Negative. Negative for sore throat. Eyes: Negative. Respiratory: Negative for cough and shortness of breath. Cardiovascular: Negative for chest pain and palpitations. Gastrointestinal: Positive for abdominal pain and blood in stool. Negative for nausea and vomiting. Genitourinary: Negative for dysuria. Musculoskeletal: Negative. Skin: Negative. Neurological: Positive for syncope. Negative for dizziness, weakness, light-headedness and headaches. Psychiatric/Behavioral: Negative. All other systems reviewed and are negative. Physical Exam  
 
Vitals:  
 11/29/18 1800 11/29/18 1816 11/29/18 1932 11/29/18 1945 BP: 109/72  113/87 114/90 Pulse: 63   (!) 56 Resp: 16   17 Temp:  97.2 °F (36.2 °C) SpO2: 100%   99% Weight: 136.1 kg (300 lb) Height: 6' 2\" (1.88 m) Physical Exam  
Constitutional: He is oriented to person, place, and time. He appears well-developed and well-nourished. No distress. HENT:  
Head: Normocephalic. Head is with laceration. Right Ear: External ear and ear canal normal.  
Left Ear: External ear and ear canal normal.  
Nose: Nose normal.  
Mouth/Throat: Uvula is midline, oropharynx is clear and moist and mucous membranes are normal.  
Eyes: Conjunctivae are normal. Pupils are equal, round, and reactive to light. No scleral icterus. Neck: Normal range of motion. Neck supple. No JVD present. No spinous process tenderness present. No tracheal deviation present. Cardiovascular: Normal rate, regular rhythm and normal heart sounds. No murmur heard. Pulmonary/Chest: Effort normal and breath sounds normal. No respiratory distress. He has no wheezes. He has no rales. He exhibits no tenderness. Abdominal: Soft. Normal appearance. He exhibits no distension and no mass. There is tenderness in the periumbilical area. There is no rigidity, no rebound, no guarding, no tenderness at McBurney's point and negative Ibanez's sign. Unable to obtain bowel sounds due to body habitus; no peritoneal signs. Unable to reproduce pain on exam  
Genitourinary: Rectal exam shows guaiac positive stool.  Rectal exam shows no external hemorrhoid, no internal hemorrhoid, no fissure, no mass, no tenderness and anal tone normal.  
 Genitourinary Comments: Dark tarry stool in rectal vault Musculoskeletal: Normal range of motion. Neurological: He is alert and oriented to person, place, and time. He has normal strength. GCS eye subscore is 4. GCS verbal subscore is 5. GCS motor subscore is 6. Skin: Skin is warm and dry. He is not diaphoretic. Psychiatric: He has a normal mood and affect. Nursing note and vitals reviewed. Diagnostic Study Results Labs - Recent Results (from the past 12 hour(s)) EKG, 12 LEAD, INITIAL Collection Time: 11/29/18  6:11 PM  
Result Value Ref Range Ventricular Rate 52 BPM  
 Atrial Rate 52 BPM  
 P-R Interval 178 ms QRS Duration 90 ms Q-T Interval 452 ms QTC Calculation (Bezet) 420 ms Calculated P Axis 36 degrees Calculated R Axis -21 degrees Calculated T Axis 127 degrees Diagnosis Sinus bradycardia Left ventricular hypertrophy with repolarization abnormality Inferior infarct (cited on or before 13-AUG-2018) Abnormal ECG When compared with ECG of 14-AUG-2018 17:21, 
Nonspecific T wave abnormality, improved in Inferior leads QT has shortened CBC WITH AUTOMATED DIFF Collection Time: 11/29/18  6:25 PM  
Result Value Ref Range WBC 7.0 4.6 - 13.2 K/uL  
 RBC 5.02 4.70 - 5.50 M/uL  
 HGB 14.0 13.0 - 16.0 g/dL HCT 42.8 36.0 - 48.0 % MCV 85.3 74.0 - 97.0 FL  
 MCH 27.9 24.0 - 34.0 PG  
 MCHC 32.7 31.0 - 37.0 g/dL  
 RDW 14.4 11.6 - 14.5 % PLATELET 215 204 - 623 K/uL MPV 10.0 9.2 - 11.8 FL  
 NEUTROPHILS 63 40 - 73 % LYMPHOCYTES 25 21 - 52 % MONOCYTES 8 3 - 10 % EOSINOPHILS 4 0 - 5 % BASOPHILS 0 0 - 2 %  
 ABS. NEUTROPHILS 4.4 1.8 - 8.0 K/UL  
 ABS. LYMPHOCYTES 1.8 0.9 - 3.6 K/UL  
 ABS. MONOCYTES 0.6 0.05 - 1.2 K/UL  
 ABS. EOSINOPHILS 0.2 0.0 - 0.4 K/UL  
 ABS. BASOPHILS 0.0 0.0 - 0.1 K/UL  
 DF AUTOMATED METABOLIC PANEL, COMPREHENSIVE Collection Time: 11/29/18  6:25 PM  
Result Value Ref Range Sodium 141 136 - 145 mmol/L Potassium 4.1 3.5 - 5.5 mmol/L Chloride 105 100 - 108 mmol/L  
 CO2 26 21 - 32 mmol/L Anion gap 10 3.0 - 18 mmol/L Glucose 103 (H) 74 - 99 mg/dL BUN 42 (H) 7.0 - 18 MG/DL Creatinine 1.40 (H) 0.6 - 1.3 MG/DL  
 BUN/Creatinine ratio 30 (H) 12 - 20 GFR est AA >60 >60 ml/min/1.73m2 GFR est non-AA 55 (L) >60 ml/min/1.73m2 Calcium 9.2 8.5 - 10.1 MG/DL Bilirubin, total 0.4 0.2 - 1.0 MG/DL  
 ALT (SGPT) 26 16 - 61 U/L  
 AST (SGOT) 23 15 - 37 U/L Alk. phosphatase 91 45 - 117 U/L Protein, total 7.9 6.4 - 8.2 g/dL Albumin 3.8 3.4 - 5.0 g/dL Globulin 4.1 (H) 2.0 - 4.0 g/dL A-G Ratio 0.9 0.8 - 1.7 CARDIAC PANEL,(CK, CKMB & TROPONIN) Collection Time: 11/29/18  6:25 PM  
Result Value Ref Range  39 - 308 U/L  
 CK - MB 2.0 <3.6 ng/ml CK-MB Index 1.8 0.0 - 4.0 % Troponin-I, Qt. 0.02 0.0 - 0.045 NG/ML  
TYPE & SCREEN Collection Time: 11/29/18  6:25 PM  
Result Value Ref Range Crossmatch Expiration 12/02/2018 ABO/Rh(D) O POSITIVE Antibody screen NEG PROTHROMBIN TIME + INR Collection Time: 11/29/18  6:25 PM  
Result Value Ref Range Prothrombin time 14.0 11.5 - 15.2 sec INR 1.1 0.8 - 1.2 NT-PRO BNP Collection Time: 11/29/18  6:25 PM  
Result Value Ref Range NT pro- 0 - 450 PG/ML Radiologic Studies -  
XR CHEST PA LAT    (Results Pending) CT HEAD WO CONT    (Results Pending) CT ABD PELV W CONT    (Results Pending) CT Results  (Last 48 hours) None CXR Results  (Last 48 hours) None Medical Decision Making I am the first provider for this patient. I reviewed the vital signs, available nursing notes, past medical history, past surgical history, family history and social history. Vital Signs-Reviewed the patient's vital signs.  
 
Records Reviewed: Nursing Notes, Old Medical Records, Previous electrocardiograms, Previous Radiology Studies and Previous Laboratory Studies 6:21 PM 
38 y/o male c/o syncope and head injury. Stood up and fell from standing position, striking back of head. Does not recall event; states he was trying to go to the bathroom. No seizure like activity after fall. No bowel or bladder incontinence. Approx. 5 cm lac to posterior occiput. Pt on plavix after MI this past summer. Also reports dark black stools x 1-2 days; admits to taking ibuprofen frequently for muscle pains. States abd pain x 1-2 days as well. Alert x 4 on exam.  Will plan on labs, ekg, imaging at this time. Discussed pt with Dr. Keli Mae as well. Sun Sears PA-C 
  
7:31 PM 
Spoke with Dr. Juancarlos Ruth, GI on call who agrees for consult and will see pt in the morning. Sun Sears PA-C 
 
8:57 PM 
Spoke with Dr. Chasity Ramesh, who agrees to accept admission to hospital.  No acute findings noted on ct head and abd. Discussed all results with pt. Head laceration closed with sutures. Pt tolerated well. Sun Sears PA-C Disposition: 
Admitted Follow-up Information None Current Discharge Medication List  
  
 
 
Provider Notes (Medical Decision Making):  
 
Procedures: 
Wound Repair 
Date/Time: 11/29/2018 8:54 PM 
Performed by: PAPreparation: skin prepped with Shur-Clens Pre-procedure re-eval: Immediately prior to the procedure, the patient was reevaluated and found suitable for the planned procedure and any planned medications. Time out: Immediately prior to the procedure a time out was called to verify the correct patient, procedure, equipment, staff and marking as appropriate. [employer] [employer] Location details: scalp Wound length:2.6 - 7.5 cm (5) Anesthesia: local infiltration Anesthesia: 
Local Anesthetic: lidocaine 1% with epinephrine Anesthetic total: 8 mL Foreign bodies: no foreign bodies Irrigation solution: saline Irrigation method: syringe Debridement: none Wound skin closure material used: 3-0. Number of sutures: 9 Technique: simple and running Approximation: close Patient tolerance: Patient tolerated the procedure well with no immediate complications My total time at bedside, performing this procedure was 16-30 minutes (20). Diagnosis Clinical Impression: 1. Syncope and collapse 2. Laceration of scalp, initial encounter 3. Abdominal pain, generalized 4. Upper GI bleed 5. ROSEMARY (acute kidney injury) ([facility] [address]

## 2018-11-30 ENCOUNTER — APPOINTMENT (OUTPATIENT)
Dept: VASCULAR SURGERY | Age: 43
DRG: 378 | End: 2018-11-30
Attending: HOSPITALIST
Payer: MEDICARE

## 2018-11-30 ENCOUNTER — ANESTHESIA EVENT (OUTPATIENT)
Dept: ENDOSCOPY | Age: 43
DRG: 378 | End: 2018-11-30
Payer: MEDICARE

## 2018-11-30 ENCOUNTER — ANESTHESIA (OUTPATIENT)
Dept: ENDOSCOPY | Age: 43
DRG: 378 | End: 2018-11-30
Payer: MEDICARE

## 2018-11-30 LAB
ALBUMIN SERPL-MCNC: 3.4 G/DL (ref 3.4–5)
ALBUMIN/GLOB SERPL: 1 {RATIO} (ref 0.8–1.7)
ALP SERPL-CCNC: 81 U/L (ref 45–117)
ALT SERPL-CCNC: 24 U/L (ref 16–61)
ANION GAP SERPL CALC-SCNC: 8 MMOL/L (ref 3–18)
AST SERPL-CCNC: 21 U/L (ref 15–37)
BASOPHILS # BLD: 0 K/UL (ref 0–0.1)
BASOPHILS NFR BLD: 0 % (ref 0–2)
BILIRUB SERPL-MCNC: 0.6 MG/DL (ref 0.2–1)
BUN SERPL-MCNC: 31 MG/DL (ref 7–18)
BUN/CREAT SERPL: 28 (ref 12–20)
CALCIUM SERPL-MCNC: 8.7 MG/DL (ref 8.5–10.1)
CHLORIDE SERPL-SCNC: 106 MMOL/L (ref 100–108)
CK MB CFR SERPL CALC: 2 % (ref 0–4)
CK MB CFR SERPL CALC: 2.8 % (ref 0–4)
CK MB SERPL-MCNC: 1.4 NG/ML (ref 5–25)
CK MB SERPL-MCNC: 1.6 NG/ML (ref 5–25)
CK SERPL-CCNC: 58 U/L (ref 39–308)
CK SERPL-CCNC: 70 U/L (ref 39–308)
CO2 SERPL-SCNC: 27 MMOL/L (ref 21–32)
CREAT SERPL-MCNC: 1.12 MG/DL (ref 0.6–1.3)
DIFFERENTIAL METHOD BLD: ABNORMAL
EOSINOPHIL # BLD: 0.2 K/UL (ref 0–0.4)
EOSINOPHIL NFR BLD: 3 % (ref 0–5)
ERYTHROCYTE [DISTWIDTH] IN BLOOD BY AUTOMATED COUNT: 14.8 % (ref 11.6–14.5)
GLOBULIN SER CALC-MCNC: 3.4 G/DL (ref 2–4)
GLUCOSE SERPL-MCNC: 115 MG/DL (ref 74–99)
HCT VFR BLD AUTO: 36.9 % (ref 36–48)
HCT VFR BLD AUTO: 37.6 % (ref 36–48)
HCT VFR BLD AUTO: 38.6 % (ref 36–48)
HGB BLD-MCNC: 12.1 G/DL (ref 13–16)
HGB BLD-MCNC: 12.2 G/DL (ref 13–16)
HGB BLD-MCNC: 12.3 G/DL (ref 13–16)
LYMPHOCYTES # BLD: 1.4 K/UL (ref 0.9–3.6)
LYMPHOCYTES NFR BLD: 21 % (ref 21–52)
MCH RBC QN AUTO: 26.9 PG (ref 24–34)
MCHC RBC AUTO-ENTMCNC: 31.3 G/DL (ref 31–37)
MCV RBC AUTO: 85.8 FL (ref 74–97)
MONOCYTES # BLD: 0.5 K/UL (ref 0.05–1.2)
MONOCYTES NFR BLD: 8 % (ref 3–10)
NEUTS SEG # BLD: 4.6 K/UL (ref 1.8–8)
NEUTS SEG NFR BLD: 68 % (ref 40–73)
PLATELET # BLD AUTO: 144 K/UL (ref 135–420)
PMV BLD AUTO: 9.8 FL (ref 9.2–11.8)
POTASSIUM SERPL-SCNC: 4.3 MMOL/L (ref 3.5–5.5)
PROT SERPL-MCNC: 6.8 G/DL (ref 6.4–8.2)
RBC # BLD AUTO: 4.5 M/UL (ref 4.7–5.5)
SODIUM SERPL-SCNC: 141 MMOL/L (ref 136–145)
TROPONIN I SERPL-MCNC: 0.02 NG/ML (ref 0–0.04)
TROPONIN I SERPL-MCNC: 0.03 NG/ML (ref 0–0.04)
WBC # BLD AUTO: 6.7 K/UL (ref 4.6–13.2)

## 2018-11-30 PROCEDURE — 74011250636 HC RX REV CODE- 250/636: Performed by: HOSPITALIST

## 2018-11-30 PROCEDURE — 88342 IMHCHEM/IMCYTCHM 1ST ANTB: CPT

## 2018-11-30 PROCEDURE — 76040000019: Performed by: INTERNAL MEDICINE

## 2018-11-30 PROCEDURE — 65660000000 HC RM CCU STEPDOWN

## 2018-11-30 PROCEDURE — 85018 HEMOGLOBIN: CPT

## 2018-11-30 PROCEDURE — 76060000031 HC ANESTHESIA FIRST 0.5 HR: Performed by: INTERNAL MEDICINE

## 2018-11-30 PROCEDURE — 74011250636 HC RX REV CODE- 250/636: Performed by: INTERNAL MEDICINE

## 2018-11-30 PROCEDURE — 88305 TISSUE EXAM BY PATHOLOGIST: CPT

## 2018-11-30 PROCEDURE — 93880 EXTRACRANIAL BILAT STUDY: CPT

## 2018-11-30 PROCEDURE — 0DB68ZX EXCISION OF STOMACH, VIA NATURAL OR ARTIFICIAL OPENING ENDOSCOPIC, DIAGNOSTIC: ICD-10-PCS | Performed by: INTERNAL MEDICINE

## 2018-11-30 PROCEDURE — C9113 INJ PANTOPRAZOLE SODIUM, VIA: HCPCS | Performed by: HOSPITALIST

## 2018-11-30 PROCEDURE — 74011250636 HC RX REV CODE- 250/636

## 2018-11-30 PROCEDURE — 74011000258 HC RX REV CODE- 258: Performed by: HOSPITALIST

## 2018-11-30 PROCEDURE — 82550 ASSAY OF CK (CPK): CPT

## 2018-11-30 PROCEDURE — 85025 COMPLETE CBC W/AUTO DIFF WBC: CPT

## 2018-11-30 PROCEDURE — 36415 COLL VENOUS BLD VENIPUNCTURE: CPT

## 2018-11-30 PROCEDURE — 80053 COMPREHEN METABOLIC PANEL: CPT

## 2018-11-30 PROCEDURE — 77030009426 HC FCPS BIOP ENDOSC BSC -B: Performed by: INTERNAL MEDICINE

## 2018-11-30 RX ORDER — SODIUM CHLORIDE, SODIUM LACTATE, POTASSIUM CHLORIDE, CALCIUM CHLORIDE 600; 310; 30; 20 MG/100ML; MG/100ML; MG/100ML; MG/100ML
INJECTION, SOLUTION INTRAVENOUS
Status: DISCONTINUED | OUTPATIENT
Start: 2018-11-30 | End: 2018-11-30 | Stop reason: HOSPADM

## 2018-11-30 RX ORDER — PROPOFOL 10 MG/ML
INJECTION, EMULSION INTRAVENOUS AS NEEDED
Status: DISCONTINUED | OUTPATIENT
Start: 2018-11-30 | End: 2018-11-30 | Stop reason: HOSPADM

## 2018-11-30 RX ORDER — FLUMAZENIL 0.1 MG/ML
0.2 INJECTION INTRAVENOUS
Status: CANCELLED | OUTPATIENT
Start: 2018-11-30 | End: 2018-11-30

## 2018-11-30 RX ORDER — DEXTROMETHORPHAN/PSEUDOEPHED 2.5-7.5/.8
1.2 DROPS ORAL
Status: CANCELLED | OUTPATIENT
Start: 2018-11-30

## 2018-11-30 RX ORDER — EPINEPHRINE 0.1 MG/ML
1 INJECTION INTRACARDIAC; INTRAVENOUS
Status: CANCELLED | OUTPATIENT
Start: 2018-11-30 | End: 2018-12-01

## 2018-11-30 RX ORDER — GUAIFENESIN 100 MG/5ML
81 LIQUID (ML) ORAL DAILY
Status: DISCONTINUED | OUTPATIENT
Start: 2018-12-01 | End: 2018-12-01 | Stop reason: HOSPADM

## 2018-11-30 RX ORDER — NALOXONE HYDROCHLORIDE 0.4 MG/ML
0.4 INJECTION, SOLUTION INTRAMUSCULAR; INTRAVENOUS; SUBCUTANEOUS
Status: CANCELLED | OUTPATIENT
Start: 2018-11-30 | End: 2018-11-30

## 2018-11-30 RX ORDER — CLOPIDOGREL BISULFATE 75 MG/1
75 TABLET ORAL DAILY
Status: DISCONTINUED | OUTPATIENT
Start: 2018-12-01 | End: 2018-12-01 | Stop reason: HOSPADM

## 2018-11-30 RX ORDER — MORPHINE SULFATE 2 MG/ML
1 INJECTION, SOLUTION INTRAMUSCULAR; INTRAVENOUS
Status: DISCONTINUED | OUTPATIENT
Start: 2018-11-30 | End: 2018-12-01 | Stop reason: HOSPADM

## 2018-11-30 RX ORDER — SODIUM CHLORIDE 9 MG/ML
50 INJECTION, SOLUTION INTRAVENOUS ONCE
Status: COMPLETED | OUTPATIENT
Start: 2018-11-30 | End: 2018-11-30

## 2018-11-30 RX ORDER — SODIUM CHLORIDE 0.9 % (FLUSH) 0.9 %
5-10 SYRINGE (ML) INJECTION EVERY 8 HOURS
Status: CANCELLED | OUTPATIENT
Start: 2018-11-30 | End: 2018-11-30

## 2018-11-30 RX ORDER — ATROPINE SULFATE 0.1 MG/ML
0.5 INJECTION INTRAVENOUS
Status: CANCELLED | OUTPATIENT
Start: 2018-11-30 | End: 2018-12-01

## 2018-11-30 RX ORDER — MORPHINE SULFATE 2 MG/ML
1 INJECTION, SOLUTION INTRAMUSCULAR; INTRAVENOUS ONCE
Status: COMPLETED | OUTPATIENT
Start: 2018-11-30 | End: 2018-11-30

## 2018-11-30 RX ORDER — SODIUM CHLORIDE 0.9 % (FLUSH) 0.9 %
5-10 SYRINGE (ML) INJECTION AS NEEDED
Status: CANCELLED | OUTPATIENT
Start: 2018-11-30 | End: 2018-11-30

## 2018-11-30 RX ADMIN — PROPOFOL 10 MG: 10 INJECTION, EMULSION INTRAVENOUS at 15:26

## 2018-11-30 RX ADMIN — DEXTROSE MONOHYDRATE AND SODIUM CHLORIDE 125 ML/HR: 5; .45 INJECTION, SOLUTION INTRAVENOUS at 17:28

## 2018-11-30 RX ADMIN — MORPHINE SULFATE 1 MG: 2 INJECTION, SOLUTION INTRAMUSCULAR; INTRAVENOUS at 02:32

## 2018-11-30 RX ADMIN — PROPOFOL 20 MG: 10 INJECTION, EMULSION INTRAVENOUS at 15:28

## 2018-11-30 RX ADMIN — DEXTROSE MONOHYDRATE AND SODIUM CHLORIDE 125 ML/HR: 5; .45 INJECTION, SOLUTION INTRAVENOUS at 08:29

## 2018-11-30 RX ADMIN — MORPHINE SULFATE 1 MG: 2 INJECTION, SOLUTION INTRAMUSCULAR; INTRAVENOUS at 12:27

## 2018-11-30 RX ADMIN — HYDRALAZINE HYDROCHLORIDE 10 MG: 20 INJECTION INTRAMUSCULAR; INTRAVENOUS at 22:06

## 2018-11-30 RX ADMIN — PROPOFOL 20 MG: 10 INJECTION, EMULSION INTRAVENOUS at 15:25

## 2018-11-30 RX ADMIN — SODIUM CHLORIDE 50 ML/HR: 900 INJECTION, SOLUTION INTRAVENOUS at 10:31

## 2018-11-30 RX ADMIN — MORPHINE SULFATE 1 MG: 2 INJECTION, SOLUTION INTRAMUSCULAR; INTRAVENOUS at 08:25

## 2018-11-30 RX ADMIN — PROPOFOL 60 MG: 10 INJECTION, EMULSION INTRAVENOUS at 15:19

## 2018-11-30 RX ADMIN — SODIUM CHLORIDE, SODIUM LACTATE, POTASSIUM CHLORIDE, CALCIUM CHLORIDE: 600; 310; 30; 20 INJECTION, SOLUTION INTRAVENOUS at 15:16

## 2018-11-30 RX ADMIN — MORPHINE SULFATE 1 MG: 2 INJECTION, SOLUTION INTRAMUSCULAR; INTRAVENOUS at 03:58

## 2018-11-30 RX ADMIN — PANTOPRAZOLE SODIUM 40 MG: 40 INJECTION, POWDER, FOR SOLUTION INTRAVENOUS at 08:25

## 2018-11-30 RX ADMIN — PROPOFOL 10 MG: 10 INJECTION, EMULSION INTRAVENOUS at 15:30

## 2018-11-30 RX ADMIN — PROPOFOL 20 MG: 10 INJECTION, EMULSION INTRAVENOUS at 15:23

## 2018-11-30 RX ADMIN — PROPOFOL 50 MG: 10 INJECTION, EMULSION INTRAVENOUS at 15:21

## 2018-11-30 RX ADMIN — PROPOFOL 20 MG: 10 INJECTION, EMULSION INTRAVENOUS at 15:29

## 2018-11-30 RX ADMIN — MORPHINE SULFATE 1 MG: 2 INJECTION, SOLUTION INTRAMUSCULAR; INTRAVENOUS at 17:24

## 2018-11-30 RX ADMIN — PROPOFOL 40 MG: 10 INJECTION, EMULSION INTRAVENOUS at 15:20

## 2018-11-30 RX ADMIN — PANTOPRAZOLE SODIUM 40 MG: 40 INJECTION, POWDER, FOR SOLUTION INTRAVENOUS at 20:45

## 2018-11-30 NOTE — PROGRESS NOTES
Bedside shift change report given to Suzanne RN (oncoming nurse) by Izaiah Sepulveda RN (offgoing nurse). Report included the following information SBAR, Kardex, Intake/Output, MAR and Recent Results. 
   
0831 -- AM medications given, well tolerated, will continue to monitor. Spoke to Jem), patient has been NPO, consent will be completed downstairs, and to start NS at [place].    
 -- Reassessment completed, no change in patient condition, will continue to monitor. 
   
 -- Reassessment completed, no change in patient condition, will continue to monitor. 1542 -- Pa 
 
1600 -- Back on unit Vascular Bedside shift change report given to , RN (oncoming nurse) by Donovan Elise RN (offgoing nurse). Report included the following information SBAR, Kardex, Intake/Output, MAR and Recent Results.

## 2018-11-30 NOTE — PROCEDURES
Endoscopy Procedure Note    Patient: Analy Krishnan MRN: [id number]  SSN: [id number]    YOB: 1975  Age: 39 y.o. Sex: male      Date/Time:  11/30/2018 3:32 PM    Esophagogastroduodenoscopy (EGD) Procedure Note    Procedure: Esophagogastroduodenoscopy with biopsy    IMPRESSION:   1. Two G-J anastomotic ulcers without high risk stigmata, edges biopsied  2. Gastric erosions, biopsies of gastric pouch for Hpylori  3. Slight esophagitis and possible short segment Nunez's left alone in setting of recent bleeding and ongoing plavix use    RECOMMENDATIONS:  1. Can advance diet as tolerated   2. Continue protonix 40mg twice daily to be continued as an outpatient  3. Can restart aspirin and plavix but avoid all other NSAIDs  4. Will need repeat EGD in 2-3 months to evaluate for healing  5. Will sign off and be available as needed for additional questions or concerns    Indication: Melena  :  Elaine Jeffery MD  Assistants: Endoscopy Technician-1: Haylie Whitmore  Endoscopy RN-1: Maryanna Olszewski, RN    Referring Provider:   Angelica Quezada., DO  History: The history and physical exam were reviewed and updated. Endoscope: Olympus [id number]-190 diagnostic endoscope  Extent of Exam: proximal jejunum  ASA: Per Anesthesia  Anethesia/Sedation:  MAC anesthesia    Description of the procedure: The procedure was discussed with the patient including risks, benefits, alternatives including risks of iv sedation, bleeding, perforation and aspiration. A safety timeout was performed. The patient was placed in the left lateral decubitus position. A bite block was placed. The patient was given incremental doses of intravenous sedation until moderate sedation was achieved. The patients vital signs were monitored at all times including heart rate/rhythm, blood pressure and oxygen saturation. The endoscope was then passed under direct visualization to the proximal jejunum.   The endoscope was then slowly withdrawn while visualizing the mucosa. In the stomach a retroflexion was performed and gastric fundus and cardia visualized. The endoscope was then slowly withdrawn. The patient was then transferred to recovery in stable condition. Findings:    Esophagus: There was a small linear erosion in the distal esophagus consistent with mild esophagitis. The z-line was irregular with possible 1cm tongues of Nunez's without nodule. This was left alone in the setting of recent melena and ongoing plavix use. Otherwise, the esophageal mucosa was normal with no ulceration, mass or stricture. Stomach: Changes consistent with prior RYGB were noted. No blood was seen in the stomach. A small suture was noted along the gastric wall. A few erosions were noted but no gastric ulcers were seen. Biopsies were taken of the gastric mucosa for Hpylori. Jejunum: The short blind candy-cane limb of jejunum was normal. At the G-J anastomosis entering the hayden limb, there was a 1cm linear clean based  ulcer and another 1cm round clean based G-J anastomotic ulcer  With no bleeding or high risk stimata. Biopsies were taken of the edges of the ulcers. THe more distal jejunal mucosa was normal. I did not visualize the J-J anastomosis    Therapies:  None    Specimens:   ID Type Source Tests Collected by Time Destination   1 : Bxs random gastric r/o h pylori Preservative Gastric  Nazanin Williamson MD 11/30/2018 1527 Pathology               Complications:   None; patient tolerated the procedure well.     EBL:Minimal    Discharge disposition:  Return to the floor    Donna Kaba MD  November 30, 2018  3:32 PM

## 2018-11-30 NOTE — PROGRESS NOTES
Assumed care; Pt resting in bed; no distress noted; Pt denies pain; Sutured incision intact; bed in low position; Side rails up x 3; Call light and personal belonging within reach. Will continue to monitor. 2138: Orthostatic BP completed. No c/o of dizziness. Vitals stable. Gripper socks on, Urinal, call light and personal belonging within reach. 0210: Pt c/o throbbing ache to incisional site. No Prn on Mar. MD Jadon notified. See Mar for admin. 0330: Pain unresolved. Dr Curtis Da Silva notified.

## 2018-11-30 NOTE — PROGRESS NOTES
conducted an initial consultation and Spiritual Assessment for Tracey Ortiz, who is a 39 y.o.,male. Patients Primary Language is: [place]. According to the patients EMR Congregational Affiliation is: No preference. The reason the Patient came to the hospital is:  
Patient Active Problem List  
 Diagnosis Date Noted  Syncope 11/29/2018  GI bleed 11/29/2018  STEMI (ST elevation myocardial infarction) ([facility] [address] 08/13/2018  Obesity, morbid ([facility] [address] [facility] 07/26/2018  Post corneal transplant 07/21/2014  Cardiomyopathy ([facility] [address] 07/21/2014  Obesity 08/08/2011  Gastric bypass status for obesity 08/08/2011  
 HTN (hypertension) 08/08/2011  Hypothyroid 08/08/2011 The  provided the following Interventions: 
Initiated a relationship of care and support. Explored issues of florencio, spirituality and/or Protestant needs while hospitalized. Listened empathically. Provided chaplaincy education. Provided information about Spiritual Care Services. Offered prayer and assurance of continued prayers on patient's behalf. Chart reviewed. The following outcomes were achieved: 
Patient shared some information about their medical narrative and spiritual journey/beliefs. Patient processed feeling about current hospitalization. Patient expressed gratitude for the 's visit. Assessment: 
Patient did not indicate any spiritual or Protestant issues which require Spiritual Care Services interventions at this time. Patient does not have any Protestant/cultural needs that will affect patients preferences in health care. Plan: 
Chaplains will continue to follow and will provide pastoral care on an as needed or requested basis.  recommends bedside caregivers page  on duty if patient shows signs of acute spiritual or emotional distress. [address] [facility] Staff  Spiritual Care  
[phone]

## 2018-11-30 NOTE — H&P
[facility] DIVISION HISTORY AND PHYSICAL Judie Sebastian 
MR#: [id number] : 1975 ACCOUNT #: [de-identified] ADMIT DATE: 2018 PRIMARY CARE PHYSICIAN:  Desiree Burr DO.   
 
CHIEF COMPLAINT:  Syncope. HISTORY OF PRESENT ILLNESS:  The patient is a 39-year-old male with past medical history per medical record significant for hypertension, hypothyroidism, congestive heart failure, history of ST elevation myocardial infarction August of this year, status post gastric bypass in the past, post cornea transplant, presents to the emergency department of [facility] with complaints of syncope. Patient apparently experienced a syncopal episode while in a friend's house as he passed out while standing to go to the restroom, though there was no seizure activity, or bladder or bowel incontinence noted. Per the ER physician assistant, patient struck his head on the floor and had a head laceration. Of note, he had ST elevation myocardial infarction several months ago specifically August of this year for which he is taking aspirin and Plavix. In addition, he has been having some dark black stools that he noticed the day prior to this admission and associated was intermittent mid abdominal pain. He also has been taking ibuprofen daily for his muscle aches and rectal examination performed by the emergency department physician assistant revealed blood in the stool and he was noted to be hemoccult positive. The scalp laceration was sutured by the emergency department physician assistant and gastroenterologist on call, Dr. Salas Mitch has been consulted for the gastrointestinal bleeding. No information could be obtained directly from the patient, as he was sleeping at the time of my evaluation. The hospitalist service was consulted to admit this patient to the hospital. 
 
PAST MEDICAL HISTORY:  Per medical records: 1. Hypertension. 2.  Hypothyroidism 3. Congestive heart failure. 4.  Cardiomyopathy. 5.  ST elevation myocardial infarction in August of this year. 6.  Post corneal transplant. PAST SURGICAL HISTORY: 
1.  Status post gastric bypass in the past. ALLERGIES:  NO KNOWN DRUG ALLERGIES. HOME MEDICATIONS:  Per medical records: 
1. Enalapril 20 mg p.o. twice daily. 2.  Synthroid 200 mg p.o. daily. 3.  Chlorthalidone 25 mg p.o. daily. 4.  Lipitor 80 mg p.o. at bedtime. 5.  Metoprolol 100 mg IR twice daily. 6.  Aspirin 81 mg p.o. daily. 7.  Plavix 75 mg p.o. daily. 8.  Amlodipine 10 mg p.o. daily. FAMILY HISTORY:  No significant family history was noted in the medical record. SOCIAL HISTORY:  Per medical records, patient has never smoked and consumes 6-pack of beer daily. No illicit drug use was noted in the medical records. REVIEW OF SYSTEMS:  Could not be obtained directly from the patient as he was sleeping at the time of my evaluation. PHYSICAL EXAMINATION: 
VITAL SIGNS:  Temperature 98.1, heart rate 62, respiratory rate 16, blood pressure 120/61, oxygen saturation 100%. GENERAL:  He was lying in bed and sleeping, in no appreciable distress. HEENT:  Eyes were closed. No significant lesions noted around the lips or buccal mucosa. NECK:  Supple, with no jugular venous distention, no carotid bruit, and no lymphadenopathy. CARDIOVASCULAR:  Normal S1, S2, regular rate and rhythm. RESPIRATORY:  Chest was clear to auscultation bilaterally. ABDOMEN:  Soft, nondistended. SKIN:  Warm and dry. GENITOURINARY:  Rectal examination was not performed as patient was sleeping. NEUROLOGICAL:  Could not be assessed as he was sleeping at the time of my evaluation. LABORATORY DATA:  Complete blood count revealed white blood cell count 7.0, hemoglobin 14.0, hematocrit 42.8, platelets 318. Coagulation profile reviewed. INR 1.1, PT Sodium 141, potassium 4.1, chloride 105, bicarb 26, BUN 42, creatinine 1.4, glucose 103.   Total bilirubin 0.4, total protein 7.9, albumin 3.8, globulin 4.1, ALT was 26, AST was 23, alkaline phosphatase 91. CK-MB was 2.0. Troponin 0.02. ProBNP was 151. PSA was 0.75. IMAGING DATA:  CT of the abdomen and pelvis and CT of the head has been done with the official read currently pending. Chest imaging per my read revealed no acute findings, though the official read is currently pending. EKG reveals sinus bradycardia, rate 52 with left ventricular hypertrophy. IMPRESSION: 
1. Syncope. 2.  Gastrointestinal bleeding. 3.  Acute kidney injury. 4.  Hypertension. 5.  Hypothyroidism. 6.  Scalp laceration following fall. PLAN: 
1. Syncope. Orthostatic vitals will be done every shift and we will trend cardiac enzymes and he will be monitored closely on telemetry. Bilateral carotid duplex ultrasound has been ordered and he has been placed on fall precautions. 2.  Gastrointestinal bleeding. He was started on Protonix 40 mg IV q.12 hours. H and H will be monitored every 6 hours and he is currently nothing by mouth. He was started on intravenous hydration with D5 at 0.45% normal saline at 125 mL per hour and gastroenterologist on call, Dr. Juancarlos Ruth  has been consulted and he will be scoping the patient later this morning to elucidate the source of bleeding. Once he is awake, he should be advised to refrain from taking NSAIDs. 3.  Acute kidney injury. He will be continued on intravenous hydration as stated above and we will be repeating basic metabolic profile in the morning. Nephrotoxins will be avoided during this hospitalization. 4.  Hypertension. Blood pressure is currently well controlled. We will monitor closely. 5.  Hypothyroidism. TSH checked this admission was normal and he should be restarted on his home dose of Synthroid when he is taking orally. 6.  Gastrointestinal prophylaxis. He is already on Protonix which is adequate for gastrointestinal prophylaxis. 7. Deep venous thrombosis prophylaxis. Sequential compression device will be applied to bilateral lower extremity for deep venous thrombosis, as chemical anticoagulation is to be avoided at this time because of gastrointestinal bleeding. 8.  Scalp laceration. This has been sutured and he was started on morphine 1 mg IV every 4 hours as needed for pain. Further evaluation and treatment will be dictated by pending workup and his overall clinical course. DO NORMA Guzman/RN 
D: 11/30/2018 03:58 T: 11/30/2018 09:12 
JOB #: [id number] CC: Odette Viera DO

## 2018-11-30 NOTE — ANESTHESIA POSTPROCEDURE EVALUATION
Procedure(s): ESOPHAGOGASTRODUODENOSCOPY (EGD). Anesthesia Post Evaluation Multimodal analgesia: multimodal analgesia not used between 6 hours prior to anesthesia start to PACU discharge Patient location during evaluation: PACU Patient participation: complete - patient participated Level of consciousness: awake Pain score: 0 Pain management: adequate Airway patency: patent Anesthetic complications: no 
Cardiovascular status: acceptable Respiratory status: acceptable Hydration status: acceptable Post anesthesia nausea and vomiting:  none Visit Vitals BP (!) 177/105 Pulse 80 Temp 36.7 °C (98 °F) Resp 17 Ht 6' 2\" (1.88 m) Wt 136.1 kg (300 lb) SpO2 100% BMI 38.52 kg/m²

## 2018-11-30 NOTE — CONSULTS
Gastroenterology Consult    Patient: Pilar Caceres MRN: [id number]  SSN: [id number]    YOB: 1975  Age: 39 y.o. Sex: male      Assessment:   1) Syncope: Unclear etiology, possibly related to melena although Hb remains near-normal making true hypovolemia related to blood loss less likely. May have been combination of blood loss, ETOH use, medication administration (beta blocker, CCB) Other possibilities include vasovagal syncope, cardiac etiologies. 2) Black stool: Need to consider as possible melena at this point. Hb stable. At significant risk of anastomotic ulceration in setting of prior RYGB and chronic aspirin/Plavix and ibuprofen use  3) Abdominal cramping pain, resolved  4) Acute kidney injury  5) Chronic buttock pain    Recommendations:   -NPO for EGD this afternoon  -Agree with protonix 40mg IV q12h  -Follow H/H q6h and transfuse as needed  -OK to continue aspirin and plavix from GI perspective as risk of in-stent thrombosis outweighs risk of bleeding    Subjective:      Pilar Caceres is a 39 y.o. male with a medical history of ASCAD, STEMI s/p RUT placement Aug2018, CHF, Cardiomyopathy, HTN, prior gastric bypass surgery (2011) who came to the ER for syncope when standing up to go use the bathroom. He had 3 black colored formed stools yesterday and had cramping indigestion in the mid-abdomen. He was at a friend's house and had half a shot of liquor and then felt increasing woozy and lightheaded. He reports taking his night-time medications right before this. He got up to use the restroom and had a syncopal even where he felt everything go dark. No chest pain or dyspnea. He suffered a head laceration due to striking his head on the floor and required repair in the ER. His vitals were unremarkable in the ER and troponin has been negative x 2. CT head shows the laceration but was otherwise without acute findings and preliminary read of CT Abd/Pelvis shows no acute findings. He admits to taking ibuprofen 2-3 times daily for buttock and leg pain and is on chronic aspirin/Plavix for his cardiac disease with stent placement Aug2018. He has never had problems with ulcers before. Doesn't believe he every had EGD or colonoscopy before. He has had no bowel movements since admission. He is started on protonix 40mg q12h. Admission labwork is notable for Hb 14 with normal platelets and INR, normal liver enzymes. Creatinine is 1.4. Troponin is 0.4. Hb dropped slightly to 12.1 this AM. His abdominal cramping pain has resolved. Past Medical History  Past Medical History:   Diagnosis Date    Cardiomyopathy ([facility] 7/21/2014    Gastric bypass status for obesity 8/8/2011    Heart failure ([facility]     HTN (hypertension) 8/8/2011    Hypothyroid 8/8/2011    Post corneal transplant 7/21/2014    STEMI (ST elevation myocardial infarction) ([facility]     08/2018       Past Surgical History  Past Surgical History:   Procedure Laterality Date    ABDOMEN SURGERY PROC UNLISTED         Family History  No family history on file.       Social History  Social History     Socioeconomic History    Marital status: SINGLE     Spouse name: Not on file    Number of children: Not on file    Years of education: Not on file    Highest education level: Not on file   Social Needs    Financial resource strain: Not on file    Food insecurity - worry: Not on file    Food insecurity - inability: Not on file   [employer] needs - medical: Not on file   [employer] needs - non-medical: Not on file   Occupational History    Not on file   Tobacco Use    Smoking status: Never Smoker    Smokeless tobacco: Never Used   Substance and Sexual Activity    Alcohol use: Yes     Comment: 6 pk daily    Drug use: No    Sexual activity: Yes   Other Topics Concern    Not on file   Social History Narrative    Not on file         Medications  Current Facility-Administered Medications   Medication Dose Route Frequency    morphine injection 1 mg  1 mg IntraVENous Q4H PRN    morphine injection 1 mg  1 mg IntraVENous Q4H PRN    0.9% sodium chloride infusion  50 mL/hr IntraVENous ONCE    dextrose 5 % - 0.45% NaCl infusion  125 mL/hr IntraVENous CONTINUOUS    pantoprazole (PROTONIX) injection 40 mg  40 mg IntraVENous Q12H    hydrALAZINE (APRESOLINE) 20 mg/mL injection 10 mg  10 mg IntraVENous Q6H PRN        Hospital Problems  Date Reviewed: 10/13/2018          Codes Class Noted POA    Syncope ICD-10-CM: R55  ICD-9-CM: 780.2  11/29/2018 Unknown        * (Principal) GI bleed ICD-10-CM: K92.2  ICD-9-CM: 578.9  11/29/2018 Unknown            No Known Allergies    Review of Systems:  Pertinent items are noted in the History of Present Illness. Objective:     Physical Exam:  Visit Vitals  /80 (BP 1 Location: Right arm, BP Patient Position: Head of bed elevated (Comment degrees))   Pulse 90   Temp 97.6 °F (36.4 °C)   Resp 18   Ht 6' 2\" (1.88 m)   Wt 136.1 kg (300 lb)   SpO2 95%   BMI 38.52 kg/m²     General appearance: alert, cooperative, no distress, appears stated age  Eyes: negative for pallor/icterus  Throat: Lips, mucosa, and tongue normal. Teeth and gums normal  Neck: supple, symmetrical, trachea midline, no adenopathy, thyroid: not enlarged   Lungs: clear to auscultation bilaterally  Heart: regular rate and rhythm, S1, S2 normal, no murmur, click, rub or gallop  Abdomen: soft, non-tender. Bowel sounds normal. No masses,  no organomegaly  Extremities: extremities normal, atraumatic, no cyanosis or edema  Skin: Skin color, texture, turgor normal. No rashes or lesions  Neurologic: Awake and oriented, appropriate, moving all extremities.      Recent Results (from the past 24 hour(s))   EKG, 12 LEAD, INITIAL    Collection Time: 11/29/18  6:11 PM   Result Value Ref Range    Ventricular Rate 52 BPM    Atrial Rate 52 BPM    P-R Interval 178 ms    QRS Duration 90 ms    Q-T Interval 452 ms    QTC Calculation (Bezet) 420 ms Calculated P Axis 36 degrees    Calculated R Axis -21 degrees    Calculated T Axis 127 degrees    Diagnosis       Sinus bradycardia  Left ventricular hypertrophy with repolarization abnormality  Inferior infarct (cited on or before 13-AUG-2018)  Abnormal ECG  When compared with ECG of 14-AUG-2018 17:21,  Nonspecific T wave abnormality, improved in Inferior leads  QT has shortened     CBC WITH AUTOMATED DIFF    Collection Time: 11/29/18  6:25 PM   Result Value Ref Range    WBC 7.0 4.6 - 13.2 K/uL    RBC 5.02 4.70 - 5.50 M/uL    HGB 14.0 13.0 - 16.0 g/dL    HCT 42.8 36.0 - 48.0 %    MCV 85.3 74.0 - 97.0 FL    MCH 27.9 24.0 - 34.0 PG    MCHC 32.7 31.0 - 37.0 g/dL    RDW 14.4 11.6 - 14.5 %    PLATELET 209 142 - 749 K/uL    MPV 10.0 9.2 - 11.8 FL    NEUTROPHILS 63 40 - 73 %    LYMPHOCYTES 25 21 - 52 %    MONOCYTES 8 3 - 10 %    EOSINOPHILS 4 0 - 5 %    BASOPHILS 0 0 - 2 %    ABS. NEUTROPHILS 4.4 1.8 - 8.0 K/UL    ABS. LYMPHOCYTES 1.8 0.9 - 3.6 K/UL    ABS. MONOCYTES 0.6 0.05 - 1.2 K/UL    ABS. EOSINOPHILS 0.2 0.0 - 0.4 K/UL    ABS. BASOPHILS 0.0 0.0 - 0.1 K/UL    DF AUTOMATED     METABOLIC PANEL, COMPREHENSIVE    Collection Time: 11/29/18  6:25 PM   Result Value Ref Range    Sodium 141 136 - 145 mmol/L    Potassium 4.1 3.5 - 5.5 mmol/L    Chloride 105 100 - 108 mmol/L    CO2 26 21 - 32 mmol/L    Anion gap 10 3.0 - 18 mmol/L    Glucose 103 (H) 74 - 99 mg/dL    BUN 42 (H) 7.0 - 18 MG/DL    Creatinine 1.40 (H) 0.6 - 1.3 MG/DL    BUN/Creatinine ratio 30 (H) 12 - 20      GFR est AA >60 >60 ml/min/1.73m2    GFR est non-AA 55 (L) >60 ml/min/1.73m2    Calcium 9.2 8.5 - 10.1 MG/DL    Bilirubin, total 0.4 0.2 - 1.0 MG/DL    ALT (SGPT) 26 16 - 61 U/L    AST (SGOT) 23 15 - 37 U/L    Alk.  phosphatase 91 45 - 117 U/L    Protein, total 7.9 6.4 - 8.2 g/dL    Albumin 3.8 3.4 - 5.0 g/dL    Globulin 4.1 (H) 2.0 - 4.0 g/dL    A-G Ratio 0.9 0.8 - 1.7     CARDIAC PANEL,(CK, CKMB & TROPONIN)    Collection Time: 11/29/18  6:25 PM   Result Value Ref Range     39 - 308 U/L    CK - MB 2.0 <3.6 ng/ml    CK-MB Index 1.8 0.0 - 4.0 %    Troponin-I, Qt. 0.02 0.0 - 0.045 NG/ML   TYPE & SCREEN    Collection Time: 11/29/18  6:25 PM   Result Value Ref Range    Crossmatch Expiration 12/02/2018     ABO/Rh(D) Mary Numbers POSITIVE     Antibody screen NEG    PROTHROMBIN TIME + INR    Collection Time: 11/29/18  6:25 PM   Result Value Ref Range    Prothrombin time 14.0 11.5 - 15.2 sec    INR 1.1 0.8 - 1.2     NT-PRO BNP    Collection Time: 11/29/18  6:25 PM   Result Value Ref Range    NT pro- 0 - 450 PG/ML   HGB & HCT    Collection Time: 11/29/18  9:53 PM   Result Value Ref Range    HGB 13.4 13.0 - 16.0 g/dL    HCT 41.0 36.0 - 48.0 %   TSH 3RD GENERATION    Collection Time: 11/29/18  9:53 PM   Result Value Ref Range    TSH 0.75 0.36 - 3.74 uIU/mL   CARDIAC PANEL,(CK, CKMB & TROPONIN)    Collection Time: 11/29/18  9:53 PM   Result Value Ref Range    CK 93 39 - 308 U/L    CK - MB 1.6 <3.6 ng/ml    CK-MB Index 1.7 0.0 - 4.0 %    Troponin-I, Qt. 0.02 0.0 - 0.045 NG/ML   CBC WITH AUTOMATED DIFF    Collection Time: 11/30/18  6:30 AM   Result Value Ref Range    WBC 6.7 4.6 - 13.2 K/uL    RBC 4.50 (L) 4.70 - 5.50 M/uL    HGB 12.1 (L) 13.0 - 16.0 g/dL    HCT 38.6 36.0 - 48.0 %    MCV 85.8 74.0 - 97.0 FL    MCH 26.9 24.0 - 34.0 PG    MCHC 31.3 31.0 - 37.0 g/dL    RDW 14.8 (H) 11.6 - 14.5 %    PLATELET 034 187 - 638 K/uL    MPV 9.8 9.2 - 11.8 FL    NEUTROPHILS 68 40 - 73 %    LYMPHOCYTES 21 21 - 52 %    MONOCYTES 8 3 - 10 %    EOSINOPHILS 3 0 - 5 %    BASOPHILS 0 0 - 2 %    ABS. NEUTROPHILS 4.6 1.8 - 8.0 K/UL    ABS. LYMPHOCYTES 1.4 0.9 - 3.6 K/UL    ABS. MONOCYTES 0.5 0.05 - 1.2 K/UL    ABS. EOSINOPHILS 0.2 0.0 - 0.4 K/UL    ABS.  BASOPHILS 0.0 0.0 - 0.1 K/UL    DF AUTOMATED     METABOLIC PANEL, COMPREHENSIVE    Collection Time: 11/30/18  6:30 AM   Result Value Ref Range    Sodium 141 136 - 145 mmol/L    Potassium 4.3 3.5 - 5.5 mmol/L    Chloride 106 100 - 108 mmol/L    CO2 27 21 - 32 mmol/L    Anion gap 8 3.0 - 18 mmol/L    Glucose 115 (H) 74 - 99 mg/dL    BUN 31 (H) 7.0 - 18 MG/DL    Creatinine 1.12 0.6 - 1.3 MG/DL    BUN/Creatinine ratio 28 (H) 12 - 20      GFR est AA >60 >60 ml/min/1.73m2    GFR est non-AA >60 >60 ml/min/1.73m2    Calcium 8.7 8.5 - 10.1 MG/DL    Bilirubin, total 0.6 0.2 - 1.0 MG/DL    ALT (SGPT) 24 16 - 61 U/L    AST (SGOT) 21 15 - 37 U/L    Alk.  phosphatase 81 45 - 117 U/L    Protein, total 6.8 6.4 - 8.2 g/dL    Albumin 3.4 3.4 - 5.0 g/dL    Globulin 3.4 2.0 - 4.0 g/dL    A-G Ratio 1.0 0.8 - 1.7     CARDIAC PANEL,(CK, CKMB & TROPONIN)    Collection Time: 11/30/18  6:30 AM   Result Value Ref Range    CK 70 39 - 308 U/L    CK - MB 1.4 <3.6 ng/ml    CK-MB Index 2.0 0.0 - 4.0 %    Troponin-I, Qt. 0.02 0.0 - 0.045 NG/ML         Signed By: Sofía Isabel MD     November 30, 2018

## 2018-11-30 NOTE — ANESTHESIA PREPROCEDURE EVALUATION
Anesthetic History No history of anesthetic complications Review of Systems / Medical History Patient summary reviewed and pertinent labs reviewed Pulmonary Neuro/Psych Cardiovascular Hypertension CAD and cardiac stents GI/Hepatic/Renal 
  
 
 
 
 
 
 Endo/Other Hypothyroidism Morbid obesity Other Findings Physical Exam 
 
Airway Mallampati: III 
TM Distance: 4 - 6 cm Neck ROM: normal range of motion, short neck Mouth opening: Diminished (comment) Cardiovascular Rhythm: regular Rate: normal 
 
 
 
 Dental 
 
Dentition: Poor dentition Pulmonary Breath sounds clear to auscultation Abdominal 
GI exam deferred Other Findings Anesthetic Plan ASA: 3 Anesthesia type: MAC Anesthetic plan and risks discussed with: Patient

## 2018-11-30 NOTE — MANAGEMENT PLAN
Discharge/Transition Planning Interviewed patient. Verified demographics listed on face sheet with patient; all information correct. Pt with no insurance and has applied for Medicaid with [employer]. Has  and going to court to attempt to get disability which was inially denied. Patient stated their PCP is Dr Roscoe Avina and last appt 6 mo ago; states has appt on Dec 18 . Patient lives in mother home. Patient's NOK is mother, Samantha Callahan. Patient independent with ADLs prior to admission. DME prior to admission: cane and crutch when needed. Discharge plan is Home Patient has designated ____mother____________________ to participate in his/her discharge plan and to receive any needed information. Name: Samantha Callahan Address:same as pt Phone [phone] Care Management Interventions PCP Verified by CM: Yes(Dr Sarkis Henry) Last Visit to PCP: 05/30/18 Mode of Transport at Discharge: Other (see comment)(family) Transition of Care Consult (CM Consult): Discharge Planning Current Support Network: Relative's Home Confirm Follow Up Transport: Self Plan discussed with Pt/Family/Caregiver: Yes Discharge Location Discharge Placement: Home Reason for Admission:    GI Bleed RRAT Score:    13 Plan for utilizing home health: If needed Likelihood of Readmission:  low Transition of Care Plan:       Home Yao Velez RN BSN Outcomes Manager Pager # [phone]

## 2018-11-30 NOTE — PROGRESS NOTES
Early morning admit for GIB. GI consulted. Protonix. H/h stable. Last h/h 12.3/37. 6. EGD this afternoon. Will restart ASA/plavix-cleared by GI to restart. Duplex carotid pending. Will need to restart home medications tomorrow after EGD completion if no further w/u.

## 2018-11-30 NOTE — ED NOTES
TRANSFER - ED to INPATIENT REPORT: 
 
SBAR report made available to receiving floor on this patient being transferred to [address] [place] [address] ([facility])  for routine progression of care Admitting diagnosis Syncope [R55] GI bleed [K92.2] Information from the following report(s) SBAR, ED Summary, MAR, Recent Results and Cardiac Rhythm Strip was made available to receiving floor. Lines:  
Peripheral IV 11/29/18 Right Forearm (Active) Site Assessment Clean, dry, & intact 11/29/2018  6:25 PM  
Phlebitis Assessment 0 11/29/2018  6:25 PM  
Infiltration Assessment 0 11/29/2018  6:25 PM  
Dressing Status Clean, dry, & intact 11/29/2018  6:25 PM  
Dressing Type Tape;Transparent 11/29/2018  6:25 PM  
Hub Color/Line Status Green 11/29/2018  6:25 PM  
Action Taken Blood drawn 11/29/2018  6:25 PM  
  
 
Medication list confirmed with patient Opportunity for questions and clarification was provided. Patient is oriented to time, place, person and situation. Patient is  continent and ambulatory with assist 
  
Valuables transported with patient Patient transported with: 
 Monitor Tech

## 2018-11-30 NOTE — ADDENDUM NOTE
Addendum  created 11/30/18 1704 by Damir Olea MD  
 [phone]

## 2018-11-30 NOTE — PROGRESS NOTES
Assumed care; Pt resting in bed; no distress noted; Pt denies pain; bed in low position; Side rails up x 3; Call light and personal belonging within reach. Will continue to monitor. 2350: Sleeping.

## 2018-11-30 NOTE — PERIOP NOTES
Pt arrived by bed. VSS. Pt oriented to room and POC. Dr. Jorge L Ugarte by bedside. Shilpi called report to floor. Transport called to take pt to the floor

## 2018-12-01 VITALS
WEIGHT: 300 LBS | HEART RATE: 90 BPM | RESPIRATION RATE: 18 BRPM | SYSTOLIC BLOOD PRESSURE: 160 MMHG | BODY MASS INDEX: 38.5 KG/M2 | TEMPERATURE: 97.4 F | OXYGEN SATURATION: 94 % | HEIGHT: 74 IN | DIASTOLIC BLOOD PRESSURE: 107 MMHG

## 2018-12-01 LAB
ATRIAL RATE: 52 BPM
CALCULATED P AXIS, ECG09: 36 DEGREES
CALCULATED R AXIS, ECG10: -21 DEGREES
CALCULATED T AXIS, ECG11: 127 DEGREES
DIAGNOSIS, 93000: NORMAL
LEFT CCA DIST DIAS: 18.64 CM/S
LEFT CCA DIST SYS: 66.89 CM/S
LEFT CCA MID DIAS: 24.29 CM/S
LEFT CCA MID SYS: 89.58 CM/S
LEFT CCA PROX DIAS: 22.69 CM/S
LEFT CCA PROX SYS: 84.81 CM/S
LEFT ECA DIAS: 26.33 CM/S
LEFT ECA SYS: 91.36 CM/S
LEFT ICA DIST DIAS: 29.86 CM/S
LEFT ICA DIST SYS: 61.71 CM/S
LEFT ICA MID DIAS: 20.75 CM/S
LEFT ICA MID SYS: 55.44 CM/S
LEFT ICA PROX DIAS: 18.19 CM/S
LEFT ICA PROX SYS: 66.86 CM/S
LEFT ICA/CCA SYS: 0.75
LEFT SUBCLAVIAN DIAS: 0 CM/S
LEFT SUBCLAVIAN SYS: 89.38 CM/S
LEFT VERTEBRAL DIAS: 20.24 CM/S
LEFT VERTEBRAL SYS: 52.63 CM/S
P-R INTERVAL, ECG05: 178 MS
Q-T INTERVAL, ECG07: 452 MS
QRS DURATION, ECG06: 90 MS
QTC CALCULATION (BEZET), ECG08: 420 MS
RIGHT CCA DIST DIAS: 20.31 CM/S
RIGHT CCA DIST SYS: 72.07 CM/S
RIGHT CCA MID DIAS: 21.87 CM/S
RIGHT CCA MID SYS: 87.12 CM/S
RIGHT CCA PROX DIAS: 20.48 CM/S
RIGHT CCA PROX SYS: 87.12 CM/S
RIGHT ECA DIAS: 22.69 CM/S
RIGHT ECA SYS: 91.97 CM/S
RIGHT ICA DIST DIAS: 24.36 CM/S
RIGHT ICA DIST SYS: 58.88 CM/S
RIGHT ICA MID DIAS: 18.22 CM/S
RIGHT ICA MID SYS: 48.52 CM/S
RIGHT ICA PROX DIAS: 16.6 CM/S
RIGHT ICA PROX SYS: 61.25 CM/S
RIGHT ICA/CCA SYS: 0.7
RIGHT SUBCLAVIAN DIAS: 0 CM/S
RIGHT SUBCLAVIAN SYS: 108.72 CM/S
RIGHT VERTEBRAL DIAS: 16.69 CM/S
RIGHT VERTEBRAL SYS: 48.52 CM/S
VENTRICULAR RATE, ECG03: 52 BPM

## 2018-12-01 PROCEDURE — 74011000258 HC RX REV CODE- 258: Performed by: HOSPITALIST

## 2018-12-01 PROCEDURE — C9113 INJ PANTOPRAZOLE SODIUM, VIA: HCPCS | Performed by: HOSPITALIST

## 2018-12-01 PROCEDURE — 74011250637 HC RX REV CODE- 250/637: Performed by: NURSE PRACTITIONER

## 2018-12-01 PROCEDURE — 74011250636 HC RX REV CODE- 250/636: Performed by: HOSPITALIST

## 2018-12-01 RX ORDER — PANTOPRAZOLE SODIUM 20 MG/1
40 TABLET, DELAYED RELEASE ORAL 2 TIMES DAILY
Qty: 60 TAB | Refills: 0 | Status: SHIPPED | OUTPATIENT
Start: 2018-12-01 | End: 2018-12-05

## 2018-12-01 RX ADMIN — DEXTROSE MONOHYDRATE AND SODIUM CHLORIDE 125 ML/HR: 5; .45 INJECTION, SOLUTION INTRAVENOUS at 01:50

## 2018-12-01 RX ADMIN — ASPIRIN 81 MG 81 MG: 81 TABLET ORAL at 08:54

## 2018-12-01 RX ADMIN — PANTOPRAZOLE SODIUM 40 MG: 40 INJECTION, POWDER, FOR SOLUTION INTRAVENOUS at 08:55

## 2018-12-01 RX ADMIN — CLOPIDOGREL BISULFATE 75 MG: 75 TABLET ORAL at 08:54

## 2018-12-01 NOTE — PROGRESS NOTES
Problem: Falls - Risk of 
Goal: *Absence of Falls Document Roselia Buchanan Fall Risk and appropriate interventions in the flowsheet. Outcome: Progressing Towards Goal 
Fall Risk Interventions: 
Mobility Interventions: Patient to call before getting OOB Medication Interventions: Patient to call before getting OOB Elimination Interventions: Patient to call for help with toileting needs History of Falls Interventions: Door open when patient unattended

## 2018-12-01 NOTE — DISCHARGE SUMMARY
2 [facility]  Hospitalist Division    Discharge Summary    Patient: Robson Dietz MRN: [id number]  CSN: [id number]    YOB: 1975  Age: 39 y.o. Sex: male    DOA: 11/29/2018 LOS:  LOS: 2 days   Discharge Date:      Admission Diagnoses: Syncope [R55]  GI bleed [K92.2]    Discharge Diagnoses:    Problem List as of 12/1/2018 Date Reviewed: 11/30/2018          Codes Class Noted - Resolved    Syncope ICD-10-CM: R55  ICD-9-CM: 780.2  11/29/2018 - Present        * (Principal) GI bleed ICD-10-CM: K92.2  ICD-9-CM: 578.9  11/29/2018 - Present        STEMI (ST elevation myocardial infarction) ([facility] ICD-10-CM: I21.3  ICD-9-CM: 410.90  8/13/2018 - Present        Obesity, morbid ([facility] ICD-10-CM: E66.01  ICD-9-CM: 278.01  7/26/2018 - Present        Post corneal transplant ICD-10-CM: Z94.7  ICD-9-CM: V42.5  7/21/2014 - Present        Cardiomyopathy ([facility] ICD-10-CM: I42.9  ICD-9-CM: 425.4  7/21/2014 - Present        Obesity ICD-10-CM: E66.9  ICD-9-CM: 278.00  8/8/2011 - Present        Gastric bypass status for obesity ICD-10-CM: Z98.84  ICD-9-CM: V45.86  8/8/2011 - Present        HTN (hypertension) ICD-10-CM: I10  ICD-9-CM: 401.9  8/8/2011 - Present        Hypothyroid ICD-10-CM: E03.9  ICD-9-CM: 244.9  8/8/2011 - Present              Discharge Condition: Good    Discharge To: Home    Consults: Gastroenterology    Hospital Course: The patient is a 69-year-old male with past medical history per medical record, significant for hypertension, hypothyroidism, congestive heart failure, history of ST elevation myocardial infarction August of this year, status post gastric bypass in the past, post cornea transplant, presents to the emergency department of [facility] with complaints of syncope.  Patient apparently experienced syncopal episodes while in the friend's house as he passed out while standing to go to the restroom, though there was no seizure activity, bladder or bowel incontinence noted. Per the ER physician assistant, patient struck his head on the floor and had a head laceration. Of note, he had ST elevation myocardial infarction several months ago, specifically, August of this year for which he was taking aspirin and Plavix. In addition, he has been having some dark black stools that he noticed the day prior to this admission associated with some mid abdominal pain. He has been taking ibuprofen daily for his muscle aches and rectal examination performed by the emergency department physician assistant revealed blood in the stool and was noted to be hemoccult positive. The scalp laceration was sutured by the emergency department physician assistant and gastroenterologist on call, Dr. Naomi Chappell has been consulted for the gastrointestinal bleeding. The patient was admitted for further evaluation. CT abd negative for any acute event, CT head negative. CXR ok. Gastroenterology consulted. EGD performed-findings to include two G-J anastomotic ulcers without high risk stigmata, edges biopsied, gastric erosions, biopsies of gastric pouch for Hpylori, slight esophagitis and possible short segment Nunez's left along in setting of recent bleeding and ongoing plavix use. H/h stable. The patient is able to tolerate a regular cardiac diet without any associated n/v/d. Stools becoming lighter per patient. The patient will be discharged home today. The patient can continue aspirin and plavix outpatient, but should avoid all other NSAIDs. The patient will be started on protonix 40 mg BID. The patient should follow-up with PCP in 1 week/Gastroenterology in 1-2 weeks (for biopsy results) and obtain an EGD outpatient in 2-3 months to evaluate for healing. Patient to arrange. Physical Exam:  General appearance: alert, cooperative  Lungs: clear to auscultation bilaterally  Heart: regular rate and rhythm, S1, S2 normal  Abdomen: soft, non-tender.  Bowel sounds normal.   Extremities: extremities normal, atraumatic, no cyanosis or edema  Skin: Skin color, texture, turgor normal.   Neurologic: Grossly normal      Significant Diagnostic Studies:     IMPRESSION:   1. Two G-J anastomotic ulcers without high risk stigmata, edges biopsied  2. Gastric erosions, biopsies of gastric pouch for Hpylori  3. Slight esophagitis and possible short segment Nunez's left alone in setting of recent bleeding and ongoing plavix use     EXAM: CT of the abdomen and pelvis     INDICATION: Right buttock pain. Black stools all day. Taking Advil for chronic  right buttock pain. History of gastric bypass.     COMPARISON: None     TECHNIQUE: Helical volumetric scanning through the abdomen and pelvis was  performed without oral, with nonionic intravenous contrast.  Axial, coronal and  sagittal reconstructions were created. One or more dose reduction techniques  were used on this CT: automated exposure control, adjustment of the mAs and/or  kVp according to patient size, and iterative reconstruction techniques. The  specific techniques used on this CT exam have been documented in the patient's  electronic medical record.           _______________     FINDINGS:     Image quality is degraded by streak artifact from the patient's arms at his  sides.     LOWER CHEST: Mild dependent atelectasis is present posteriorly within both lung  bases. Moderate bilateral gynecomastia is present.     LIVER, BILIARY: No obvious lesion. The right liver may be slightly hypodense. No  biliary dilation. Cholecystectomy.     PANCREAS: Normal.     SPLEEN: Normal.     ADRENALS: Normal.     KIDNEYS: Small exophytic lower pole left renal lesions are present, unable to  obtain accurate density readings due to small size and streak artifact,  statistically benign, probably cysts. No renal or ureteral calculi. No signs of  obstructive uropathy.     LYMPH NODES: No enlarged lymph nodes.     GASTROINTESTINAL TRACT: Gastric bypass noted.  No bowel dilatation or wall  thickening. The appendix is normal.     PELVIC ORGANS: Small fat-containing left inguinal hernia is present. No pelvic  masses or fluid collections.     VASCULATURE: Moderate calcific atherosclerosis is present. No AAA.     BONES: No suspicious osseous lesions. L5-S1 disc is degenerated.     OTHER: None.     _______________     IMPRESSION  IMPRESSION:        1. No bowel dilatation or acute inflammatory process. 2. Gastric bypass and cholecystectomy. 3. Small fat-containing left inguinal hernia. EXAM: CT head     INDICATION: Head trauma. Lightheaded after taking nighttime medications. Went to  restroom and subsequently passed out striking head.     COMPARISON: None.     TECHNIQUE: Axial scanning was performed through the head without intravenous  contrast.  Sagittal and coronal reconstructions were created from the axial  data. One or more dose reduction techniques were used on this CT: automated  exposure control, adjustment of the mAs and/or kVp according to patient size,  and iterative reconstruction techniques. The specific techniques used on this  CT exam have been documented in the patient's electronic medical record.        _______________     FINDINGS:     BRAIN AND POSTERIOR FOSSA: Ventricles, cisterns and sulci are within normal  range in size and configuration. A smooth rounded hyperdense 7 mm mass is  present at the anterior aspect of the third ventricle. No intracranial  hemorrhage or mass effect. Patchy low-attenuation is present in the ashleigh, while  some of which may represent streak artifact, chronic lacunar infarcts are  suspected.     EXTRA-AXIAL SPACES AND MENINGES: No extracerebral collections.     CALVARIUM: Skull is intact. Left posterior parietal scalp contusion/laceration  is present.     SINUSES: Visualized portions are clear.     OTHER: None.     _______________     IMPRESSION  IMPRESSION:        1. No acute intracranial abnormalities.   2. 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 No hydrocephalus. 3. Low density in the ashleigh, while some of which may represent streak artifact,  lacunar infarcts are suspected. EXAM: AP AND LATERAL CHEST     INDICATION:  Shortness of breath.     COMPARISON:  Portable exam 08/14/2018     TECHNIQUE: AP and lateral views.     ________________________________     FINDINGS:     HEART AND MEDIASTINUM: Cardiac mediastinal silhouette appears within normal  limits for technique. Normal caliber thoracic aorta. No central vascular  congestion.     LUNGS AND PLEURAL SPACES: Lungs are well aerated with no confluent airspace  opacity. No pleural effusion or pneumothorax. No pleural effusion or  pneumothorax. BONY THORAX AND SOFT TISSUES: No acute osseous abnormality.     ________________________________     IMPRESSION  IMPRESSION:     Unremarkable chest. Stable exam.    DUPLEX CAROTID BILATERAL (Order #[id number]) on 11/29/18   Reason For Exam   Priority: Routine   Syncope/fainting   Procedure Technique     Duplex images were obtained using 2-D gray scale, color flow, and spectral Doppler analysis. Vitals     Weight Height BSA (calculated - sq m) BP         Interpretation Summary     · Mild <50% internal carotid artery stenosis bilaterally. Cerebrovascular Findings     Right Carotid     The right CCA is patent. There is mild stenosis in the right ICA (<50%). The right ICA has heterogeneous plaque. The right ECA is patent. The right vertebral is antegrade. The right subclavian is normal.   Left Carotid     The left CCA is patent. There is mild stenosis in the left ICA (<50%). The left ICA has heterogeneous plaque. The left ECA is patent. The left vertebral is antegrade. The left subclavian is normal.        Discharge Medications:          Current Discharge Medication List      START taking these medications    Details   pantoprazole (PROTONIX) 20 mg tablet Take 2 Tabs by mouth two (2) times a day.   Qty: 60 Tab, Refills: 0         CONTINUE these medications which have NOT CHANGED    Details   enalapril (VASOTEC) 20 mg tablet TAKE ONE TABLET BY MOUTH TWICE DAILY  Qty: 60 Tab, Refills: 14    Comments: Please consider 90 day supplies to promote better adherence  Associated Diagnoses: Encounter for immunization; Cardiomyopathy, unspecified type ([facility] Hypertension, unspecified type; Hypothyroidism, unspecified type      levothyroxine (SYNTHROID) 200 mcg tablet TAKE ONE TABLET BY MOUTH ONCE DAILY BEFORE  BREAKFAST  Qty: 30 Tab, Refills: 14    Comments: Please consider 90 day supplies to promote better adherence  Associated Diagnoses: Encounter for immunization; Cardiomyopathy, unspecified type ([facility] Hypertension, unspecified type; Hypothyroidism, unspecified type      chlorthalidone (HYGROTEN) 25 mg tablet Take 1 Tab by mouth daily. Qty: 30 Tab, Refills: 6      atorvastatin (LIPITOR) 80 mg tablet Take 1 Tab by mouth nightly. Qty: 30 Tab, Refills: 6    Associated Diagnoses: Hypertension, unspecified type; Cardiomyopathy, unspecified type ([facility] ST elevation myocardial infarction (STEMI), unspecified artery ([facility] Hypothyroidism, unspecified type      metoprolol tartrate (LOPRESSOR) 100 mg IR tablet Take 1 Tab by mouth every twelve (12) hours. Qty: 60 Tab, Refills: 6    Associated Diagnoses: Hypertension, unspecified type; Cardiomyopathy, unspecified type ([facility] ST elevation myocardial infarction (STEMI), unspecified artery ([facility] Hypothyroidism, unspecified type      aspirin delayed-release 81 mg tablet Take 1 Tab by mouth daily. Qty: 30 Tab, Refills: 0    Associated Diagnoses: Hypertension, unspecified type; Cardiomyopathy, unspecified type ([address] ST elevation myocardial infarction (STEMI), unspecified artery ([facility] Hypothyroidism, unspecified type      clopidogrel (PLAVIX) 75 mg tab Take 1 Tab by mouth daily.   Qty: 30 Tab, Refills: 11    Associated Diagnoses: Hypertension, unspecified type; Cardiomyopathy, unspecified type ([facility] ST elevation myocardial infarction (STEMI), unspecified artery ([address] Hypothyroidism, unspecified type      amLODIPine (NORVASC) 10 mg tablet Take 1 Tab by mouth daily. Qty: 30 Tab, Refills: 1    Associated Diagnoses: Hypertension, unspecified type; Cardiomyopathy, unspecified type ([address] ST elevation myocardial infarction (STEMI), unspecified artery ([address] Hypothyroidism, unspecified type               Activity: Activity as tolerated    Diet: Cardiac Diet; can continue ASA and plavix, but avoid all other NSAIDS     Wound Care: None needed    Follow-up:     The patient should follow-up with PCP (1 week)/Gastroenterology in 1-2 weeks for biopsy results (EGD repeat 2-3 months to evaluate for healing) weeks post discharge in regards to recent hospitalization. Patient to arrange.      Discharge time > 35 mins   Manju Schwartz NP  12/1/2018, 7:53 AM

## 2018-12-01 NOTE — PROGRESS NOTES
Pt ate breakfast and tolerated well, discharge orders received-pt instructed in discharge-follow up, prescriptions etc.  Pt verbalizes understanding of all instructions.

## 2018-12-01 NOTE — PROGRESS NOTES
Problem: Falls - Risk of 
Goal: *Absence of Falls Document Navid Yaneth Fall Risk and appropriate interventions in the flowsheet. Outcome: Progressing Towards Goal 
Fall Risk Interventions: 
Mobility Interventions: Patient to call before getting OOB Medication Interventions: Teach patient to arise slowly, Patient to call before getting OOB Elimination Interventions: Call light in reach, Patient to call for help with toileting needs, Urinal in reach History of Falls Interventions: Door open when patient unattended, Room close to nurse's station

## 2018-12-01 NOTE — DISCHARGE INSTRUCTIONS
Upper GI Endoscopy: What to Expect at [address] [place] [address]  After you have an endoscopy, you will stay at the hospital or clinic for 1 to 2 hours. This will allow the medicine to wear off. You will be able to go home after your doctor or nurse checks to make sure you are not having any problems. You may have to stay overnight if you had treatment during the test. You may have a sore throat for a day or two after the test.  This care sheet gives you a general idea about what to expect after the test.  How can you care for yourself at home? Activity  · Rest as much as you need to after you go home. · You should be able to go back to your usual activities the day after the test.  Diet  · Follow your doctor's directions for eating after the test.  · Drink plenty of fluids (unless your doctor has told you not to). Medications  · If you have a sore throat the day after the test, use an over-the-counter spray to numb your throat. Follow-up care is a key part of your treatment and safety. Be sure to make and go to all appointments, and call your doctor if you are having problems. It's also a good idea to know your test results and keep a list of the medicines you take. When should you call for help? Call 911 anytime you think you may need emergency care. For example, call if:    · You passed out (loses consciousness).     · You have trouble breathing.     · You pass maroon or bloody stools.    Call your doctor now or seek immediate medical care if:    · You have pain that does not get better after your take pain medicine.     · You have new or worse belly pain.     · You have blood in your stools.     · You are sick to your stomach and cannot keep fluids down.     · You have a fever.     · You cannot pass stools or gas.    Watch closely for changes in your health, and be sure to contact your doctor if:    · Your throat still hurts after a day or two.     · You do not get better as expected.    Where can you learn more?  Go to http://darrius-mirian.info/. Enter [phone] in the search box to learn more about \"Upper GI Endoscopy: What to Expect at Home. \"  Current as of: March 28, 2018  Content Version: 11.8  © 6388-3898 [employer]. Care instructions adapted under license by [employer] (which disclaims liability or warranty for this information). If you have questions about a medical condition or this instruction, always ask your healthcare professional. [place] [address] any warranty or liability for your use of this information. Fainting: Care Instructions  Your Care Instructions    When you faint, or pass out, you lose consciousness for a short time. A brief drop in blood flow to the brain often causes it. When you fall or lie down, more blood flows to your brain and you regain consciousness. Emotional stress, pain, or overheating--especially if you have been standing--can make you faint. In these cases, fainting is usually not serious. But fainting can be a sign of a more serious problem. Your doctor may want you to have more tests to rule out other causes. The treatment you need depends on the reason why you fainted. The doctor has checked you carefully, but problems can develop later. If you notice any problems or new symptoms, get medical treatment right away. Follow-up care is a key part of your treatment and safety. Be sure to make and go to all appointments, and call your doctor if you are having problems. It's also a good idea to know your test results and keep a list of the medicines you take. How can you care for yourself at home? · Drink plenty of fluids to prevent dehydration. If you have kidney, heart, or liver disease and have to limit fluids, talk with your doctor before you increase your fluid intake. When should you call for help? Call 911 anytime you think you may need emergency care.  For example, call if:    · You have symptoms of a heart problem. These may include:  ? Chest pain or pressure. ? Severe trouble breathing. ? A fast or irregular heartbeat. ? Lightheadedness or sudden weakness. ? Coughing up pink, foamy mucus. ? Passing out. After you call 911, the  may tell you to chew 1 adult-strength or 2 to 4 low-dose aspirin. Wait for an ambulance. Do not try to drive yourself.     · You have symptoms of a stroke. These may include:  ? Sudden numbness, tingling, weakness, or loss of movement in your face, arm, or leg, especially on only one side of your body. ? Sudden vision changes. ? Sudden trouble speaking. ? Sudden confusion or trouble understanding simple statements. ? Sudden problems with walking or balance. ? A sudden, severe headache that is different from past headaches.     · You passed out (lost consciousness) again.    Watch closely for changes in your health, and be sure to contact your doctor if:    · You do not get better as expected. Where can you learn more? Go to http://darrius-mirian.info/. Enter I714 in the search box to learn more about \"Fainting: Care Instructions. \"  Current as of: November 20, 2017  Content Version: 11.8  © 4549-7708 [employer]. Care instructions adapted under license by [employer] (which disclaims liability or warranty for this information). If you have questions about a medical condition or this instruction, always ask your healthcare professional. [place] [address] any warranty or liability for your use of this information. Cuts: Care Instructions  Your Care Instructions  A cut can happen anywhere on your body. Stitches, staples, skin adhesives, or pieces of tape called Steri-Strips are sometimes used to keep the edges of a cut together and help it heal. Steri-Strips can be used by themselves or with stitches or staples. Sometimes cuts are left open.   If the cut went deep and through the skin, the doctor may have closed the cut in two layers. A deeper layer of stitches brings the deep part of the cut together. These stitches will dissolve and don't need to be removed. The upper layer closure, which could be stitches, staples, Steri-Strips, or adhesive, is what you see on the cut. A cut is often covered by a bandage. The doctor has checked you carefully, but problems can develop later. If you notice any problems or new symptoms, get medical treatment right away. Follow-up care is a key part of your treatment and safety. Be sure to make and go to all appointments, and call your doctor if you are having problems. It's also a good idea to know your test results and keep a list of the medicines you take. How can you care for yourself at home? If a cut is open or closed  · Prop up the sore area on a pillow anytime you sit or lie down during the next 3 days. Try to keep it above the level of your heart. This will help reduce swelling. · Keep the cut dry for the first 24 to 48 hours. After this, you can shower if your doctor okays it. Pat the cut dry. · Don't soak the cut, such as in a bathtub. Your doctor will tell you when it's safe to get the cut wet. · After the first 24 to 48 hours, clean the cut with soap and water 2 times a day unless your doctor gives you different instructions. ? Don't use hydrogen peroxide or alcohol, which can slow healing. ? You may cover the cut with a thin layer of petroleum jelly and a nonstick bandage. ? If the doctor put a bandage over the cut, put on a new bandage after cleaning the cut or if the bandage gets wet or dirty. · Avoid any activity that could cause your cut to reopen. · Be safe with medicines. Read and follow all instructions on the label. ? If the doctor gave you a prescription medicine for pain, take it as prescribed. ? If you are not taking a prescription pain medicine, ask your doctor if you can take an over-the-counter medicine.   If the cut is closed with stitches, staples, or Steri-Strips  · Follow the above instructions for open or closed cuts. · Do not remove the stitches or staples on your own. Your doctor will tell you when to come back to have the stitches or staples removed. · Leave Steri-Strips on until they fall off. If the cut is closed with a skin adhesive  · Follow the above instructions for open or closed cuts. · Leave the skin adhesive on your skin until it falls off on its own. This may take 5 to 10 days. · Do not scratch, rub, or pick at the adhesive. · Do not put the sticky part of a bandage directly on the adhesive. · Do not put any kind of ointment, cream, or lotion over the area. This can make the adhesive fall off too soon. Do not use hydrogen peroxide or alcohol, which can slow healing. When should you call for help? Call your doctor now or seek immediate medical care if:    · You have new pain, or your pain gets worse.     · The skin near the cut is cold or pale or changes color.     · You have tingling, weakness, or numbness near the cut.     · The cut starts to bleed, and blood soaks through the bandage. Oozing small amounts of blood is normal.     · You have trouble moving the area near the cut.     · You have symptoms of infection, such as:  ? Increased pain, swelling, warmth, or redness around the cut.  ? Red streaks leading from the cut.  ? Pus draining from the cut.  ? A fever.    Watch closely for changes in your health, and be sure to contact your doctor if:    · The cut reopens.     · You do not get better as expected. Where can you learn more? Go to http://darrius-mirian.info/. Enter M735 in the search box to learn more about \"Cuts: Care Instructions. \"  Current as of: November 20, 2017  Content Version: 11.8  © 3012-3043 [employer]. Care instructions adapted under license by [employer] (which disclaims liability or warranty for this information).  If you have questions about a medical condition or this instruction, always ask your healthcare professional. [address] any warranty or liability for your use of this information. Cuts Closed With Stitches: Care Instructions  Your Care Instructions  A cut can happen anywhere on your body. The doctor used stitches to close the cut. Using stitches also helps the cut heal and reduces scarring. Sometimes pieces of tape called Steri-Strips are put over the stitches. If the cut went deep and through the skin, the doctor may have put in two layers of stitches. The deeper layer brings the deep part of the cut together. These stitches will dissolve and don't need to be removed. The stitches in the upper layer are the ones you see on the cut. You will probably have a bandage over the stitches. You will need to have the stitches removed, usually in 7 to 14 days. The doctor has checked you carefully, but problems can develop later. If you notice any problems or new symptoms, get medical treatment right away. Follow-up care is a key part of your treatment and safety. Be sure to make and go to all appointments, and call your doctor if you are having problems. It's also a good idea to know your test results and keep a list of the medicines you take. How can you care for yourself at home? · Keep the cut dry for the first 24 to 48 hours. After this, you can shower if your doctor okays it. Pat the cut dry. · Don't soak the cut, such as in a bathtub. Your doctor will tell you when it's safe to get the cut wet. · If your doctor told you how to care for your cut, follow your doctor's instructions. If you did not get instructions, follow this general advice:  ? After the first 24 to 48 hours, wash around the cut with clean water 2 times a day. Don't use hydrogen peroxide or alcohol, which can slow healing. ? You may cover the cut with a thin layer of petroleum jelly, such as Vaseline, and a nonstick bandage. ?  Apply more petroleum jelly and replace the bandage as needed. · Prop up the sore area on a pillow anytime you sit or lie down during the next 3 days. Try to keep it above the level of your heart. This will help reduce swelling. · Avoid any activity that could cause your cut to reopen. · Do not remove the stitches on your own. Your doctor will tell you when to come back to have the stitches removed. · Leave Steri-Strips on until they fall off. · Be safe with medicines. Read and follow all instructions on the label. ? If the doctor gave you a prescription medicine for pain, take it as prescribed. ? If you are not taking a prescription pain medicine, ask your doctor if you can take an over-the-counter medicine. When should you call for help? Call your doctor now or seek immediate medical care if:    · You have new pain, or your pain gets worse.     · The skin near the cut is cold or pale or changes color.     · You have tingling, weakness, or numbness near the cut.     · The cut starts to bleed, and blood soaks through the bandage. Oozing small amounts of blood is normal.     · You have trouble moving the area near the cut.     · You have symptoms of infection, such as:  ? Increased pain, swelling, warmth, or redness around the cut.  ? Red streaks leading from the cut.  ? Pus draining from the cut.  ? A fever.    Watch closely for changes in your health, and be sure to contact your doctor if:    · The cut reopens.     · You do not get better as expected. Where can you learn more? Go to http://darrius-mirian.info/. Enter R217 in the search box to learn more about \"Cuts Closed With Stitches: Care Instructions. \"  Current as of: November 20, 2017  Content Version: 11.8  © 2717-0618 [employer]. Care instructions adapted under license by [employer] (which disclaims liability or warranty for this information).  If you have questions about a medical condition or this instruction, always ask your healthcare professional. [place] [address] any warranty or liability for your use of this information.

## 2018-12-05 ENCOUNTER — HOSPITAL ENCOUNTER (OUTPATIENT)
Dept: LAB | Age: 43
Discharge: HOME | End: 2018-12-05
Payer: MEDICAID

## 2018-12-05 ENCOUNTER — OFFICE VISIT (OUTPATIENT)
Dept: FAMILY MEDICINE CLINIC | Age: 43
End: 2018-12-05

## 2018-12-05 VITALS
HEART RATE: 91 BPM | BODY MASS INDEX: 37.99 KG/M2 | WEIGHT: 296 LBS | RESPIRATION RATE: 18 BRPM | DIASTOLIC BLOOD PRESSURE: 79 MMHG | SYSTOLIC BLOOD PRESSURE: 115 MMHG | OXYGEN SATURATION: 96 % | HEIGHT: 74 IN | TEMPERATURE: 97.9 F

## 2018-12-05 DIAGNOSIS — E03.9 HYPOTHYROIDISM, UNSPECIFIED TYPE: ICD-10-CM

## 2018-12-05 DIAGNOSIS — I42.9 CARDIOMYOPATHY, UNSPECIFIED TYPE (HCC): ICD-10-CM

## 2018-12-05 DIAGNOSIS — I10 HYPERTENSION, UNSPECIFIED TYPE: ICD-10-CM

## 2018-12-05 DIAGNOSIS — I21.3 ST ELEVATION MYOCARDIAL INFARCTION (STEMI), UNSPECIFIED ARTERY (HCC): ICD-10-CM

## 2018-12-05 DIAGNOSIS — G89.4 CHRONIC PAIN SYNDROME: ICD-10-CM

## 2018-12-05 DIAGNOSIS — G89.4 CHRONIC PAIN SYNDROME: Primary | ICD-10-CM

## 2018-12-05 PROBLEM — E66.01 OBESITY, MORBID (HCC): Status: RESOLVED | Noted: 2018-07-26 | Resolved: 2018-12-05

## 2018-12-05 PROBLEM — E66.01 SEVERE OBESITY (HCC): Status: ACTIVE | Noted: 2018-12-05

## 2018-12-05 PROBLEM — I25.2 HISTORY OF ST ELEVATION MYOCARDIAL INFARCTION (STEMI): Status: ACTIVE | Noted: 2018-12-05

## 2018-12-05 PROCEDURE — G0480 DRUG TEST DEF 1-7 CLASSES: HCPCS

## 2018-12-05 RX ORDER — ASPIRIN 81 MG/1
81 TABLET ORAL DAILY
Qty: 90 TAB | Refills: 4 | Status: SHIPPED | OUTPATIENT
Start: 2018-12-05 | End: 2018-12-05 | Stop reason: SDUPTHER

## 2018-12-05 RX ORDER — CLOPIDOGREL BISULFATE 75 MG/1
75 TABLET ORAL DAILY
Qty: 90 TAB | Refills: 4 | Status: SHIPPED | OUTPATIENT
Start: 2018-12-05 | End: 2018-12-05 | Stop reason: SDUPTHER

## 2018-12-05 RX ORDER — CHLORTHALIDONE 25 MG/1
25 TABLET ORAL DAILY
Qty: 90 TAB | Refills: 4 | Status: SHIPPED | OUTPATIENT
Start: 2018-12-05 | End: 2019-03-07 | Stop reason: SDUPTHER

## 2018-12-05 RX ORDER — AMLODIPINE BESYLATE 10 MG/1
10 TABLET ORAL DAILY
Qty: 90 TAB | Refills: 4 | Status: SHIPPED | OUTPATIENT
Start: 2018-12-05 | End: 2019-03-07 | Stop reason: SDUPTHER

## 2018-12-05 RX ORDER — ENALAPRIL MALEATE 20 MG/1
TABLET ORAL
Qty: 180 TAB | Refills: 4 | Status: SHIPPED | OUTPATIENT
Start: 2018-12-05 | End: 2018-12-05 | Stop reason: SDUPTHER

## 2018-12-05 RX ORDER — LEVOTHYROXINE SODIUM 200 UG/1
TABLET ORAL
Qty: 90 TAB | Refills: 4 | Status: SHIPPED | OUTPATIENT
Start: 2018-12-05 | End: 2019-03-07 | Stop reason: SDUPTHER

## 2018-12-05 RX ORDER — PANTOPRAZOLE SODIUM 40 MG/1
40 TABLET, DELAYED RELEASE ORAL 2 TIMES DAILY
Qty: 180 TAB | Refills: 4 | Status: SHIPPED | OUTPATIENT
Start: 2018-12-05 | End: 2018-12-05 | Stop reason: SDUPTHER

## 2018-12-05 RX ORDER — METOPROLOL TARTRATE 100 MG/1
100 TABLET ORAL EVERY 12 HOURS
Qty: 180 TAB | Refills: 4 | Status: SHIPPED | OUTPATIENT
Start: 2018-12-05 | End: 2019-03-07 | Stop reason: SDUPTHER

## 2018-12-05 RX ORDER — ATORVASTATIN CALCIUM 80 MG/1
80 TABLET, FILM COATED ORAL
Qty: 90 TAB | Refills: 4 | Status: SHIPPED | OUTPATIENT
Start: 2018-12-05 | End: 2019-03-07 | Stop reason: SDUPTHER

## 2018-12-05 RX ORDER — CLOPIDOGREL BISULFATE 75 MG/1
75 TABLET ORAL DAILY
Qty: 90 TAB | Refills: 4 | Status: SHIPPED | OUTPATIENT
Start: 2018-12-05 | End: 2019-03-07 | Stop reason: SDUPTHER

## 2018-12-05 RX ORDER — CHLORTHALIDONE 25 MG/1
25 TABLET ORAL DAILY
Qty: 90 TAB | Refills: 4 | Status: SHIPPED | OUTPATIENT
Start: 2018-12-05 | End: 2018-12-05 | Stop reason: SDUPTHER

## 2018-12-05 RX ORDER — ATORVASTATIN CALCIUM 80 MG/1
80 TABLET, FILM COATED ORAL
Qty: 90 TAB | Refills: 4 | Status: SHIPPED | OUTPATIENT
Start: 2018-12-05 | End: 2018-12-05 | Stop reason: SDUPTHER

## 2018-12-05 RX ORDER — ASPIRIN 81 MG/1
81 TABLET ORAL DAILY
Qty: 90 TAB | Refills: 4 | Status: SHIPPED | OUTPATIENT
Start: 2018-12-05 | End: 2019-03-07 | Stop reason: SDUPTHER

## 2018-12-05 RX ORDER — LEVOTHYROXINE SODIUM 200 UG/1
TABLET ORAL
Qty: 90 TAB | Refills: 4 | Status: SHIPPED | OUTPATIENT
Start: 2018-12-05 | End: 2018-12-05 | Stop reason: SDUPTHER

## 2018-12-05 RX ORDER — ENALAPRIL MALEATE 20 MG/1
TABLET ORAL
Qty: 180 TAB | Refills: 4 | Status: SHIPPED | OUTPATIENT
Start: 2018-12-05 | End: 2019-03-07 | Stop reason: SDUPTHER

## 2018-12-05 RX ORDER — HYDROCODONE BITARTRATE AND ACETAMINOPHEN 5; 325 MG/1; MG/1
1 TABLET ORAL EVERY 12 HOURS
Qty: 60 TAB | Refills: 0 | Status: SHIPPED | OUTPATIENT
Start: 2018-12-05 | End: 2019-01-09 | Stop reason: SDUPTHER

## 2018-12-05 RX ORDER — METOPROLOL TARTRATE 100 MG/1
100 TABLET ORAL EVERY 12 HOURS
Qty: 180 TAB | Refills: 4 | Status: SHIPPED | OUTPATIENT
Start: 2018-12-05 | End: 2018-12-05 | Stop reason: SDUPTHER

## 2018-12-05 RX ORDER — PANTOPRAZOLE SODIUM 40 MG/1
40 TABLET, DELAYED RELEASE ORAL 2 TIMES DAILY
Qty: 180 TAB | Refills: 4 | Status: SHIPPED | OUTPATIENT
Start: 2018-12-05 | End: 2019-03-07 | Stop reason: SDUPTHER

## 2018-12-05 RX ORDER — AMLODIPINE BESYLATE 10 MG/1
10 TABLET ORAL DAILY
Qty: 90 TAB | Refills: 4 | Status: SHIPPED | OUTPATIENT
Start: 2018-12-05 | End: 2018-12-05 | Stop reason: SDUPTHER

## 2018-12-05 NOTE — PROGRESS NOTES
Arlina Ahumada is a 39 y.o.  male and presents with Chief Complaint Patient presents with  Loss of Consciousness  Other  
  gi bleed  Pain (Chronic)  Cholesterol Problem  Cardiomyopathy  Hypertension  Thyroid Problem Subjective: 
Pt here for to Was admited to hosp 11/29 and d/c 12/1 
probl was syncope caused by GI bleed He has been taking motrin which caused the gi bleed He has had gastric bypass and is unable to take motrin, aleve, etc etc  
 
 
Cardiovascular Review: 
The patient has hypertension, hyperlipidemia, coronary artery disease, CHF and dilatged cardiomyopathy . Diet and Lifestyle: generally follows a low fat low cholesterol diet Home BP Monitoring: is not measured at home. Pertinent ROS: taking medications as instructed, no medication side effects noted, no TIA's, no chest pain on exertion, no dyspnea on exertion, no swelling of ankles. Thyroid Review: 
Patient is seen for followup of hypothyroidism. Thyroid ROS: denies fatigue, weight changes, heat/cold intolerance, bowel/skin changes or CVS symptoms. Osteoarthritis and Chronic Pain: 
Patient has osteoarthritis, primarily affecting the diffuse. Symptoms onset: problem is longstanding. Rheumatological ROS: ongoing significant pain in diffuse which is stable and controlled by PRN meds. Response to treatment plan: stable. Additional Concerns:   
 
 
 
Patient Active Problem List  
 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  
 HTN (hypertension) 08/08/2011  Hypothyroid 08/08/2011 Current Outpatient Medications Medication Sig Dispense Refill  enalapril (VASOTEC) 20 mg tablet TAKE ONE TABLET BY MOUTH TWICE DAILY 180 Tab 4  
 levothyroxine (SYNTHROID) 200 mcg tablet TAKE ONE TABLET BY MOUTH ONCE DAILY BEFORE  BREAKFAST 90 Tab 4  chlorthalidone (HYGROTEN) 25 mg tablet Take 1 Tab by mouth daily. 90 Tab 4  
 atorvastatin (LIPITOR) 80 mg tablet Take 1 Tab by mouth nightly. 90 Tab 4  
 metoprolol tartrate (LOPRESSOR) 100 mg IR tablet Take 1 Tab by mouth every twelve (12) hours. 180 Tab 4  
 aspirin delayed-release 81 mg tablet Take 1 Tab by mouth daily. 90 Tab 4  clopidogrel (PLAVIX) 75 mg tab Take 1 Tab by mouth daily. 90 Tab 4  
 amLODIPine (NORVASC) 10 mg tablet Take 1 Tab by mouth daily. 90 Tab 4  pantoprazole (PROTONIX) 40 mg tablet Take 1 Tab by mouth two (2) times a day. 180 Tab 4  
 HYDROcodone-acetaminophen (NORCO) 5-325 mg per tablet Take 1 Tab by mouth every twelve (12) hours every twelve (12) hours. Max Daily Amount: 2 Tabs. 60 Tab 0  
 pantoprazole (PROTONIX) 20 mg tablet Take 2 Tabs by mouth two (2) times a day. 60 Tab 0 No Known Allergies Past Medical History:  
Diagnosis Date  Cardiomyopathy ([facility] 7/21/2014  Chronic pain syndrome 12/5/2018  Gastric bypass status for obesity 8/8/2011  
 Heart failure ([facility]  History of ST elevation myocardial infarction (STEMI) 12/5/2018  
 HTN (hypertension) 8/8/2011  Hypothyroid 8/8/2011  Post corneal transplant 7/21/2014  
 STEMI (ST elevation myocardial infarction) ([facility]   
 08/2018 Past Surgical History:  
Procedure Laterality Date [address] UNLISTED No family history on file. Social History Tobacco Use  Smoking status: Never Smoker  Smokeless tobacco: Never Used Substance Use Topics  Alcohol use: Yes Comment: 6 pk daily ROS All other systems reviewed and are negative. Objective: 
Vitals:  
 12/05/18 7677 BP: 115/79 Pulse: 91  
Resp: 18 Temp: 97.9 °F (36.6 °C) TempSrc: Oral  
SpO2: 96% Weight: 296 lb (134.3 kg) Height: 6' 2\" (1.88 m) PainSc:   8 PainLoc: Head  
 
 
 
 
 
 
 
alert, well appearing, and in no distress, oriented to person, place, and time and normal appearing weight Chest - clear to auscultation, no wheezes, rales or rhonchi, symmetric air entry Heart - normal rate, regular rhythm, normal S1, S2, no murmurs, rubs, clicks or gallops Abdomen - soft, nontender, nondistended, no masses or organomegaly LA Component Latest Ref Rng & Units 11/30/2018 11/30/2018 11/30/2018 11/30/2018  
 
      6:23 PM  6:30 AM  6:30 AM  6:30 AM  
WBC 
    4.6 - 13.2 K/uL    6.7  
RBC 
    4.70 - 5.50 M/uL    4.50 (L) HGB 13.0 - 16.0 g/dL 12.2 (L)   12.1 (L) HCT 
    36.0 - 48.0 % 36.9   38.6 MCV 
    74.0 - 97.0 FL    85.8 MCH 
    24.0 - 34.0 PG    26.9 MCHC 31.0 - 37.0 g/dL    31.3  
RDW 
    11.6 - 14.5 %    14.8 (H) PLATELET 
    287 - 770 K/uL    144 MPV 
    9.2 - 11.8 FL    9.8 NEUTROPHILS 
    40 - 73 %    68 LYMPHOCYTES 
    21 - 52 %    21 MONOCYTES 
    3 - 10 %    8 EOSINOPHILS 
    0 - 5 %    3  
BASOPHILS 
    0 - 2 %    0  
ABS. NEUTROPHILS 
    1.8 - 8.0 K/UL    4.6  
ABS. LYMPHOCYTES 
    0.9 - 3.6 K/UL    1.4  
ABS. MONOCYTES 
    0.05 - 1.2 K/UL    0.5  
ABS. EOSINOPHILS 
    0.0 - 0.4 K/UL    0.2  
ABS. BASOPHILS 
    0.0 - 0.1 K/UL    0.0  
DF AUTOMATED Sodium 136 - 145 mmol/L   141 Potassium 3.5 - 5.5 mmol/L   4.3 Chloride 100 - 108 mmol/L   106 CO2 
    21 - 32 mmol/L   27 Anion gap 3.0 - 18 mmol/L   8 Glucose 74 - 99 mg/dL   115 (H) BUN 
    7.0 - 18 MG/DL   31 (H) Creatinine 
    0.6 - 1.3 MG/DL   1.12   
BUN/Creatinine ratio 12 - 20     28 (H) GFR est AA 
    >60 ml/min/1.73m2   >60   
GFR est non-AA 
    >60 ml/min/1.73m2   >60 Calcium 8.5 - 10.1 MG/DL   8.7 Bilirubin, total 
    0.2 - 1.0 MG/DL   0.6 ALT (SGPT) 16 - 61 U/L   24 AST 15 - 37 U/L   21 Alk. phosphatase 45 - 117 U/L   81 Protein, total 
    6.4 - 8.2 g/dL   6.8 Albumin 3.4 - 5.0 g/dL   3.4 Globulin 2.0 - 4.0 g/dL   3.4 A-G Ratio 
    0.8 - 1.7     1.0 CK 
    39 - 308 U/L  70 CK - MB 
    <3.6 ng/ml  1.4 CK-MB Index 0.0 - 4.0 %  2.0 Troponin-I, Qt. 
    0.0 - 0.045 NG/ML  0.02 Crossmatch Expiration ABO/Rh(D) Antibody screen Prothrombin time 11.5 - 15.2 sec INR 
    0.8 - 1.2 NT pro-BNP 
    0 - 450 PG/ML      
TSH 
    0.36 - 3.74 uIU/mL Component Latest Ref Rng & Units 11/29/2018 11/29/2018 11/29/2018 11/29/2018  
 
      9:53 PM  6:25 PM  6:25 PM  6:25 PM  
WBC 
    4.6 - 13.2 K/uL      
RBC 
    4.70 - 5.50 M/uL      
HGB 13.0 - 16.0 g/dL HCT 
    36.0 - 48.0 % MCV 
    74.0 - 97.0 FL      
MCH 
    24.0 - 34.0 PG      
MCHC 31.0 - 37.0 g/dL      
RDW 
    11.6 - 14.5 % PLATELET 
    820 - 789 K/uL MPV 
    9.2 - 11.8 FL      
NEUTROPHILS 
    40 - 73 % LYMPHOCYTES 
    21 - 52 % MONOCYTES 
    3 - 10 % EOSINOPHILS 
    0 - 5 % BASOPHILS 
    0 - 2 %      
ABS. NEUTROPHILS 
    1.8 - 8.0 K/UL      
ABS. LYMPHOCYTES 
    0.9 - 3.6 K/UL      
ABS. MONOCYTES 
    0.05 - 1.2 K/UL      
ABS. EOSINOPHILS 
    0.0 - 0.4 K/UL      
ABS. BASOPHILS 
    0.0 - 0.1 K/UL      
DF Sodium 136 - 145 mmol/L Potassium 3.5 - 5.5 mmol/L Chloride 100 - 108 mmol/L      
CO2 
    21 - 32 mmol/L Anion gap 3.0 - 18 mmol/L Glucose 74 - 99 mg/dL BUN 
    7.0 - 18 MG/DL Creatinine 
    0.6 - 1.3 MG/DL      
BUN/Creatinine ratio 12 - 20 GFR est AA 
    >60 ml/min/1.73m2 GFR est non-AA 
    >60 ml/min/1.73m2 Calcium 8.5 - 10.1 MG/DL Bilirubin, total 
    0.2 - 1.0 MG/DL      
ALT (SGPT) 16 - 61 U/L      
AST 15 - 37 U/L Alk. phosphatase 45 - 117 U/L Protein, total 
    6.4 - 8.2 g/dL Albumin 3.4 - 5.0 g/dL Globulin 2.0 - 4.0 g/dL A-G Ratio 
    0.8 - 1.7 CK 
    39 - 308 U/L      
CK - MB 
    <3.6 ng/ml CK-MB Index 0.0 - 4.0 % Troponin-I, Qt. 
    0.0 - 0.045 NG/ML Crossmatch Expiration 2018 ABO/Rh(D) O POSITIVE Antibody screen NEG Prothrombin time 11.5 - 15.2 sec    14.0 INR 
    0.8 - 1.2      1.1 NT pro-BNP 
    0 - 450 PG/ML   151 TSH 
    0.36 - 3.74 uIU/mL 0.75 BS  
  
TESTS Assessment/Plan: 1. Syncope seconadry to gi bleed -- probably no further tx needed 2. Gi bleed seconadry to nsaid -- given protonix 40 bid on d/c will refill for now consider d/c in a month or two 3. chornic pain --  
Agus Recinos RTC today to follow up on chronic pain diagnosis. We discussed his osteoarthritis that is affecting his back. Significant changes since last visit: none. He is  able to do his normal daily activities. He reports the following adverse side effects: none. Least pain over the last week has been 3/10. Worst pain over the last week has been 4/10. Opioid Risk Tool Reviewed: YES Aberrant behaviors: None. Urine Drug Screen: due - order placed. Controlled substance agreement on file: YES.  reviewed:yes Pill count is consistent with his prescription: yes and n/a Concomitant use of a benzodiazepine: no 
 
Also,  abstinence syndrome was reviewed and discussed with her today N/A 
 
 
4. Cholesterol still on lipiotor -- given by cardiology 5. htn meds reviewed -- note med list is in error -- cardiology has been calling in refills and not documenting their rx 6. Thyroid optimal 
 
Lab review: labs are reviewed, up to date and normal 
 
Diagnoses and all orders for this visit: 
 
1. Chronic pain syndrome -     COMPLIANCE DRUG SCREEN/PRESCRIPTION MONITORING; Future 
-     HYDROcodone-acetaminophen (NORCO) 5-325 mg per tablet; Take 1 Tab by mouth every twelve (12) hours every twelve (12) hours. Max Daily Amount: 2 Tabs. 2. Cardiomyopathy, unspecified type (HCC) 
-     enalapril (VASOTEC) 20 mg tablet; TAKE ONE TABLET BY MOUTH TWICE DAILY 
-     levothyroxine (SYNTHROID) 200 mcg tablet; TAKE ONE TABLET BY MOUTH ONCE DAILY BEFORE  BREAKFAST 
-     atorvastatin (LIPITOR) 80 mg tablet; Take 1 Tab by mouth nightly. -     metoprolol tartrate (LOPRESSOR) 100 mg IR tablet; Take 1 Tab by mouth every twelve (12) hours. -     aspirin delayed-release 81 mg tablet; Take 1 Tab by mouth daily. -     clopidogrel (PLAVIX) 75 mg tab; Take 1 Tab by mouth daily. -     amLODIPine (NORVASC) 10 mg tablet; Take 1 Tab by mouth daily. -     pantoprazole (PROTONIX) 40 mg tablet; Take 1 Tab by mouth two (2) times a day. 3. Hypertension, unspecified type 
-     enalapril (VASOTEC) 20 mg tablet; TAKE ONE TABLET BY MOUTH TWICE DAILY 
-     levothyroxine (SYNTHROID) 200 mcg tablet; TAKE ONE TABLET BY MOUTH ONCE DAILY BEFORE  BREAKFAST 
-     atorvastatin (LIPITOR) 80 mg tablet; Take 1 Tab by mouth nightly. -     metoprolol tartrate (LOPRESSOR) 100 mg IR tablet; Take 1 Tab by mouth every twelve (12) hours. -     aspirin delayed-release 81 mg tablet; Take 1 Tab by mouth daily. -     clopidogrel (PLAVIX) 75 mg tab; Take 1 Tab by mouth daily. -     amLODIPine (NORVASC) 10 mg tablet; Take 1 Tab by mouth daily. -     pantoprazole (PROTONIX) 40 mg tablet; Take 1 Tab by mouth two (2) times a day. 4. Hypothyroidism, unspecified type 
-     enalapril (VASOTEC) 20 mg tablet; TAKE ONE TABLET BY MOUTH TWICE DAILY 
-     levothyroxine (SYNTHROID) 200 mcg tablet; TAKE ONE TABLET BY MOUTH ONCE DAILY BEFORE  BREAKFAST 
-     atorvastatin (LIPITOR) 80 mg tablet; Take 1 Tab by mouth nightly. -     metoprolol tartrate (LOPRESSOR) 100 mg IR tablet; Take 1 Tab by mouth every twelve (12) hours. -     aspirin delayed-release 81 mg tablet; Take 1 Tab by mouth daily. -     clopidogrel (PLAVIX) 75 mg tab; Take 1 Tab by mouth daily. -     amLODIPine (NORVASC) 10 mg tablet; Take 1 Tab by mouth daily. -     pantoprazole (PROTONIX) 40 mg tablet; Take 1 Tab by mouth two (2) times a day. 5. ST elevation myocardial infarction (STEMI), unspecified artery (HCC) 
-     atorvastatin (LIPITOR) 80 mg tablet; Take 1 Tab by mouth nightly. -     metoprolol tartrate (LOPRESSOR) 100 mg IR tablet; Take 1 Tab by mouth every twelve (12) hours. -     aspirin delayed-release 81 mg tablet; Take 1 Tab by mouth daily. -     clopidogrel (PLAVIX) 75 mg tab; Take 1 Tab by mouth daily. -     amLODIPine (NORVASC) 10 mg tablet; Take 1 Tab by mouth daily. -     pantoprazole (PROTONIX) 40 mg tablet; Take 1 Tab by mouth two (2) times a day. Other orders 
-     chlorthalidone (HYGROTEN) 25 mg tablet; Take 1 Tab by mouth daily. I have discussed the diagnosis with the patient and the intended plan as seen in the above orders. The patient has received an after-visit summary and questions were answered concerning future plans. I have discussed medication side effects and warnings with the patient as well. I have reviewed the plan of care with the patient, accepted their input and they are in agreement with the treatment goals. Follow-up Disposition: Not on File

## 2018-12-05 NOTE — PROGRESS NOTES
1. Have you been to the ER, urgent care clinic since your last visit? Hospitalized since your last visit? Yes  
 
 
2. Have you seen or consulted any other health care providers outside of the [address] [place] [address] since your last visit? Include any pap smears or colon screening.  No

## 2018-12-10 ENCOUNTER — OFFICE VISIT (OUTPATIENT)
Dept: FAMILY MEDICINE CLINIC | Age: 43
End: 2018-12-10

## 2018-12-10 VITALS
RESPIRATION RATE: 19 BRPM | HEART RATE: 55 BPM | TEMPERATURE: 97.2 F | SYSTOLIC BLOOD PRESSURE: 117 MMHG | OXYGEN SATURATION: 98 % | WEIGHT: 295.6 LBS | HEIGHT: 74 IN | BODY MASS INDEX: 37.94 KG/M2 | DIASTOLIC BLOOD PRESSURE: 80 MMHG

## 2018-12-10 DIAGNOSIS — S06.2X0A: Primary | ICD-10-CM

## 2018-12-10 LAB — DRUGS UR: NORMAL

## 2018-12-10 NOTE — PROGRESS NOTES
Room #      SUBJECTIVE:    Alea Barber is a 39 y.o. male who presents today for follow up stiches removal    1. Have you been to the ER, urgent care clinic since your last visit? Hospitalized since your last visit? NO    2. Have you seen or consulted any other health care providers outside of the [address] [place] [address] since your last visit? Include any pap smears or colon screening. NO  When :  Reason:    Health Maintenance reviewed Yes    There are no preventive care reminders to display for this patient.

## 2018-12-19 ENCOUNTER — DOCUMENTATION ONLY (OUTPATIENT)
Dept: FAMILY MEDICINE CLINIC | Age: 43
End: 2018-12-19

## 2018-12-19 NOTE — PROGRESS NOTES
Patient did not arrive to their appointment on 12/18/18. No show letter #3 sent on 12/19/18. Thank you.

## 2019-01-09 ENCOUNTER — OFFICE VISIT (OUTPATIENT)
Dept: FAMILY MEDICINE CLINIC | Age: 44
End: 2019-01-09

## 2019-01-09 VITALS
OXYGEN SATURATION: 99 % | HEART RATE: 64 BPM | RESPIRATION RATE: 18 BRPM | TEMPERATURE: 97.3 F | WEIGHT: 294.6 LBS | HEIGHT: 74 IN | BODY MASS INDEX: 37.81 KG/M2 | DIASTOLIC BLOOD PRESSURE: 85 MMHG | SYSTOLIC BLOOD PRESSURE: 130 MMHG

## 2019-01-09 DIAGNOSIS — E03.9 HYPOTHYROIDISM, UNSPECIFIED TYPE: ICD-10-CM

## 2019-01-09 DIAGNOSIS — I21.3 ST ELEVATION MYOCARDIAL INFARCTION (STEMI), UNSPECIFIED ARTERY (HCC): ICD-10-CM

## 2019-01-09 DIAGNOSIS — I10 HYPERTENSION, UNSPECIFIED TYPE: ICD-10-CM

## 2019-01-09 DIAGNOSIS — M25.551 PAIN OF RIGHT HIP JOINT: Primary | ICD-10-CM

## 2019-01-09 DIAGNOSIS — G89.4 CHRONIC PAIN SYNDROME: ICD-10-CM

## 2019-01-09 DIAGNOSIS — I42.9 CARDIOMYOPATHY, UNSPECIFIED TYPE (HCC): ICD-10-CM

## 2019-01-09 RX ORDER — HYDROCODONE BITARTRATE AND ACETAMINOPHEN 5; 325 MG/1; MG/1
1 TABLET ORAL EVERY 12 HOURS
Qty: 60 TAB | Refills: 0 | Status: SHIPPED | OUTPATIENT
Start: 2019-01-09 | End: 2019-01-09

## 2019-01-09 RX ORDER — OXYCODONE AND ACETAMINOPHEN 5; 325 MG/1; MG/1
1 TABLET ORAL
Qty: 60 TAB | Refills: 0 | Status: SHIPPED | OUTPATIENT
Start: 2019-01-09 | End: 2019-02-07 | Stop reason: SDUPTHER

## 2019-01-09 NOTE — PROGRESS NOTES
Aisha Sanchez is a 39 y.o.  male and presents with    Chief Complaint   Patient presents with    Pain (Chronic)    Cardiomyopathy    Hypertension    Thyroid Problem           Subjective:    Cardiovascular Review:  The patient has hypertension and hyperlipidemia. Diet and Lifestyle: not attempting to follow a low fat, low cholesterol diet, not attempting to follow a low sodium diet  Home BP Monitoring: is not measured at home. Pertinent ROS: taking medications as instructed, no medication side effects noted, no TIA's, no chest pain on exertion, no dyspnea on exertion, no swelling of ankles. Thyroid Review:  Patient is seen for followup of hypothyroidism. Thyroid ROS: denies fatigue, weight changes, heat/cold intolerance, bowel/skin changes or CVS symptoms. Osteoarthritis and Chronic Pain:  Patient has osteoarthritis, primarily affecting the diffuse. Symptoms onset: problem is longstanding. Rheumatological ROS: no current joint or muscle symptoms, essentially pain-free. Response to treatment plan: stable. 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 Max Daily Amount: 6 Tabs. 60 Tab 0    pantoprazole (PROTONIX) 40 mg tablet Take 1 Tab by mouth two (2) times a day. 180 Tab 4    amLODIPine (NORVASC) 10 mg tablet Take 1 Tab by mouth daily.  90 Tab 4    clopidogrel (PLAVIX) 75 mg tab Take 1 Tab by mouth daily. 90 Tab 4    aspirin delayed-release 81 mg tablet Take 1 Tab by mouth daily. 90 Tab 4    metoprolol tartrate (LOPRESSOR) 100 mg IR tablet Take 1 Tab by mouth every twelve (12) hours. 180 Tab 4    atorvastatin (LIPITOR) 80 mg tablet Take 1 Tab by mouth nightly. 90 Tab 4    chlorthalidone (HYGROTEN) 25 mg tablet Take 1 Tab by mouth daily. 90 Tab 4    levothyroxine (SYNTHROID) 200 mcg tablet TAKE ONE TABLET BY MOUTH ONCE DAILY BEFORE  BREAKFAST 90 Tab 4    enalapril (VASOTEC) 20 mg tablet TAKE ONE TABLET BY MOUTH TWICE DAILY 180 Tab 4     No Known Allergies  Past Medical History:   Diagnosis Date    [facility]omy[facility] ([facility] 7/21/2014    Chronic pain syndrome 12/5/2018    Gastric bypass status for obesity 8/8/2011    [facility] failure ([facility]     History of ST elevation myocardial infarction (STEMI) 12/5/2018    HTN (hypertension) 8/8/2011    Hypothyroid 8/8/2011    Post corneal transplant 7/21/2014    STEMI (ST elevation myocardial infarction) ([facility]     08/2018     Past Surgical History:   Procedure Laterality Date    ABDOMEN SURGERY PROC UNLISTED       No family history on file. Social History     Tobacco Use    Smoking status: Never Smoker    Smokeless tobacco: Never Used   Substance Use Topics    Alcohol use: Yes     Comment: 6 pk daily       ROS       All other systems reviewed and are negative.       Objective:  Vitals:    01/09/19 1322   BP: 130/85   Pulse: 64   Resp: 18   Temp: 97.3 °F (36.3 °C)   TempSrc: Oral   SpO2: 99%   Weight: 294 lb 9.6 oz (133.6 kg)   Height: 6' 2\" (1.88 m)   PainSc:   8   PainLoc: Head                 alert, well appearing, and in no distress and oriented to person, place, and time  Chest - clear to auscultation, no wheezes, rales or rhonchi, symmetric air entry  Heart - normal rate, regular rhythm, normal S1, S2, no murmurs, rubs, clicks or gallops        LABS     TESTS      Assessment/Plan:    Hypertension - stable  cardiomypathy stable  Thyroid dz stable  chrnic pain  ongiong  -- note he had a gi bleed because of nsaids  I will give vicodin or something up to 2 x day   He would like ortho eval to see if pain can b e helped long term      Lab review: labs are reviewed, up to date and normal    Diagnoses and all orders for this visit:    1. Pain of right hip joint  -     REFERRAL TO ORTHOPEDICS    2. Chronic pain syndrome  -     oxyCODONE-acetaminophen (PERCOCET) 5-325 mg per tablet; Take 1 Tab by mouth every four (4) hours as needed for Pain. Max Daily Amount: 6 Tabs. I have discussed the diagnosis with the patient and the intended plan as seen in the above orders. The patient has received an after-visit summary and questions were answered concerning future plans. I have discussed medication side effects and warnings with the patient as well. I have reviewed the plan of care with the patient, accepted their input and they are in agreement with the treatment goals. Follow-up Disposition:  Return in about 4 weeks (around 2/6/2019) for EOV.

## 2019-01-09 NOTE — PROGRESS NOTES
1. Have you been to the ER, urgent care clinic since your last visit? Hospitalized since your last visit? No    2. Have you seen or consulted any other health care providers outside of the [address] [place] [address] since your last visit? Include any pap smears or colon screening.  No

## 2019-01-28 ENCOUNTER — OFFICE VISIT (OUTPATIENT)
Dept: ORTHOPEDIC SURGERY | Facility: CLINIC | Age: 44
End: 2019-01-28

## 2019-01-28 VITALS
HEART RATE: 102 BPM | WEIGHT: 287 LBS | RESPIRATION RATE: 16 BRPM | OXYGEN SATURATION: 99 % | SYSTOLIC BLOOD PRESSURE: 174 MMHG | BODY MASS INDEX: 36.83 KG/M2 | TEMPERATURE: 97.2 F | HEIGHT: 74 IN | DIASTOLIC BLOOD PRESSURE: 117 MMHG

## 2019-01-28 DIAGNOSIS — M54.16 LUMBAR RADICULOPATHY: ICD-10-CM

## 2019-01-28 DIAGNOSIS — M54.50 LUMBAR PAIN: Primary | ICD-10-CM

## 2019-01-28 DIAGNOSIS — M25.551 RIGHT HIP PAIN: ICD-10-CM

## 2019-01-28 RX ORDER — BETAMETHASONE SODIUM PHOSPHATE AND BETAMETHASONE ACETATE 3; 3 MG/ML; MG/ML
6 INJECTION, SUSPENSION INTRA-ARTICULAR; INTRALESIONAL; INTRAMUSCULAR; SOFT TISSUE ONCE
Qty: 0.5 ML | Refills: 0
Start: 2019-01-28 | End: 2019-01-28

## 2019-01-28 RX ORDER — BUPIVACAINE HYDROCHLORIDE 2.5 MG/ML
4 INJECTION, SOLUTION EPIDURAL; INFILTRATION; INTRACAUDAL ONCE
Qty: 4 ML | Refills: 0
Start: 2019-01-28 | End: 2019-01-28

## 2019-01-28 NOTE — PROGRESS NOTES
Patient: Latasha Hicks                MRN: [id number]       SSN: [id number] YOB: 1975        AGE: 39 y.o. SEX: male PCP: Taye Castro.,  
01/28/19 Chief Complaint Patient presents with  
 Hip Pain Right hip pain HISTORY:  Latasha Hicks is a 39 y.o. male who is seen for right buttock and radiating leg pain. He has been experiencing hip pain since he had a femoral stick for cardiac cath & stent. He reports pain at the arterial puncture site in the right groin. He notes his pain started right after the procedure. He notes pain with standing and walking & difficulty with everyday activities. He has some pain radiating from his right lower back into his right posterior LE. Pain Assessment  1/28/2019 Location of Pain Hip Location Modifiers Right Severity of Pain 10 Quality of Pain Sharp; Aching Duration of Pain Persistent Frequency of Pain Constant Aggravating Factors Bending;Walking;Standing; Other (Comment) Aggravating Factors Comment Coughing, sneezing Relieving Factors Nothing Result of Injury No  
 
Occupation, etc:  Mr. Demarco Willoughby  receives social security disability benefits for visual impairment and CHF. 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 He lives in [place] with his wife, his uncle, 15 yo daughter, and uncle's 14 yo daughter. He is currently on Plavix. Current weight is 287 pounds. He is 6'2\" tall. Lab Results Component Value Date/Time Hemoglobin A1c 5.3 08/14/2018 07:40 AM  
 
Weight Metrics 1/28/2019 1/9/2019 12/10/2018 12/5/2018 11/29/2018 10/4/2018 9/25/2018 Weight 287 lb 294 lb 9.6 oz 295 lb 9.6 oz 296 lb 300 lb 299 lb 301 lb BMI 36.85 kg/m2 37.82 kg/m2 37.95 kg/m2 38 kg/m2 38.52 kg/m2 38.39 kg/m2 38.65 kg/m2 Patient Active Problem List  
Diagnosis Code  Obesity E66.9  
 Gastric bypass status for obesity Z98.84  
 HTN (hypertension) I10  
 Hypothyroid E03.9  Post corneal transplant Z94.7  Cardiomyopathy ([facility] [address] I42.9  Syncope R55  GI bleed K92.2  History of ST elevation myocardial infarction (STEMI) I25.2  Chronic pain syndrome G89.4  Severe obesity (HCC) E66.01  
 
REVIEW OF SYSTEMS: All Below are Negative except: See HPI Constitutional: negative for fever, chills, and weight loss. Cardiovascular: negative for chest pain, claudication, leg swelling, SOB, MATIAS Gastrointestinal: Negative for pain, N/V/C/D, Blood in stool or urine, dysuria,  hematuria, incontinence, pelvic pain. Musculoskeletal: See HPI Neurological: Negative for dizziness and weakness. Negative for headaches, Visual changes, confusion, seizures Phychiatric/Behavioral: Negative for depression, memory loss, substance  abuse. Extremities: Negative for hair changes, rash, or skin lesion changes. Hematologic: Negative for bleeding problems, bruising, pallor or swollen lymph  nodes Peripheral Vascular: No calf pain, no circulation deficits. Social History Socioeconomic History  Marital status: SINGLE Spouse name: Not on file  Number of children: Not on file  Years of education: Not on file  Highest education level: Not on file Social Needs  Financial resource strain: Not on file  Food insecurity - worry: Not on file  Food insecurity - inability: Not on file  Transportation needs - medical: Not on file  Transportation needs - non-medical: Not on file Occupational History  Not on file Tobacco Use  Smoking status: Never Smoker  Smokeless tobacco: Never Used Substance and Sexual Activity  Alcohol use: Yes Comment: 6 pk daily  Drug use: No  
 Sexual activity: Yes Other Topics Concern  Not on file Social History Narrative  Not on file No Known Allergies Current Outpatient Medications Medication Sig  
 betamethasone (CELESTONE SOLUSPAN) 6 mg/mL injection 1 mL by Intra artICUlar route once for 1 dose.  bupivacaine, PF, (MARCAINE, PF,) 0.25 % (2.5 mg/mL) injection 4 mL by Intra artICUlar route once for 1 dose.  oxyCODONE-acetaminophen (PERCOCET) 5-325 mg per tablet Take 1 Tab by mouth every four (4) hours as needed for Pain. Max Daily Amount: 6 Tabs.  pantoprazole (PROTONIX) 40 mg tablet Take 1 Tab by mouth two (2) times a day.  amLODIPine (NORVASC) 10 mg tablet Take 1 Tab by mouth daily.  clopidogrel (PLAVIX) 75 mg tab Take 1 Tab by mouth daily.  aspirin delayed-release 81 mg tablet Take 1 Tab by mouth daily.  metoprolol tartrate (LOPRESSOR) 100 mg IR tablet Take 1 Tab by mouth every twelve (12) hours.  atorvastatin (LIPITOR) 80 mg tablet Take 1 Tab by mouth nightly.  chlorthalidone (HYGROTEN) 25 mg tablet Take 1 Tab by mouth daily.  levothyroxine (SYNTHROID) 200 mcg tablet TAKE ONE TABLET BY MOUTH ONCE DAILY BEFORE  BREAKFAST  enalapril (VASOTEC) 20 mg tablet TAKE ONE TABLET BY MOUTH TWICE DAILY No current facility-administered medications for this visit. PHYSICAL EXAMINATION: 
Visit Vitals BP (!) 174/117 (BP 1 Location: Right arm, BP Patient Position: Sitting) Pulse (!) 102 Temp 97.2 °F (36.2 °C) (Oral) Resp 16 Ht 6' 2\" (1.88 m) Wt 287 lb (130.2 kg) SpO2 99% BMI 36.85 kg/m² ORTHO EXAMINATION: 
Examination Lumbar Thoracic Skin Intact Intact Tenderness + right iliolumbar and right buttock - Tightness + right iliolumbar and right buttock - Lordosis Normal N/A Kyphosis N/A Normal  
Scoliosis - - Flexion Fingertips to mid shin N/A Extension 10 N/A Knee reflexes Normal N/A Ankle reflexes Normal N/A Straight leg raise - N/A Calf tenderness - N/A Difficulty on standing Examination Right hip Skin No reactions at arterial puncture site External Rotation ROM 20 Internal Rotation ROM 20 Trochanteric tenderness - Hip flexion contracture - Antalgic gait - Trendelenberg sign -  
 Lumbar tenderness - Straight leg raise - Calf tenderness - Neurovascular Intact PROCEDURE:  After discussing treatment options, patient's right iliolumbar region was injected with 4 cc Marcaine and 1/2 cc Celestone. Chart reviewed for the following: 
 Maribel Ulloa MD, have reviewed the History, Physical and updated the Allergic reactions for Gunnar Villatoro TIME OUT performed immediately prior to start of procedure: 
Maribel Ulloa MD, have performed the following reviews on Gunnar Villatoro prior to the start of the procedure: 
         
* Patient was identified by name and date of birth * Agreement on procedure being performed was verified * Risks and Benefits explained to the patient * Procedure site verified and marked as necessary * Patient was positioned for comfort * Consent was obtained Time: 9:58 AM  
 
Date of procedure: 1/28/2019 Procedure performed by:  Namita Barcenas MD 
 
Mr. Yanique Funes tolerated the procedure well with no complications. CT PELV ABD W CONT 11/29/18 IMPRESSION: 
1. No bowel dilatation or acute inflammatory process. 2. Gastric bypass and cholecystectomy. 3. Small fat-containing left inguinal hernia. RADIOGRAPHS: 
XR RIGHT HIP 1/28/19 NICOLE IMPRESSION:  AP pelvis and two views - No fractures, mild joint space narrowing, + osteophytes present. Tonnis grade 1 IMPRESSION:   
  ICD-10-CM ICD-9-CM 1. Lumbar pain M54.5 724.2 REFERRAL TO SPINE SURGERY  
   REFERRAL TO PHYSICAL THERAPY  
   betamethasone (CELESTONE SOLUSPAN) 6 mg/mL injection BETAMETHASONE ACETATE & SODIUM PHOSPHATE INJECTION 3 MG EA. THER/PROPH/DIAG INJECTION, SUBCUT/IM  
   bupivacaine, PF, (MARCAINE, PF,) 0.25 % (2.5 mg/mL) injection 2. Right hip pain M25.551 719.45 AMB POC X-RAY RADEX HIP UNI WITH PELVIS 2-3 VIEWS  
   REFERRAL TO PHYSICAL THERAPY 3. Lumbar radiculopathy M54.16 724.4 REFERRAL TO SPINE SURGERY  
   REFERRAL TO PHYSICAL THERAPY betamethasone (CELESTONE SOLUSPAN) 6 mg/mL injection BETAMETHASONE ACETATE & SODIUM PHOSPHATE INJECTION 3 MG EA. THER/PROPH/DIAG INJECTION, SUBCUT/IM  
   bupivacaine, PF, (MARCAINE, PF,) 0.25 % (2.5 mg/mL) injection PLAN:  After discussing treatment options, patient's right iliolumbar region was injected with 4 cc Marcaine and 1/2 cc Celestone. There is no need for surgery at this time. He will start a brief course of outpatient physical therapy. He will follow up at the spine center.      
 
Scribed by Terrell Saleem ([facility]) as dictated by Franco Head MD

## 2019-02-04 ENCOUNTER — OFFICE VISIT (OUTPATIENT)
Dept: CARDIOLOGY CLINIC | Age: 44
End: 2019-02-04

## 2019-02-04 VITALS
HEART RATE: 98 BPM | BODY MASS INDEX: 37.35 KG/M2 | HEIGHT: 74 IN | DIASTOLIC BLOOD PRESSURE: 102 MMHG | WEIGHT: 291 LBS | SYSTOLIC BLOOD PRESSURE: 163 MMHG | OXYGEN SATURATION: 97 %

## 2019-02-04 DIAGNOSIS — I25.10 CORONARY ARTERY DISEASE INVOLVING NATIVE CORONARY ARTERY OF NATIVE HEART WITHOUT ANGINA PECTORIS: Primary | ICD-10-CM

## 2019-02-04 NOTE — PROGRESS NOTES
Subjective:   Mr. Dayami Alcantar is here today for follow up visit after hospitalization at [facility] in August 2018. He presented with chest pain and EKG showed inferior STEMI. He was taken to cardiac cath lab and had totally occluded RCA and RUT was placed. He also had mild 30% stenosis in the diagonal branch, also in the circumflex. He has been taking his mediations as prescribed. He had an admission at [facility] in [facility] 2018 for syncope, had hemoccult positive stool. He was evaluated by GI and underwent EGD which showed two G-J anastomotic ulcers without high risk stigmata, edges biopsied, gastric erosions, biopsies of gastric pouch for Hpylori, slight esophagitis and possible short segment Nunez's. He was told to avoid NSAID's, but that he could continue with ASA and Plavix. He does admit that was was taking Ibuprofen \"like candy\" for cramps and right buttock pain. Today in the office, his only complaint is right buttock pain radiating down the thigh. He states that this started in August after his cardiac cath. He recent saw ortho for this issue, he was given steroid injection, PT and spine center referral sent. He denies injury or strenuous lifting. He denies any chest pain, shortness of breath, palpitations, syncope or LE edema. No further melena or hematochezia. Patient's cardiac risk factors are obesity, sedentary life style, male gender, hypertension.         Patient Active Problem List    Diagnosis Date Noted    History of ST elevation myocardial infarction (STEMI) 12/05/2018    Chronic pain syndrome 12/05/2018    Severe obesity ([address] 12/05/2018    Syncope 11/29/2018    GI bleed 11/29/2018    Post corneal transplant 07/21/2014    Cardiomyopathy ([address] 07/21/2014    Obesity 08/08/2011    Gastric bypass status for obesity 08/08/2011    HTN (hypertension) 08/08/2011    Hypothyroid 08/08/2011     Current Outpatient Medications   Medication Sig Dispense Refill    oxyCODONE-acetaminophen (PERCOCET) 5-325 mg per tablet Take 1 Tab by mouth every four (4) hours as needed for Pain. Max Daily Amount: 6 Tabs. 60 Tab 0    pantoprazole (PROTONIX) 40 mg tablet Take 1 Tab by mouth two (2) times a day. 180 Tab 4    amLODIPine (NORVASC) 10 mg tablet Take 1 Tab by mouth daily. 90 Tab 4    clopidogrel (PLAVIX) 75 mg tab Take 1 Tab by mouth daily. 90 Tab 4    aspirin delayed-release 81 mg tablet Take 1 Tab by mouth daily. 90 Tab 4    metoprolol tartrate (LOPRESSOR) 100 mg IR tablet Take 1 Tab by mouth every twelve (12) hours. 180 Tab 4    atorvastatin (LIPITOR) 80 mg tablet Take 1 Tab by mouth nightly. 90 Tab 4    chlorthalidone (HYGROTEN) 25 mg tablet Take 1 Tab by mouth daily. 90 Tab 4    levothyroxine (SYNTHROID) 200 mcg tablet TAKE ONE TABLET BY MOUTH ONCE DAILY BEFORE  BREAKFAST 90 Tab 4    enalapril (VASOTEC) 20 mg tablet TAKE ONE TABLET BY MOUTH TWICE DAILY 180 Tab 4     No Known Allergies  Past Medical History:   Diagnosis Date    Cardiomyopathy ([facility] [address] 7/21/2014    Chronic pain syndrome 12/5/2018    Gastric bypass status for obesity 8/8/2011    Heart failure ([facility] [address]     History of ST elevation myocardial infarction (STEMI) 12/5/2018    HTN (hypertension) 8/8/2011    Hypothyroid 8/8/2011    Post corneal transplant 7/21/2014    STEMI (ST elevation myocardial infarction) ([facility] [address]     08/2018     Past Surgical History:   Procedure Laterality Date    ABDOMEN SURGERY PROC UNLISTED       No family history on file.   Social History     Tobacco Use   Smoking Status Never Smoker   Smokeless Tobacco Never Used          Review of Systems, additional:  Constitutional: negative  Eyes: negative  Respiratory: negative for dyspnea on exertion  Cardiovascular: per HPI  Gastrointestinal: negative  Musculoskeletal:+right buttock pain radiates to right thigh  Neurological: negative  Behvioral/Psych: negative  Endocrine: negative  ENT: negative    Objective:     Visit Vitals  BP (!) 163/102 Pulse 98   Ht 6' 2\" (1.88 m)   Wt 291 lb (132 kg)   SpO2 97%   BMI 37.36 kg/m²     General:  alert, cooperative, no distress, appears stated age   Chest Wall: inspection normal - no chest wall deformities or tenderness, respiratory effort normal   Lung: clear to auscultation bilaterally   Heart:  normal rate, regular rhythm, normal S1, S2, no murmurs, rubs, clicks or gallops   Abdomen: soft, non-tender. Bowel sounds normal. No masses,  no organomegaly   Extremities: extremities normal, atraumatic, no cyanosis or edema Skin: no rashes   Neuro: alert, oriented, normal speech, no focal findings or movement disorder noted       Assessment/Plan:         ICD-10-CM ICD-9-CM    1. Hypertension, unspecified type-- BP elevated in office today, admits that he has not taken AM meds. I10 401.9 atorvastatin (LIPITOR) 80 mg tablet      metoprolol tartrate (LOPRESSOR) 100 mg IR tablet   2. [facility]-- EF 45%, advised of salt restriction, no volume overload. Continue with current cardiac regimen. I42.9 425.4 atorvastatin (LIPITOR) 80 mg tablet      metoprolol tartrate (LOPRESSOR) 100 mg IR tablet   3. ST elevation myocardial infarction (STEMI), ([facility]-- S/P RUT to RCA Resolute Integrity 4X22 mm, on 8/13/2018. He is on ASA, Plavix, Lopressor and Atorvastatin. Stable. RT 3 mos. He is ok to continue with ASA and Plavix per GI. Advised patient to continue avoiding NSAID's and take Protonix as prescribed. I21.3 410.90 atorvastatin (LIPITOR) 80 mg tablet      metoprolol tartrate (LOPRESSOR) 100 mg IR tablet      REFERRAL TO CARDIAC REHAB   4. Hypothyroidism, unspecified type E03.9 244.9 atorvastatin (LIPITOR) 80 mg tablet      metoprolol tartrate (LOPRESSOR) 100 mg IR tablet   5. Lumbar radiculopathy: continue with PT and spine center referral. Reassured patient that etiology is unlikely cardiac related, as he began feeling that after STEMI in August 2018.

## 2019-02-07 ENCOUNTER — OFFICE VISIT (OUTPATIENT)
Dept: FAMILY MEDICINE CLINIC | Age: 44
End: 2019-02-07

## 2019-02-07 VITALS
TEMPERATURE: 97.5 F | HEART RATE: 87 BPM | SYSTOLIC BLOOD PRESSURE: 157 MMHG | RESPIRATION RATE: 20 BRPM | OXYGEN SATURATION: 99 % | WEIGHT: 292.2 LBS | DIASTOLIC BLOOD PRESSURE: 112 MMHG | BODY MASS INDEX: 37.5 KG/M2 | HEIGHT: 74 IN

## 2019-02-07 DIAGNOSIS — E03.9 HYPOTHYROIDISM, UNSPECIFIED TYPE: ICD-10-CM

## 2019-02-07 DIAGNOSIS — I42.9 CARDIOMYOPATHY, UNSPECIFIED TYPE (HCC): Primary | ICD-10-CM

## 2019-02-07 DIAGNOSIS — I10 HYPERTENSION, UNSPECIFIED TYPE: ICD-10-CM

## 2019-02-07 DIAGNOSIS — K25.4 GASTROINTESTINAL HEMORRHAGE ASSOCIATED WITH GASTRIC ULCER: ICD-10-CM

## 2019-02-07 DIAGNOSIS — G89.4 CHRONIC PAIN SYNDROME: ICD-10-CM

## 2019-02-07 RX ORDER — OXYCODONE AND ACETAMINOPHEN 5; 325 MG/1; MG/1
1 TABLET ORAL
Qty: 60 TAB | Refills: 0 | Status: SHIPPED | OUTPATIENT
Start: 2019-02-07 | End: 2019-03-07 | Stop reason: SDUPTHER

## 2019-02-07 NOTE — PROGRESS NOTES
Room #      SUBJECTIVE:    Yumiko Kearns is a 39 y.o. male who presents today for follow up for chronic pain    1. Have you been to the ER, urgent care clinic since your last visit? Hospitalized since your last visit? NO    2. Have you seen or consulted any other health care providers outside of the [address] [place] [address] since your last visit? Include any pap smears or colon screening. NO  When :  Reason:    Health Maintenance reviewed Yes    There are no preventive care reminders to display for this patient.

## 2019-02-07 NOTE — PROGRESS NOTES
Sofy Moreira is a 39 y.o.  male and presents with    Chief Complaint   Patient presents with    Cardiomyopathy    Thyroid Problem    Hypertension    Pain (Chronic)           Subjective:    Cardiovascular Review:  The patient has hypertension and hyperlipidemia. Diet and Lifestyle: not attempting to follow a low fat, low cholesterol diet  Home BP Monitoring: is not measured at home. Pertinent ROS: taking medications as instructed, no medication side effects noted, no TIA's, no chest pain on exertion, no dyspnea on exertion, no swelling of ankles. Thyroid Review:  Patient is seen for followup of hypothyroidism. Thyroid ROS: denies fatigue, weight changes, heat/cold intolerance, bowel/skin changes or CVS symptoms. Osteoarthritis and Chronic Pain:  Patient has osteoarthritis, primarily affecting the back. Symptoms onset: problem is longstanding. Rheumatological ROS: no current joint or muscle symptoms, essentially pain-free. Response to treatment plan: stable. Additional Concerns:          Patient Active Problem List    Diagnosis Date Noted    History of ST elevation myocardial infarction (STEMI) 12/05/2018    Chronic pain syndrome 12/05/2018    Severe obesity ([facility] [address] 12/05/2018    Syncope 11/29/2018    GI bleed 11/29/2018    Post corneal transplant 07/21/2014    Cardiomyopathy ([facility] [address] 07/21/2014    Obesity 08/08/2011    Gastric bypass status for obesity 08/08/2011    HTN (hypertension) 08/08/2011    Hypothyroid 08/08/2011     Current Outpatient Medications   Medication Sig Dispense Refill    oxyCODONE-acetaminophen (PERCOCET) 5-325 mg per tablet Take 1 Tab by mouth every eight to twelve (8-12) hours as needed for Pain. Max Daily Amount: 3 Tabs. 60 Tab 0    pantoprazole (PROTONIX) 40 mg tablet Take 1 Tab by mouth two (2) times a day. 180 Tab 4    amLODIPine (NORVASC) 10 mg tablet Take 1 Tab by mouth daily. 90 Tab 4    clopidogrel (PLAVIX) 75 mg tab Take 1 Tab by mouth daily. 90 Tab 4    aspirin delayed-release 81 mg tablet Take 1 Tab by mouth daily. 90 Tab 4    metoprolol tartrate (LOPRESSOR) 100 mg IR tablet Take 1 Tab by mouth every twelve (12) hours. 180 Tab 4    atorvastatin (LIPITOR) 80 mg tablet Take 1 Tab by mouth nightly. 90 Tab 4    chlorthalidone (HYGROTEN) 25 mg tablet Take 1 Tab by mouth daily. 90 Tab 4    levothyroxine (SYNTHROID) 200 mcg tablet TAKE ONE TABLET BY MOUTH ONCE DAILY BEFORE  BREAKFAST 90 Tab 4    enalapril (VASOTEC) 20 mg tablet TAKE ONE TABLET BY MOUTH TWICE DAILY 180 Tab 4     No Known Allergies  Past Medical History:   Diagnosis Date    Cardiomyopathy ([facility] [address] 7/21/2014    Chronic pain syndrome 12/5/2018    Gastric bypass status for obesity 8/8/2011    [facility]     History of ST elevation myocardial infarction (STEMI) 12/5/2018    HTN (hypertension) 8/8/2011    Hypothyroid 8/8/2011    Post corneal transplant 7/21/2014    STEMI (ST elevation myocardial infarction) ([facility]     08/2018     Past Surgical History:   Procedure Laterality Date    ABDOMEN SURGERY PROC UNLISTED       History reviewed. No pertinent family history. Social History     Tobacco Use    Smoking status: Never Smoker    Smokeless tobacco: Never Used   Substance Use Topics    Alcohol use: Yes     Comment: 6 pk daily       ROS       All other systems reviewed and are negative.       Objective:  Vitals:    02/07/19 1234 02/07/19 1238   BP: (!) 160/117 (!) 157/112   Pulse: 87    Resp: 20    Temp: 97.5 °F (36.4 °C)    TempSrc: Oral    SpO2: 99%    Weight: 292 lb 3.2 oz (132.5 kg)    Height: 6' 2\" (1.88 m)    PainSc:  10 - Worst pain ever    PainLoc: Hip                  alert, well appearing, and in no distress and oriented to person, place, and time  Chest - clear to auscultation, no wheezes, rales or rhonchi, symmetric air entry  Heart - normal rate, regular rhythm, normal S1, S2, no murmurs, rubs, clicks or gallops        LABS     TESTS      Assessment/Plan: Hypertension - stable  cardiomypathy stable  Thyroid stable  Chronic pain oingong This is a chronic problem that is worsened. Per review of available records and patients , there are not sign of overuse, misuse, diversion, or concerning side effects. Today we reviewed: the risk of overdose, addiction, and dependency  The following changes were made to the patients current treatment plan: nothing, medications refilled. Has appt with ortho in 2 wk. Will f/u  In 1  Mo also schedule PE          Lab review: labs are reviewed, up to date and normal    Diagnoses and all orders for this visit:    1. Cardiomyopathy, unspecified type ([address]  -     LIPID PANEL; Future  -     CBC WITH AUTOMATED DIFF; Future  -     METABOLIC PANEL, COMPREHENSIVE; Future  -     URINALYSIS W/ RFLX MICROSCOPIC; Future  -     TSH 3RD GENERATION; Future  -     T4, FREE; Future    2. Chronic pain syndrome  -     oxyCODONE-acetaminophen (PERCOCET) 5-325 mg per tablet; Take 1 Tab by mouth every eight to twelve (8-12) hours as needed for Pain. Max Daily Amount: 3 Tabs. -     LIPID PANEL; Future  -     CBC WITH AUTOMATED DIFF; Future  -     METABOLIC PANEL, COMPREHENSIVE; Future  -     URINALYSIS W/ RFLX MICROSCOPIC; Future  -     TSH 3RD GENERATION; Future  -     T4, FREE; Future    3. Hypothyroidism, unspecified type  -     LIPID PANEL; Future  -     CBC WITH AUTOMATED DIFF; Future  -     METABOLIC PANEL, COMPREHENSIVE; Future  -     URINALYSIS W/ RFLX MICROSCOPIC; Future  -     TSH 3RD GENERATION; Future  -     T4, FREE; Future    4. Hypertension, unspecified type  -     LIPID PANEL; Future  -     CBC WITH AUTOMATED DIFF; Future  -     METABOLIC PANEL, COMPREHENSIVE; Future  -     URINALYSIS W/ RFLX MICROSCOPIC; Future  -     TSH 3RD GENERATION; Future  -     T4, FREE; Future    5. Gastrointestinal hemorrhage associated with gastric ulcer  -     LIPID PANEL; Future  -     CBC WITH AUTOMATED DIFF;  Future  -     METABOLIC PANEL, COMPREHENSIVE; Future  -     URINALYSIS W/ RFLX MICROSCOPIC; Future  -     TSH 3RD GENERATION; Future  -     T4, FREE; Future          I have discussed the diagnosis with the patient and the intended plan as seen in the above orders. The patient has received an after-visit summary and questions were answered concerning future plans. I have discussed medication side effects and warnings with the patient as well. I have reviewed the plan of care with the patient, accepted their input and they are in agreement with the treatment goals. Follow-up Disposition:  Return in about 4 weeks (around 3/7/2019) for physical, labs today, EKG next visit.

## 2019-02-19 ENCOUNTER — HOSPITAL ENCOUNTER (OUTPATIENT)
Dept: PHYSICAL THERAPY | Age: 44
Discharge: HOME | End: 2019-02-19
Payer: MEDICAID

## 2019-02-19 PROCEDURE — 97162 PT EVAL MOD COMPLEX 30 MIN: CPT

## 2019-02-19 NOTE — PROGRESS NOTES
[address] PHYSICAL THERAPY  [address] [place] [address] Karo Garsia, [address] - Phone: [phone]  Fax: [phone] / 4134 [facility]  Patient Name: Galdino Nye : 1975   Medical   Diagnosis: Right hip pain [M25.551]  Low back pain [M54.5] Treatment Diagnosis: L/S radiculopathy   Onset Date: 2018     Referral Source: Debbie Prasad MD [facility]): 2019   Prior Hospitalization: See medical history Provider #: [id number]   Prior Level of Function: Progressive decline in ability to complete ADL's and functional activities   Comorbidities: BMI, bilateral knee OA, Depression, HTN, visual issues   Medications: Verified on Patient Summary List   The Plan of Care and following information is based on the information from the initial evaluation.   ===========================================================================================  Assessment / key information:  Pt is a 39year old male who presents to PT today with c/o right hip and L/S pain post stent placement on 2018. Upon evaluation, signs and symptoms consistent with mechanical low back pain with radiculopathy. Pt presents with abnormal posture, decreased right LE strength, abnormal gait mechanics, decreased Le flexibility and decreased activity tolerance. Pt main complaint is pain with prolonged sitting and transitioning from sit to stand. Pt able to centralize symptoms from right LE with HARPREET and REIL. Pt would benefit from skilled PT to address deficits noted during initial evaluation and facilitate return to PLOF. Observation: Forward held head, rounded shoulders, moderate kyphotic posture.    - Gross atrophy of right quad compared to left however unable to measure d/t attire.     Palpation: Pt TTP along distal ITB, piriformis, and right L/S paraspinals.      Gait: Pt ambulated independently demonstrating analgic gait favoring right LE.      Squat mechanics:Decreased depth and ability to load posterior chain     ROM / Strength  [] Unable to assess                  AROM                Strength (1-5)      Left Right Left Right   Hip Flexion [facility] [place] WFL 4+/5 4-/5     Extension WFL BURKETT 4+/5 4-/5     Abduction     4+/5 4-/5     Int. rotation   BURKETT 4+/5 4-/5     Ext. rotation   BURKETT 4+/5 4-/5   Knee Flexion WNL WNL 4+/5 4-/5     Extension WNL WNL 4+/5 4-/5   Ankle Plantarflexion WNL WNL 4+/5 3+/5     Dorsiflexion BURKETT BURKETT 4+/5 4/5      L/S AROM:  - Flexion:  72 deg  - Extension: 22 deg  - SB right: WNL  - SB left: WNL  - Bilateral rot:  WNL     Repeated L/S motion:   PATRICIO: Decreased intensity \"slightly\"  REIL: decreased centralized symptoms  HARPREET: Centralized symptoms to L/S     Flexibility:   Hamstrings:               (L) Tightness= [] WNL   [x] Min   [] Mod   [] Severe                (R) Tightness= [] WNL   [] Min   [x] Mod   [] Severe  Quadriceps:               (L) Tightness= [] WNL   [] Min   [x] Mod   [] Severe                (R) Tightness= [] WNL   [] Min   [x] Mod   [] Severe  Gastroc:                 (L) Tightness= [] WNL   [] Min   [x] Mod   [] Severe                (R) Tightness= [] WNL   [] Min   [x] Mod   [] Severe     Special tests:     Mark test    _ Neg    X Pos  Ely's Test                     [] Neg    [x] Pos  Scour (-) bilaterally  Slump: (-) bilaterally  ===========================================================================================  Eval Complexity: History: MEDIUM  Complexity : 1-2 comorbidities / personal factors will impact the outcome/ POC Exam:MEDIUM Complexity : 3 Standardized tests and measures addressing body structure, function, activity limitation and / or participation in recreation  Presentation: MEDIUM Complexity : Evolving with changing characteristics  Clinical Decision Making:MEDIUM Complexity : FOTO score of 26-74Overall Complexity:MEDIUM    FOTO score: 38: which indicates 62 % of functional disability. Problem List: pain affecting function, decrease ROM, decrease strength, edema affecting function, impaired gait/ balance, decrease ADL/ functional abilitiies, decrease activity tolerance, decrease flexibility/ joint mobility and decrease transfer abilities   Treatment Plan may include any combination of the following: Therapeutic exercise, Therapeutic activities, Neuromuscular re-education, Physical agent/modality, Gait/balance training, Manual therapy, Aquatic therapy, Patient education, Self Care training, Functional mobility training, Home safety training and Stair training  Patient / Family readiness to learn indicated by: asking questions, trying to perform skills and interest  Persons(s) to be included in education: patient (P)  Barriers to Learning/Limitations: None  Measures taken: NA   Patient Goal (s): Decrease pain   Patient self reported health status: poor  Rehabilitation Potential: good   Short Term Goals: To be accomplished in  2  weeks:  1. Pt will be compliant with HEP for symptom management at home. 2. Pt will demonstrate independence with ability to centralize radicular symptoms in order to increase compliance with PT program.   3. Pt will tolerate 15 minutes of sitting with no increase in pain in order to facilitate return to seated ADL's and functional activities.  Long Term Goals: To be accomplished in  4  weeks:  1. Pt will be independent with HEP at D/C for self management. 2. Pt will increase right hip abduction strength to at least 4/5 in order to improve hip stability during stance phase of gait. 3. Patient to increase FOTO score to > 49 to indicate improved functional independence. 4. Pt will increase sitting tolerance to at least 60 minutes with no change in position in order to return to PLOF.   Frequency / Duration:   Patient to be seen  2  times per week for 4-6  weeks:  Patient / Caregiver education and instruction: self care, activity modification and exercises  Therapist Signature: James MinayaISAI Date: 0/27/6111   Certification Period: NA Time: 8:54 AM   ===========================================================================================  To ensure your patient receives the highest quality care and to avoid disruption in therapy please sign and return this plan of care within 21 days. Per Medicaid guidelines if the plan of care is not received within 21 days the patient's care must be put on hold until signed. I certify that the above Physical Therapy Services are being furnished while the patient is under my care. I agree with the treatment plan and certify that this therapy is necessary. Physician Signature:        Date:       Time:     Please sign and return to [facility] or you may fax the signed copy to [phone]. Thank you.

## 2019-02-19 NOTE — PROGRESS NOTES
PHYSICAL THERAPY - DAILY TREATMENT NOTE    Patient Name: Galdino Nye        Date: 2019  : 1975   YES Patient  Verified  Visit #:   1     Insurance: Payor: MEDICAID [facility] / Plan: [id number] / Product Type: Medicaid /      In time: 09:15 Out time: 09:55   Total Treatment Time: 40     Medicare Time Tracking (below)   Total Timed Codes (min):  NA 1:1 Treatment Time:  NA     TREATMENT AREA = Right hip pain [M25.551]  Low back pain [M54.5]    SUBJECTIVE    Pain Level (on 0 to 10 scale):  9  / 10   Medication Changes/New allergies or changes in medical history, any new surgeries or procedures? YES    If yes, update Summary List   Subjective Functional Status/Changes:  []  No changes reported     CC:Right hip pain    KATLIN/HPI:Pt states on 2018 pt had stent placed in heart through right groin region. Pt denies any right hip or low back pain prior to stent placement. Pt states he was in the hospital and the pain started during his stay. Pt initially thought it was a pulled muscle however it has progressively worsened since 2018. Pt denies any numbness/tingling down LE's. Pt denies any changes in bowel/bladder. Pt main complaint is lateral knee and posterior/lateral hip. Pt states when he coughs or sneezes he has a shock wave through his body. Pt had 2 injections in right hip 2019 which did not help at all. Pmhx: Denies any right hip or low back pain prior to procedure. Bilateral knee OA. Symptoms  Pain   Today:9/10   Best:6/10   Worst:10/10     Aggravating factors:Seated position, walking, sit<>stand transfers    Relieving factors: Lying on stomach or belly    Occupational tasks: Waiting on disability. Pain/difficulty with functional activities:  [x] Yes [] No   Sit<>stand  [x] Yes [] No Squatting  [x] Yes [] No Lifting  [x] Yes [] No Sitting (5 minutes)  [] Yes [x] No Walking (time)  [x] Yes [] No Stairs     OBJECTIVE    Observation:  Forward held head, rounded shoulders, moderate kyphotic posture. - Gross atrophy of right quad compared to left however unable to measure d/t attire. Palpation: Pt TTP along distal ITB, piriformis, and right L/S paraspinals. Gait: Pt ambulated independently demonstrating analgic gait favoring right LE. Squat mechanics:Decreased depth and ability to load posterior chain    ROM / Strength  [] Unable to assess                  AROM                Strength (1-5)    Left Right Left Right   Hip Flexion [facility] [place] WFL 4+/5 4-/5    Extension WFL BURKETT 4+/5 4-/5    Abduction   4+/5 4-/5    Int. rotation  BURKETT 4+/5 4-/5    Ext. rotation  BURKETT 4+/5 4-/5   Knee Flexion WNL WNL 4+/5 4-/5    Extension WNL WNL 4+/5 4-/5   Ankle Plantarflexion WNL WNL 4+/5 3+/5    Dorsiflexion BURKETT BURKETT 4+/5 4/5     L/S AROM:  - Flexion:  72 deg  - Extension: 22 deg  - SB right: WNL  - SB left: WNL  - Bilateral rot: WNL    Repeated L/S motion:   PATRICIO: Decreased intensity \"slightly\"  REIL: decreased centralized symptoms  HARPREET: Centralized symptoms to L/S    Flexibility:   Hamstrings:    (L) Tightness= [] WNL   [x] Min   [] Mod   [] Severe    (R) Tightness= [] WNL   [] Min   [x] Mod   [] Severe  Quadriceps:    (L) Tightness= [] WNL   [] Min   [x] Mod   [] Severe    (R) Tightness= [] WNL   [] Min   [x] Mod   [] Severe  Gastroc:      (L) Tightness= [] WNL   [] Min   [x] Mod   [] Severe    (R) Tightness= [] WNL   [] Min   [x] Mod   [] Severe    Special tests:    Mark test _ Neg    X Pos  Ely's Test  [] Neg    [x] Pos  Scour (-) bilaterally  Slump: (-) bilaterally     Other Objective/Functional Measures:    See objective measurements above. PT educated pt on MDT exercises. Pt given packet and verbalized understanding. Post Treatment Pain Level (on 0 to 10) scale:   6 / 10     ASSESSMENT    Assessment/Changes in Function: See POC    Justification for Eval Code Complexity: Moderate   Patient History (low 0, mod 1-2, high 3-4):  Moderate: chronic right hip and L/S pain   Examination (low 1-2, mod 3+, high 4+): Moderate: decreased Le flexibility, abnormal posture, decreased LE strength   Clinical Presentation (low: stable/uncomplicated; mod: evolving; high: unstable/unpredictable): Moderate  Clinical Decision Making (low , mod 26-74, high 1-25):  Moderate: FOTO: 38    [x]  See Plan of Care  []  See Progress Note/ Recertification  []  See Discharge Summary        Patient will continue to benefit from skilled PT services to modify and progress therapeutic interventions, address functional mobility deficits, address ROM deficits, address strength deficits, analyze and address soft tissue restrictions, analyze and cue movement patterns, analyze and modify body mechanics/ergonomics, assess and modify postural abnormalities, address imbalance/dizziness and instruct in home and community integration  to attain remaining goals   Progress toward goals / Updated goals:    See POC     PLAN    [x]  Upgrade activities as tolerated  [x]  Update interventions per flow sheet YES Continue plan of care   []  Discharge due to :    []  Other:      Therapist: Carlee Garcia DPT    Date: 2/19/2019 Time: 7:43 AM     Future Appointments   Date Time Provider Enzo Bernal   2/19/2019  9:00 AM [facility]   2/20/2019  9:25 AM MD Arsh Mariee Mark   3/7/2019  9:00 AM Jennyfer Rivera, DO [facility]   5/6/2019  9:00 AM Sofie Elias MD [address] [place] [address]

## 2019-02-20 ENCOUNTER — OFFICE VISIT (OUTPATIENT)
Dept: ORTHOPEDIC SURGERY | Age: 44
End: 2019-02-20

## 2019-02-20 VITALS
TEMPERATURE: 97.6 F | HEIGHT: 74 IN | RESPIRATION RATE: 18 BRPM | DIASTOLIC BLOOD PRESSURE: 83 MMHG | BODY MASS INDEX: 37.5 KG/M2 | WEIGHT: 292.2 LBS | HEART RATE: 66 BPM | SYSTOLIC BLOOD PRESSURE: 130 MMHG | OXYGEN SATURATION: 100 %

## 2019-02-20 DIAGNOSIS — M54.16 LUMBAR NEURITIS: ICD-10-CM

## 2019-02-20 DIAGNOSIS — M51.36 DDD (DEGENERATIVE DISC DISEASE), LUMBAR: ICD-10-CM

## 2019-02-20 DIAGNOSIS — M47.817 LUMBOSACRAL SPONDYLOSIS WITHOUT MYELOPATHY: ICD-10-CM

## 2019-02-20 DIAGNOSIS — M54.50 LOW BACK PAIN AT MULTIPLE SITES: Primary | ICD-10-CM

## 2019-02-20 RX ORDER — TOPIRAMATE 25 MG/1
TABLET ORAL
Qty: 90 TAB | Refills: 1 | Status: SHIPPED | OUTPATIENT
Start: 2019-02-20 | End: 2019-04-04 | Stop reason: ALTCHOICE

## 2019-02-20 NOTE — PROGRESS NOTES
[facility]  [address], [place]   Phone: [phone]  Fax: [phone]        INITIAL CONSULTATION      HISTORY OF PRESENT ILLNESS:  Angel Luis Bar is a 39 y.o. male whom is referred from Dr. Khai Lawler secondary to progressive low back/buttock pain extending into the RLE in an L5 distribution to the knee x 6 months. He rates his pain 6-10/10. He reports the onset of his pain was around the same time he had his cardiac stent put in. He has treated with Percocet. Pt denies hx of spinal surgery or injections. Pt reports he has just started PT. Pt is on Plavix and asprin for his heart. Pt denies fever, weight loss, or skin changes. Pt denies change in bowel or bladder habits. He denies hx of DM and glaucoma. PmHx of gastric bypass (2011), chronic pain syndrome, myocardial infarction. Patient is not a candidate for an MRI due to cardiac stent (August 2018 by Dr. Ruben Meraz). Note from Dr. Khai Lawler dated 1/28/19 indicating patient was seen with c/o pain in the right buttocks and into the right leg. Hx of cardiac stent (August 2018 by Dr. Ruben Meraz) around the time of pain onset. Performed trigger point injection. Referred to us. The patient is RHD.  reviewed. Body mass index is 37.52 kg/m².     PCP: Shameka Millan., DO    Past Medical History:   Diagnosis Date    Cardiomyopathy [facility]) 7/21/2014    Chronic pain syndrome 12/5/2018    Gastric bypass status for obesity 8/8/2011    Heart failure ([facility] [address]     History of ST elevation myocardial infarction (STEMI) 12/5/2018    HTN (hypertension) 8/8/2011    Hypothyroid 8/8/2011    Post corneal transplant 7/21/2014    STEMI (ST elevation myocardial infarction) ([facility] [address]     08/2018          Past Surgical History:   Procedure Laterality Date    ABDOMEN SURGERY PROC UNLISTED           Social History     Tobacco Use    Smoking status: Never Smoker    Smokeless tobacco: Never Used   Substance Use Topics    Alcohol use: Yes     Comment: 6 pk daily     Work status: The patient is not employed. Marital status: The patient is single. Current Outpatient Medications   Medication Sig Dispense Refill    oxyCODONE-acetaminophen (PERCOCET) 5-325 mg per tablet Take 1 Tab by mouth every eight to twelve (8-12) hours as needed for Pain. Max Daily Amount: 3 Tabs. 60 Tab 0    pantoprazole (PROTONIX) 40 mg tablet Take 1 Tab by mouth two (2) times a day. 180 Tab 4    amLODIPine (NORVASC) 10 mg tablet Take 1 Tab by mouth daily. 90 Tab 4    clopidogrel (PLAVIX) 75 mg tab Take 1 Tab by mouth daily. 90 Tab 4    aspirin delayed-release 81 mg tablet Take 1 Tab by mouth daily. 90 Tab 4    metoprolol tartrate (LOPRESSOR) 100 mg IR tablet Take 1 Tab by mouth every twelve (12) hours. 180 Tab 4    atorvastatin (LIPITOR) 80 mg tablet Take 1 Tab by mouth nightly. 90 Tab 4    chlorthalidone (HYGROTEN) 25 mg tablet Take 1 Tab by mouth daily. 90 Tab 4    levothyroxine (SYNTHROID) 200 mcg tablet TAKE ONE TABLET BY MOUTH ONCE DAILY BEFORE  BREAKFAST 90 Tab 4    enalapril (VASOTEC) 20 mg tablet TAKE ONE TABLET BY MOUTH TWICE DAILY 180 Tab 4       No Known Allergies       History reviewed. No pertinent family history. REVIEW OF SYSTEMS  Constitutional symptoms: Negative  Eyes: Negative  Ears, Nose, Throat, and Mouth: Negative  Cardiovascular: Negative  Respiratory: Negative  Genitourinary: Negative  Integumentary (Skin and/or breast): Negative  Musculoskeletal: Positive for low back/buttock pain, RLE pain  Extremities: Negative for edema.   Endocrine/Rheumatologic: Negative  Hematologic/Lymphatic: Negative  Allergic/Immunologic: Negative  Psychiatric: Negative       PHYSICAL EXAMINATION  Visit Vitals  /83   Pulse 66   Temp 97.6 °F (36.4 °C) (Oral)   Resp 18   Ht 6' 2\" (1.88 m)   Wt 292 lb 3.2 oz (132.5 kg)   SpO2 100%   BMI 37.52 kg/m²       CONSTITUTIONAL: NAD, A&O x 3  HEART: Regular rate and rhythm  ABDOMEN: Positive bowel sounds, soft, nontender, and nondistended  LUNGS: Clear to auscultation bilaterally. SKIN: Negative for rash. RANGE OF MOTION: The patient has full passive range of motion in all four extremities. SENSATION: Sensation is intact to light touch throughout. MOTOR:   Straight Leg Raise: Negative, bilateral  Sewell: Negative, bilateral  Deep tendon reflexes are 2+ at the brachioradialis and biceps, and 0 at the triceps bilaterally. Deep tendon reflexes are 2+ at the knees and ankles bilaterally. Shoulder AB/Flex Elbow Flex Wrist Ext Elbow Ext Wrist Flex Hand Intrin Tone   Right +4/5 +4/5 +4/5 +4/5 +4/5 +4/5 +4/5   Left +4/5 +4/5 +4/5 +4/5 +4/5 +4/5 +4/5              Hip Flex Knee Ext Knee Flex Ankle DF GTE Ankle PF Tone   Right +4/5 +4/5 +4/5 +4/5 +4/5 +4/5 +4/5   Left +4/5 +4/5 +4/5 +4/5 +4/5 +4/5 +4/5     RADIOGRAPHS  Preliminary reading of lumbar plain films:  Limited study due to body habits. Moderate disc space narrowing at L5-S1. Small anterior osteophytes noted on L1, L3, L5, and S1. No acute pathology identified. These are being sent out for official reading by Dr. Francy Fernandes. ASSESSMENT   Diagnoses and all orders for this visit:    1. Low back pain at multiple sites  -     AMB POC XRAY, SPINE, LUMBOSACRAL; 2 O    2. Lumbosacral spondylosis without myelopathy    3. Lumbar neuritis    4. DDD (degenerative disc disease), lumbar           IMPRESSIONS/RECOMMENDATIONS:  Patient presents today with c/o low back/buttock pain extending into the RLE in an L5 distribution to the knee x 6 months. Patient reports the onset of his pain was around the time he has his cardiac stent put in. My suspicion is that his sxs may not be due to spinal pathology. I will try him on Topamax 75 mg qhs. The risks, benefits, and potential side effects of this medication were discussed. Patient understands and wishes to proceed. Patient advised to call the office if intolerant to new medication.  I will refer him for an EMG of the RLE to determine if his neuropathic pain is coming from the spine. Multiple treatment options were discussed. Patient is neurologically intact. I will see the patient back following EMG. Written by Anamika Fatima, as dictated by Randal Chu MD  I examined the patient, reviewed and agree with the note.

## 2019-02-26 ENCOUNTER — APPOINTMENT (OUTPATIENT)
Dept: PHYSICAL THERAPY | Age: 44
End: 2019-02-26
Payer: MEDICAID

## 2019-02-28 ENCOUNTER — HOSPITAL ENCOUNTER (OUTPATIENT)
Dept: PHYSICAL THERAPY | Age: 44
Discharge: HOME | End: 2019-02-28
Payer: MEDICAID

## 2019-02-28 PROCEDURE — 97530 THERAPEUTIC ACTIVITIES: CPT

## 2019-02-28 PROCEDURE — 97110 THERAPEUTIC EXERCISES: CPT

## 2019-02-28 NOTE — PROGRESS NOTES
PHYSICAL THERAPY - DAILY TREATMENT NOTE    Patient Name: Skyler         Date: 2019  : 1975   yes Patient  Verified  Visit #:   2   of   8  Insurance: Payor: [address] [place] [address] / Plan: Cheryl Ramos / Product Type: Medicaid /      In time: 903 Out time: 615   Total Treatment Time: 49     TREATMENT AREA =  Right hip pain [M25.551]  Low back pain [M54.5]    SUBJECTIVE  Pain Level (on 0 to 10 scale):  6  / 10   Medication Changes/New allergies or changes in medical history, any new surgeries or procedures?    no  If yes, update Summary List   Subjective Functional Status/Changes:  []  No changes reported     \"I am always hurting. I tried the HEP and it helps some.  I start off my day on my elbows, it does help me when I first get out of bed\"          OBJECTIVE  Modalities Rationale:     decrease pain to improve patient's ability to perform ADLs/ bending/stooping/ lifting/prolong sitting, stding and amb/ stairs with ease    min [] Estim, type/location:                                      []  att     []  unatt     []  w/US     []  w/ice    []  w/heat    min []  Mechanical Traction: type/lbs                   []  pro   []  sup   []  int   []  cont    []  before manual    []  after manual    min []  Ultrasound, settings/location:      min []  Iontophoresis w/ dexamethasone, location:                                               []  take home patch       []  in clinic   10 min []  Ice     [x]  Heat    location/position: prone with wedge under LEs      min []  Vasopneumatic Device, press/temp:     min []  Other:    [x] Skin assessment post-treatment (if applicable):    [x]  intact    []  redness- no adverse reaction     []redness - adverse reaction:        31 min Therapeutic Exercise:  [x]  See flow sheet   Rationale:      increase ROM and increase strength to improve the patients ability to perform ADLs/ bending/stooping/ lifting/prolong sitting, stding and amb/ stairs with ease 8 min Therapeutic Activity: [x]  See flow sheet  postural/bed mobility training   Rationale:    increase ROM, increase strength and improve coordination to improve the patients ability to perform ADLs/ bending/stooping/ lifting/prolong sitting, stding and amb/ stairs with ease     Billed With/As:   [] TE   [x] TA   [] Neuro   [] Self Care Patient Education: [x] Review HEP    [] Progressed/Changed HEP based on:   [x] positioning   [x] body mechanics   [x] transfers   [x] heat/ice application    [x] other: Pt ed on importance and benefits of compliance with HEP, core strength/stability and proper posture; pt verbalized understanding         Other Objective/Functional Measures:  PATRICIO over bar-B/B decrease p! 4/10   HARPREET- maintained 4/10 p! level    VCs + demo to perform proper technique for TE  Initiated TE per flowsheet without c/o p! Reviewed proper bed mobility, sleeping positions,  and importance and benefits of a neutral spine     Post Treatment Pain Level (on 0 to 10) scale:   1  / 10     ASSESSMENT  Assessment/Changes in Function:     Progressed there-ex without c/o increase p!  (I) demos proper bed mobility with instruction      []  See Progress Note/Recertification   Patient will continue to benefit from skilled PT services to modify and progress therapeutic interventions, address functional mobility deficits, address ROM deficits, address strength deficits, analyze and address soft tissue restrictions, analyze and cue movement patterns, analyze and modify body mechanics/ergonomics, assess and modify postural abnormalities and instruct in home and community integration to attain remaining goals.    Progress toward goals / Updated goals:    Pt's first visit since IE, no noted progress        PLAN  [x]  Upgrade activities as tolerated yes Continue plan of care   []  Discharge due to :    []  Other:      Therapist: Belinda Gabriel PTA    Date: 2/28/2019 Time: 12:13 PM     Future Appointments   Date Time Provider Department Center   3/6/2019  9:20 AM Adrian Arita  [facility]    3/7/2019  9:00 AM Lila Woods,  [facility]   3/12/2019  9:50 AM Monica Barros MD [facility]   5/6/2019  9:00 AM Melia Goodpasture, MD [address] [place] [address]

## 2019-03-05 ENCOUNTER — HOSPITAL ENCOUNTER (OUTPATIENT)
Dept: LAB | Age: 44
Discharge: HOME | End: 2019-03-05
Payer: MEDICAID

## 2019-03-05 DIAGNOSIS — E03.9 HYPOTHYROIDISM, UNSPECIFIED TYPE: ICD-10-CM

## 2019-03-05 DIAGNOSIS — K25.4 GASTROINTESTINAL HEMORRHAGE ASSOCIATED WITH GASTRIC ULCER: ICD-10-CM

## 2019-03-05 DIAGNOSIS — I10 HYPERTENSION, UNSPECIFIED TYPE: ICD-10-CM

## 2019-03-05 DIAGNOSIS — I42.9 CARDIOMYOPATHY, UNSPECIFIED TYPE (HCC): ICD-10-CM

## 2019-03-05 DIAGNOSIS — G89.4 CHRONIC PAIN SYNDROME: ICD-10-CM

## 2019-03-05 LAB
ALBUMIN SERPL-MCNC: 3.8 G/DL (ref 3.4–5)
ALBUMIN/GLOB SERPL: 1 {RATIO} (ref 0.8–1.7)
ALP SERPL-CCNC: 108 U/L (ref 45–117)
ALT SERPL-CCNC: 26 U/L (ref 16–61)
ANION GAP SERPL CALC-SCNC: 9 MMOL/L (ref 3–18)
AST SERPL-CCNC: 16 U/L (ref 15–37)
BASOPHILS # BLD: 0 K/UL (ref 0–0.1)
BASOPHILS NFR BLD: 0 % (ref 0–2)
BILIRUB SERPL-MCNC: 0.3 MG/DL (ref 0.2–1)
BUN SERPL-MCNC: 23 MG/DL (ref 7–18)
BUN/CREAT SERPL: 18 (ref 12–20)
CALCIUM SERPL-MCNC: 8.8 MG/DL (ref 8.5–10.1)
CHLORIDE SERPL-SCNC: 105 MMOL/L (ref 100–108)
CHOLEST SERPL-MCNC: 97 MG/DL
CO2 SERPL-SCNC: 25 MMOL/L (ref 21–32)
CREAT SERPL-MCNC: 1.27 MG/DL (ref 0.6–1.3)
DIFFERENTIAL METHOD BLD: ABNORMAL
EOSINOPHIL # BLD: 0.3 K/UL (ref 0–0.4)
EOSINOPHIL NFR BLD: 4 % (ref 0–5)
ERYTHROCYTE [DISTWIDTH] IN BLOOD BY AUTOMATED COUNT: 14.6 % (ref 11.6–14.5)
GLOBULIN SER CALC-MCNC: 4 G/DL (ref 2–4)
GLUCOSE SERPL-MCNC: 95 MG/DL (ref 74–99)
HCT VFR BLD AUTO: 42.8 % (ref 36–48)
HDLC SERPL-MCNC: 52 MG/DL (ref 40–60)
HDLC SERPL: 1.9 {RATIO} (ref 0–5)
HGB BLD-MCNC: 13.6 G/DL (ref 13–16)
LDLC SERPL CALC-MCNC: 29.4 MG/DL (ref 0–100)
LIPID PROFILE,FLP: NORMAL
LYMPHOCYTES # BLD: 1.9 K/UL (ref 0.9–3.6)
LYMPHOCYTES NFR BLD: 25 % (ref 21–52)
MCH RBC QN AUTO: 27.3 PG (ref 24–34)
MCHC RBC AUTO-ENTMCNC: 31.8 G/DL (ref 31–37)
MCV RBC AUTO: 85.9 FL (ref 74–97)
MONOCYTES # BLD: 0.8 K/UL (ref 0.05–1.2)
MONOCYTES NFR BLD: 10 % (ref 3–10)
NEUTS SEG # BLD: 4.6 K/UL (ref 1.8–8)
NEUTS SEG NFR BLD: 61 % (ref 40–73)
PLATELET # BLD AUTO: 283 K/UL (ref 135–420)
PMV BLD AUTO: 10 FL (ref 9.2–11.8)
POTASSIUM SERPL-SCNC: 4.4 MMOL/L (ref 3.5–5.5)
PROT SERPL-MCNC: 7.8 G/DL (ref 6.4–8.2)
RBC # BLD AUTO: 4.98 M/UL (ref 4.7–5.5)
SODIUM SERPL-SCNC: 139 MMOL/L (ref 136–145)
T4 FREE SERPL-MCNC: 1.7 NG/DL (ref 0.7–1.5)
TRIGL SERPL-MCNC: 78 MG/DL (ref ?–150)
TSH SERPL DL<=0.05 MIU/L-ACNC: 0.04 UIU/ML (ref 0.36–3.74)
VLDLC SERPL CALC-MCNC: 15.6 MG/DL
WBC # BLD AUTO: 7.6 K/UL (ref 4.6–13.2)

## 2019-03-05 PROCEDURE — 80061 LIPID PANEL: CPT

## 2019-03-05 PROCEDURE — 85025 COMPLETE CBC W/AUTO DIFF WBC: CPT

## 2019-03-05 PROCEDURE — 80053 COMPREHEN METABOLIC PANEL: CPT

## 2019-03-05 PROCEDURE — 84443 ASSAY THYROID STIM HORMONE: CPT

## 2019-03-05 PROCEDURE — 84439 ASSAY OF FREE THYROXINE: CPT

## 2019-03-06 ENCOUNTER — OFFICE VISIT (OUTPATIENT)
Dept: NEUROLOGY | Age: 44
End: 2019-03-06

## 2019-03-06 DIAGNOSIS — G57.21 FEMORAL NEUROPATHY OF RIGHT LOWER EXTREMITY: Primary | ICD-10-CM

## 2019-03-06 NOTE — PROGRESS NOTES
Bharath Mena is a 39 y.o., right handed male, with a history of gastric bypass surgery approximately 8 years ago with a 200 pound weight loss, prior history of diabetes, who underwent a cardiac stenting back in August 2018 for myocardial infarction he was experiencing. He underwent a right groin puncture and unfortunately there was a significant amount of bleeding into his thigh region after the procedure. Since that time he is noted pain that seems to radiate from his hip in the right side down to below his knee on the right side. He has no pain below the knee itself. Has no weakness. He says this started after the puncture. He denies any significant pain on the left side. He says most of his symptoms on the right side. Social History; patient is single lives with some roommates. He drinks alcohol on a regular basis. No tobacco use. Currently is disabled and not working. Current Outpatient Medications   Medication Sig Dispense Refill    topiramate (TOPAMAX) 25 mg tablet 3 tabs PO QHS 90 Tab 1    oxyCODONE-acetaminophen (PERCOCET) 5-325 mg per tablet Take 1 Tab by mouth every eight to twelve (8-12) hours as needed for Pain. Max Daily Amount: 3 Tabs. 60 Tab 0    pantoprazole (PROTONIX) 40 mg tablet Take 1 Tab by mouth two (2) times a day. 180 Tab 4    amLODIPine (NORVASC) 10 mg tablet Take 1 Tab by mouth daily. 90 Tab 4    clopidogrel (PLAVIX) 75 mg tab Take 1 Tab by mouth daily. 90 Tab 4    aspirin delayed-release 81 mg tablet Take 1 Tab by mouth daily. 90 Tab 4    metoprolol tartrate (LOPRESSOR) 100 mg IR tablet Take 1 Tab by mouth every twelve (12) hours. 180 Tab 4    atorvastatin (LIPITOR) 80 mg tablet Take 1 Tab by mouth nightly. 90 Tab 4    chlorthalidone (HYGROTEN) 25 mg tablet Take 1 Tab by mouth daily.  90 Tab 4    levothyroxine (SYNTHROID) 200 mcg tablet TAKE ONE TABLET BY MOUTH ONCE DAILY BEFORE  BREAKFAST 90 Tab 4    enalapril (VASOTEC) 20 mg tablet TAKE ONE TABLET BY MOUTH TWICE DAILY 180 Tab 4       Past Medical History:   Diagnosis Date    Cardiomyopathy ([address] 7/21/2014    Chronic pain syndrome 12/5/2018    Gastric bypass status for obesity 8/8/2011    Heart failure ([address]     History of ST elevation myocardial infarction (STEMI) 12/5/2018    HTN (hypertension) 8/8/2011    Hypothyroid 8/8/2011    Post corneal transplant 7/21/2014    STEMI (ST elevation myocardial infarction) ([facility]     08/2018       Past Surgical History:   Procedure Laterality Date    ABDOMEN SURGERY PROC UNLISTED         No Known Allergies    Patient Active Problem List   Diagnosis Code    Obesity E66.9    Gastric bypass status for obesity Z98.84    HTN (hypertension) I5    Hypothyroid E03.9    Post corneal transplant Z94.7    Cardiomyopathy ([facility] I42.9    Syncope R55    GI bleed K92.2    History of ST elevation myocardial infarction (STEMI) I25.2    Chronic pain syndrome G89.4    Severe obesity (HCC) E66.01         Review of Systems: He has significant vision loss. As above otherwise 11 point review of systems negative including;   Constitutional no fever or chills  Skin denies rash or itching  HENT  Denies tinnitus, hearing lose  Eyes denies diplopia   Respiratory denies shortness of breath  Cardiovascular denies chest pain, dyspnea on exertion  Gastrointestinal denies nausea, vomiting, diarrhea, constipation  Genitourinary denies incontinence  Musculoskeletal denies joint pain or swelling  Endocrine denies weight change  Hematology denies easy bruising or bleeding   Neurological as above in HPI      PHYSICAL EXAMINATION:      VITAL SIGNS:  There were no vitals taken for this visit. GENERAL: The patient is well developed, well nourished, and in no apparent distress. EXTREMITIES: No clubbing, cyanosis, or edema is identified. Pulses 2+ and symmetrical.  Muscle tone is normal.  There are some venous stasis changes in the lower extremities.   HEAD:   Ear, nose, and throat appear to be without trauma. The patient is normocephalic. NEUROLOGIC EXAMINATION    MENTAL STATUS: The patient is awake, alert, and oriented x 4. Fund of knowledge is adequate. Speech is fluent and memory appears to be intact, both long and short term. CRANIAL NERVES: II - Visual fields are full to confrontation. Funduscopic examination reveals flat disks bilaterally. Pupils are both 3 mm and briskly reactive to light and accommodation. III, IV, VI - Extraocular movements are intact and there is no nystagmus. V - Facial sensation is intact to pinprick and light touch. VII - Face is symmetrical.   VIII - Hearing is present. IX, X, XII- Palate rises symmetrically. Gag is present. Tongue is in the midline. XI - Shoulder shrugging and head turning intact  MOTOR:  The patient is 5/5 in all four limbs without any drift. Fine finger movements are symmetrical.  Isolated motor group testing reveals no focal abnormalities. Tone is normal.  Sensory examination is intact to pinprick, light touch and position sense testing. Reflexes are 2+ and symmetrical. Plantars are down going. Cerebellar examination reveals no gross ataxia or dysmetria.  Gait is normal       CBC:   Lab Results   Component Value Date/Time    WBC 7.6 03/05/2019 08:00 AM    RBC 4.98 03/05/2019 08:00 AM    HGB 13.6 03/05/2019 08:00 AM    HCT 42.8 03/05/2019 08:00 AM    PLATELET 376 76/59/3070 08:00 AM     BMP:   Lab Results   Component Value Date/Time    Glucose 95 03/05/2019 08:00 AM    Sodium 139 03/05/2019 08:00 AM    Potassium 4.4 03/05/2019 08:00 AM    Chloride 105 03/05/2019 08:00 AM    CO2 25 03/05/2019 08:00 AM    BUN 23 (H) 03/05/2019 08:00 AM    Creatinine 1.27 03/05/2019 08:00 AM    Calcium 8.8 03/05/2019 08:00 AM     CMP:   Lab Results   Component Value Date/Time    Glucose 95 03/05/2019 08:00 AM    Sodium 139 03/05/2019 08:00 AM    Potassium 4.4 03/05/2019 08:00 AM    Chloride 105 03/05/2019 08:00 AM    CO2 25 03/05/2019 08:00 AM    BUN 23 (H) 03/05/2019 08:00 AM    Creatinine 1.27 03/05/2019 08:00 AM    Calcium 8.8 03/05/2019 08:00 AM    Anion gap 9 03/05/2019 08:00 AM    BUN/Creatinine ratio 18 03/05/2019 08:00 AM    Alk. phosphatase 108 03/05/2019 08:00 AM    Protein, total 7.8 03/05/2019 08:00 AM    Albumin 3.8 03/05/2019 08:00 AM    Globulin 4.0 03/05/2019 08:00 AM    A-G Ratio 1.0 03/05/2019 08:00 AM     Coagulation:   Lab Results   Component Value Date/Time    Prothrombin time 14.0 11/29/2018 06:25 PM    INR 1.1 11/29/2018 06:25 PM     Cardiac markers:   Lab Results   Component Value Date/Time    CK 58 11/30/2018 12:32 PM    CK-MB Index 2.8 11/30/2018 12:32 PM      [facility]  OFFICE PROCEDURE PROGRESS NOTE        Chart reviewed for the following:   Ketan Randall MD, have reviewed the History, Physical and updated the Allergic reactions for [address] performed immediately prior to start of procedure:   Ketan Randall MD, have performed the following reviews on Christiano Matthew prior to the start of the procedure:            * Patient was identified by name and date of birth   * Agreement on procedure being performed was verified  * Risks and Benefits explained to the patient  * Procedure site verified and marked as necessary  * Patient was positioned for comfort  * Consent was signed and verified     Time: 1:12 PM    Date of procedure: 3/6/2019    Procedure performed by:  Ardia Kussmaul, MD    Provider assisted by: Teola Curling MA    Patient assisted by: self    How tolerated by patient: tolerated the procedure well with no complications    Post Procedural Pain Scale: 0 - No Hurt    Comments: none    Patient: Sylvia Arnold     ID: [id number] Physician: Maite Alicea.  Amberly Singh MD   Gender: Male Ref Phys: Lino Flores MD   Handedness: Teola Curling     Study Date: March 6, 2019       Nerve Conduction Studies  Anti Sensory Summary Table     Stim Site NR Peak (ms) Norm Peak (ms) O-P Amp (µV) Norm O-P Amp Dist (cm) Christian (m/s)   Left Sural Anti Sensory (Ankle)   Calf    3.9 <4.4 4.6 >6.0 14.0 48.3   Site 2    3.8  2.7      Right Sural Anti Sensory (Ankle)   Calf    4.0 <4.4 7.0 >6.0 14.0 42.4   Site 2    6.0  1.6        Motor Summary Table     Stim Site NR Onset (ms) Norm Onset (ms) O-P Amp (mV) Norm O-P Amp Dist (cm) Christian (m/s) Norm Christian (m/s)   Left Peroneal Motor (EDB)   Ankle    3.8 <6.5 6.1 >2.0 39.0 45.9 >44   Fibula (Head)    12.3  5.7  10.0 38.5    Pop Fossa    14.9  5.3       Right Peroneal Motor (EDB)   Ankle    4.5 <6.5 4.3 >2.0 39.0 45.3 >44   Fibula (Head)    13.1  4.2  10.0 47.6    Pop Fossa    15.2  4.0       Left Tibial Motor (Abd Carter Brev)   Ankle    4.2 <5.8 4.3 >4.0 49.0 43.0 >41   Knee    15.6  2.7       Right Tibial Motor (Abd Carter Brev)   Ankle    5.0 <5.8 3.4 >4.0 49.0 41.5 >41   Knee    16.8  2.2           EMG     Side Muscle Nerve Root Ins Act Fibs Psw Fasc Amp Dur Poly Recrt Int Julee [place] Comment   Right AntTibialis Dp Br Fibular L4-5 Nml Nml Nml None Nml Nml 0 Nml Nml    Right MedGastroc   Nml Nml Nml None Nml Nml 0 Nml Nml    Right VastusMed Femoral L2-4 Nml Nml Nml None Nml Nml 0 Nml Nml    Right VastusLat Femoral L2-4 Nml Nml Nml None Nml Nml 0 Nml Nml    Right BicepsFemS Sciatic L5-S1 Nml Nml Nml None Nml Nml 0 Nml Nml    Right ExtHallLong Dp Br Fibular L5, S1 Nml Nml Nml None Nml Nml 0 Nml Nml    Right Lumbo Parasp Up Rami L1-2 Nml Nml Nml          Right Lumbo Parasp Mid Rami L3-4 Nml Nml Nml          Right Lumbo Parasp Low Rami L5-S1 Nml Nml Nml            NCS/EMG FINDINGS:     Evaluation of the Left peroneal motor, the Right peroneal motor, the Left tibial motor, the Left sural sensory, and the Right sural sensory nerves were unremarkable.  The Right tibial motor nerve showed reduced amplitude (3.4 mV).         Waveforms:                    Impression: Normal EMG nerve conduction study showing no signs of neuropathy myopathy or radiculopathy in the nerves and muscles tested. Specifically there are no signs of peripheral neuropathy. 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 This is a partial injury in that there is no sign of denervation at this time on the electromyographic study. PLEASE NOTE:   This document has been produced using voice recognition software. Unrecognized errors in transcription may be present.

## 2019-03-06 NOTE — LETTER
3/6/2019 1:17 PM 
 
Patient:  Tila Tipton YOB: 1975 Date of Visit: 3/6/2019 Dear Salomon Bourgeois MD 
[address] [place] [address] VIA In Basket Megha Guerrero,  
[address] [facility] [address] [id number] VIA In Basket 
 : Thank you for referring Mr. Jayme Ruano to me for evaluation/treatment. Below are the relevant portions of my assessment and plan of care. If you have questions, please do not hesitate to call me. I look forward to following Mr. Orin Howard along with you.  
 
 
 
Sincerely, 
 
 
David Boucher MD

## 2019-03-07 ENCOUNTER — TELEPHONE (OUTPATIENT)
Dept: FAMILY MEDICINE CLINIC | Age: 44
End: 2019-03-07

## 2019-03-07 ENCOUNTER — OFFICE VISIT (OUTPATIENT)
Dept: FAMILY MEDICINE CLINIC | Age: 44
End: 2019-03-07

## 2019-03-07 VITALS
HEIGHT: 74 IN | OXYGEN SATURATION: 99 % | RESPIRATION RATE: 20 BRPM | HEART RATE: 82 BPM | BODY MASS INDEX: 36.99 KG/M2 | WEIGHT: 288.2 LBS | TEMPERATURE: 98.1 F | SYSTOLIC BLOOD PRESSURE: 102 MMHG | DIASTOLIC BLOOD PRESSURE: 65 MMHG

## 2019-03-07 DIAGNOSIS — I42.9 CARDIOMYOPATHY, UNSPECIFIED TYPE (HCC): ICD-10-CM

## 2019-03-07 DIAGNOSIS — Z00.00 ROUTINE GENERAL MEDICAL EXAMINATION AT A HEALTH CARE FACILITY: ICD-10-CM

## 2019-03-07 DIAGNOSIS — E03.9 HYPOTHYROIDISM, UNSPECIFIED TYPE: ICD-10-CM

## 2019-03-07 DIAGNOSIS — G89.4 CHRONIC PAIN SYNDROME: ICD-10-CM

## 2019-03-07 DIAGNOSIS — I10 HYPERTENSION, UNSPECIFIED TYPE: Primary | ICD-10-CM

## 2019-03-07 DIAGNOSIS — I21.3 ST ELEVATION MYOCARDIAL INFARCTION (STEMI), UNSPECIFIED ARTERY (HCC): ICD-10-CM

## 2019-03-07 PROBLEM — K92.2 GI BLEED: Status: RESOLVED | Noted: 2018-11-29 | Resolved: 2019-03-07

## 2019-03-07 PROBLEM — R55 SYNCOPE: Status: RESOLVED | Noted: 2018-11-29 | Resolved: 2019-03-07

## 2019-03-07 RX ORDER — LEVOTHYROXINE SODIUM 200 UG/1
TABLET ORAL
Qty: 90 TAB | Refills: 4 | Status: SHIPPED | OUTPATIENT
Start: 2019-03-07 | End: 2019-05-07 | Stop reason: SDUPTHER

## 2019-03-07 RX ORDER — AMLODIPINE BESYLATE 10 MG/1
10 TABLET ORAL DAILY
Qty: 90 TAB | Refills: 4 | Status: SHIPPED | OUTPATIENT
Start: 2019-03-07 | End: 2019-05-07 | Stop reason: SDUPTHER

## 2019-03-07 RX ORDER — OXYCODONE AND ACETAMINOPHEN 5; 325 MG/1; MG/1
1 TABLET ORAL
Qty: 60 TAB | Refills: 0 | Status: SHIPPED | OUTPATIENT
Start: 2019-03-07 | End: 2019-04-04 | Stop reason: SDUPTHER

## 2019-03-07 RX ORDER — ENALAPRIL MALEATE 20 MG/1
TABLET ORAL
Qty: 180 TAB | Refills: 4 | Status: SHIPPED | OUTPATIENT
Start: 2019-03-07 | End: 2019-05-07 | Stop reason: SDUPTHER

## 2019-03-07 RX ORDER — PANTOPRAZOLE SODIUM 40 MG/1
40 TABLET, DELAYED RELEASE ORAL 2 TIMES DAILY
Qty: 180 TAB | Refills: 4 | Status: SHIPPED | OUTPATIENT
Start: 2019-03-07 | End: 2019-05-07 | Stop reason: SDUPTHER

## 2019-03-07 RX ORDER — CHLORTHALIDONE 25 MG/1
25 TABLET ORAL DAILY
Qty: 90 TAB | Refills: 4 | Status: SHIPPED | OUTPATIENT
Start: 2019-03-07 | End: 2019-05-07 | Stop reason: SDUPTHER

## 2019-03-07 RX ORDER — ASPIRIN 81 MG/1
81 TABLET ORAL DAILY
Qty: 90 TAB | Refills: 4 | Status: SHIPPED | OUTPATIENT
Start: 2019-03-07 | End: 2019-05-07 | Stop reason: SDUPTHER

## 2019-03-07 RX ORDER — CLOPIDOGREL BISULFATE 75 MG/1
75 TABLET ORAL DAILY
Qty: 90 TAB | Refills: 4 | Status: SHIPPED | OUTPATIENT
Start: 2019-03-07 | End: 2019-05-07 | Stop reason: SDUPTHER

## 2019-03-07 RX ORDER — ATORVASTATIN CALCIUM 80 MG/1
80 TABLET, FILM COATED ORAL
Qty: 90 TAB | Refills: 4 | Status: SHIPPED | OUTPATIENT
Start: 2019-03-07 | End: 2019-05-07 | Stop reason: SDUPTHER

## 2019-03-07 RX ORDER — METOPROLOL TARTRATE 100 MG/1
100 TABLET ORAL EVERY 12 HOURS
Qty: 180 TAB | Refills: 4 | Status: SHIPPED | OUTPATIENT
Start: 2019-03-07 | End: 2019-05-07 | Stop reason: SDUPTHER

## 2019-03-07 NOTE — PROGRESS NOTES
Bharath Mena is a 39 y.o.  male and presents for a preventive health care visit        Subjective:  Health Maintenance History  Immunizations reviewed, utd  indicated. colonoscopy: na , Eye exam: na , Chest CT: na,      Patient Active Problem List    Diagnosis Date Noted    History of ST elevation myocardial infarction (STEMI) 12/05/2018    Chronic pain syndrome 12/05/2018    Severe obesity ([facility] [address] 12/05/2018    Post corneal transplant 07/21/2014    Cardiomyopathy ([facility] [address] 07/21/2014    Obesity 08/08/2011    Gastric bypass status for obesity 08/08/2011    HTN (hypertension) 08/08/2011    Hypothyroid 08/08/2011     Current Outpatient Medications   Medication Sig Dispense Refill    oxyCODONE-acetaminophen (PERCOCET) 5-325 mg per tablet Take 1 Tab by mouth every eight to twelve (8-12) hours as needed for Pain for up to 30 days. Max Daily Amount: 3 Tabs. 60 Tab 0    pantoprazole (PROTONIX) 40 mg tablet Take 1 Tab by mouth two (2) times a day. 180 Tab 4    amLODIPine (NORVASC) 10 mg tablet Take 1 Tab by mouth daily. 90 Tab 4    clopidogrel (PLAVIX) 75 mg tab Take 1 Tab by mouth daily. 90 Tab 4    aspirin delayed-release 81 mg tablet Take 1 Tab by mouth daily. 90 Tab 4    metoprolol tartrate (LOPRESSOR) 100 mg IR tablet Take 1 Tab by mouth every twelve (12) hours. 180 Tab 4    atorvastatin (LIPITOR) 80 mg tablet Take 1 Tab by mouth nightly. 90 Tab 4    chlorthalidone (HYGROTEN) 25 mg tablet Take 1 Tab by mouth daily.  90 Tab 4    levothyroxine (SYNTHROID) 200 mcg tablet TAKE ONE TABLET BY MOUTH ONCE DAILY BEFORE  BREAKFAST 90 Tab 4    enalapril (VASOTEC) 20 mg tablet TAKE ONE TABLET BY MOUTH TWICE DAILY 180 Tab 4    topiramate (TOPAMAX) 25 mg tablet 3 tabs PO QHS 90 Tab 1     No Known Allergies  Past Medical History:   Diagnosis Date    Cardiomyopathy ([facility] [address] 7/21/2014    Chronic pain syndrome 12/5/2018    Gastric bypass status for obesity 8/8/2011    Heart failure ([facility] [address]     History of ST elevation myocardial infarction (STEMI) 12/5/2018    HTN (hypertension) 8/8/2011    Hypothyroid 8/8/2011    Post corneal transplant 7/21/2014    STEMI (ST elevation myocardial infarction) ([facility] [address]     08/2018     Past Surgical History:   Procedure Laterality Date    ABDOMEN SURGERY PROC UNLISTED       History reviewed. No pertinent family history.   Social History     Tobacco Use    Smoking status: Never Smoker    Smokeless tobacco: Never Used   Substance Use Topics    Alcohol use: Yes     Comment: 6 pk daily           ROS       General: negative for - chills, fatigue, fever, weight change  Psych: negative for - anxiety, depression, irritability or mood swings  ENT: negative for - headaches, hearing change, nasal congestion, oral lesions, sneezing or sore throat  Heme/ Lymph: negative for - bleeding problems, bruising, pallor or swollen lymph nodes  Endo: negative for - hot flashes, polydipsia/polyuria or temperature intolerance  Resp: negative for - cough, shortness of breath or wheezing  CV: negative for - chest pain, edema or palpitations  GI: negative for - abdominal pain, change in bowel habits, constipation, diarrhea or nausea/vomiting  : negative for - dysuria, hematuria, incontinence, pelvic pain or vulvar/vaginal symptoms  MSK: negative for - joint pain, joint swelling or muscle pain  Neuro: negative for - confusion, headaches, seizures or weakness  Derm: negative for - dry skin, hair changes, rash or skin lesion changes        Objective:  Vitals:    03/07/19 0944   BP: 102/65   Pulse: 82   Resp: 20   Temp: 98.1 °F (36.7 °C)   TempSrc: Oral   SpO2: 99%   Weight: 288 lb 3.2 oz (130.7 kg)   Height: 6' 2\" (1.88 m)   PainSc:   8   PainLoc: Leg     alert, well appearing, and in no distress, oriented to person, place, and time and overweight  General appearance - alert, well appearing, and in no distress, oriented to person, place, and time and normal appearing weight   Visit Vitals  /65 (BP 1 Location: Left arm, BP Patient Position: Sitting)   Pulse 82   Temp 98.1 °F (36.7 °C) (Oral)   Resp 20   Ht 6' 2\" (1.88 m)   Wt 288 lb 3.2 oz (130.7 kg)   SpO2 99%   BMI 37.00 kg/m²       General appearance  alert, cooperative, no distress, appears stated age   Head  Normocephalic, without obvious abnormality, atraumatic   Eyes  conjunctivae/corneas clear. PERRL, EOM's intact. Fundi benign   Ears  normal TM's and external ear canals AU   Nose Nares normal. Septum midline. Mucosa normal. No drainage or sinus tenderness. Throat Lips, mucosa, and tongue normal. Teeth and gums normal   Neck supple, symmetrical, trachea midline, no adenopathy, thyroid: not enlarged, symmetric, no tenderness/mass/nodules, no carotid bruit and no JVD   Back   symmetric, no curvature. ROM normal. No CVA tenderness   Lungs   clear to auscultation bilaterally   Chest wall  no tenderness   Heart  regular rate and rhythm, S1, S2 normal, no murmur, click, rub or gallop   Abdomen   soft, non-tender.  Bowel sounds normal. No masses,  No organomegaly   Genitalia  Normal male   Rectal  Normal tone, normal prostate, no masses or tenderness  Guaiac negative stool   Extremities extremities normal, atraumatic, no cyanosis or edema   Pulses 2+ and symmetric   Skin Skin color, texture, turgor normal. No rashes or lesions   Lymph nodes Cervical, supraclavicular, and axillary nodes normal.   Neurologic Normal       LABS  Component      Latest Ref Rng & Units 3/5/2019 3/5/2019 3/5/2019 3/5/2019           8:00 AM  8:00 AM  8:00 AM  8:00 AM   WBC      4.6 - 13.2 K/uL       RBC      4.70 - 5.50 M/uL       HGB      13.0 - 16.0 g/dL       HCT      36.0 - 48.0 %       MCV      74.0 - 97.0 FL       MCH      24.0 - 34.0 PG       MCHC      31.0 - 37.0 g/dL       RDW      11.6 - 14.5 %       PLATELET      797 - 154 K/uL       MPV      9.2 - 11.8 FL       NEUTROPHILS      40 - 73 %       LYMPHOCYTES      21 - 52 %       MONOCYTES      3 - 10 %       EOSINOPHILS      0 - 5 %       BASOPHILS      0 - 2 %       ABS. NEUTROPHILS      1.8 - 8.0 K/UL       ABS. LYMPHOCYTES      0.9 - 3.6 K/UL       ABS. MONOCYTES      0.05 - 1.2 K/UL       ABS. EOSINOPHILS      0.0 - 0.4 K/UL       ABS. BASOPHILS      0.0 - 0.1 K/UL       DF             Sodium      136 - 145 mmol/L   139    Potassium      3.5 - 5.5 mmol/L   4.4    Chloride      100 - 108 mmol/L   105    CO2      21 - 32 mmol/L   25    Anion gap      3.0 - 18 mmol/L   9    Glucose      74 - 99 mg/dL   95    BUN      7.0 - 18 MG/DL   23 (H)    Creatinine      0.6 - 1.3 MG/DL   1.27    BUN/Creatinine ratio      12 - 20     18    GFR est AA      >60 ml/min/1.73m2   >60    GFR est non-AA      >60 ml/min/1.73m2   >60    Calcium      8.5 - 10.1 MG/DL   8.8    Bilirubin, total      0.2 - 1.0 MG/DL   0.3    ALT (SGPT)      16 - 61 U/L   26    AST      15 - 37 U/L   16    Alk.  phosphatase      45 - 117 U/L   108    Protein, total      6.4 - 8.2 g/dL   7.8    Albumin      3.4 - 5.0 g/dL   3.8    Globulin      2.0 - 4.0 g/dL   4.0    A-G Ratio      0.8 - 1.7     1.0    Cholesterol, total      <200 MG/DL    97   Triglyceride      <150 MG/DL    78   HDL Cholesterol      40 - 60 MG/DL    52   LDL, calculated      0 - 100 MG/DL    29.4   VLDL, calculated      MG/DL    15.6   CHOL/HDL Ratio      0 - 5.0      1.9   TSH      0.36 - 3.74 uIU/mL  0.04 (L)     T4, Free      0.7 - 1.5 NG/DL 1.7 (H)        Component      Latest Ref Rng & Units 3/5/2019 11/29/2018 9/20/2018 9/20/2018           8:00 AM  9:53 PM 11:07 AM 11:07 AM   WBC      4.6 - 13.2 K/uL 7.6      RBC      4.70 - 5.50 M/uL 4.98      HGB      13.0 - 16.0 g/dL 13.6      HCT      36.0 - 48.0 % 42.8      MCV      74.0 - 97.0 FL 85.9      MCH      24.0 - 34.0 PG 27.3      MCHC      31.0 - 37.0 g/dL 31.8      RDW      11.6 - 14.5 % 14.6 (H)      PLATELET      957 - 889 K/uL 283      MPV      9.2 - 11.8 FL 10.0      NEUTROPHILS      40 - 73 % 61      LYMPHOCYTES      21 - 52 % 25      MONOCYTES      3 - 10 % 10      EOSINOPHILS      0 - 5 % 4      BASOPHILS      0 - 2 % 0      ABS. NEUTROPHILS      1.8 - 8.0 K/UL 4.6      ABS. LYMPHOCYTES      0.9 - 3.6 K/UL 1.9      ABS. MONOCYTES      0.05 - 1.2 K/UL 0.8      ABS. EOSINOPHILS      0.0 - 0.4 K/UL 0.3      ABS. BASOPHILS      0.0 - 0.1 K/UL 0.0      DF       AUTOMATED      Sodium      136 - 145 mmol/L       Potassium      3.5 - 5.5 mmol/L       Chloride      100 - 108 mmol/L       CO2      21 - 32 mmol/L       Anion gap      3.0 - 18 mmol/L       Glucose      74 - 99 mg/dL       BUN      7.0 - 18 MG/DL       Creatinine      0.6 - 1.3 MG/DL       BUN/Creatinine ratio      12 - 20         GFR est AA      >60 ml/min/1.73m2       GFR est non-AA      >60 ml/min/1.73m2       Calcium      8.5 - 10.1 MG/DL       Bilirubin, total      0.2 - 1.0 MG/DL       ALT (SGPT)      16 - 61 U/L       AST      15 - 37 U/L       Alk. phosphatase      45 - 117 U/L       Protein, total      6.4 - 8.2 g/dL       Albumin      3.4 - 5.0 g/dL       Globulin      2.0 - 4.0 g/dL       A-G Ratio      0.8 - 1.7         Cholesterol, total      <200 MG/DL       Triglyceride      <150 MG/DL       HDL Cholesterol      40 - 60 MG/DL       LDL, calculated      0 - 100 MG/DL       VLDL, calculated      MG/DL       CHOL/HDL Ratio      0 - 5.0         TSH      0.36 - 3.74 uIU/mL  0.75  0.05 (L)   T4, Free      0.7 - 1.5 NG/DL   1.6 (H)      Component      Latest Ref Rng & Units 7/26/2018 7/26/2018           8:20 AM  8:20 AM   WBC      4.6 - 13.2 K/uL     RBC      4.70 - 5.50 M/uL     HGB      13.0 - 16.0 g/dL     HCT      36.0 - 48.0 %     MCV      74.0 - 97.0 FL     MCH      24.0 - 34.0 PG     MCHC      31.0 - 37.0 g/dL     RDW      11.6 - 14.5 %     PLATELET      572 - 111 K/uL     MPV      9.2 - 11.8 FL     NEUTROPHILS      40 - 73 %     LYMPHOCYTES      21 - 52 %     MONOCYTES      3 - 10 %     EOSINOPHILS      0 - 5 %     BASOPHILS      0 - 2 %     ABS. NEUTROPHILS      1.8 - 8.0 K/UL     ABS. LYMPHOCYTES      0.9 - 3.6 K/UL     ABS. MONOCYTES      0.05 - 1.2 K/UL     ABS. EOSINOPHILS      0.0 - 0.4 K/UL     ABS. BASOPHILS      0.0 - 0.1 K/UL     DF           Sodium      136 - 145 mmol/L     Potassium      3.5 - 5.5 mmol/L     Chloride      100 - 108 mmol/L     CO2      21 - 32 mmol/L     Anion gap      3.0 - 18 mmol/L     Glucose      74 - 99 mg/dL     BUN      7.0 - 18 MG/DL     Creatinine      0.6 - 1.3 MG/DL     BUN/Creatinine ratio      12 - 20       GFR est AA      >60 ml/min/1.73m2     GFR est non-AA      >60 ml/min/1.73m2     Calcium      8.5 - 10.1 MG/DL     Bilirubin, total      0.2 - 1.0 MG/DL     ALT (SGPT)      16 - 61 U/L     AST      15 - 37 U/L     Alk. phosphatase      45 - 117 U/L     Protein, total      6.4 - 8.2 g/dL     Albumin      3.4 - 5.0 g/dL     Globulin      2.0 - 4.0 g/dL     A-G Ratio      0.8 - 1.7       Cholesterol, total      <200 MG/DL     Triglyceride      <150 MG/DL     HDL Cholesterol      40 - 60 MG/DL     LDL, calculated      0 - 100 MG/DL     VLDL, calculated      MG/DL     CHOL/HDL Ratio      0 - 5.0       TSH      0.36 - 3.74 uIU/mL  9.30 (H)   T4, Free      0.7 - 1.5 NG/DL 0.8      TESTS  ekg  nsr    Assessment/Plan:  Healthy patient except as noted below:    Health Maintenance up to date. Recommend f/u physical 1 year. Routine screening labs/tests recommended prior to next physical.      Lab review: labs are reviewed, up to date and normal        I have discussed the diagnosis with the patient and the intended plan as seen in the above orders. The patient has received an after-visit summary and questions were answered concerning future plans. I have discussed medication side effects and warnings with the patient as well. I have reviewed the plan of care with the patient, accepted their input and they are in agreement with the treatment goals.          Follow-up Disposition: Not on File    -------------------------------------------------------------------------------------------------------------------    Problem Assessment  and also with   Chief Complaint   Patient presents with    Thyroid Problem    Hypertension    Pain (Chronic)    Arthritis    Cardiomyopathy         HPI ;  Cardiovascular Review:  The patient has hypertension and cardiomypathy. Diet and Lifestyle: generally follows a low fat low cholesterol diet  Home BP Monitoring: is not measured at home. Pertinent ROS: taking medications as instructed, no medication side effects noted, no TIA's, no chest pain on exertion, no dyspnea on exertion, no swelling of ankles. Thyroid Review:  Patient is seen for followup of hypothyroidism. Thyroid ROS: denies fatigue, weight changes, heat/cold intolerance, bowel/skin changes or CVS symptoms. Osteoarthritis and Chronic Pain:  Patient has osteoarthritis, primarily affecting the diffuse. Symptoms onset: problem is longstanding. Rheumatological ROS: no current joint or muscle symptoms, essentially pain-free. Response to treatment plan: stable. Additional Concerns:          Assessment/Plan:      Hypertension - stable  cardiomyipathy stable  Thyroid stable  Chronic pain  Rodney Hilton RTC today to follow up on chronic pain diagnosis. We discussed his osteoarthritis that is affecting his diffuse . Significant changes since last visit: none. He is  able to do his normal daily activities. He reports the following adverse side effects: none. Least pain over the last week has been 2/10. Worst pain over the last week has been 3/10. Opioid Risk Tool Reviewed: YES    Aberrant behaviors: None. Urine Drug Screen: reviewed and up to date. Controlled substance agreement on file: YES.        reviewed:yes    Pill count is consistent with his prescription: yes    Concomitant use of a benzodiazepine: no             Also,  abstinence syndrome was reviewed and discussed with her today N/A      Diagnoses and all orders for this visit:    1. Hypertension, unspecified type  -     AMB POC EKG ROUTINE W/ 12 LEADS, INTER & REP  -     pantoprazole (PROTONIX) 40 mg tablet; Take 1 Tab by mouth two (2) times a day. -     amLODIPine (NORVASC) 10 mg tablet; Take 1 Tab by mouth daily. -     clopidogrel (PLAVIX) 75 mg tab; Take 1 Tab by mouth daily. -     aspirin delayed-release 81 mg tablet; Take 1 Tab by mouth daily. -     metoprolol tartrate (LOPRESSOR) 100 mg IR tablet; Take 1 Tab by mouth every twelve (12) hours. -     atorvastatin (LIPITOR) 80 mg tablet; Take 1 Tab by mouth nightly. -     levothyroxine (SYNTHROID) 200 mcg tablet; TAKE ONE TABLET BY MOUTH ONCE DAILY BEFORE  BREAKFAST  -     enalapril (VASOTEC) 20 mg tablet; TAKE ONE TABLET BY MOUTH TWICE DAILY    2. Chronic pain syndrome  -     oxyCODONE-acetaminophen (PERCOCET) 5-325 mg per tablet; Take 1 Tab by mouth every eight to twelve (8-12) hours as needed for Pain for up to 30 days. Max Daily Amount: 3 Tabs. 3. Cardiomyopathy, unspecified type ([address]  -     pantoprazole (PROTONIX) 40 mg tablet; Take 1 Tab by mouth two (2) times a day. -     amLODIPine (NORVASC) 10 mg tablet; Take 1 Tab by mouth daily. -     clopidogrel (PLAVIX) 75 mg tab; Take 1 Tab by mouth daily. -     aspirin delayed-release 81 mg tablet; Take 1 Tab by mouth daily. -     metoprolol tartrate (LOPRESSOR) 100 mg IR tablet; Take 1 Tab by mouth every twelve (12) hours. -     atorvastatin (LIPITOR) 80 mg tablet; Take 1 Tab by mouth nightly. -     levothyroxine (SYNTHROID) 200 mcg tablet; TAKE ONE TABLET BY MOUTH ONCE DAILY BEFORE  BREAKFAST  -     enalapril (VASOTEC) 20 mg tablet; TAKE ONE TABLET BY MOUTH TWICE DAILY    4. Hypothyroidism, unspecified type  -     pantoprazole (PROTONIX) 40 mg tablet; Take 1 Tab by mouth two (2) times a day. -     amLODIPine (NORVASC) 10 mg tablet; Take 1 Tab by mouth daily.   -     clopidogrel (PLAVIX) 75 mg tab; Take 1 Tab by mouth daily. -     aspirin delayed-release 81 mg tablet; Take 1 Tab by mouth daily. -     metoprolol tartrate (LOPRESSOR) 100 mg IR tablet; Take 1 Tab by mouth every twelve (12) hours. -     atorvastatin (LIPITOR) 80 mg tablet; Take 1 Tab by mouth nightly. -     levothyroxine (SYNTHROID) 200 mcg tablet; TAKE ONE TABLET BY MOUTH ONCE DAILY BEFORE  BREAKFAST  -     enalapril (VASOTEC) 20 mg tablet; TAKE ONE TABLET BY MOUTH TWICE DAILY    5. ST elevation myocardial infarction (STEMI), unspecified artery (HCC)  -     pantoprazole (PROTONIX) 40 mg tablet; Take 1 Tab by mouth two (2) times a day. -     amLODIPine (NORVASC) 10 mg tablet; Take 1 Tab by mouth daily. -     clopidogrel (PLAVIX) 75 mg tab; Take 1 Tab by mouth daily. -     aspirin delayed-release 81 mg tablet; Take 1 Tab by mouth daily. -     metoprolol tartrate (LOPRESSOR) 100 mg IR tablet; Take 1 Tab by mouth every twelve (12) hours. -     atorvastatin (LIPITOR) 80 mg tablet; Take 1 Tab by mouth nightly. Other orders  -     chlorthalidone (HYGROTEN) 25 mg tablet; Take 1 Tab by mouth daily.           Lab review: labs are reviewed, up to date and normal

## 2019-03-07 NOTE — TELEPHONE ENCOUNTER
Pt. Needs claims for 01/09/19 and 02/07/19 to be sent to Medicaid. He was eligible as of 01/01/19.  Please assist.

## 2019-03-07 NOTE — PROGRESS NOTES
Room #      SUBJECTIVE:    Bharath Mena is a 39 y.o. male who presents today for complete physical with EKG  1. Have you been to the ER, urgent care clinic since your last visit? Hospitalized since your last visit? NO    2. Have you seen or consulted any other health care providers outside of the [address] [place] [address] since your last visit? Include any pap smears or colon screening. NO  When :  Reason:    Health Maintenance reviewed Yes    There are no preventive care reminders to display for this patient.

## 2019-03-15 ENCOUNTER — HOSPITAL ENCOUNTER (OUTPATIENT)
Dept: PHYSICAL THERAPY | Age: 44
Discharge: HOME | End: 2019-03-15
Payer: MEDICAID

## 2019-03-15 PROCEDURE — 97110 THERAPEUTIC EXERCISES: CPT

## 2019-03-15 NOTE — PROGRESS NOTES
PHYSICAL THERAPY - DAILY TREATMENT NOTE    Patient Name: Jackelyn Pringle        Date: 3/15/2019  : 1975   yes Patient  Verified  Visit #:   3 of   8  Insurance: Payor: Jerson Burton / Plan: Elsy Calix / Product Type: Managed Care Medicaid /      In time:  Out time:    Total Treatment Time: 46     TREATMENT AREA =  Right hip pain [M25.551]  Low back pain [M54.5]    SUBJECTIVE  Pain Level (on 0 to 10 scale):  8-9  / 10   Medication Changes/New allergies or changes in medical history, any new surgeries or procedures?    no  If yes, update Summary List   Subjective Functional Status/Changes:  []  No changes reported     \"I had the stomach bug for the last week sorry I wasn't in here.  I think that is why I hurt more today\"          OBJECTIVE  Modalities Rationale:     decrease pain to improve patient's ability to perform ADLs/ bending/stooping/ lifting/prolong sitting, stding and amb/ stairs with ease    min [] Estim, type/location:                                      []  att     []  unatt     []  w/US     []  w/ice    []  w/heat    min []  Mechanical Traction: type/lbs                   []  pro   []  sup   []  int   []  cont    []  before manual    []  after manual    min []  Ultrasound, settings/location:      min []  Iontophoresis w/ dexamethasone, location:                                               []  take home patch       []  in clinic   10 min []  Ice     [x]  Heat* prior to TE     location/position: prone with wedge under LEs      min []  Vasopneumatic Device, press/temp:     min []  Other:    [x] Skin assessment post-treatment (if applicable):    [x]  intact    []  redness- no adverse reaction     []redness - adverse reaction:        36 min Therapeutic Exercise:  [x]  See flow sheet   Rationale:      increase ROM and increase strength to improve the patients ability to perform ADLs/ bending/stooping/ lifting/prolong sitting, stding and amb/ stairs with ease        Billed With/As:   [] TE   [x] TA   [] Neuro   [] Self Care Patient Education: [x] Review HEP    [] Progressed/Changed HEP based on:   [x] positioning   [x] body mechanics   [x] transfers   [x] heat/ice application    [x] other: Pt ed on importance and benefits of compliance with HEP, core strength/stability and proper posture; pt verbalized understanding         Other Objective/Functional Measures:  PATRICIO- unable to perform due to pain in standing,   HARPREET with MHP- reduced p! to 3/10  PATRICIO after HARPREET and bed TE- P/B- decreased p! to 2/10  VCs + demo to perform proper technique for TE  Initiated prone quad str, without c/o p!  demos proper sup>sit without VCs  increased to GTB without difficulty      Post Treatment Pain Level (on 0 to 10) scale:   2-3  / 10     ASSESSMENT  Assessment/Changes in Function:   Progressed there-ex without c/o increase p! []  See Progress Note/Recertification   Patient will continue to benefit from skilled PT services to modify and progress therapeutic interventions, address functional mobility deficits, address ROM deficits, address strength deficits, analyze and address soft tissue restrictions, analyze and cue movement patterns, analyze and modify body mechanics/ergonomics, assess and modify postural abnormalities and instruct in home and community integration to attain remaining goals. Progress toward goals / Updated goals: · Short Term Goals: To be accomplished in  2  weeks:  1. Pt will be compliant with HEP for symptom management at home. MET  2. Pt will demonstrate independence with ability to centralize radicular symptoms in order to increase compliance with PT program. MET  3. Pt will tolerate 15 minutes of sitting with no increase in pain in order to facilitate return to seated ADL's and functional activities. · Long Term Goals: To be accomplished in  4  weeks:  1. Pt will be independent with HEP at D/C for self management.   2. Pt will increase right hip abduction strength to at least 4/5 in order to improve hip stability during stance phase of gait. 3. Patient to increase FOTO score to > 49 to indicate improved functional independence.   4. Pt will increase sitting tolerance to at least 60 minutes with no change in position in order to return to PLOF           PLAN  [x]  Upgrade activities as tolerated yes Continue plan of care   []  Discharge due to :    []  Other:      Therapist: Ashley Pineda PTA    Date: 3/15/2019 Time: 12:13 PM     Future Appointments   Date Time Provider Enzo Bernal   3/19/2019 11:00 AM [facility]   3/21/2019 11:00 AM Concha Carroll [facility]   3/25/2019 10:15 AM Amie Koroma MD [facility]   3/26/2019  2:00 PM [facility]   3/28/2019 10:30 AM [facility]   4/4/2019 10:00 AM Rachael Raphael,  [facility]   5/6/2019  9:00 AM Casie Ramirez MD [address] [place] [address]

## 2019-03-19 ENCOUNTER — HOSPITAL ENCOUNTER (OUTPATIENT)
Dept: PHYSICAL THERAPY | Age: 44
Discharge: HOME | End: 2019-03-19
Payer: MEDICAID

## 2019-03-19 PROCEDURE — 97110 THERAPEUTIC EXERCISES: CPT

## 2019-03-19 NOTE — PROGRESS NOTES
PHYSICAL THERAPY - DAILY TREATMENT NOTE    Patient Name: Jolene Castro        Date: 3/19/2019  : 1975   yes Patient  Verified  Visit #:  4 of   8  Insurance: Payor: Laurie Dhaliwalar / Plan: [employer] / Product Type: Managed Care Medicaid /      In time: 8371 Out time: 1150   Total Treatment Time: 45     TREATMENT AREA =  Right hip pain [M25.551]  Low back pain [M54.5]    SUBJECTIVE  Pain Level (on 0 to 10 scale):  6-7 / 10   Medication Changes/New allergies or changes in medical history, any new surgeries or procedures?    no  If yes, update Summary List   Subjective Functional Status/Changes:  []  No changes reported     \"I am not that bad today, I feel good with the band exercise\"          OBJECTIVE      45 min Therapeutic Exercise:  [x]  See flow sheet   Rationale:      increase ROM and increase strength to improve the patients ability to perform ADLs/ bending/stooping/ lifting/prolong sitting, stding and amb/ stairs with ease        Billed With/As:   [] TE   [x] TA   [] Neuro   [] Self Care Patient Education: [x] Review HEP    [] Progressed/Changed HEP based on:   [x] positioning   [x] body mechanics   [x] transfers   [x] heat/ice application    [x] other: Pt ed on importance and benefits of compliance with HEP, core strength/stability and proper posture; pt verbalized understanding         Other Objective/Functional Measures:    VCs + demo to perform proper technique for TE  Initiated pallof press, mini bridge and POC with TA without c/o p!   VCs/TCs to keep elbows closer to body with tband rows  demos poor posture; improved with VCs  c/o dizziness with sup>sit, subsided within 30 secs   Post Treatment Pain Level (on 0 to 10) scale:   0  / 10     ASSESSMENT  Assessment/Changes in Function:   Progressed there-ex without c/o increase p!  continues to lack core and glut strength, improved activation with VCs     []  See Progress Note/Recertification   Patient will continue to benefit from skilled PT services to modify and progress therapeutic interventions, address functional mobility deficits, address ROM deficits, address strength deficits, analyze and address soft tissue restrictions, analyze and cue movement patterns, analyze and modify body mechanics/ergonomics, assess and modify postural abnormalities and instruct in home and community integration to attain remaining goals. Progress toward goals / Updated goals: · Short Term Goals: To be accomplished in  2  weeks:  1. Pt will be compliant with HEP for symptom management at home. MET  2. Pt will demonstrate independence with ability to centralize radicular symptoms in order to increase compliance with PT program. MET  3. Pt will tolerate 15 minutes of sitting with no increase in pain in order to facilitate return to seated ADL's and functional activities. · Long Term Goals: To be accomplished in  4  weeks:  1. Pt will be independent with HEP at D/C for self management. 2. Pt will increase right hip abduction strength to at least 4/5 in order to improve hip stability during stance phase of gait. 3. Patient to increase FOTO score to > 49 to indicate improved functional independence.   4. Pt will increase sitting tolerance to at least 60 minutes with no change in position in order to return to PLOF     PLAN  [x]  Upgrade activities as tolerated yes Continue plan of care   []  Discharge due to :    []  Other:      Therapist: Luther Litten, PTA    Date: 3/19/2019 Time: 12:20 PM     Future Appointments   Date Time Provider Enzo Bernal   3/21/2019 11:00 AM Rupinder Pena [facility]   3/25/2019 10:15 AM MD [address]   3/26/2019  2:00 PM [facility]   3/28/2019 10:30 AM [facility]   4/4/2019 10:00 AM Esperanza Ortiz, DO [facility]   5/6/2019  9:00 AM Margaret Briscoe MD [address] [place] [address]

## 2019-03-21 ENCOUNTER — HOSPITAL ENCOUNTER (OUTPATIENT)
Dept: PHYSICAL THERAPY | Age: 44
Discharge: HOME | End: 2019-03-21
Payer: MEDICAID

## 2019-03-21 PROCEDURE — 97530 THERAPEUTIC ACTIVITIES: CPT

## 2019-03-21 PROCEDURE — 97110 THERAPEUTIC EXERCISES: CPT

## 2019-03-21 NOTE — PROGRESS NOTES
[address] PHYSICAL THERAPY  [address] [place] [address] Christine Garsia, [address] - Phone: [phone]  Fax: [phone]  Progress Note/CONTINUED PLAN OF CARE for PHYSICAL THERAPY          Patient Name: Mina Thomas : 1975   Treatment/Medical Diagnosis: Right hip pain [M25.551]  Low back pain [M54.5]   Onset Date: 18    Referral Source: Bradly Sorto MD [facility]): 19   Prior Hospitalization: See Medical History Provider #: [id number]   Prior Level of Function: Progressive decline in ability to complete ADL's and functional activities   Comorbidities: BMI, bilateral knee OA, Depression, HTN, visual issues   Medications: Verified on Patient Summary List   Visits from [facility]: 5 Missed Visits: 1     Goal/Measure of Progress Goal Met? 1. Pt will be compliant with HEP for symptom management at home. Status at last Eval: NA Current Status: Reports improving compliance progressing   2. Pt will demonstrate independence with ability to centralize radicular symptoms in order to increase compliance with PT program   Status at last Eval: NA Current Status: Interm ability progressing   3. Pt will increase right hip abduction strength to at least 4/5 in order to improve hip stability during stance phase of gait. Status at last Eval: 4-/5 Current Status: 4/5 yes   4. Patient to increase FOTO score to > 49 to indicate improved functional independence. Status at last Eval: 38 Current Status: 48 progressing     Key Functional Changes/Progress: Pt was seen  For x1 follow-up after evaluation and then was not seen again until 3/15/19 due to illness. Pt does continue to respond to extension based repeated movements demo ability to centralize and abolish symptoms in clinic. Pt does require frequent cues for posture in clinic as pt will demo upright posture then quickly return to slumped sitting/standing with distraction.  Lots of education to pt regarding importance of posture and positioning which pt voices understanding. Overall pt reports 30-40% improvement since [facility]. Currently: Left LEstrength-  Hip flex 4+/5  Knee Ext 4-/5 with pain   Knee Flex4/5  DF 5/5  Hip abd 4/5   Hip ext 4/5   90/90 HS lacking right 45deg  No change noted with sitting tolerance. Problem List: pain affecting function, decrease ROM, decrease strength, impaired gait/ balance, decrease ADL/ functional abilitiies, decrease activity tolerance, decrease flexibility/ joint mobility and decrease transfer abilities   Treatment Plan may include any combination of the following: Therapeutic exercise, Therapeutic activities, Neuromuscular re-education, Physical agent/modality, Gait/balance training, Manual therapy, Aquatic therapy, Patient education, Self Care training, Functional mobility training, Home safety training and Stair training  Patient Goal(s) has been updated and includes: \"Get rid of this pain\"    Goals for this certification period include and are to be achieved in   4  weeks:  1. Pt will be independent with HEP at D/C for self management. 2. Pt will increase 90/90 HS flexibility to lacking </= 25deg for decreased stress on spine. 3. Pt will demo box lift with good mechanics and not increased pain for ease and safety at home. 4. Pt will increase left hip ext MTT to >/= 4+/5 for increased hip ext in gait. Frequency / Duration:   Patient to be seen   2   times per week for   4    weeks:    Assessments/Recommendations: Pt would benefit from cont skilled PT to progress strength, ROM, posture, and activity tolerance. If you have any questions/comments please contact us directly at [phone]. Thank you for allowing us to assist in the care of your patient. Therapist Signature:  Hugo Brand DPT Date: 5/01/9737   Certification Period:  Reporting Period: NA  NA Time: 12:15 PM   NOTE TO PHYSICIAN:  PLEASE COMPLETE THE ORDERS BELOW AND FAX TO   [facility] [place] [facility]: [phone]. If you are unable to process this request in 24 hours please contact our office: [phone].    ___ I have read the above report and request that my patient continue as recommended.   ___ I have read the above report and request that my patient continue therapy with the following changes/special instructions: ________________________________________________   ___ I have read the above report and request that my patient be discharged from therapy.      Physician Signature:        Date:       Time:

## 2019-03-21 NOTE — PROGRESS NOTES
PHYSICAL THERAPY - DAILY TREATMENT NOTE    Patient Name: Andrey Pastor        Date: 3/21/2019  : 1975   YES Patient  Verified  Visit #:   5   of   8  Insurance: Payor: Mike Ivy / Plan: Skyler Norman / Product Type: Managed Care Medicaid /      In time: 11:00 Out time: 11:25   Total Treatment Time: 25     Medicare/BCBS [place] Time Tracking (below)   Total Timed Codes (min):  na 1:1 Treatment Time:  na     TREATMENT AREA =  L/S    SUBJECTIVE    Pain Level (on 0 to 10 scale):   10 right hip and thigh pain   Medication Changes/New allergies or changes in medical history, any new surgeries or procedures?     NO    If yes, update Summary List   Subjective Functional Status/Changes:  []  No changes reported     \"Its getting better\"          OBJECTIVE      10 min Therapeutic Exercise:  [x]  See flow sheet   Rationale:      increase ROM, increase strength and MDT to improve the patients ability to decrease stress on spine     15 min Therapeutic Activity: FOTO assessment and education regarding sitting with lumber roll   Rationale:   Decrease stress on spine   min Patient Education:  YES  Reviewed HEP   []  Progressed/Changed HEP based on:   Importance of MDT, centralization and posture     Other Objective/Functional Measures:  30-40% improvement  Hip flex 4+/5  Knee Ext 4-/5  Knee Flex4/5  DF 5/5  Hip abd 4/5   Hip ext 4/5   90/90 HS right 45deg  No change noted with sleeping posture         Post Treatment Pain Level (on 0 to 10) scale:   3  / 10     ASSESSMENT    Assessment/Changes in Function:     See MD note     []  See Progress Note/Recertification   Patient will continue to benefit from skilled PT services to analyze, cue, progress, modify,, demonstrate, instruct, and address, movement patterns, therapeutic interventions, postural abnormalities, soft tissue restrictions, ROM, strength, functional mobility, body mechanics/ergonomics, and home and community integration, to attain remaining goals.   Progress toward goals / Updated goals:    See MD note     PLAN    []  Upgrade activities as tolerated YES Continue plan of care   []  Discharge due to :    []  Other:      Therapist: Hugo Brand DPT    Date: 3/21/2019 Time: 11:09 AM     Future Appointments   Date Time Provider Enzo Bernal   3/25/2019 10:15 AM MD Arsh Quinteros Mark   3/26/2019  2:00 PM [facility]   3/28/2019 10:30 AM Kervin Bello PTA [facility]   4/4/2019 10:00 AM Rachael Vivar.,  [facility]   5/6/2019  9:00 AM Casie Ramirez MD [address] [place] [address]

## 2019-03-25 ENCOUNTER — OFFICE VISIT (OUTPATIENT)
Dept: ORTHOPEDIC SURGERY | Age: 44
End: 2019-03-25

## 2019-03-25 ENCOUNTER — TELEPHONE (OUTPATIENT)
Dept: CARDIOLOGY CLINIC | Age: 44
End: 2019-03-25

## 2019-03-25 VITALS
WEIGHT: 279 LBS | SYSTOLIC BLOOD PRESSURE: 106 MMHG | OXYGEN SATURATION: 99 % | BODY MASS INDEX: 35.81 KG/M2 | DIASTOLIC BLOOD PRESSURE: 73 MMHG | TEMPERATURE: 97.3 F | HEIGHT: 74 IN | RESPIRATION RATE: 18 BRPM | HEART RATE: 59 BPM

## 2019-03-25 DIAGNOSIS — M47.817 LUMBOSACRAL SPONDYLOSIS WITHOUT MYELOPATHY: Primary | ICD-10-CM

## 2019-03-25 DIAGNOSIS — M54.16 LUMBAR RADICULOPATHY: ICD-10-CM

## 2019-03-25 DIAGNOSIS — M51.36 DDD (DEGENERATIVE DISC DISEASE), LUMBAR: ICD-10-CM

## 2019-03-25 RX ORDER — GABAPENTIN 300 MG/1
300 CAPSULE ORAL 3 TIMES DAILY
Qty: 90 CAP | Refills: 1 | Status: SHIPPED | OUTPATIENT
Start: 2019-03-25 | End: 2019-05-07 | Stop reason: SDUPTHER

## 2019-03-25 NOTE — PROGRESS NOTES
[facility] SPECIAL[facility]  [address], [place]   Phone: [phone]  Fax: [phone]        PROGRESS NOTE      HISTORY OF PRESENT ILLNESS:  The patient is a 39 y.o. male and was seen today for follow up of progressive low back/buttock pain extending into the RLE in an L5 distribution to the knee x 6 months. He reports the onset of his pain was around the same time he had his cardiac stent put in. He has treated with Percocet. Pt denies hx of spinal surgery or injections. Pt reports he has just started PT. Pt is on Plavix and asprin for his heart. Pt denies fever, weight loss, or skin changes. Pt denies change in bowel or bladder habits. He denies hx of DM and glaucoma. The patient is RHD. PmHx of gastric bypass (2011), chronic pain syndrome, myocardial infarction. Patient is not a candidate for an MRI due to cardiac stent (August 2018 by Dr. Gilda Dillon). Note from Dr. Flor Bustillo dated 1/28/19 indicating patient was seen with c/o pain in the right buttocks and into the right leg. Hx of cardiac stent (August 2018 by Dr. Gilda Dillon) around the time of pain onset. Performed trigger point injection. Referred to us. Preliminary reading of lumbar plain films revealed: Limited study due to body habits. Moderate disc space narrowing at L5-S1. Small anterior osteophytes noted on L1, L3, L5, and S1. No acute pathology identified. At his last clinical appointment, patient presented with c/o low back/buttock pain extending into the RLE in an L5 distribution to the knee x 6 months. Patient reported the onset of his pain was around the time he has his cardiac stent put in. My suspicion was that his sxs may not be due to spinal pathology. I tried him on Topamax 75 mg qhs. I referred him for an EMG of the RLE to determine if his neuropathic pain is coming from the spine. The patient returns today with pain location and distribution remain unchanged.  He rates his pain 9/10, previously 6-10/10. He is tolerating Topamax 75 mg qhs without relief. Pt denies change in bowel or bladder habits. A RLE EMG dated 3/6/19 was suggestive of a normal EMG nerve conduction study showing no signs of neuropathy myopathy or radiculopathy in the nerves and muscles tested. Specifically there are no signs of peripheral neuropathy. 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 This is a partial injury in that there is no sign of denervation at this time on the electromyographic study.  reviewed. Body mass index is 35.82 kg/m².       PCP: Mey Groves., DO      Past Medical History:   Diagnosis Date    Cardiomyopathy [facility]) 7/21/2014    Chronic pain syndrome 12/5/2018    Gastric bypass status for obesity 8/8/2011    [facility] failure ([facility] [address]     History of ST elevation myocardial infarction (STEMI) 12/5/2018    HTN (hypertension) 8/8/2011    Hypothyroid 8/8/2011    Post corneal transplant 7/21/2014    STEMI (ST elevation myocardial infarction) ([facility] [address]     08/2018        Social History     Socioeconomic History    Marital status: SINGLE     Spouse name: Not on file    Number of children: Not on file    Years of education: Not on file    Highest education level: Not on file   Occupational History    Not on file   Social Needs    Financial resource strain: Not on file    Food insecurity:     Worry: Not on file     Inability: Not on file    Transportation needs:     Medical: Not on file     Non-medical: Not on file   Tobacco Use    Smoking status: Never Smoker    Smokeless tobacco: Never Used   Substance and Sexual Activity    Alcohol use: Yes     Comment: 6 pk daily    Drug use: No    Sexual activity: Yes   Lifestyle    Physical activity:     Days per week: Not on file     Minutes per session: Not on file    Stress: Not on file Relationships    Social connections:     Talks on phone: Not on file     Gets together: Not on file     Attends [employer] service: Not on file     Active member of club or organization: Not on file     Attends meetings of clubs or organizations: Not on file     Relationship status: Not on file    Intimate partner violence:     Fear of current or ex partner: Not on file     Emotionally abused: Not on file     Physically abused: Not on file     Forced sexual activity: Not on file   Other Topics Concern    Not on file   Social History Narrative    Not on file       Current Outpatient Medications   Medication Sig Dispense Refill    oxyCODONE-acetaminophen (PERCOCET) 5-325 mg per tablet Take 1 Tab by mouth every eight to twelve (8-12) hours as needed for Pain for up to 30 days. Max Daily Amount: 3 Tabs. 60 Tab 0    pantoprazole (PROTONIX) 40 mg tablet Take 1 Tab by mouth two (2) times a day. 180 Tab 4    amLODIPine (NORVASC) 10 mg tablet Take 1 Tab by mouth daily. 90 Tab 4    clopidogrel (PLAVIX) 75 mg tab Take 1 Tab by mouth daily. 90 Tab 4    aspirin delayed-release 81 mg tablet Take 1 Tab by mouth daily. 90 Tab 4    metoprolol tartrate (LOPRESSOR) 100 mg IR tablet Take 1 Tab by mouth every twelve (12) hours. 180 Tab 4    atorvastatin (LIPITOR) 80 mg tablet Take 1 Tab by mouth nightly. 90 Tab 4    chlorthalidone (HYGROTEN) 25 mg tablet Take 1 Tab by mouth daily.  90 Tab 4    levothyroxine (SYNTHROID) 200 mcg tablet TAKE ONE TABLET BY MOUTH ONCE DAILY BEFORE  BREAKFAST 90 Tab 4    enalapril (VASOTEC) 20 mg tablet TAKE ONE TABLET BY MOUTH TWICE DAILY 180 Tab 4    topiramate (TOPAMAX) 25 mg tablet 3 tabs PO QHS 90 Tab 1       No Known Allergies       PHYSICAL EXAMINATION    Visit Vitals  /73   Pulse (!) 59   Temp 97.3 °F (36.3 °C)   Resp 18   Ht 6' 2\" (1.88 m)   Wt 279 lb (126.6 kg)   SpO2 99%   BMI 35.82 kg/m²       CONSTITUTIONAL: NAD, A&O x 3  SENSATION: Intact to light touch throughout  RANGE OF MOTION: The patient has full passive range of motion in all four extremities. MOTOR:  Straight Leg Raise: Negative, bilateral               Hip Flex Knee Ext Knee Flex Ankle DF GTE Ankle PF Tone   Right +4/5 +4/5 +4/5 +4/5 +4/5 +4/5 +4/5   Left +4/5 +4/5 +4/5 +4/5 +4/5 +4/5 +4/5       ASSESSMENT   Diagnoses and all orders for this visit:    1. Lumbosacral spondylosis without myelopathy    2. Lumbar radiculopathy    3. DDD (degenerative disc disease), lumbar          IMPRESSION AND PLAN:  I will contact the patient's cardiologist to determine if it is safe to obtain an MRI of the lumbar spine, as he had cardiac stents put in August of 2018. Patient was given instructions to wean off of Topamax 75 mg qhs. I will try him on Neurontin 300mg TID. The risks, benefits, and potential side effects of this medication were discussed. Patient understands and wishes to proceed. Patient advised to call the office if intolerant to new medication. Patient is neurologically intact. I will see the patient back in 1 month's time or following MRI or earlier if needed. Written by Magno Maharaj, as dictated by Katie Ramirez MD  I examined the patient, reviewed and agree with the note.

## 2019-03-25 NOTE — PROGRESS NOTES
1. Have you been to the ER, urgent care clinic since your last visit? Hospitalized since your last visit? No    2. Have you seen or consulted any other health care providers outside of the [address] [place] [address] since your last visit? Include any pap smears or colon screening.  No

## 2019-03-25 NOTE — TELEPHONE ENCOUNTER
Incoming  from Chantelle Baugh from Dr Lizz Baird (Ortho spine). Two patient Identifiers confirmed. Advised they would like to know if pt can use MRI machine since he had stents placed 08/2018. Did not see stent card in medica. Will inquire with Provider and advise. Chantelle Baugh verbalized understanding.

## 2019-03-26 ENCOUNTER — APPOINTMENT (OUTPATIENT)
Dept: PHYSICAL THERAPY | Age: 44
End: 2019-03-26
Payer: MEDICAID

## 2019-03-26 NOTE — TELEPHONE ENCOUNTER
Arlen Obrien. Two patient Identifiers confirmed. Advised per Dr Sayda Bolden notes below ok to have MRI. Dru Mauricio verbalized understanding.

## 2019-03-28 ENCOUNTER — TELEPHONE (OUTPATIENT)
Dept: ORTHOPEDIC SURGERY | Age: 44
End: 2019-03-28

## 2019-03-28 ENCOUNTER — HOSPITAL ENCOUNTER (OUTPATIENT)
Dept: PHYSICAL THERAPY | Age: 44
Discharge: HOME | End: 2019-03-28
Payer: MEDICAID

## 2019-03-28 PROCEDURE — 97110 THERAPEUTIC EXERCISES: CPT

## 2019-03-28 PROCEDURE — 97530 THERAPEUTIC ACTIVITIES: CPT

## 2019-03-28 NOTE — PROGRESS NOTES
PHYSICAL THERAPY - DAILY TREATMENT NOTE    Patient Name: Man Sagastume        Date: 3/28/2019  : 1975   yes Patient  Verified  Visit #:     13  Insurance: Payor: Jescia Barry / Plan: Veronika Quiñonez / Product Type: Managed Care Medicaid /      In time: 56 Out time:    Total Treatment Time: 52     TREATMENT AREA =  Right hip pain [M25.551]  Low back pain [M54.5]    SUBJECTIVE  Pain Level (on 0 to 10 scale):  4-5/ 10   Medication Changes/New allergies or changes in medical history, any new surgeries or procedures? yes  If yes, update Summary List   Subjective Functional Status/Changes:  []  No changes reported     \"I am all messed up, my ankles have arthritis and I am always a little dizzy. I am on 3 BP meds. I got new nerve p! meds, I forgot the name, I can bring it in with me next time\"          OBJECTIVE      42 min Therapeutic Exercise:  [x]  See flow sheet   Rationale:      increase ROM and increase strength to improve the patients ability to perform ADLs/ bending/stooping/ lifting/prolong sitting, stding and amb/ stairs with ease      10 min Therapeutic Activity: postural training  mini bridge for bed mobility  HRs for ankle push off during gait  hip hinge to promote neutral spine for bending/lifting/transfers   Rationale:    increase ROM, increase strength and improve coordination to improve the patients ability to perform ADLs/ bending/stooping/ lifting/prolong sitting, stding and amb/ stairs with ease         Billed With/As:   [] TE   [x] TA   [] Neuro   [] Self Care Patient Education: [x] Review HEP    [] Progressed/Changed HEP based on:   [x] positioning   [x] body mechanics   [x] transfers   [x] heat/ice application    [x] other: Pt ed on importance and benefits of compliance with HEP, core strength/stability and proper posture; pt verbalized understanding     Other Objective/Functional Measures:  c/o dizziness from new meds, CN=611/90, pt reports normal BP =120/80.  Pt ed on FAST, and to f/u with MD if sxs persist; pt verbalized understanding    initiated 90/90 HS str with strap, clams, and hip hinge without c/o p!    paul improved bridge form     c/o dizziness after 6 reps of hip hinge, had to sit and rest at this time. Note pt able to perform correct form for all 6 reps  PATRICIO Over bar- abolished 2/10 p! level     Post Treatment Pain Level (on 0 to 10) scale:   0  / 10     ASSESSMENT  Assessment/Changes in Function:   Progressed there-ex without c/o increase p!  paul improved posture in sitting without VCing, continues to require VCs to decrease fwd head performing standing band TE     []  See Progress Note/Recertification   Patient will continue to benefit from skilled PT services to modify and progress therapeutic interventions, address functional mobility deficits, address ROM deficits, address strength deficits, analyze and address soft tissue restrictions, analyze and cue movement patterns, analyze and modify body mechanics/ergonomics, assess and modify postural abnormalities and instruct in home and community integration to attain remaining goals.    Progress toward goals / Updated goals:  See PN last visit, no changes noted at this time       PLAN  [x]  Upgrade activities as tolerated yes Continue plan of care   []  Discharge due to :    []  Other:      Therapist: Bere Godoy PTA    Date: 3/28/2019 Time: 12:20 PM     Future Appointments   Date Time Provider Enzo Bernal   4/1/2019 11:00 AM [facility]   4/3/2019 11:00 AM Rachel Peunte [facility]   4/4/2019 10:00 AM DO ERIC Machado   4/9/2019 11:00 AM Gita Obregon [facility]   4/11/2019 11:00 AM [facility]   4/15/2019 11:00 AM [facility]   4/17/2019 11:00 AM Rachel Puente [facility]   4/23/2019 10:15 AM MD Arsh Brand   4/23/2019 11:00 AM Gita Obregon [facility] 4/25/2019 11:00 AM Tobi Qureshi [facility]   5/6/2019  9:00 AM Seb Martinez MD [address] [place] [address]

## 2019-03-28 NOTE — TELEPHONE ENCOUNTER
Spoke to Lizzy at Dr. Kia Goetz office in regards to the patient having a MRI after having stents placed in August. Lizzy states that it is ok for the patient to have a MRI. Order has been placed.

## 2019-04-01 ENCOUNTER — HOSPITAL ENCOUNTER (OUTPATIENT)
Dept: PHYSICAL THERAPY | Age: 44
Discharge: HOME | End: 2019-04-01
Payer: MEDICAID

## 2019-04-01 PROCEDURE — 97530 THERAPEUTIC ACTIVITIES: CPT

## 2019-04-01 PROCEDURE — 97110 THERAPEUTIC EXERCISES: CPT

## 2019-04-01 NOTE — PROGRESS NOTES
PHYSICAL THERAPY - DAILY TREATMENT NOTE    Patient Name: Marianne Prasad        Date: 2019  : 1975   yes Patient  Verified  Visit #:    Insurance: Payor: Benoit Edith / Plan: QRGL / Product Type: Managed Care Medicaid /      In time: 1100 Out time: 1150   Total Treatment Time: 50     TREATMENT AREA =  Right hip pain [M25.551]  Low back pain [M54.5]    SUBJECTIVE  Pain Level (on 0 to 10 scale):  3/ 10   Medication Changes/New allergies or changes in medical history, any new surgeries or procedures?    no  If yes, update Summary List   Subjective Functional Status/Changes:  []  No changes reported   *Pt's new meds from last visit in chart* ( Gabapenntin 300 mg, 3x daily)    \"I feel pretty good, I don't know if its the new meds or this therapy\"          OBJECTIVE      40 min Therapeutic Exercise:  [x]  See flow sheet   Rationale:      increase ROM and increase strength to improve the patients ability to perform ADLs/ bending/stooping/ lifting/prolong sitting, stding and amb/ stairs with ease      10 min Therapeutic Activity: postural training  mini bridge for bed mobility  HRs for ankle push off during gait  hip hinge + mini squats to promote neutral spine for bending/lifting/transfers   Rationale:    increase ROM, increase strength and improve coordination to improve the patients ability to perform ADLs/ bending/stooping/ lifting/prolong sitting, stding and amb/ stairs with ease         Billed With/As:   [] TE   [x] TA   [] Neuro   [] Self Care Patient Education: [x] Review HEP    [] Progressed/Changed HEP based on:   [x] positioning   [x] body mechanics   [x] transfers   [x] heat/ice application    [x] other: Pt ed on importance and benefits of compliance with HEP, core strength/stability and proper posture; pt verbalized understanding     Other Objective/Functional Measures:  VCs + demo to perform proper technique for TE    initiated HS str at step and hip hinge + a mini squat without c/o p!    demos increased anterior translation with mini squat, improved with VCs, note no c/o dizziness at this time; last session wasn't able to complete > 6 reps due to dizziness       Post Treatment Pain Level (on 0 to 10) scale:   0  / 10     ASSESSMENT  Assessment/Changes in Function:     continues to progress with TE without increase p!  progressing towards increased amb/standing tolerance and promote proper body mechanics   pt able to execute core activation and perform upright posture for all standing TE; previously unable to keep upright posture      []  See Progress Note/Recertification   Patient will continue to benefit from skilled PT services to modify and progress therapeutic interventions, address functional mobility deficits, address ROM deficits, address strength deficits, analyze and address soft tissue restrictions, analyze and cue movement patterns, analyze and modify body mechanics/ergonomics, assess and modify postural abnormalities and instruct in home and community integration to attain remaining goals. Progress toward goals / Updated goals:  · Goals for this certification period include and are to be achieved in   4  weeks:  1. Pt will be independent with HEP at D/C for self management. 2. Pt will increase 90/90 HS flexibility to lacking </= 25deg for decreased stress on spine. progressing, performing 90/90 HS without increase p!  3. Pt will demo box lift with good mechanics and not increased pain for ease and safety at home. progressing, demos proper hip hinge mini squat without increase p!  4. Pt will increase left hip ext MTT to >/= 4+/5 for increased hip ext in gait.       PLAN  [x]  Upgrade activities as tolerated yes Continue plan of care   []  Discharge due to :    []  Other:      Therapist: Shanell Main PTA    Date: 4/1/2019 Time: 12:20 PM     Future Appointments   Date Time Provider Enzo Bernal   4/3/2019 11:00 AM Henrry Uribe, PT [facility] AT [facility]   4/4/2019 10:00 AM Julianne Jacob DO [facility]   4/9/2019 11:00 AM Nahid Bonilla PTA [facility]   4/11/2019 11:00 AM [facility]   4/15/2019 11:00 AM [facility]   4/17/2019 11:00 AM Deedee Love [facility]   4/23/2019 10:15 AM Ninoska Zendejas MD [facility]   4/23/2019 11:00 AM [facility]   4/25/2019 11:00 AM [facility]   5/6/2019  9:00 AM Robson Villarreal MD [address] [place] [address]

## 2019-04-03 ENCOUNTER — HOSPITAL ENCOUNTER (OUTPATIENT)
Dept: PHYSICAL THERAPY | Age: 44
Discharge: HOME | End: 2019-04-03
Payer: MEDICAID

## 2019-04-03 PROCEDURE — 97110 THERAPEUTIC EXERCISES: CPT

## 2019-04-03 NOTE — PROGRESS NOTES
PHYSICAL THERAPY - DAILY TREATMENT NOTE    Patient Name: Tami Patricia        Date: 4/3/2019  : 1975   YES Patient  Verified  Visit #:     Insurance: Payor: Sybil Pruett / Plan: [id number] [employer] / Product Type: Managed Care Medicaid /      In time: 11:02 Out time: 11:45   Total Treatment Time: 43     Medicare/BCBS [place] Time Tracking (below)   Total Timed Codes (min):  NA 1:1 Treatment Time:  NA     TREATMENT AREA =  L/S, Right hip    SUBJECTIVE    Pain Level (on 0 to 10 scale):  3  / 10   Medication Changes/New allergies or changes in medical history, any new surgeries or procedures? NO    If yes, update Summary List   Subjective Functional Status/Changes:  []  No changes reported     \"I really think this new Med is helping a lot. Well and this too\"          OBJECTIVE      43 min Therapeutic Exercise:  [x]  See flow sheet   Rationale:      increase ROM and increase strength to improve the patients ability to perform ADLs and amb with increased ease      min Patient Education:  YES  Reviewed HEP   []  Progressed/Changed HEP based on:   Cont HEP     Other Objective/Functional Measures: Added new TE today  Pt challenged with upright trunk during standing hip abd and ext-Muscles fatigue reported but no increased pain     Post Treatment Pain Level (on 0 to 10) scale:   0  / 10     ASSESSMENT    Assessment/Changes in Function:     Cont to respond well to TE     []  See Progress Note/Recertification   Patient will continue to benefit from skilled PT services to analyze, cue, progress, modify,, demonstrate, instruct, and address, movement patterns, therapeutic interventions, postural abnormalities, soft tissue restrictions, ROM, strength, functional mobility, body mechanics/ergonomics, and home and community integration, to attain remaining goals. Progress toward goals / Updated goals:    1. Pt will be independent with HEP at D/C for self management.   2. Pt will increase 90/90 HS flexibility to lacking </= 25deg for decreased stress on spine. 3. Pt will demo box lift with good mechanics and not increased pain for ease and safety at home. 4. Pt will increase left hip ext MTT to >/= 4+/5 for increased hip ext in gait.  added standing hip ext         PLAN    []  Upgrade activities as tolerated YES\ Continue plan of care   []  Discharge due to :    []  Other:      Therapist: Mega Humphrey DPT    Date: 4/3/2019 Time: 11:05 AM     Future Appointments   Date Time Provider Enzo Bernal   4/4/2019 10:00 AM DO ERIC Hernandez   4/9/2019 11:00 AM Comfort Benitez PTA [facility]   4/11/2019 11:00 AM [facility]   4/15/2019 11:00 AM [facility]   4/17/2019 11:00 AM Evaristo Terry PT [facility]   4/22/2019 10:00 AM [facility]   4/23/2019 10:15 AM Amy Robles MD [facility]   4/25/2019 11:00 AM [facility]   5/6/2019  9:00 AM Mattie Mobley MD [address] [place] [address]

## 2019-04-04 ENCOUNTER — OFFICE VISIT (OUTPATIENT)
Dept: FAMILY MEDICINE CLINIC | Age: 44
End: 2019-04-04

## 2019-04-04 VITALS
HEIGHT: 74 IN | RESPIRATION RATE: 16 BRPM | OXYGEN SATURATION: 97 % | BODY MASS INDEX: 36.91 KG/M2 | HEART RATE: 57 BPM | SYSTOLIC BLOOD PRESSURE: 110 MMHG | DIASTOLIC BLOOD PRESSURE: 74 MMHG | WEIGHT: 287.6 LBS | TEMPERATURE: 97.8 F

## 2019-04-04 DIAGNOSIS — G89.4 CHRONIC PAIN SYNDROME: ICD-10-CM

## 2019-04-04 RX ORDER — OXYCODONE AND ACETAMINOPHEN 5; 325 MG/1; MG/1
1 TABLET ORAL
Qty: 60 TAB | Refills: 0 | Status: SHIPPED | OUTPATIENT
Start: 2019-04-04 | End: 2019-05-04

## 2019-04-04 NOTE — PROGRESS NOTES
Dat Jaime is a 39 y.o.  male and presents with    Chief Complaint   Patient presents with    Cardiomyopathy    Thyroid Problem    Hypertension    Coronary Artery Disease    Pain (Chronic)           Subjective:    Cardiovascular Review:  The patient has hypertension, hyperlipidemia and coronary artery disease. Diet and Lifestyle: not attempting to follow a low fat, low cholesterol diet  Home BP Monitoring: is not measured at home. Pertinent ROS: taking medications as instructed, no medication side effects noted, no TIA's, no chest pain on exertion, no dyspnea on exertion, no swelling of ankles. Thyroid Review:  Patient is seen for followup of hypothyroidism. Thyroid ROS: denies fatigue, weight changes, heat/cold intolerance, bowel/skin changes or CVS symptoms. Osteoarthritis and Chronic Pain:  Patient has osteoarthritis, primarily affecting the diffuse. Symptoms onset: problem is longstanding. Rheumatological ROS: no current joint or muscle symptoms, essentially pain-free. Response to treatment plan: stable. Additional Concerns:          Patient Active Problem List    Diagnosis Date Noted    History of ST elevation myocardial infarction (STEMI) 12/05/2018    Chronic pain syndrome 12/05/2018    Severe obesity ([facility] [address] 12/05/2018    Post corneal transplant 07/21/2014    Cardiomyopathy ([facility] [address] 07/21/2014    Obesity 08/08/2011    Gastric bypass status for obesity 08/08/2011    HTN (hypertension) 08/08/2011    Hypothyroid 08/08/2011     Current Outpatient Medications   Medication Sig Dispense Refill    oxyCODONE-acetaminophen (PERCOCET) 5-325 mg per tablet Take 1 Tab by mouth every eight to twelve (8-12) hours as needed for Pain for up to 30 days. Max Daily Amount: 3 Tabs. 60 Tab 0    gabapentin (NEURONTIN) 300 mg capsule Take 1 Cap by mouth three (3) times daily. 90 Cap 1    pantoprazole (PROTONIX) 40 mg tablet Take 1 Tab by mouth two (2) times a day.  180 Tab 4    amLODIPine (NORVASC) 10 mg tablet Take 1 Tab by mouth daily. 90 Tab 4    clopidogrel (PLAVIX) 75 mg tab Take 1 Tab by mouth daily. 90 Tab 4    aspirin delayed-release 81 mg tablet Take 1 Tab by mouth daily. 90 Tab 4    metoprolol tartrate (LOPRESSOR) 100 mg IR tablet Take 1 Tab by mouth every twelve (12) hours. 180 Tab 4    atorvastatin (LIPITOR) 80 mg tablet Take 1 Tab by mouth nightly. 90 Tab 4    chlorthalidone (HYGROTEN) 25 mg tablet Take 1 Tab by mouth daily. 90 Tab 4    levothyroxine (SYNTHROID) 200 mcg tablet TAKE ONE TABLET BY MOUTH ONCE DAILY BEFORE  BREAKFAST 90 Tab 4    enalapril (VASOTEC) 20 mg tablet TAKE ONE TABLET BY MOUTH TWICE DAILY 180 Tab 4     No Known Allergies  Past Medical History:   Diagnosis Date    Cardiomyopathy ([facility] [address] 7/21/2014    Chronic pain syndrome 12/5/2018    Gastric bypass status for obesity 8/8/2011    Heart failure ([facility] [address]     History of ST elevation myocardial infarction (STEMI) 12/5/2018    HTN (hypertension) 8/8/2011    Hypothyroid 8/8/2011    Post corneal transplant 7/21/2014    STEMI (ST elevation myocardial infarction) ([facility] [address]     08/2018     Past Surgical History:   Procedure Laterality Date    ABDOMEN SURGERY PROC UNLISTED       History reviewed. No pertinent family history. Social History     Tobacco Use    Smoking status: Never Smoker    Smokeless tobacco: Never Used   Substance Use Topics    Alcohol use: Yes     Comment: 6 pk daily       ROS       All other systems reviewed and are negative.       Objective:  Vitals:    04/04/19 1008   BP: 110/74   Pulse: (!) 57   Resp: 16   Temp: 97.8 °F (36.6 °C)   TempSrc: Oral   SpO2: 97%   Weight: 287 lb 9.6 oz (130.5 kg)   Height: 6' 2\" (1.88 m)   PainSc:   9   PainLoc: Buttocks                 alert, well appearing, and in no distress and overweight  Chest - clear to auscultation, no wheezes, rales or rhonchi, symmetric air entry  Heart - normal rate, regular rhythm, normal S1, S2, no murmurs, rubs, clicks or gallops  Abdomen - soft, nontender, nondistended, no masses or organomegaly        TESTS  Component      Latest Ref Rng & Units 3/5/2019 3/5/2019 3/5/2019 3/5/2019           8:00 AM  8:00 AM  8:00 AM  8:00 AM   WBC      4.6 - 13.2 K/uL       RBC      4.70 - 5.50 M/uL       HGB      13.0 - 16.0 g/dL       HCT      36.0 - 48.0 %       MCV      74.0 - 97.0 FL       MCH      24.0 - 34.0 PG       MCHC      31.0 - 37.0 g/dL       RDW      11.6 - 14.5 %       PLATELET      118 - 358 K/uL       MPV      9.2 - 11.8 FL       NEUTROPHILS      40 - 73 %       LYMPHOCYTES      21 - 52 %       MONOCYTES      3 - 10 %       EOSINOPHILS      0 - 5 %       BASOPHILS      0 - 2 %       ABS. NEUTROPHILS      1.8 - 8.0 K/UL       ABS. LYMPHOCYTES      0.9 - 3.6 K/UL       ABS. MONOCYTES      0.05 - 1.2 K/UL       ABS. EOSINOPHILS      0.0 - 0.4 K/UL       ABS. BASOPHILS      0.0 - 0.1 K/UL       DF             Sodium      136 - 145 mmol/L   139    Potassium      3.5 - 5.5 mmol/L   4.4    Chloride      100 - 108 mmol/L   105    CO2      21 - 32 mmol/L   25    Anion gap      3.0 - 18 mmol/L   9    Glucose      74 - 99 mg/dL   95    BUN      7.0 - 18 MG/DL   23 (H)    Creatinine      0.6 - 1.3 MG/DL   1.27    BUN/Creatinine ratio      12 - 20     18    GFR est AA      >60 ml/min/1.73m2   >60    GFR est non-AA      >60 ml/min/1.73m2   >60    Calcium      8.5 - 10.1 MG/DL   8.8    Bilirubin, total      0.2 - 1.0 MG/DL   0.3    ALT (SGPT)      16 - 61 U/L   26    AST      15 - 37 U/L   16    Alk.  phosphatase      45 - 117 U/L   108    Protein, total      6.4 - 8.2 g/dL   7.8    Albumin      3.4 - 5.0 g/dL   3.8    Globulin      2.0 - 4.0 g/dL   4.0    A-G Ratio      0.8 - 1.7     1.0    Cholesterol, total      <200 MG/DL    97   Triglyceride      <150 MG/DL    78   HDL Cholesterol      40 - 60 MG/DL    52   LDL, calculated      0 - 100 MG/DL    29.4   VLDL, calculated      MG/DL    15.6   CHOL/HDL Ratio      0 - 5.0      1.9   TSH      0.36 - 3.74 uIU/mL  0.04 (L)     T4, Free      0.7 - 1.5 NG/DL 1.7 (H)        Component      Latest Ref Rng & Units 3/5/2019           8:00 AM   WBC      4.6 - 13.2 K/uL 7.6   RBC      4.70 - 5.50 M/uL 4.98   HGB      13.0 - 16.0 g/dL 13.6   HCT      36.0 - 48.0 % 42.8   MCV      74.0 - 97.0 FL 85.9   MCH      24.0 - 34.0 PG 27.3   MCHC      31.0 - 37.0 g/dL 31.8   RDW      11.6 - 14.5 % 14.6 (H)   PLATELET      004 - 035 K/uL 283   MPV      9.2 - 11.8 FL 10.0   NEUTROPHILS      40 - 73 % 61   LYMPHOCYTES      21 - 52 % 25   MONOCYTES      3 - 10 % 10   EOSINOPHILS      0 - 5 % 4   BASOPHILS      0 - 2 % 0   ABS. NEUTROPHILS      1.8 - 8.0 K/UL 4.6   ABS. LYMPHOCYTES      0.9 - 3.6 K/UL 1.9   ABS. MONOCYTES      0.05 - 1.2 K/UL 0.8   ABS. EOSINOPHILS      0.0 - 0.4 K/UL 0.3   ABS. BASOPHILS      0.0 - 0.1 K/UL 0.0   DF       AUTOMATED   Sodium      136 - 145 mmol/L    Potassium      3.5 - 5.5 mmol/L    Chloride      100 - 108 mmol/L    CO2      21 - 32 mmol/L    Anion gap      3.0 - 18 mmol/L    Glucose      74 - 99 mg/dL    BUN      7.0 - 18 MG/DL    Creatinine      0.6 - 1.3 MG/DL    BUN/Creatinine ratio      12 - 20      GFR est AA      >60 ml/min/1.73m2    GFR est non-AA      >60 ml/min/1.73m2    Calcium      8.5 - 10.1 MG/DL    Bilirubin, total      0.2 - 1.0 MG/DL    ALT (SGPT)      16 - 61 U/L    AST      15 - 37 U/L    Alk.  phosphatase      45 - 117 U/L    Protein, total      6.4 - 8.2 g/dL    Albumin      3.4 - 5.0 g/dL    Globulin      2.0 - 4.0 g/dL    A-G Ratio      0.8 - 1.7      Cholesterol, total      <200 MG/DL    Triglyceride      <150 MG/DL    HDL Cholesterol      40 - 60 MG/DL    LDL, calculated      0 - 100 MG/DL    VLDL, calculated      MG/DL    CHOL/HDL Ratio      0 - 5.0      TSH      0.36 - 3.74 uIU/mL    T4, Free      0.7 - 1.5 NG/DL        Assessment/Plan:    Hypertension - stable  Hyperlipidemia - stable  cardiomypathy ongoing  Thyroid stable  Chronic pain This is a chronic problem that is stable. Per review of available records and patients , there are not sign of overuse, misuse, diversion, or concerning side effects. Today we reviewed: the risk of overdose, addiction, and dependency  The following changes were made to the patients current treatment plan: nothing, medications refilled. Lab review: labs are reviewed, up to date and normal    Diagnoses and all orders for this visit:    1. Chronic pain syndrome  -     oxyCODONE-acetaminophen (PERCOCET) 5-325 mg per tablet; Take 1 Tab by mouth every eight to twelve (8-12) hours as needed for Pain for up to 30 days. Max Daily Amount: 3 Tabs. I have discussed the diagnosis with the patient and the intended plan as seen in the above orders. The patient has received an after-visit summary and questions were answered concerning future plans. I have discussed medication side effects and warnings with the patient as well. I have reviewed the plan of care with the patient, accepted their input and they are in agreement with the treatment goals.

## 2019-04-04 NOTE — PROGRESS NOTES
Xi Cohen is a 39 y.o. male presents today for continued low back pain that starts into his buttocks and goes down into his right leg. Patient reports his pain a 9/10. Pt is in Room # tx        1. Have you been to the ER, urgent care clinic since your last visit? Hospitalized since your last visit? No    2. Have you seen or consulted any other health care providers outside of the [address] [place] [address] since your last visit? Include any pap smears or colon screening. No     Health Maintenance reviewed - .

## 2019-04-09 ENCOUNTER — HOSPITAL ENCOUNTER (OUTPATIENT)
Dept: PHYSICAL THERAPY | Age: 44
Discharge: HOME | End: 2019-04-09
Payer: MEDICAID

## 2019-04-09 PROCEDURE — 97530 THERAPEUTIC ACTIVITIES: CPT

## 2019-04-09 PROCEDURE — 97110 THERAPEUTIC EXERCISES: CPT

## 2019-04-09 NOTE — PROGRESS NOTES
PHYSICAL THERAPY - DAILY TREATMENT NOTE    Patient Name: Efe Agudelo        Date: 2019  : 1975   yes Patient  Verified  Visit #:    Insurance: Payor: Liz Carey / Plan: Jewell Centeno / Product Type: Managed Care Medicaid /      In time: 1100 Out time: 1150   Total Treatment Time: 50     TREATMENT AREA =  Right hip pain [M25.551]  Low back pain [M54.5]    SUBJECTIVE  Pain Level (on 0 to 10 scale):  3/ 10   Medication Changes/New allergies or changes in medical history, any new surgeries or procedures?    no  If yes, update Summary List   Subjective Functional Status/Changes:  []  No changes reported   \"I am not too bad, sore from mowing 2 lawns, I am just really out of shape\"       OBJECTIVE      38 min Therapeutic Exercise:  [x]  See flow sheet   Rationale:      increase ROM and increase strength to improve the patients ability to perform ADLs/ bending/stooping/ lifting/prolong sitting, stding and amb/ stairs with ease      12 min Therapeutic Activity: postural training   bridges for bed mobility  HRs for ankle push off during gait  mini squats to promote neutral spine for bending/lifting/transfers   Rationale:    increase ROM, increase strength and improve coordination to improve the patients ability to perform ADLs/ bending/stooping/ lifting/prolong sitting, stding and amb/ stairs with ease         Billed With/As:   [] TE   [x] TA   [] Neuro   [] Self Care Patient Education: [x] Review HEP    [] Progressed/Changed HEP based on:   [x] positioning   [x] body mechanics   [x] transfers   [x] heat/ice application    [x] other: Pt ed on importance and benefits of compliance with HEP, core strength/stability and proper posture; pt verbalized understanding  issued BTB for HEP     Other Objective/Functional Measures:  VCs + demo to perform proper technique for TE    initiated pallof press #2, 3 without c/o p!    increased to BTB without difficulty  demos fair bridge form, previously fair-    Continues to demo anterior translation with mini squats      Post Treatment Pain Level (on 0 to 10) scale:   0  / 10     ASSESSMENT  Assessment/Changes in Function:     continues to progress with TE without increase p!  progressing towards increased amb/standing tolerance and promote proper body mechanics   continues to have some difficulty with upright posture 25% of session     []  See Progress Note/Recertification   Patient will continue to benefit from skilled PT services to modify and progress therapeutic interventions, address functional mobility deficits, address ROM deficits, address strength deficits, analyze and address soft tissue restrictions, analyze and cue movement patterns, analyze and modify body mechanics/ergonomics, assess and modify postural abnormalities and instruct in home and community integration to attain remaining goals. Progress toward goals / Updated goals:  · Goals for this certification period include and are to be achieved in   4  weeks:  1. Pt will be independent with HEP at D/C for self management. 2. Pt will increase 90/90 HS flexibility to lacking </= 25deg for decreased stress on spine. progressing, performing 90/90 HS without increase p!  3. Pt will demo box lift with good mechanics and not increased pain for ease and safety at home. progressing, demos proper hip hinge mini squat without increase p!  4. Pt will increase left hip ext MTT to >/= 4+/5 for increased hip ext in gait.       PLAN  [x]  Upgrade activities as tolerated yes Continue plan of care   []  Discharge due to :    []  Other:      Therapist: Clementina Fabry, PTA    Date: 4/9/2019 Time: 12:27 PM     Future Appointments   Date Time Provider Enzo Bernal   4/9/2019 11:00 AM [facility]   4/11/2019 11:00 AM [facility]   4/15/2019 11:00 AM [facility]   4/17/2019 11:00 AM Lamar Dancer, PT [facility]   4/22/2019 10:00 AM Jhonatan Pilo [facility]   4/23/2019 10:15 AM MD Arsh Montoya   4/25/2019 11:00 AM Oneyda rupert [facility]   5/6/2019  9:00 AM Bruno Aquino MD [address] [place] [address]   5/7/2019 10:30 AM Jen Rodriguez., DO [email or web address]

## 2019-04-11 ENCOUNTER — HOSPITAL ENCOUNTER (OUTPATIENT)
Dept: PHYSICAL THERAPY | Age: 44
Discharge: HOME | End: 2019-04-11
Payer: MEDICAID

## 2019-04-11 PROCEDURE — 97530 THERAPEUTIC ACTIVITIES: CPT

## 2019-04-11 PROCEDURE — 97110 THERAPEUTIC EXERCISES: CPT

## 2019-04-11 NOTE — PROGRESS NOTES
PHYSICAL THERAPY - DAILY TREATMENT NOTE    Patient Name: Rubén Human        Date: 2019  : 1975   yes Patient  Verified  Visit #:  10 of   13  Insurance: Payor: Abdoulaye Bishop / Plan: [employer] / Product Type: Managed Care Medicaid /      In time:  Out time: 599   Total Treatment Time: 44     TREATMENT AREA =  Right hip pain [M25.551]  Low back pain [M54.5]    SUBJECTIVE  Pain Level (on 0 to 10 scale):  1-2/ 10   Medication Changes/New allergies or changes in medical history, any new surgeries or procedures?    no  If yes, update Summary List   Subjective Functional Status/Changes:  []  No changes reported     \"my right leg is much tighter than my left when I stretch the HS\"         OBJECTIVE      32 min Therapeutic Exercise:  [x]  See flow sheet   Rationale:      increase ROM and increase strength to improve the patients ability to perform ADLs/ bending/stooping/ lifting/prolong sitting, stding and amb/ stairs with ease      12 min Therapeutic Activity: postural training   bridges for bed mobility  HRs for ankle push off during gait  mini squats to promote neutral spine for bending/lifting/transfers   Rationale:    increase ROM, increase strength and improve coordination to improve the patients ability to perform ADLs/ bending/stooping/ lifting/prolong sitting, stding and amb/ stairs with ease         Billed With/As:   [] TE   [x] TA   [] Neuro   [] Self Care Patient Education: [x] Review HEP    [] Progressed/Changed HEP based on:   [x] positioning   [x] body mechanics   [x] transfers   [x] heat/ice application    [x] other: Pt ed on importance and benefits of compliance with HEP, core strength/stability and proper posture; pt verbalized understanding  issued BTB for HEP     Other Objective/Functional Measures:  VCs + demo to perform proper technique for TE  increased to GTB with clams without difficulty  improved form with mini squats, able to decrease UE assist  proper posture 90% of session   Post Treatment Pain Level (on 0 to 10) scale:   0  / 10     ASSESSMENT  Assessment/Changes in Function:     continues to progress with TE without increase p!  progressing towards increased amb/standing tolerance and promote proper body mechanics    []  See Progress Note/Recertification   Patient will continue to benefit from skilled PT services to modify and progress therapeutic interventions, address functional mobility deficits, address ROM deficits, address strength deficits, analyze and address soft tissue restrictions, analyze and cue movement patterns, analyze and modify body mechanics/ergonomics, assess and modify postural abnormalities and instruct in home and community integration to attain remaining goals. Progress toward goals / Updated goals:  · Goals for this certification period include and are to be achieved in   4  weeks:  1. Pt will be independent with HEP at D/C for self management. 2. Pt will increase 90/90 HS flexibility to lacking </= 25deg for decreased stress on spine. progressing, performing 90/90 HS without increase p!  3. Pt will demo box lift with good mechanics and not increased pain for ease and safety at home. progressing, demos proper hip hinge mini squat without increase p!  4. Pt will increase left hip ext MTT to >/= 4+/5 for increased hip ext in gait.       PLAN  [x]  Upgrade activities as tolerated yes Continue plan of care   []  Discharge due to :    []  Other:      Therapist: Sal Hauser PTA    Date: 4/11/2019 Time: 12:27 PM     Future Appointments   Date Time Provider Enzo Bernal   4/15/2019 11:00 AM [facility]   4/17/2019 11:00 AM Mariam Donahue PT [facility]   4/22/2019 10:00 AM [facility]   4/23/2019 10:15 AM Tavo Jha MD [facility]   4/25/2019 11:00 AM Damian Rodriguez PTA [facility]   5/6/2019  9:00 AM Itz Fenton MD [address] [place] [address]   5/7/2019 10:30 AM Tatum Geronimo, Cesar Nam., DO [facility] Delatorre

## 2019-04-15 ENCOUNTER — HOSPITAL ENCOUNTER (OUTPATIENT)
Dept: PHYSICAL THERAPY | Age: 44
Discharge: HOME | End: 2019-04-15
Payer: MEDICAID

## 2019-04-15 PROCEDURE — 97530 THERAPEUTIC ACTIVITIES: CPT

## 2019-04-15 PROCEDURE — 97110 THERAPEUTIC EXERCISES: CPT

## 2019-04-15 NOTE — PROGRESS NOTES
PHYSICAL THERAPY - DAILY TREATMENT NOTE    Patient Name: Isai Doty        Date: 4/15/2019  : 1975   yes Patient  Verified  Visit #:    Insurance: Payor: Vince Gooden / Plan: [id number] [employer] / Product Type: Managed Care Medicaid /      In time:  Out time: 6755   Total Treatment Time: 55     TREATMENT AREA =  Right hip pain [M25.551]  Low back pain [M54.5]    SUBJECTIVE  Pain Level (on 0 to 10 scale):  2-3/ 10   Medication Changes/New allergies or changes in medical history, any new surgeries or procedures?    no  If yes, update Summary List   Subjective Functional Status/Changes:  []  No changes reported     \"I always feel like my legs go numb when I do the piriformis str since I started that new med, it goes away.  Sometimes I just put my legs a certain way and it goes numb but it always goes away\"       OBJECTIVE      43 min Therapeutic Exercise:  [x]  See flow sheet   Rationale:      increase ROM and increase strength to improve the patients ability to perform ADLs/ bending/stooping/ lifting/prolong sitting, stding and amb/ stairs with ease      12 min Therapeutic Activity: postural training   bridges for bed mobility  HRs for ankle push off during gait  mini squats to promote neutral spine for bending/lifting/transfers   Rationale:    increase ROM, increase strength and improve coordination to improve the patients ability to perform ADLs/ bending/stooping/ lifting/prolong sitting, stding and amb/ stairs with ease         Billed With/As:   [] TE   [x] TA   [] Neuro   [] Self Care Patient Education: [x] Review HEP    [] Progressed/Changed HEP based on:   [x] positioning   [x] body mechanics   [x] transfers   [x] heat/ice application    [x] other: Pt ed on importance and benefits of compliance with HEP, core strength/stability and proper posture; pt verbalized understanding       Other Objective/Functional Measures:  advised to speak with MD about intermittent numbness with B LEs    VCs + demo to perform proper technique for TE  increased to [place] with bridges without difficulty  added 5# KB to mini squat without difficulty or pain     demos increased B knee ant translation with mini squats, improved with CCs and instructions to tap butt on mat    Post Treatment Pain Level (on 0 to 10) scale:   0  / 10     ASSESSMENT  Assessment/Changes in Function:     continues to progress with TE without increase p!  progressing towards increased amb/standing tolerance and promote proper body mechanics    []  See Progress Note/Recertification   Patient will continue to benefit from skilled PT services to modify and progress therapeutic interventions, address functional mobility deficits, address ROM deficits, address strength deficits, analyze and address soft tissue restrictions, analyze and cue movement patterns, analyze and modify body mechanics/ergonomics, assess and modify postural abnormalities and instruct in home and community integration to attain remaining goals. Progress toward goals / Updated goals:  · Goals for this certification period include and are to be achieved in   4  weeks:  1. Pt will be independent with HEP at D/C for self management. 2. Pt will increase 90/90 HS flexibility to lacking </= 25deg for decreased stress on spine. progressing, performing 90/90 HS without increase p!  3. Pt will demo box lift with good mechanics and not increased pain for ease and safety at home. progressing, demos proper hip hinge mini squat without increase p!  4. Pt will increase left hip ext MTT to >/= 4+/5 for increased hip ext in gait.       PLAN  [x]  Upgrade activities as tolerated yes Continue plan of care   []  Discharge due to :    []  Other:      Therapist: Paula Turk PTA    Date: 4/15/2019 Time: 12:33 PM     Future Appointments   Date Time Provider Enzo Bernal   4/17/2019 11:00 AM Ifeanyi Toscano, PT [facility]   4/22/2019 10:00 AM Alexander Gilbert PTA [facility]   4/23/2019 10:15 AM MD NICOLE Melendez [facility]   4/25/2019 11:00 AM [facility]   5/6/2019  9:00 AM Xenia Coley MD [address] [place] [address]   5/7/2019 10:30 AM DO Patti Taylor

## 2019-04-17 ENCOUNTER — APPOINTMENT (OUTPATIENT)
Dept: PHYSICAL THERAPY | Age: 44
End: 2019-04-17
Payer: MEDICAID

## 2019-04-22 ENCOUNTER — TELEPHONE (OUTPATIENT)
Dept: ORTHOPEDIC SURGERY | Age: 44
End: 2019-04-22

## 2019-04-22 ENCOUNTER — HOSPITAL ENCOUNTER (OUTPATIENT)
Dept: PHYSICAL THERAPY | Age: 44
Discharge: HOME | End: 2019-04-22
Payer: MEDICAID

## 2019-04-22 PROCEDURE — 97530 THERAPEUTIC ACTIVITIES: CPT

## 2019-04-22 PROCEDURE — 97110 THERAPEUTIC EXERCISES: CPT

## 2019-04-22 NOTE — TELEPHONE ENCOUNTER
Patient has not had MRI done yet, so RS him 4 weeks out. Patient states he needs a refill on his Gabapentin. [facility] is [facility]  [phone].

## 2019-04-22 NOTE — PROGRESS NOTES
PHYSICAL THERAPY - DAILY TREATMENT NOTE    Patient Name: Car Morocho        Date: 2019  : 1975   yes Patient  Verified  Visit #:    Insurance: Payor: Kiesha Cruz / Plan: Nieves Calvillo / Product Type: Managed Care Medicaid /      In time: 3063 Out time: 1218   Total Treatment Time: 38     TREATMENT AREA =  Right hip pain [M25.551]  Low back pain [M54.5]    SUBJECTIVE  Pain Level (on 0 to 10 scale):  4/ 10   Medication Changes/New allergies or changes in medical history, any new surgeries or procedures?    no  If yes, update Summary List   Subjective Functional Status/Changes:  []  No changes reported     \"I feel it in the knees with the stairs but its not too bad\"       OBJECTIVE      28 min Therapeutic Exercise:  [x]  See flow sheet   Rationale:      increase ROM and increase strength to improve the patients ability to perform ADLs/ bending/stooping/ lifting/prolong sitting, stding and amb/ stairs with ease      10 min Therapeutic Activity: postural training  step ups on 6\"  mini squats to promote neutral spine for bending/lifting/transfers   Rationale:    increase ROM, increase strength and improve coordination to improve the patients ability to perform ADLs/ bending/stooping/ lifting/prolong sitting, stding and amb/ stairs with ease         Billed With/As:   [] TE   [x] TA   [] Neuro   [] Self Care Patient Education: [x] Review HEP    [] Progressed/Changed HEP based on:   [x] positioning   [x] body mechanics   [x] transfers   [x] heat/ice application    [x] other: Pt ed on importance and benefits of compliance with HEP, core strength/stability and proper posture; pt verbalized understanding       Other Objective/Functional Measures:    VCs + demo to perform proper technique for TE  initiated step up + knee drive and 5# KB on 6\" without increase L/s p!, min knee discomfort  Able to follow instructions to perform slow ecc lowering with 6\" step ups  Pt with difficulty decreasing ant translation in knees during mini squats, instructed pt to tap bottom to 26\" mat to promote proper mechanics    Post Treatment Pain Level (on 0 to 10) scale:   0  / 10     ASSESSMENT  Assessment/Changes in Function:     continues to progress with TE without increase p!  progressing towards increased amb/standing tolerance and promote proper body mechanics    []  See Progress Note/Recertification   Patient will continue to benefit from skilled PT services to modify and progress therapeutic interventions, address functional mobility deficits, address ROM deficits, address strength deficits, analyze and address soft tissue restrictions, analyze and cue movement patterns, analyze and modify body mechanics/ergonomics, assess and modify postural abnormalities and instruct in home and community integration to attain remaining goals. Progress toward goals / Updated goals:  · Goals for this certification period include and are to be achieved in   4  weeks:  1. Pt will be independent with HEP at D/C for self management. 2. Pt will increase 90/90 HS flexibility to lacking </= 25deg for decreased stress on spine. progressing, performing 90/90 HS without increase p!  3. Pt will demo box lift with good mechanics and not increased pain for ease and safety at home. progressing, demos proper hip hinge mini squat without increase p!  4. Pt will increase left hip ext MTT to >/= 4+/5 for increased hip ext in gait.       PLAN  [x]  Upgrade activities as tolerated yes Continue plan of care   []  Discharge due to :    []  Other:      Therapist: Víctor Coley PTA    Date: 4/22/2019 Time: 12:33 PM     Future Appointments   Date Time Provider Enzo Bernal   4/25/2019 11:00 AM Oneyda Monroy [address]   4/30/2019 10:00 AM Dimitri Chino, PT [address]   5/2/2019 10:30 AM Zulma Del Real PTA [address]   5/6/2019  9:00 AM Bruno Aquino MD [address] [place] [address]   5/7/2019 10:30 AM Jen Rodriguez., DO [facility]   5/8/2019 11:00 AM Bryan Hartman [facility]   5/10/2019 10:00 AM Bryan Hartman [facility]   5/13/2019 11:00 AM [facility]   5/16/2019 11:00 AM [facility]   5/20/2019 11:00 AM [facility]   5/21/2019 10:15 AM MD Arsh Lazo Mark   5/23/2019 11:00 AM [facility]   5/28/2019 10:00 AM Bryan Hartman PTA [facility]   5/30/2019 11:00 [facility]

## 2019-04-23 ENCOUNTER — APPOINTMENT (OUTPATIENT)
Dept: PHYSICAL THERAPY | Age: 44
End: 2019-04-23
Payer: MEDICAID

## 2019-04-25 ENCOUNTER — HOSPITAL ENCOUNTER (OUTPATIENT)
Dept: PHYSICAL THERAPY | Age: 44
Discharge: HOME | End: 2019-04-25
Payer: MEDICAID

## 2019-04-25 PROCEDURE — 97110 THERAPEUTIC EXERCISES: CPT

## 2019-04-25 NOTE — PROGRESS NOTES
[facility] PHYSICAL THERAPY  [address] [place] [address] Vashti Garsia, [address] - Phone: [phone]  Fax: [phone]  PROGRESS NOTE  Patient Name: Karina Bacon : 1975   Treatment/Medical Diagnosis: Right hip pain [M25.551]  Low back pain [M54.5]   Referral Source: Paulo Mena MD     Date of Initial Visit: 19 Attended Visits: 13 Missed Visits: 1     SUMMARY OF TREATMENT  Patient's POC has consisted of therex, therapeutic activities, manual therapy prn, modalities prn, pt. education, and a comprehensive HEP. Treatment strategies used to address functional mobility deficits, ROM deficits, strength deficits, analyze and address soft tissue restrictions, analyze and cue movement patterns, analyze and modify body mechanics/ergonomics, assess and modify postural abnormalities and instruct in home and community integration. CURRENT STATUS  Pt continues to make steady progress towards goals to increase HS flexibility, lifting mechanics, and LE strength. Pt reports daily compliance with HEP and is (I) with managing sxs with repeated/prolong L/s movement (MDT). Pt able to perform all TE in clinic gym without sxs elevating. Demos proper, pain free mini squat to 28\" mat with 5# KB without difficulty. Pt demos proper posture 75% of session previously only with VCing. Pt is able to lift buttocks off mat to perform bed mobility, previously unable. Continues to have 4/5 hip abd strength, however, performing SLing hip abd without pain. Pt able to demo good quad activation and stability with step ups on 6\" and 5# KB with 1 UE support indicating improved strength to negotiate stairs safely. Goal/Measure of Progress Goal Met? 1. Pt will be independent with HEP at D/C for self management. Status at last Eval: compliant with HEP Current Status: compliant with HEP progressing   2.    Pt will increase 90/90 HS flexibility to lacking </= 25deg for decreased stress on spine   Status at last Eval: lacking 45 degs Current Status: performing long sit HS str at stairs without c/o p! progressing   3. Pt will demo box lift with good mechanics and not increased pain for ease and safety at home   Status at last Eval: unable Current Status: demos proper mini squat with 5# KB with no c/o  increase p! progressing   4. Pt will increase left hip ext MTT to >/= 4+/5 for increased hip ext in gait.    Status at last Eval: 4/5 Current Status: 4/5 without p! slow progressing     New Goals to be achieved in __4__  weeks:  1. Pt will be independent with HEP at D/C for self management. 2. Pt will increase 90/90 HS flexibility to lacking </= 25deg for decreased stress on spine. 3. Pt will demo box lift with good mechanics and not increased pain for ease and safety at home. 4. Pt will increase left hip ext MTT to >/= 4+/5 for increased hip ext in gait. RECOMMENDATIONS  Pt will benefit from further skilled PT services 2x wk x 4 wks to increase strength/stability and decrease sxs to promote functional mobility. If you have any questions/comments please contact us directly at [phone]. Thank you for allowing us to assist in the care of your patient. LPTA Signature: Samira Lugo PTA  Date: 4/25/2019   PT Signature: Swathi Lazo DPT Time: 1:13 PM   NOTE TO PHYSICIAN:  PLEASE COMPLETE THE ORDERS BELOW AND FAX TO   [facility] Physical Therapy: [phone]. If you are unable to process this request in 24 hours please contact our office: [phone].    ___ I have read the above report and request that my patient continue as recommended.   ___ I have read the above report and request that my patient continue therapy with the following changes/special instructions:_________________________________________________________   ___ I have read the above report and request that my patient be discharged from therapy.      Physician Signature:        Date:       Time:

## 2019-04-25 NOTE — PROGRESS NOTES
PHYSICAL THERAPY - DAILY TREATMENT NOTE    Patient Name: Rubén Human        Date: 2019  : 1975   yes Patient  Verified  Visit #:  15   Insurance: Payor: Abdoulaye Bishop / Plan: [employer] / Product Type: Managed Care Medicaid /      In time:  Out time: 4832   Total Treatment Time: 57     TREATMENT AREA =  Right hip pain [M25.551]  Low back pain [M54.5]    SUBJECTIVE  Pain Level (on 0 to 10 scale):  6/ 10   Medication Changes/New allergies or changes in medical history, any new surgeries or procedures?    no  If yes, update Summary List   Subjective Functional Status/Changes:  []  No changes reported     \"I woke up with more pain, and I woke up later.  Not sure why I am so tired\"       OBJECTIVE      57 min Therapeutic Exercise:  [x]  See flow sheet   Rationale:      increase ROM and increase strength to improve the patients ability to perform ADLs/ bending/stooping/ lifting/prolong sitting, stding and amb/ stairs with ease      Billed With/As:   [] TE   [x] TA   [] Neuro   [] Self Care Patient Education: [x] Review HEP    [] Progressed/Changed HEP based on:   [x] positioning   [x] body mechanics   [x] transfers   [x] heat/ice application    [x] other: Pt ed on importance and benefits of compliance with HEP, core strength/stability and proper posture; pt verbalized understanding       Other Objective/Functional Measures:    VCs + demo to perform proper technique for TE  initiated SL abd, and prone hip ext without c/o P!  c/o fatigue at beginning of session  c/o increased Right L/s discomfort after performing nu-step and Tband TE, decreased with PATRICIO over bar     Post Treatment Pain Level (on 0 to 10) scale:   0  / 10     ASSESSMENT  Assessment/Changes in Function:   See PN    continues to progress with TE without increase p!  progressing towards increased amb/standing tolerance and promote proper body mechanics    []  See Progress Note/Recertification   Patient will continue to benefit from skilled PT services to modify and progress therapeutic interventions, address functional mobility deficits, address ROM deficits, address strength deficits, analyze and address soft tissue restrictions, analyze and cue movement patterns, analyze and modify body mechanics/ergonomics, assess and modify postural abnormalities and instruct in home and community integration to attain remaining goals. Progress toward goals / Updated goals:  · Goals for this certification period include and are to be achieved in   4  weeks:  1. Pt will be independent with HEP at D/C for self management. 2. Pt will increase 90/90 HS flexibility to lacking </= 25deg for decreased stress on spine. progressing, performing 90/90 HS without increase p!  3. Pt will demo box lift with good mechanics and not increased pain for ease and safety at home. progressing, demos proper hip hinge mini squat without increase p!  4. Pt will increase left hip ext MTT to >/= 4+/5 for increased hip ext in gait.  progressing 4/5     PLAN  [x]  Upgrade activities as tolerated yes Continue plan of care   []  Discharge due to :    []  Other:      Therapist: Gil Plaza PTA    Date: 4/25/2019 Time: 1:10 PM     Future Appointments   Date Time Provider Enzo Bernal   4/30/2019 10:00 AM Violetta Ayoub PT [facility]   5/2/2019 10:30 AM [facility]   5/3/2019  9:45 AM [facility] RM 1 [facility]   5/6/2019  9:00 AM Alli Hair MD [address] [place] [address]   5/7/2019 10:30 AM Taz Curry DO [facility]   5/8/2019 11:00 AM Xuan Deal [facility]   5/10/2019 10:00 AM Xuan Deal PTA [facility]   5/13/2019 11:00 AM [facility]   5/16/2019 11:00 AM [facility]   5/20/2019 11:00 AM [facility]   5/21/2019 10:15 AM Radha Clarke MD [facility]   5/23/2019 11:00 AM Reji Loya [facility]   5/28/2019 10:00 [facility]   5/30/2019 11:00 AM Radha Alva [facility]

## 2019-04-25 NOTE — TELEPHONE ENCOUNTER
Called the patients pharmacy and he can fill the medication tomorrow. Called patient and informed him.

## 2019-04-30 ENCOUNTER — HOSPITAL ENCOUNTER (OUTPATIENT)
Dept: PHYSICAL THERAPY | Age: 44
Discharge: HOME | End: 2019-04-30
Payer: MEDICAID

## 2019-04-30 PROCEDURE — 97530 THERAPEUTIC ACTIVITIES: CPT

## 2019-04-30 PROCEDURE — 97110 THERAPEUTIC EXERCISES: CPT

## 2019-04-30 NOTE — PROGRESS NOTES
PHYSICAL THERAPY - DAILY TREATMENT NOTE    Patient Name: Rocky Dowling        Date: 2019  : 1975   YES Patient  Verified  Visit #:     Insurance: Payor: Abbi Javed / Plan: [employer] / Product Type: Managed Care Medicaid /      In time: 10:00 Out time: 10:50   Total Treatment Time: 50     Medicare/BCBS [place] Time Tracking (below)   Total Timed Codes (min):  NA 1:1 Treatment Time:  NA     TREATMENT AREA =  L/S    SUBJECTIVE    Pain Level (on 0 to 10 scale):  2  / 10   Medication Changes/New allergies or changes in medical history, any new surgeries or procedures? NO    If yes, update Summary List   Subjective Functional Status/Changes:  []  No changes reported     \"About the same in hip/thigh.  I go get an MRI on Friday to see whats going on\"         OBJECTIVE    40 min Therapeutic Exercise:  [x]  See flow sheet   Rationale:      increase ROM and increase strength to improve the patients ability to perform ADLs with increased ease     10 min Therapeutic Activity: Squatting and balance   Rationale:   Improve mechanics and balance fro decreased stress on spine   min Patient Education:  YES  Reviewed HEP   []  Progressed/Changed HEP based on:   Cont HEP     Other Objective/Functional Measures:    Noted decreased rotation with Left open book compared to right  Increased balance exercises today with stagger stance during Tband ext and horizontal abd   Changed pallof press to in Rhom stance for balance challenge   Unable to perform step-up with knee drive and 5# KB, required x1 UE support      Post Treatment Pain Level (on 0 to 10) scale:   1  / 10     ASSESSMENT    Assessment/Changes in Function:     Challenged with balance today, unable to perform SLS, required stagger stance toe touch for support     []  See Progress Note/Recertification   Patient will continue to benefit from skilled PT services to analyze, cue, progress, modify,, demonstrate, instruct, and address, movement patterns, therapeutic interventions, postural abnormalities, soft tissue restrictions, ROM, strength, functional mobility, body mechanics/ergonomics, and home and community integration, to attain remaining goals. Progress toward goals / Updated goals:    New Goals to be achieved in __4__  weeks:  1. Pt will be independent with HEP at D/C for self management. 2. Pt will increase 90/90 HS flexibility to lacking </= 25deg for decreased stress on spine. Challenged with HS str and maintaining upright posture today  3. Pt will demo box lift with good mechanics and not increased pain for ease and safety at home.    4. Pt will increase left hip ext MTT to >/= 4+/5 for increased hip ext in gait.          PLAN    []  Upgrade activities as tolerated YES Continue plan of care   []  Discharge due to :    []  Other:      Therapist: Chyna Roberts DPT    Date: 4/30/2019 Time: 10:08 AM     Future Appointments   Date Time Provider Enzo Bernal   5/2/2019 10:30 AM [facility]   5/3/2019  9:45 AM [facility] MRI RM 1 [facility]   5/6/2019  9:00 AM Thuan Prajapati MD [address] [place] [address]   5/7/2019 10:30 AM Sona Langford.,  [facility]   5/8/2019 11:00 AM [facility]   5/10/2019 10:00 AM [facility]   5/13/2019 11:00 AM [facility]   5/16/2019 11:00 AM [facility]   5/20/2019 11:00 AM [facility]   5/21/2019 10:15 AM Magalis Orourke MD [facility]   5/23/2019 11:00 AM [facility]   5/28/2019 10:00 AM [facility]   5/30/2019 11:00 AM [facility]

## 2019-05-02 ENCOUNTER — HOSPITAL ENCOUNTER (OUTPATIENT)
Dept: PHYSICAL THERAPY | Age: 44
Discharge: HOME | End: 2019-05-02
Payer: MEDICAID

## 2019-05-02 PROCEDURE — 97110 THERAPEUTIC EXERCISES: CPT

## 2019-05-02 PROCEDURE — 97140 MANUAL THERAPY 1/> REGIONS: CPT

## 2019-05-02 NOTE — PROGRESS NOTES
PHYSICAL THERAPY - DAILY TREATMENT NOTE    Patient Name: Abhinav Mena        Date: 2019  : 1975   yes Patient  Verified  Visit #:    Insurance: Payor: Elaina Higgins / Plan: Ramírez Angle / Product Type: Managed Care Medicaid /      In time: 4007 Out time: 4810   Total Treatment Time: 47     TREATMENT AREA =  Right hip pain [M25.551]  Low back pain [M54.5]    SUBJECTIVE  Pain Level (on 0 to 10 scale):  2/ 10   Medication Changes/New allergies or changes in medical history, any new surgeries or procedures?    no  If yes, update Summary List   Subjective Functional Status/Changes:  []  No changes reported     \"I feel it when I am moving around today, just in the back not the leg\"       OBJECTIVE      39 min Therapeutic Exercise:  [x]  See flow sheet   Rationale:      increase ROM and increase strength to improve the patients ability to perform ADLs/ bending/stooping/ lifting/prolong sitting, stding and amb/ stairs with ease        8 min Manual Therapy: STM to right lumbosacral junction/L/s and OP to L/s in prone   Rationale:      decrease pain, increase ROM, increase tissue extensibility, decrease trigger points and increase postural awareness to improve patient's ability to safely perform ADLs/ bending/stooping/ lifting/prolong sitting, stding and amb/ stairs with minimal to no pain      Billed With/As:   [] TE   [x] TA   [] Neuro   [] Self Care Patient Education: [x] Review HEP    [] Progressed/Changed HEP based on:   [x] positioning   [x] body mechanics   [x] transfers   [x] heat/ice application    [x] other: Pt ed on importance and benefits of compliance with HEP, core strength/stability and proper posture; pt verbalized understanding       Other Objective/Functional Measures:    VCs + demo to perform proper technique for TE  initiated SB with wall push ups without c/o p!   Right L/s/lumbosacral TTP, able to decrease with manual, and OP to L/s abolished pain and was able to perform prone hip ext without pain returning   Post Treatment Pain Level (on 0 to 10) scale:   0  / 10     ASSESSMENT  Assessment/Changes in Function:   demos improved form with mini squats, continues to require VCing to decrease ant translation   continues to progress with TE without increase p!  progressing towards increased amb/standing tolerance and promote proper body mechanics    []  See Progress Note/Recertification   Patient will continue to benefit from skilled PT services to modify and progress therapeutic interventions, address functional mobility deficits, address ROM deficits, address strength deficits, analyze and address soft tissue restrictions, analyze and cue movement patterns, analyze and modify body mechanics/ergonomics, assess and modify postural abnormalities and instruct in home and community integration to attain remaining goals. Progress toward goals / Updated goals:  · Goals for this certification period include and are to be achieved in   4  weeks:  1. Pt will be independent with HEP at D/C for self management. 2. Pt will increase 90/90 HS flexibility to lacking </= 25deg for decreased stress on spine. progressing, performing 90/90 HS without increase p!  3. Pt will demo box lift with good mechanics and not increased pain for ease and safety at home. progressing, demos proper hip hinge mini squat without increase p!  4. Pt will increase left hip ext MTT to >/= 4+/5 for increased hip ext in gait.  progressing 4/5     PLAN  [x]  Upgrade activities as tolerated yes Continue plan of care   []  Discharge due to :    []  Other:      Therapist: Lizzie Ball PTA    Date: 5/2/2019 Time: 1:10 PM     Future Appointments   Date Time Provider Enzo Bernal   5/3/2019  9:45 AM [facility] MRI RM 1 [facility]   5/6/2019  9:00 AM Pieter Flowers MD [address] [place] [address]   5/7/2019 10:30 AM [facility]   5/8/2019 11:00 AM Tameka Maria PTA [facility]   5/10/2019 10:00 AM Stella Shields [facility]   5/13/2019 11:00 AM [facility]   5/16/2019 11:00 AM [facility]   5/20/2019 11:00 AM [facility]   5/21/2019 10:15 AM MD Arsh [facility] Mark   5/23/2019 11:00 AM [facility]   5/28/2019 10:00 AM Stella Shields [facility]   5/30/2019 11:00 AM [facility]

## 2019-05-03 ENCOUNTER — HOSPITAL ENCOUNTER (OUTPATIENT)
Dept: MRI IMAGING | Age: 44
Discharge: HOME | End: 2019-05-03
Attending: PHYSICAL MEDICINE & REHABILITATION
Payer: MEDICAID

## 2019-05-03 DIAGNOSIS — M54.16 LUMBAR RADICULOPATHY: ICD-10-CM

## 2019-05-03 DIAGNOSIS — M51.36 DDD (DEGENERATIVE DISC DISEASE), LUMBAR: ICD-10-CM

## 2019-05-03 DIAGNOSIS — M47.817 LUMBOSACRAL SPONDYLOSIS WITHOUT MYELOPATHY: ICD-10-CM

## 2019-05-03 PROCEDURE — 72148 MRI LUMBAR SPINE W/O DYE: CPT

## 2019-05-06 ENCOUNTER — OFFICE VISIT (OUTPATIENT)
Dept: CARDIOLOGY CLINIC | Age: 44
End: 2019-05-06

## 2019-05-06 VITALS
HEART RATE: 61 BPM | DIASTOLIC BLOOD PRESSURE: 79 MMHG | HEIGHT: 74 IN | SYSTOLIC BLOOD PRESSURE: 115 MMHG | BODY MASS INDEX: 37.47 KG/M2 | WEIGHT: 292 LBS | OXYGEN SATURATION: 98 %

## 2019-05-06 DIAGNOSIS — Z95.5 S/P PRIMARY ANGIOPLASTY WITH CORONARY STENT: ICD-10-CM

## 2019-05-06 DIAGNOSIS — E66.01 SEVERE OBESITY (HCC): ICD-10-CM

## 2019-05-06 DIAGNOSIS — I42.9 CARDIOMYOPATHY, UNSPECIFIED TYPE (HCC): Primary | ICD-10-CM

## 2019-05-06 DIAGNOSIS — M79.18 PAIN IN RIGHT BUTTOCK: ICD-10-CM

## 2019-05-06 DIAGNOSIS — I10 HYPERTENSION, UNSPECIFIED TYPE: ICD-10-CM

## 2019-05-06 DIAGNOSIS — I25.2 HISTORY OF ST ELEVATION MYOCARDIAL INFARCTION (STEMI): ICD-10-CM

## 2019-05-06 NOTE — PROGRESS NOTES
1. Have you been to the ER, urgent care clinic since your last visit? Hospitalized since your last visit? No    2. Have you seen or consulted any other health care providers outside of the [address] [place] [address] since your last visit? Include any pap smears or colon screening.  No

## 2019-05-07 ENCOUNTER — OFFICE VISIT (OUTPATIENT)
Dept: FAMILY MEDICINE CLINIC | Age: 44
End: 2019-05-07

## 2019-05-07 VITALS
RESPIRATION RATE: 16 BRPM | HEART RATE: 61 BPM | HEIGHT: 74 IN | TEMPERATURE: 97.6 F | DIASTOLIC BLOOD PRESSURE: 77 MMHG | OXYGEN SATURATION: 99 % | BODY MASS INDEX: 37.6 KG/M2 | WEIGHT: 293 LBS | SYSTOLIC BLOOD PRESSURE: 112 MMHG

## 2019-05-07 DIAGNOSIS — I10 HYPERTENSION, UNSPECIFIED TYPE: ICD-10-CM

## 2019-05-07 DIAGNOSIS — I42.9 CARDIOMYOPATHY, UNSPECIFIED TYPE (HCC): ICD-10-CM

## 2019-05-07 DIAGNOSIS — E03.9 HYPOTHYROIDISM, UNSPECIFIED TYPE: ICD-10-CM

## 2019-05-07 DIAGNOSIS — G89.4 CHRONIC PAIN SYNDROME: ICD-10-CM

## 2019-05-07 DIAGNOSIS — I21.3 ST ELEVATION MYOCARDIAL INFARCTION (STEMI), UNSPECIFIED ARTERY (HCC): ICD-10-CM

## 2019-05-07 RX ORDER — CHLORTHALIDONE 25 MG/1
25 TABLET ORAL DAILY
Qty: 90 TAB | Refills: 4 | Status: SHIPPED | OUTPATIENT
Start: 2019-05-07 | End: 2019-09-13 | Stop reason: ALTCHOICE

## 2019-05-07 RX ORDER — ENALAPRIL MALEATE 20 MG/1
TABLET ORAL
Qty: 180 TAB | Refills: 4 | Status: SHIPPED | OUTPATIENT
Start: 2019-05-07 | End: 2020-01-29

## 2019-05-07 RX ORDER — CLOPIDOGREL BISULFATE 75 MG/1
75 TABLET ORAL DAILY
Qty: 90 TAB | Refills: 4 | Status: SHIPPED | OUTPATIENT
Start: 2019-05-07 | End: 2019-09-18 | Stop reason: ALTCHOICE

## 2019-05-07 RX ORDER — GABAPENTIN 300 MG/1
300 CAPSULE ORAL 3 TIMES DAILY
Qty: 90 CAP | Refills: 1 | Status: SHIPPED | OUTPATIENT
Start: 2019-05-07 | End: 2019-07-08 | Stop reason: DRUGHIGH

## 2019-05-07 RX ORDER — ASPIRIN 81 MG/1
81 TABLET ORAL DAILY
Qty: 90 TAB | Refills: 4 | Status: SHIPPED | OUTPATIENT
Start: 2019-05-07 | End: 2020-05-11 | Stop reason: SDUPTHER

## 2019-05-07 RX ORDER — OXYCODONE AND ACETAMINOPHEN 5; 325 MG/1; MG/1
1 TABLET ORAL
Qty: 60 TAB | Refills: 0 | Status: SHIPPED | OUTPATIENT
Start: 2019-05-07 | End: 2019-07-08 | Stop reason: SDUPTHER

## 2019-05-07 RX ORDER — AMLODIPINE BESYLATE 10 MG/1
10 TABLET ORAL DAILY
Qty: 90 TAB | Refills: 4 | Status: SHIPPED | OUTPATIENT
Start: 2019-05-07 | End: 2020-01-29

## 2019-05-07 RX ORDER — PANTOPRAZOLE SODIUM 40 MG/1
40 TABLET, DELAYED RELEASE ORAL 2 TIMES DAILY
Qty: 180 TAB | Refills: 4 | Status: SHIPPED | OUTPATIENT
Start: 2019-05-07 | End: 2020-09-11 | Stop reason: SDUPTHER

## 2019-05-07 RX ORDER — LEVOTHYROXINE SODIUM 200 UG/1
TABLET ORAL
Qty: 90 TAB | Refills: 4 | Status: SHIPPED | OUTPATIENT
Start: 2019-05-07 | End: 2019-07-08 | Stop reason: DRUGHIGH

## 2019-05-07 RX ORDER — ATORVASTATIN CALCIUM 80 MG/1
80 TABLET, FILM COATED ORAL
Qty: 90 TAB | Refills: 4 | Status: SHIPPED | OUTPATIENT
Start: 2019-05-07 | End: 2019-10-08

## 2019-05-07 RX ORDER — METOPROLOL TARTRATE 100 MG/1
100 TABLET ORAL EVERY 12 HOURS
Qty: 180 TAB | Refills: 4 | Status: SHIPPED | OUTPATIENT
Start: 2019-05-07 | End: 2019-09-13

## 2019-05-07 NOTE — PROGRESS NOTES
Marcos Galindo is a 39 y.o.  male and presents with Chief Complaint Patient presents with  Cardiomyopathy  Thyroid Problem  Hypertension  Osteoarthritis  Pain (Chronic) Subjective: 
 
Cardiovascular Review: 
The patient has hypertension and hyperlipidemia. Diet and Lifestyle: not attempting to follow a low fat, low cholesterol diet Home BP Monitoring: is not measured at home. Pertinent ROS: taking medications as instructed, no medication side effects noted, no TIA's, no chest pain on exertion, no dyspnea on exertion, no swelling of ankles. Thyroid Review: 
Patient is seen for followup of hypothyroidism. Thyroid ROS: denies fatigue, weight changes, heat/cold intolerance, bowel/skin changes or CVS symptoms. Osteoarthritis and Chronic Pain: 
Patient has osteoarthritis, primarily affecting the diffuse. Symptoms onset: problem is longstanding. Rheumatological ROS: no current joint or muscle symptoms, essentially pain-free. Response to treatment plan: stable. Additional Concerns:   
 
 
 
Patient Active Problem List  
 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  
 HTN (hypertension) 08/08/2011  Hypothyroid 08/08/2011 Current Outpatient Medications Medication Sig Dispense Refill  gabapentin (NEURONTIN) 300 mg capsule Take 1 Cap by mouth three (3) times daily. 90 Cap 1  
 pantoprazole (PROTONIX) 40 mg tablet Take 1 Tab by mouth two (2) times a day. 180 Tab 4  
 amLODIPine (NORVASC) 10 mg tablet Take 1 Tab by mouth daily. 90 Tab 4  clopidogrel (PLAVIX) 75 mg tab Take 1 Tab by mouth daily. 90 Tab 4  
 aspirin delayed-release 81 mg tablet Take 1 Tab by mouth daily. 90 Tab 4  metoprolol tartrate (LOPRESSOR) 100 mg IR tablet Take 1 Tab by mouth every twelve (12) hours. 180 Tab 4  
 atorvastatin (LIPITOR) 80 mg tablet Take 1 Tab by mouth nightly. 90 Tab 4  chlorthalidone (HYGROTEN) 25 mg tablet Take 1 Tab by mouth daily. 90 Tab 4  
 levothyroxine (SYNTHROID) 200 mcg tablet TAKE ONE TABLET BY MOUTH ONCE DAILY BEFORE  BREAKFAST 90 Tab 4  
 enalapril (VASOTEC) 20 mg tablet TAKE ONE TABLET BY MOUTH TWICE DAILY 180 Tab 4  
 oxyCODONE-acetaminophen (PERCOCET) 5-325 mg per tablet Take 1 Tab by mouth every eight to twelve (8-12) hours as needed for Pain for up to 30 days. Max Daily Amount: 3 Tabs. 60 Tab 0 No Known Allergies Past Medical History:  
Diagnosis Date  Cardiomyopathy ([facility] [address] 7/21/2014  Chronic pain syndrome 12/5/2018  Gastric bypass status for obesity 8/8/2011  
 Heart failure ([facility] [address]  History of ST elevation myocardial infarction (STEMI) 12/5/2018  
 HTN (hypertension) 8/8/2011  Hypothyroid 8/8/2011  Post corneal transplant 7/21/2014  
 STEMI (ST elevation myocardial infarction) ([facility] [address]   
 08/2018 Past Surgical History:  
Procedure Laterality Date [address] UNLISTED No family history on file. Social History Tobacco Use  Smoking status: Never Smoker  Smokeless tobacco: Never Used Substance Use Topics  Alcohol use: Yes Comment: 6 pk daily ROS All other systems reviewed and are negative. Objective: 
Vitals:  
 05/07/19 1043 BP: 112/77 Pulse: 61 Resp: 16 Temp: 97.6 °F (36.4 °C) TempSrc: Oral  
SpO2: 99% Weight: 293 lb (132.9 kg) Height: 6' 2\" (1.88 m) PainSc:   7 PainLoc: Hip  
 
 
 
 
 
 
 
alert, well appearing, and in no distress and oriented to person, place, and time Chest - clear to auscultation, no wheezes, rales or rhonchi, symmetric air entry Heart - normal rate, regular rhythm, normal S1, S2, no murmurs, rubs, clicks or gallops Abdomen - soft, nontender, nondistended, no masses or organomegaly LABS Component Latest Ref Rng & Units 3/5/2019 3/5/2019 3/5/2019 3/5/2019  
 
      8:00 AM  8:00 AM  8:00 AM  8:00 AM  
WBC 
    4.6 - 13.2 K/uL      
RBC 
    4.70 - 5.50 M/uL      
HGB 13.0 - 16.0 g/dL HCT 
    36.0 - 48.0 % MCV 
    74.0 - 97.0 FL      
MCH 
    24.0 - 34.0 PG      
MCHC 31.0 - 37.0 g/dL      
RDW 
    11.6 - 14.5 % PLATELET 
    182 - 422 K/uL MPV 
    9.2 - 11.8 FL      
NEUTROPHILS 
    40 - 73 % LYMPHOCYTES 
    21 - 52 % MONOCYTES 
    3 - 10 % EOSINOPHILS 
    0 - 5 % BASOPHILS 
    0 - 2 %      
ABS. NEUTROPHILS 
    1.8 - 8.0 K/UL      
ABS. LYMPHOCYTES 
    0.9 - 3.6 K/UL      
ABS. MONOCYTES 
    0.05 - 1.2 K/UL      
ABS. EOSINOPHILS 
    0.0 - 0.4 K/UL      
ABS. BASOPHILS 
    0.0 - 0.1 K/UL      
DF Sodium 136 - 145 mmol/L   139 Potassium 3.5 - 5.5 mmol/L   4.4 Chloride 100 - 108 mmol/L   105 CO2 
    21 - 32 mmol/L   25 Anion gap 3.0 - 18 mmol/L   9 Glucose 74 - 99 mg/dL   95 BUN 
    7.0 - 18 MG/DL   23 (H) Creatinine 
    0.6 - 1.3 MG/DL   1.27   
BUN/Creatinine ratio 12 - 20     18 GFR est AA 
    >60 ml/min/1.73m2   >60   
GFR est non-AA 
    >60 ml/min/1.73m2   >60 Calcium 8.5 - 10.1 MG/DL   8.8 Bilirubin, total 
    0.2 - 1.0 MG/DL   0.3 ALT (SGPT) 16 - 61 U/L   26 AST 
    15 - 37 U/L   16 Alk. phosphatase 45 - 117 U/L   108 Protein, total 
    6.4 - 8.2 g/dL   7.8 Albumin 3.4 - 5.0 g/dL   3.8 Globulin 2.0 - 4.0 g/dL   4.0 A-G Ratio 
    0.8 - 1.7     1.0 Cholesterol, total 
    <200 MG/DL    97 Triglyceride 
    <150 MG/DL    78 HDL Cholesterol 40 - 60 MG/DL    52 LDL, calculated 0 - 100 MG/DL    29.4 VLDL, calculated MG/DL    15.6 CHOL/HDL Ratio 0 - 5.0      1.9 TSH 
 0.36 - 3.74 uIU/mL  0.04 (L) T4, Free 0.7 - 1.5 NG/DL 1.7 (H) Component Latest Ref Rng & Units 3/5/2019  
 
      8:00 AM  
WBC 
    4.6 - 13.2 K/uL 7.6  
RBC 
    4.70 - 5.50 M/uL 4.98  
HGB 13.0 - 16.0 g/dL 13.6 HCT 
    36.0 - 48.0 % 42.8 MCV 
    74.0 - 97.0 FL 85.9 MCH 
    24.0 - 34.0 PG 27.3 MCHC 31.0 - 37.0 g/dL 31.8  
RDW 
    11.6 - 14.5 % 14.6 (H) PLATELET 
    923 - 851 K/uL 283 MPV 
    9.2 - 11.8 FL 10.0 NEUTROPHILS 
    40 - 73 % 61 LYMPHOCYTES 
    21 - 52 % 25 MONOCYTES 
    3 - 10 % 10 EOSINOPHILS 
    0 - 5 % 4 BASOPHILS 
    0 - 2 % 0  
ABS. NEUTROPHILS 
    1.8 - 8.0 K/UL 4.6  
ABS. LYMPHOCYTES 
    0.9 - 3.6 K/UL 1.9  
ABS. MONOCYTES 
    0.05 - 1.2 K/UL 0.8 ABS. EOSINOPHILS 
    0.0 - 0.4 K/UL 0.3  
ABS. BASOPHILS 
    0.0 - 0.1 K/UL 0.0  
DF AUTOMATED Sodium 136 - 145 mmol/L Potassium 3.5 - 5.5 mmol/L Chloride 100 - 108 mmol/L   
CO2 
    21 - 32 mmol/L Anion gap 3.0 - 18 mmol/L Glucose 74 - 99 mg/dL BUN 
    7.0 - 18 MG/DL Creatinine 
    0.6 - 1.3 MG/DL   
BUN/Creatinine ratio 12 - 20 GFR est AA 
    >60 ml/min/1.73m2 GFR est non-AA 
    >60 ml/min/1.73m2 Calcium 8.5 - 10.1 MG/DL Bilirubin, total 
    0.2 - 1.0 MG/DL   
ALT (SGPT) 16 - 61 U/L   
AST 
    15 - 37 U/L Alk. phosphatase 45 - 117 U/L Protein, total 
    6.4 - 8.2 g/dL Albumin 3.4 - 5.0 g/dL Globulin 2.0 - 4.0 g/dL A-G Ratio 
    0.8 - 1.7 Cholesterol, total 
    <200 MG/DL Triglyceride 
    <150 MG/DL   
HDL Cholesterol 40 - 60 MG/DL   
LDL, calculated 0 - 100 MG/DL VLDL, calculated MG/DL   
CHOL/HDL Ratio 0 - 5.0 TSH 
    0.36 - 3.74 uIU/mL T4, Free 0.7 - 1.5 NG/DL   
 
TESTS Assessment/Plan: Hypertension - stable Coronary artery disease - stable 
cardiomypathy stable Thyroid stable Chronic pain  This is a chronic problem that is significantly improved, stable. Per review of available records and patients , there are not sign of overuse, misuse, diversion, or concerning side effects. Today we reviewed: the risk of overdose, addiction, and dependency  The following changes were made to the patients current treatment plan: nothing, medications refilled. Lab review: labs are reviewed, up to date and normal 
 
Diagnoses and all orders for this visit: 1. Cardiomyopathy, unspecified type ([facility] [address] -     pantoprazole (PROTONIX) 40 mg tablet; Take 1 Tab by mouth two (2) times a day. -     amLODIPine (NORVASC) 10 mg tablet; Take 1 Tab by mouth daily. -     clopidogrel (PLAVIX) 75 mg tab; Take 1 Tab by mouth daily. -     aspirin delayed-release 81 mg tablet; Take 1 Tab by mouth daily. -     metoprolol tartrate (LOPRESSOR) 100 mg IR tablet; Take 1 Tab by mouth every twelve (12) hours. -     atorvastatin (LIPITOR) 80 mg tablet; Take 1 Tab by mouth nightly. -     levothyroxine (SYNTHROID) 200 mcg tablet; TAKE ONE TABLET BY MOUTH ONCE DAILY BEFORE  BREAKFAST 
-     enalapril (VASOTEC) 20 mg tablet; TAKE ONE TABLET BY MOUTH TWICE DAILY 2. Hypertension, unspecified type -     pantoprazole (PROTONIX) 40 mg tablet; Take 1 Tab by mouth two (2) times a day. -     amLODIPine (NORVASC) 10 mg tablet; Take 1 Tab by mouth daily. -     clopidogrel (PLAVIX) 75 mg tab; Take 1 Tab by mouth daily. -     aspirin delayed-release 81 mg tablet; Take 1 Tab by mouth daily. -     metoprolol tartrate (LOPRESSOR) 100 mg IR tablet; Take 1 Tab by mouth every twelve (12) hours. -     atorvastatin (LIPITOR) 80 mg tablet; Take 1 Tab by mouth nightly. -     levothyroxine (SYNTHROID) 200 mcg tablet; TAKE ONE TABLET BY MOUTH ONCE DAILY BEFORE  BREAKFAST 
-     enalapril (VASOTEC) 20 mg tablet; TAKE ONE TABLET BY MOUTH TWICE DAILY 3. Hypothyroidism, unspecified type -     pantoprazole (PROTONIX) 40 mg tablet; Take 1 Tab by mouth two (2) times a day. -     amLODIPine (NORVASC) 10 mg tablet; Take 1 Tab by mouth daily. -     clopidogrel (PLAVIX) 75 mg tab; Take 1 Tab by mouth daily. -     aspirin delayed-release 81 mg tablet; Take 1 Tab by mouth daily. -     metoprolol tartrate (LOPRESSOR) 100 mg IR tablet; Take 1 Tab by mouth every twelve (12) hours. -     atorvastatin (LIPITOR) 80 mg tablet; Take 1 Tab by mouth nightly. -     levothyroxine (SYNTHROID) 200 mcg tablet; TAKE ONE TABLET BY MOUTH ONCE DAILY BEFORE  BREAKFAST 
-     enalapril (VASOTEC) 20 mg tablet; TAKE ONE TABLET BY MOUTH TWICE DAILY 4. ST elevation myocardial infarction (STEMI), unspecified artery ([facility] -     pantoprazole (PROTONIX) 40 mg tablet; Take 1 Tab by mouth two (2) times a day. -     amLODIPine (NORVASC) 10 mg tablet; Take 1 Tab by mouth daily. -     clopidogrel (PLAVIX) 75 mg tab; Take 1 Tab by mouth daily. -     aspirin delayed-release 81 mg tablet; Take 1 Tab by mouth daily. -     metoprolol tartrate (LOPRESSOR) 100 mg IR tablet; Take 1 Tab by mouth every twelve (12) hours. -     atorvastatin (LIPITOR) 80 mg tablet; Take 1 Tab by mouth nightly. 5. Chronic pain syndrome 
-     oxyCODONE-acetaminophen (PERCOCET) 5-325 mg per tablet; Take 1 Tab by mouth every eight to twelve (8-12) hours as needed for Pain for up to 30 days. Max Daily Amount: 3 Tabs. Other orders 
-     gabapentin (NEURONTIN) 300 mg capsule; Take 1 Cap by mouth three (3) times daily. -     chlorthalidone (HYGROTEN) 25 mg tablet; Take 1 Tab by mouth daily. I have discussed the diagnosis with the patient and the intended plan as seen in the above orders. The patient has received an after-visit summary and questions were answered concerning future plans. I have discussed medication side effects and warnings with the patient as well.  I have reviewed the plan of care with the patient, accepted their input and they are in agreement with the treatment goals.

## 2019-05-07 NOTE — PROGRESS NOTES
Efe Agudelo is a 39 y.o. male presents today for follow up on his chronic pain. He would also like to discuss recent MRI results. Patient reports having pain in the right hip that travels down to his knee. Patient reports his pain 7/10. Pt is in Room # 4 Depression Screening: Completed Abuse screening: Completed 1. Have you been to the ER, urgent care clinic since your last visit? Hospitalized since your last visit? No 
 
2. Have you seen or consulted any other health care providers outside of the [address] [place] [address] since your last visit? Include any pap smears or colon screening. No  
 
Health Maintenance reviewed - .

## 2019-05-08 ENCOUNTER — APPOINTMENT (OUTPATIENT)
Dept: PHYSICAL THERAPY | Age: 44
End: 2019-05-08
Payer: MEDICAID

## 2019-05-10 ENCOUNTER — HOSPITAL ENCOUNTER (OUTPATIENT)
Dept: PHYSICAL THERAPY | Age: 44
End: 2019-05-10
Payer: MEDICAID

## 2019-05-11 PROBLEM — M79.18 PAIN IN RIGHT BUTTOCK: Status: ACTIVE | Noted: 2019-05-11

## 2019-05-11 PROBLEM — Z95.5 S/P PRIMARY ANGIOPLASTY WITH CORONARY STENT: Status: ACTIVE | Noted: 2019-05-11

## 2019-05-11 NOTE — PROGRESS NOTES
Subjective:   Mr. Jorge Alves is here today for follow up visit after hospitalization at [facility] in August 2018. He presented with chest pain and EKG showed inferior STEMI. He was taken to cardiac cath lab and had totally occluded RCA and a RUT was placed. He also had mild 30% stenosis in the diagonal branch, also in the circumflex. He has been taking his mediations as prescribed. He had an admission at [facility] [place] [facility] in [facility] 2018 for syncope, had hemoccult positive stool. He was evaluated by GI and underwent EGD which showed two G-J anastomotic ulcers without high risk stigmata, edges biopsied, gastric erosions, biopsies of gastric pouch for Hpylori, slight esophagitis and possible short segment Nunez's. He was told to avoid NSAID's, but that he could continue with ASA and Plavix. He did report that was was taking Ibuprofen \"like candy\" for cramps and right buttock pain. Since his last visit, he had some nerve conduction studies to localize the origin of his pain to the right femoral nerve. He is still participating physical therapy. He was prescribed gabapentin which he says has helped greatly. He has had no chest pain. He denies shortness of breath. Patient's cardiac risk factors are obesity, sedentary life style, male gender, hypertension. 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  90 Cap 1    pantoprazole (PROTONIX) 40 mg tablet Take 1 Tab by mouth two (2) times a day. 180 Tab 4    amLODIPine (NORVASC) 10 mg tablet Take 1 Tab by mouth daily. 90 Tab 4    clopidogrel (PLAVIX) 75 mg tab Take 1 Tab by mouth daily. 90 Tab 4    aspirin delayed-release 81 mg tablet Take 1 Tab by mouth daily. 90 Tab 4    metoprolol tartrate (LOPRESSOR) 100 mg IR tablet Take 1 Tab by mouth every twelve (12) hours. 180 Tab 4    atorvastatin (LIPITOR) 80 mg tablet Take 1 Tab by mouth nightly. 90 Tab 4    chlorthalidone (HYGROTEN) 25 mg tablet Take 1 Tab by mouth daily. 90 Tab 4    levothyroxine (SYNTHROID) 200 mcg tablet TAKE ONE TABLET BY MOUTH ONCE DAILY BEFORE  BREAKFAST 90 Tab 4    enalapril (VASOTEC) 20 mg tablet TAKE ONE TABLET BY MOUTH TWICE DAILY 180 Tab 4    oxyCODONE-acetaminophen (PERCOCET) 5-325 mg per tablet Take 1 Tab by mouth every eight to twelve (8-12) hours as needed for Pain for up to 30 days. Max Daily Amount: 3 Tabs. 61 Tab 0     No Known Allergies  Past Medical History:   Diagnosis Date    Cardiomyopathy ([facility] 7/21/2014    Chronic pain syndrome 12/5/2018    Gastric bypass status for obesity 8/8/2011    Heart failure ([facility]     History of ST elevation myocardial infarction (STEMI) 12/5/2018    HTN (hypertension) 8/8/2011    Hypothyroid 8/8/2011    Post corneal transplant 7/21/2014    STEMI (ST elevation myocardial infarction) ([facility]     08/2018     Past Surgical History:   Procedure Laterality Date    ABDOMEN SURGERY PROC UNLISTED       No family history on file.   Social History     Tobacco Use   Smoking Status Never Smoker   Smokeless Tobacco Never Used          Review of Systems, additional:  Constitutional: negative  Eyes: negative  Respiratory: negative for dyspnea on exertion  Cardiovascular: per HPI  Gastrointestinal: negative  Musculoskeletal:+right buttock pain radiates to right thigh  Neurological: negative  Behvioral/Psych: negative  Endocrine: negative  ENT: negative    Objective: Visit Vitals  /79   Pulse 61   Ht 6' 2\" (1.88 m)   Wt 292 lb (132.5 kg)   SpO2 98%   BMI 37.49 kg/m²     General:  alert, cooperative, no distress, appears stated age   Chest Wall: inspection normal - no chest wall deformities or tenderness, respiratory effort normal   Lung: clear to auscultation bilaterally   Heart:  normal rate, regular rhythm, normal S1, S2, no murmurs, rubs, clicks or gallops   Abdomen: soft, non-tender. Bowel sounds normal. No masses,  no organomegaly   Extremities: extremities normal, atraumatic, no cyanosis or edema Skin: no rashes   Neuro: alert, oriented, normal speech, no focal findings or movement disorder noted       Assessment/Plan:         ICD-10-CM ICD-9-CM    1. Hypertension, unspecified type-- BP elevated in office today, admits that he has not taken AM meds. I10 401.9 atorvastatin (LIPITOR) 80 mg tablet      metoprolol tartrate (LOPRESSOR) 100 mg IR tablet   2. Cardiomyopathy, unspecified type ([facility]-- EF 45%, advised of salt restriction, no volume overload. Continue with current cardiac regimen. I42.9 425.4 atorvastatin (LIPITOR) 80 mg tablet      metoprolol tartrate (LOPRESSOR) 100 mg IR tablet   3. ST elevation myocardial infarction (STEMI), ([facility]-- S/P RUT to RCA Resolute Integrity 4X22 mm, on 8/13/2018. He is on ASA, Plavix, Lopressor and Atorvastatin. Stable. RT 4 mos. I21.3 410.90 atorvastatin (LIPITOR) 80 mg tablet      metoprolol tartrate (LOPRESSOR) 100 mg IR tablet      REFERRAL TO CARDIAC REHAB   4. Hypothyroidism, unspecified type E03.9 244.9 atorvastatin (LIPITOR) 80 mg tablet      metoprolol tartrate (LOPRESSOR) 100 mg IR tablet   5.       Lumbar radiculopathy: Appears likely related to the femoral nerve trauma/hematoma at the time of STEMI

## 2019-05-13 ENCOUNTER — HOSPITAL ENCOUNTER (OUTPATIENT)
Dept: PHYSICAL THERAPY | Age: 44
Discharge: HOME | End: 2019-05-13
Payer: MEDICAID

## 2019-05-13 PROCEDURE — 97110 THERAPEUTIC EXERCISES: CPT

## 2019-05-13 NOTE — PROGRESS NOTES
PHYSICAL THERAPY - DAILY TREATMENT NOTE    Patient Name: Antonietta Mccoy        Date: 2019  : 1975   yes Patient  Verified  Visit #:    Insurance: Payor: Bryan Sanchez / Plan: [id number] / Product Type: Managed Care Medicaid /      In time:  Out time: 89   Total Treatment Time: 53     TREATMENT AREA =  Right hip pain [M25.551]  Low back pain [M54.5]    SUBJECTIVE  Pain Level (on 0 to 10 scale):  4-5/ 10   Medication Changes/New allergies or changes in medical history, any new surgeries or procedures?    no  If yes, update Summary List   Subjective Functional Status/Changes:  []  No changes reported     \"I am not sure what the MRI results are, I find out the \"       OBJECTIVE      53 min Therapeutic Exercise:  [x]  See flow sheet   Rationale:      increase ROM and increase strength to improve the patients ability to perform ADLs/ bending/stooping/ lifting/prolong sitting, stding and amb/ stairs with ease      Billed With/As:   [] TE   [x] TA   [] Neuro   [] Self Care Patient Education: [x] Review HEP    [] Progressed/Changed HEP based on:   [x] positioning   [x] body mechanics   [x] transfers   [x] heat/ice application    [x] other: Pt ed on importance and benefits of compliance with HEP, core strength/stability and proper posture; pt verbalized understanding       Other Objective/Functional Measures:  PATRICIO HOC left over bar-P/B decrease p! HARPREET with bed elevated HOC left> + REIL HOC LEft-P/B decrease pain     VCs + demo to perform proper technique for TE  initiated tap ups with no UE assist, performed without c/o p!   pt continues to have difficulty with SL stance Tband TE, no LOB but other LE assist    Post Treatment Pain Level (on 0 to 10) scale:   0  / 10     ASSESSMENT  Assessment/Changes in Function:     continues to progress with TE without increase p!  progressing towards increased amb/standing tolerance, balance and proper body mechanics without increase in pain      []  See Progress Note/Recertification   Patient will continue to benefit from skilled PT services to modify and progress therapeutic interventions, address functional mobility deficits, address ROM deficits, address strength deficits, analyze and address soft tissue restrictions, analyze and cue movement patterns, analyze and modify body mechanics/ergonomics, assess and modify postural abnormalities and instruct in home and community integration to attain remaining goals. Progress toward goals / Updated goals:  · Goals for this certification period include and are to be achieved in   4  weeks:  1. Pt will be independent with HEP at D/C for self management. 2. Pt will increase 90/90 HS flexibility to lacking </= 25deg for decreased stress on spine. progressing, performing 90/90 HS without increase p!  3. Pt will demo box lift with good mechanics and not increased pain for ease and safety at home. progressing, demos proper hip hinge mini squat without increase p!  4. Pt will increase left hip ext MTT to >/= 4+/5 for increased hip ext in gait.  progressing 4/5     PLAN  [x]  Upgrade activities as tolerated yes Continue plan of care   []  Discharge due to :    []  Other:      Therapist: Wily Hicks PTA    Date: 5/13/2019 Time: 12:12 PM     Future Appointments   Date Time Provider Enzo Bernal   5/16/2019 11:00 AM [facility]   5/20/2019 11:00 AM [facility]   5/21/2019 10:15 AM Evan Glover MD [facility] [place]   5/23/2019 11:00 AM [facility]   5/28/2019 10:00 AM [facility]   5/30/2019 11:00 AM [facility]   8/7/2019 10:30 AM Fred Escobedo MD [facility]   9/16/2019 11:45 AM Ernesto Romano MD [address] [place] [address]

## 2019-05-16 ENCOUNTER — HOSPITAL ENCOUNTER (OUTPATIENT)
Dept: PHYSICAL THERAPY | Age: 44
Discharge: HOME | End: 2019-05-16
Payer: MEDICAID

## 2019-05-16 PROCEDURE — 97110 THERAPEUTIC EXERCISES: CPT

## 2019-05-16 PROCEDURE — 97140 MANUAL THERAPY 1/> REGIONS: CPT

## 2019-05-16 NOTE — PROGRESS NOTES
PHYSICAL THERAPY - DAILY TREATMENT NOTE    Patient Name: Carley Murphy        Date: 2019  : 1975   yes Patient  Verified  Visit #:    Insurance: Payor: Seferino Shields / Plan: [id number] / Product Type: Managed Care Medicaid /      In time: 308 Out time: 515   Total Treatment Time: 40     TREATMENT AREA =  Right hip pain [M25.551]  Low back pain [M54.5]    SUBJECTIVE  Pain Level (on 0 to 10 scale):  5/ 10 radic down to right knee   Medication Changes/New allergies or changes in medical history, any new surgeries or procedures?    no  If yes, update Summary List   Subjective Functional Status/Changes:  []  No changes reported   \"It has been getting worse. I can not sit long its all the way down to the knee.  I am doing the extensions and it helps for a short bit but then it comes back down the leg\"       OBJECTIVE      25 min Therapeutic Exercise:  [x]  See flow sheet   Rationale:      increase ROM and increase strength to improve the patients ability to perform ADLs/ bending/stooping/ lifting/prolong sitting, stding and amb/ stairs with ease     15 min Manual Therapy: STM/MFR to Right lumbosacral junction  OP to L/s with ext mobs with HOC left and bed elevated    Rationale:      decrease pain, increase ROM, increase tissue extensibility, decrease trigger points and increase postural awareness to improve patient's ability to safely perform ADLs/ bending/stooping/ lifting/prolong sitting, stding and amb/ stairs with minimal to no pain      Billed With/As:   [] TE   [x] TA   [] Neuro   [] Self Care Patient Education: [x] Review HEP    [] Progressed/Changed HEP based on:   [x] positioning   [x] body mechanics   [x] transfers   [x] heat/ice application    [x] other: Pt ed on importance and benefits of compliance with HEP, core strength/stability and proper posture; pt verbalized understanding  See updated HEP in chart     Other Objective/Functional Measures:  HARPREET HOC left bed elevated> with therapist OP= P/B centralized to right lumbosacral junction   Rep RSG at wall-P/B maintained centralized to Right L/s    VCs + demo to perform proper technique for TE  improved gait after session    Post Treatment Pain Level (on 0 to 10) scale:   0  / 10     ASSESSMENT  Assessment/Changes in Function:   possible right lateral component with post derangement      []  See Progress Note/Recertification   Patient will continue to benefit from skilled PT services to modify and progress therapeutic interventions, address functional mobility deficits, address ROM deficits, address strength deficits, analyze and address soft tissue restrictions, analyze and cue movement patterns, analyze and modify body mechanics/ergonomics, assess and modify postural abnormalities and instruct in home and community integration to attain remaining goals. Progress toward goals / Updated goals:  · Goals for this certification period include and are to be achieved in   4  weeks:  1. Pt will be independent with HEP at D/C for self management. 2. Pt will increase 90/90 HS flexibility to lacking </= 25deg for decreased stress on spine. progressing, performing 90/90 HS without increase p!  3. Pt will demo box lift with good mechanics and not increased pain for ease and safety at home. progressing, demos proper hip hinge mini squat without increase p!  4. Pt will increase left hip ext MTT to >/= 4+/5 for increased hip ext in gait.  progressing 4/5     PLAN  [x]  Upgrade activities as tolerated yes Continue plan of care   []  Discharge due to :    []  Other:      Therapist: Víctor Coley PTA    Date: 5/16/2019 Time: 11:49 AM     Future Appointments   Date Time Provider Enzo Bernal   5/20/2019 11:00 AM [facility]   5/21/2019 10:15 AM MD Arsh Montoya   5/23/2019 11:00 AM [facility]   5/28/2019 10:00 AM Zulma Del Real PTA [facility]   5/30/2019 11:00 AM [facility] AT [facility]   8/7/2019 10:30 AM Maria Teresa Lopez MD [facility]   9/16/2019 11:45 AM Jacqui Salazar MD [address] [place] [address]

## 2019-05-20 ENCOUNTER — HOSPITAL ENCOUNTER (OUTPATIENT)
Dept: PHYSICAL THERAPY | Age: 44
Discharge: HOME | End: 2019-05-20
Payer: MEDICAID

## 2019-05-20 PROCEDURE — 97110 THERAPEUTIC EXERCISES: CPT

## 2019-05-20 PROCEDURE — 97140 MANUAL THERAPY 1/> REGIONS: CPT

## 2019-05-20 NOTE — PROGRESS NOTES
PHYSICAL THERAPY - DAILY TREATMENT NOTE    Patient Name: Nallely Gillis        Date: 2019  : 1975   yes Patient  Verified  Visit #:    Insurance: Payor: Jose D Green / Plan: [id number] [employer] / Product Type: Managed Care Medicaid /      In time: 1100 Out time: 1150   Total Treatment Time: 50     TREATMENT AREA =  Right hip pain [M25.551]  Low back pain [M54.5]    SUBJECTIVE  Pain Level (on 0 to 10 scale): 0-1/ 10 right glut   Medication Changes/New allergies or changes in medical history, any new surgeries or procedures?    no  If yes, update Summary List   Subjective Functional Status/Changes:  []  No changes reported   \"I feel so much better with that Rep RSG at wall. It really helped.  I am good but when I sit it goes down with a sharp pain\"       OBJECTIVE      42 min Therapeutic Exercise:  [x]  See flow sheet   Rationale:      increase ROM and increase strength to improve the patients ability to perform ADLs/ bending/stooping/ lifting/prolong sitting, stding and amb/ stairs with ease     8 min Manual Therapy: OP to L/s with ext mobs with HOC left and bed elevated    Rationale:      decrease pain, increase ROM, increase tissue extensibility, decrease trigger points and increase postural awareness to improve patient's ability to safely perform ADLs/ bending/stooping/ lifting/prolong sitting, stding and amb/ stairs with minimal to no pain      Billed With/As:   [] TE   [x] TA   [] Neuro   [] Self Care Patient Education: [x] Review HEP    [] Progressed/Changed HEP based on:   [x] positioning   [x] body mechanics   [x] transfers   [x] heat/ice application    [x] other: Pt ed on importance and benefits of compliance with HEP, core strength/stability and proper posture; pt verbalized understanding  See updated HEP in chart     Other Objective/Functional Measures:  APTRICIO-NE/NE  Rep RSG + ext at wall-P/B decreased pain  therapist OP HOC left - P/B abolished sxs  VCs + demo to perform proper technique for TE  increased to 15# KB with step ups and initiated SB squats without c/o p!  demos increased ant translation with SB squats improved with VCs   Post Treatment Pain Level (on 0 to 10) scale:   0  / 10     ASSESSMENT  Assessment/Changes in Function:   able to reduce pain significantly with rep RSG + Ext at wall, and abolish pain after manual  continue to progress towards (I) with HEP    []  See Progress Note/Recertification   Patient will continue to benefit from skilled PT services to modify and progress therapeutic interventions, address functional mobility deficits, address ROM deficits, address strength deficits, analyze and address soft tissue restrictions, analyze and cue movement patterns, analyze and modify body mechanics/ergonomics, assess and modify postural abnormalities and instruct in home and community integration to attain remaining goals. Progress toward goals / Updated goals:  · Goals for this certification period include and are to be achieved in   4  weeks:  1. Pt will be independent with HEP at D/C for self management. 2. Pt will increase 90/90 HS flexibility to lacking </= 25deg for decreased stress on spine. progressing, performing 90/90 HS without increase p!  3. Pt will demo box lift with good mechanics and not increased pain for ease and safety at home. progressing, demos proper hip hinge mini squat without increase p!  4. Pt will increase left hip ext MTT to >/= 4+/5 for increased hip ext in gait.  progressing 4/5     PLAN  [x]  Upgrade activities as tolerated yes Continue plan of care   []  Discharge due to :    []  Other:      Therapist: Samira Lugo PTA    Date: 5/20/2019 Time: 12:56 PM     Future Appointments   Date Time Provider Enzo Bernal   5/21/2019 10:15 AM MD Arsh Salazar   5/23/2019 11:00 AM [facility]   5/28/2019 10:00 AM Jonah Howell [facility]   5/30/2019 11:00 AM [facility]   8/7/2019 10:30 AM Aram Basilio MD [facility]   9/16/2019 11:45 AM Cesar Hernandez MD [address] [place] [address]

## 2019-05-21 ENCOUNTER — OFFICE VISIT (OUTPATIENT)
Dept: ORTHOPEDIC SURGERY | Age: 44
End: 2019-05-21

## 2019-05-21 VITALS
DIASTOLIC BLOOD PRESSURE: 71 MMHG | TEMPERATURE: 97.4 F | RESPIRATION RATE: 24 BRPM | HEIGHT: 74 IN | WEIGHT: 288 LBS | SYSTOLIC BLOOD PRESSURE: 108 MMHG | HEART RATE: 55 BPM | OXYGEN SATURATION: 97 % | BODY MASS INDEX: 36.96 KG/M2

## 2019-05-21 DIAGNOSIS — M54.16 LUMBAR RADICULOPATHY: ICD-10-CM

## 2019-05-21 DIAGNOSIS — M47.817 LUMBOSACRAL SPONDYLOSIS WITHOUT MYELOPATHY: Primary | ICD-10-CM

## 2019-05-21 DIAGNOSIS — M51.36 DDD (DEGENERATIVE DISC DISEASE), LUMBAR: ICD-10-CM

## 2019-05-21 RX ORDER — GABAPENTIN 600 MG/1
600 TABLET ORAL 3 TIMES DAILY
Qty: 90 TAB | Refills: 1 | Status: SHIPPED | OUTPATIENT
Start: 2019-05-21 | End: 2019-07-08 | Stop reason: DRUGHIGH

## 2019-05-21 NOTE — PROGRESS NOTES
[facility]  [address], [place]   Phone: [phone]  Fax: [phone]        PROGRESS NOTE      HISTORY OF PRESENT ILLNESS:  The patient is a 39 y.o. male and was seen today for follow up of progressive low back/buttock pain extending into the RLE in an L5 distribution to the knee x 6 months. He reports the onset of his pain was around the same time he had his cardiac stent put in. He has treated with Percocet. Pt denies hx of spinal surgery or injections. Pt reports he has just started PT. Pt is on Plavix and asprin for his heart. Pt denies fever, weight loss, or skin changes. Pt denies change in bowel or bladder habits. He denies hx of DM and glaucoma. The patient is RHD. PmHx of gastric bypass (2011), chronic pain syndrome, myocardial infarction. Patient has cardiac stent (August 2018 by Dr. Erna Norman). Note from Dr. Heller Alt dated 1/28/19 indicating patient was seen with c/o pain in the right buttocks and into the right leg. Hx of cardiac stent (August 2018 by Dr. Erna Norman) around the time of pain onset. Performed trigger point injection. Referred to us. Preliminary reading of lumbar plain films revealed: Limited study due to body habits. Moderate disc space narrowing at L5-S1. Small anterior osteophytes noted on L1, L3, L5, and S1. No acute pathology identified. A RLE EMG dated 3/6/19 was suggestive of a normal EMG nerve conduction study showing no signs of neuropathy myopathy or radiculopathy in the nerves and muscles tested. Specifically there are no signs of peripheral neuropathy. 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 This is a partial injury in that there is no sign of denervation at this time on the electromyographic study.  At his last clinical appointment, I contacted the patient's cardiologist to determine if it was safe to obtain an MRI of the lumbar spine, as he had cardiac stents put in August of 2018. Patient was given instructions to wean off of Topamax 75 mg qhs. I tried him on Neurontin 300mg TID. The patient returns today with pain location and distribution remain unchanged. He rates his pain 3/10, previously 9/10. Pt is tolerating Neurontin 300 mg TID with good relief. Pt denies change in bowel or bladder habits. Lumbar spine MRI dated 5/3/19 reviewed. Per report, small very broad-based protrusion on the left at L3-L4 into the foramen. This does not with confidence affect the left L3 root. Additional degenerative change but no additional evidence of herniation or high-grade stenosis. L1/L2: Normal except for mild degenerative change. L2/L3: Normal except for mild facet arthropathy. L3/L4: Broad-based eccentric to the left disc protrusion with high T2 and STIR signal in the peripheral protruding disc contour which extends to the left L3 root in the foramen as it exits the foramen but does not displace or distort the root. Slightly bulging disc contour. No significant central or lateral recess stenosis and no right lateral stenosis. L4/L5: Bulging disc and facet arthropathy. No significant central or lateral recess stenosis. Mild subarticular foraminal narrowing. L5/S1: Diffuse bulging disc. Small central inferior disc protrusion with very broad-based. No convincing effect on either S1 root or the thecal sac. Mild-moderate left greater than right foraminal stenosis not clearly affecting the L5 roots.  reviewed. Body mass index is 36.98 kg/m².       PCP: Cristo Gandhi.,       Past Medical History:   Diagnosis Date    Cardiomyopathy 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     08/2018        Social History     Socioeconomic History    Marital status: SINGLE     Spouse name: Not on file    Number of children: Not on file    Years of education: Not on file    Highest education level: Not on file   Occupational History    Not on file   Social Needs    Financial resource strain: Not on file    Food insecurity:     Worry: Not on file     Inability: Not on file    Transportation needs:     Medical: Not on file     Non-medical: Not on file   Tobacco Use    Smoking status: Never Smoker    Smokeless tobacco: Never Used   Substance and Sexual Activity    Alcohol use: Yes     Comment: 6 pk daily    Drug use: No    Sexual activity: Yes   Lifestyle    Physical activity:     Days per week: Not on file     Minutes per session: Not on file    Stress: Not on file   Relationships    Social connections:     Talks on phone: Not on file     Gets together: Not on file     Attends Taoist service: Not on file     Active member of club or organization: Not on file     Attends meetings of clubs or organizations: Not on file     Relationship status: Not on file    Intimate partner violence:     Fear of current or ex partner: Not on file     Emotionally abused: Not on file     Physically abused: Not on file     Forced sexual activity: Not on file   Other Topics Concern    Not on file   Social History Narrative    Not on file       Current Outpatient Medications   Medication Sig Dispense Refill    gabapentin (NEURONTIN) 300 mg capsule Take 1 Cap by mouth three (3) times daily. 90 Cap 1    pantoprazole (PROTONIX) 40 mg tablet Take 1 Tab by mouth two (2) times a day. 180 Tab 4    amLODIPine (NORVASC) 10 mg tablet Take 1 Tab by mouth daily. 90 Tab 4    clopidogrel (PLAVIX) 75 mg tab Take 1 Tab by mouth daily.  90 Tab 4    aspirin delayed-release 81 mg tablet Take 1 Tab by mouth daily. 90 Tab 4    metoprolol tartrate (LOPRESSOR) 100 mg IR tablet Take 1 Tab by mouth every twelve (12) hours. 180 Tab 4    atorvastatin (LIPITOR) 80 mg tablet Take 1 Tab by mouth nightly. 90 Tab 4    chlorthalidone (HYGROTEN) 25 mg tablet Take 1 Tab by mouth daily. 90 Tab 4    levothyroxine (SYNTHROID) 200 mcg tablet TAKE ONE TABLET BY MOUTH ONCE DAILY BEFORE  BREAKFAST 90 Tab 4    enalapril (VASOTEC) 20 mg tablet TAKE ONE TABLET BY MOUTH TWICE DAILY 180 Tab 4    oxyCODONE-acetaminophen (PERCOCET) 5-325 mg per tablet Take 1 Tab by mouth every eight to twelve (8-12) hours as needed for Pain for up to 30 days. Max Daily Amount: 3 Tabs. 60 Tab 0       No Known Allergies       PHYSICAL EXAMINATION    Visit Vitals  /71   Pulse (!) 55   Temp 97.4 °F (36.3 °C)   Resp 24   Ht 6' 2\" (1.88 m)   Wt 288 lb (130.6 kg)   SpO2 97%   BMI 36.98 kg/m²       CONSTITUTIONAL: NAD, A&O x 3  SENSATION: Intact to light touch throughout  RANGE OF MOTION: The patient has full passive range of motion in all four extremities. MOTOR:  Straight Leg Raise: Negative, bilateral               Hip Flex Knee Ext Knee Flex Ankle DF GTE Ankle PF Tone   Right +4/5 +4/5 +4/5 +4/5 +4/5 +4/5 +4/5   Left +4/5 +4/5 +4/5 +4/5 +4/5 +4/5 +4/5       ASSESSMENT   Diagnoses and all orders for this visit:    1. Lumbosacral spondylosis without myelopathy    2. Lumbar radiculopathy    3. 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 I will increase his dose of Neurontin to 600 mg TID. Patient advised to call the office if intolerant to higher dose. Patient is neurologically intact. I will see the patient back in 1 month's time or earlier if needed.       Written by Regino Elliott, as dictated by Radha Smith MD  I examined the patient, reviewed and agree with the note.

## 2019-05-21 NOTE — PROGRESS NOTES
1. Have you been to the ER, urgent care clinic since your last visit? Hospitalized since your last visit? No    2. Have you seen or consulted any other health care providers outside of the [address] [place] [address] since your last visit? Include any pap smears or colon screening.  No

## 2019-05-21 NOTE — LETTER
5/21/19 Patient: Yelitza Azevedo YOB: 1975 Date of Visit: 5/21/2019 Steffanie White DO 
[address] [facility] [place]note 2[facility] [address] [id number] VIA In Basket Dear Steffanie White DO, Thank you for referring Mr. Chanda Sidhu to [address] [place] [address] Drive for evaluation. My notes for this consultation are attached. If you have questions, please do not hesitate to call me. I look forward to following your patient along with you. Sincerely, Corinna Duque MD

## 2019-05-22 ENCOUNTER — HOSPITAL ENCOUNTER (OUTPATIENT)
Dept: PHYSICAL THERAPY | Age: 44
Discharge: HOME | End: 2019-05-22
Payer: MEDICAID

## 2019-05-22 PROCEDURE — 97140 MANUAL THERAPY 1/> REGIONS: CPT

## 2019-05-22 PROCEDURE — 97110 THERAPEUTIC EXERCISES: CPT

## 2019-05-22 NOTE — PROGRESS NOTES
PHYSICAL THERAPY - DAILY TREATMENT NOTE    Patient Name: Rocky Dowling        Date: 2019  : 1975   yes Patient  Verified  Visit #:    Insurance: Payor: Abbi Javed / Plan: [id number] [id number] / Product Type: Managed Care Medicaid /      In time: 8153 Out time: 3400   Total Treatment Time: 63     TREATMENT AREA =  Right hip pain [M25.551]  Low back pain [M54.5]    SUBJECTIVE  Pain Level (on 0 to 10 scale):  10 right glut   Medication Changes/New allergies or changes in medical history, any new surgeries or procedures? yes  See update Summary List   Subjective Functional Status/Changes:  []  No changes reported   \"It really helps when you do the OP, and it always feels good after I leave here. All the doctor did was talk fast and increase my meds.  He did not recommend surgery so I am happy about that \"       OBJECTIVE      55 min Therapeutic Exercise:  [x]  See flow sheet   Rationale:      increase ROM and increase strength to improve the patients ability to perform ADLs/ bending/stooping/ lifting/prolong sitting, stding and amb/ stairs with ease     8 min Manual Therapy: OP to L/s with ext mobs    Rationale:      decrease pain, increase ROM, increase tissue extensibility, decrease trigger points and increase postural awareness to improve patient's ability to safely perform ADLs/ bending/stooping/ lifting/prolong sitting, stding and amb/ stairs with minimal to no pain      Billed With/As:   [] TE   [x] TA   [] Neuro   [] Self Care Patient Education: [x] Review HEP    [] Progressed/Changed HEP based on:   [x] positioning   [x] body mechanics   [x] transfers   [x] heat/ice application    [x] other: Pt ed on importance and benefits of compliance with HEP, core strength/stability and proper posture; pt verbalized understanding  See updated HEP in chart     Other Objective/Functional Measures:  VCs + demo to perform proper technique for TE  increased to 10# KB with step ups, and increased reps without c/o p!    continues to require VCs ant translation with SB squats improved with VCs  c/o L/s pulling with HS str  at step     Post Treatment Pain Level (on 0 to 10) scale:   0  / 10     ASSESSMENT  Assessment/Changes in Function:   decrease pain with TE and manual  demos proper pain free SB squats with VCs    continue to progress towards (I) with HEP    []  See Progress Note/Recertification   Patient will continue to benefit from skilled PT services to modify and progress therapeutic interventions, address functional mobility deficits, address ROM deficits, address strength deficits, analyze and address soft tissue restrictions, analyze and cue movement patterns, analyze and modify body mechanics/ergonomics, assess and modify postural abnormalities and instruct in home and community integration to attain remaining goals. Progress toward goals / Updated goals:  · Goals for this certification period include and are to be achieved in   4  weeks:  1. Pt will be independent with HEP at D/C for self management. 2. Pt will increase 90/90 HS flexibility to lacking </= 25deg for decreased stress on spine. progressing, performing 90/90 HS without increase p!  3. Pt will demo box lift with good mechanics and not increased pain for ease and safety at home. progressing, demos proper SB squat and step ups with 10# without difficulty or increase pain  4. Pt will increase left hip ext MTT to >/= 4+/5 for increased hip ext in gait.  progressing 4/5     PLAN  [x]  Upgrade activities as tolerated yes Continue plan of care   []  Discharge due to :    []  Other:      Therapist: Logan Cannon PTA    Date: 5/22/2019 Time: 12:56 PM     Future Appointments   Date Time Provider Enzo Bernal   5/23/2019 11:00 AM [facility]   5/28/2019 10:00 AM [facility]   5/30/2019 11:00 AM [facility]   6/19/2019 11:10 AM MD Arsh Hathaway 8/7/2019 10:30 AM Mason Basilio MD [facility]   9/16/2019 11:45 AM Tiffany Suarez MD [address] [place] [address]

## 2019-05-23 ENCOUNTER — HOSPITAL ENCOUNTER (OUTPATIENT)
Dept: PHYSICAL THERAPY | Age: 44
End: 2019-05-23
Payer: MEDICAID

## 2019-05-28 ENCOUNTER — HOSPITAL ENCOUNTER (OUTPATIENT)
Dept: PHYSICAL THERAPY | Age: 44
Discharge: HOME | End: 2019-05-28
Payer: MEDICAID

## 2019-05-28 PROCEDURE — 97530 THERAPEUTIC ACTIVITIES: CPT

## 2019-05-28 PROCEDURE — 97110 THERAPEUTIC EXERCISES: CPT

## 2019-05-28 NOTE — PROGRESS NOTES
[facility]  [address] [place] [address] [address]ay [address], [address] - Phone: [phone]  Fax: [phone]  Terry Ventura  Patient Name: Dagmar Leon : 1975   Treatment/Medical Diagnosis: Right hip pain [M25.551]  Low back pain [M54.5]   Referral Source: Chiqui Vivar MD     Date of Initial Visit: 19 Attended Visits: 20 Missed Visits: 4     SUMMARY OF TREATMENT  Patient's POC has consisted of therex, therapeutic activities,repeated/prolong L/s movement (MDT)   manual therapy prn, modalities prn, pt. education, and a comprehensive HEP. Treatment strategies used to address functional mobility deficits, ROM deficits, strength deficits, analyze and address soft tissue restrictions, analyze and cue movement patterns, analyze and modify body mechanics/ergonomics, assess and modify postural abnormalities and instruct in home and community integration. CURRENT STATUS  Pt has made excellent progress by evidence by the following. Pt continues to report daily compliance with HEP, and is (I) with managing sxs with repeated/prolong L/s movement (MDT). Pt demos proper 17# box lift x 5 from mat<>chest<>pivot<>chest<>floor without pain. Pt able to perform SLS x 30\" with 1 finger support > 0 assist without LOB, progressing towards decrease fall risk. Pt able to perform 7\" step ups with 15# KB without difficulty, and without pain/peripheralizing. Goal/Measure of Progress Goal Met? 1. Pt will be independent with HEP at D/C for self management. Status at last Eval: compliant with HEP Current Status: compliant with HEP progressing   2. Pt will increase 90/90 HS flexibility to lacking </= 25deg for decreased stress on spine   Status at last Eval: lacking 45 degs Current Status:  90/90 HS AROM  right=14 degs, left=15 degs yes   3.    Pt will demo box lift with good mechanics and not increased pain for ease and safety at home   Status at last Eval: demos proper mini squat with 5# KB with no c/o  increase p! Current Status: demos proper pain free 17# box lift  yes   4. Pt will increase left hip ext MTT to >/= 4+/5 for increased hip ext in gait.    Status at last Eval: 4/5 without pain Current Status: 4+/5 yes     New Goals to be achieved in __4__  weeks:  1. Pt will be independent with HEP at D/C for self management. RECOMMENDATIONS  Pt to continue x 1 more visit to promote (I) with HEP. Instructed to continue HEP (I) 3-5 x weekly to maintain strength and stability to promote functional mobility. If you have any questions/comments please contact us directly at [phone]. Thank you for allowing us to assist in the care of your patient. LPTA Signature: Efren Aiken PTA  Date: 5/28/2019   PT Signature: Izaiah Burrows DPT Time: 12:49  PM   NOTE TO PHYSICIAN:  PLEASE COMPLETE THE ORDERS BELOW AND FAX TO   [facility]: [phone]. If you are unable to process this request in 24 hours please contact our office: [phone].    ___ I have read the above report and request that my patient continue as recommended.   ___ I have read the above report and request that my patient continue therapy with the following changes/special instructions:_________________________________________________________   ___ I have read the above report and request that my patient be discharged from therapy.      Physician Signature:        Date:       Time:

## 2019-05-28 NOTE — PROGRESS NOTES
PHYSICAL THERAPY - DAILY TREATMENT NOTE    Patient Name: Sudheer Last        Date: 2019  : 1975   yes Patient  Verified  Visit #:    Insurance: Payor: Robby Slot / Plan: Herb Chery / Product Type: Managed Care Medicaid /      In time: 1000 Out time: 3401   Total Treatment Time: 56     TREATMENT AREA =  Right hip pain [M25.551]  Low back pain [M54.5]    SUBJECTIVE  Pain Level (on 0 to 10 scale): 1/ 10 right glut   Medication Changes/New allergies or changes in medical history, any new surgeries or procedures?     yes  See update Summary List   Subjective Functional Status/Changes:  []  No changes reported   \"It is barely there, this PT really works\"       OBJECTIVE      44 min Therapeutic Exercise:  [x]  See flow sheet   Rationale:      increase ROM and increase strength to improve the patients ability to perform ADLs/ bending/stooping/ lifting/prolong sitting, stding and amb/ stairs with ease     12 min Therapeutic Activity: 17# box lifting   tap ups   step ups with 15# KB  SLS   Rationale:    increase ROM, increase strength, improve coordination, improve balance and increase proprioception to improve the patients ability to  safely perform ADLs/ bending/stooping/ lifting/prolong sitting, stding and amb/ stairs with minimal to no pain    Billed With/As:   [] TE   [x] TA   [] Neuro   [] Self Care Patient Education: [x] Review HEP    [] Progressed/Changed HEP based on:   [x] positioning   [x] body mechanics   [x] transfers   [x] heat/ice application    [x] other: Pt ed on importance and benefits of compliance with HEP, core strength/stability and proper posture; pt verbalized understanding  See updated HEP in chart     Other Objective/Functional Measures:  VCs + demo to perform proper technique for TE  increased to 15# KB with step ups without difficulty  90/90 HS flex AROM flex=15 degs/right=14 degs  demos proper, pain free 17# box lift from mat<>pivot<>floor<>chest initiated SLS x 30\" with 1 finger support >0 finger support      Post Treatment Pain Level (on 0 to 10) scale:   0  / 10     ASSESSMENT  Assessment/Changes in Function:   See PN    continue to progress towards (I) with HEP    []  See Progress Note/Recertification   Patient will continue to benefit from skilled PT services to modify and progress therapeutic interventions, address functional mobility deficits, address ROM deficits, address strength deficits, analyze and address soft tissue restrictions, analyze and cue movement patterns, analyze and modify body mechanics/ergonomics, assess and modify postural abnormalities and instruct in home and community integration to attain remaining goals. Progress toward goals / Updated goals:  · Goals for this certification period include and are to be achieved in   4  weeks:  1. Pt will be independent with HEP at D/C for self management. 2. Pt will increase 90/90 HS flexibility to lacking </= 25deg for decreased stress on spine. MET=90/90 HS flex AROM flex=15 degs/right=14 degs  3. Pt will demo box lift with good mechanics and not increased pain for ease and safety at home. MET,demos proper, pain free 17# box lift from mat<>pivot<>floor<>chest   4. Pt will increase left hip ext MTT to >/= 4+/5 for increased hip ext in gait.  MET= 4+/5     PLAN  [x]  Upgrade activities as tolerated yes Continue plan of care   []  Discharge due to :    [x]  Other: d/c with HEP NV     Therapist: Michelle Fields PTA    Date: 5/28/2019 Time: 12:56 PM     Future Appointments   Date Time Provider Enzo Dolores   5/30/2019 11:00 AM [facility]   6/19/2019 11:10 AM Mac Velarde MD [facility]   8/7/2019 10:30 AM Regino Stewart MD [facility]   9/16/2019 11:45 AM Warden Javier MD [address] [place] [address]

## 2019-06-24 ENCOUNTER — OFFICE VISIT (OUTPATIENT)
Dept: ORTHOPEDIC SURGERY | Age: 44
End: 2019-06-24

## 2019-06-24 VITALS
HEART RATE: 61 BPM | WEIGHT: 287 LBS | OXYGEN SATURATION: 98 % | HEIGHT: 74 IN | RESPIRATION RATE: 24 BRPM | BODY MASS INDEX: 36.83 KG/M2 | TEMPERATURE: 97.3 F | DIASTOLIC BLOOD PRESSURE: 78 MMHG | SYSTOLIC BLOOD PRESSURE: 116 MMHG

## 2019-06-24 DIAGNOSIS — M54.16 LUMBAR RADICULOPATHY: ICD-10-CM

## 2019-06-24 DIAGNOSIS — M51.36 DDD (DEGENERATIVE DISC DISEASE), LUMBAR: ICD-10-CM

## 2019-06-24 DIAGNOSIS — M47.817 LUMBOSACRAL SPONDYLOSIS WITHOUT MYELOPATHY: Primary | ICD-10-CM

## 2019-06-24 RX ORDER — GABAPENTIN 800 MG/1
800 TABLET ORAL 3 TIMES DAILY
Qty: 90 TAB | Refills: 1 | Status: SHIPPED | OUTPATIENT
Start: 2019-06-24 | End: 2019-09-13 | Stop reason: SDUPTHER

## 2019-06-24 NOTE — PROGRESS NOTES
[facility]  [address], [place]   Phone: [phone]  Fax: [phone]        PROGRESS NOTE      HISTORY OF PRESENT ILLNESS:  The patient is a 39 y.o. male and was seen today for follow up of progressive low back/buttock pain extending into the RLE in an L5 distribution to the knee x 6 months. He reports the onset of his pain was around the same time he had his cardiac stent put in. He has treated with Percocet. Pt denies hx of spinal surgery or injections. Pt reports he has just started PT. Pt is on Plavix and asprin for his heart. Pt denies fever, weight loss, or skin changes. Pt denies change in bowel or bladder habits. He denies hx of DM and glaucoma. The patient is RHD. PmHx of gastric bypass (2011), chronic pain syndrome, myocardial infarction. Patient has cardiac stent (August 2018 by Dr. Aleksandar Carroll). Note from Dr. George Clemente dated 1/28/19 indicating patient was seen with c/o pain in the right buttocks and into the right leg. Hx of cardiac stent (August 2018 by Dr. Aleksandar Carroll) around the time of pain onset. Performed trigger point injection. Referred to us. Preliminary reading of lumbar plain films revealed: Limited study due to body habits. Moderate disc space narrowing at L5-S1. Small anterior osteophytes noted on L1, L3, L5, and S1. No acute pathology identified. A RLE EMG dated 3/6/19 was suggestive of a normal EMG nerve conduction study showing no signs of neuropathy myopathy or radiculopathy in the nerves and muscles tested. Specifically there are no signs of peripheral neuropathy. 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 This is a partial injury in that there is no sign of denervation at this time on the electromyographic study. Lumbar spine MRI dated 5/3/19 reviewed.  Per report, small very broad-based protrusion on the left at L3-L4 into the foramen. This does not with confidence affect the left L3 root. Additional degenerative change but no additional evidence of herniation or high-grade stenosis. L1/L2: Normal except for mild degenerative change. L2/L3: Normal except for mild facet arthropathy. L3/L4: Broad-based eccentric to the left disc protrusion with high T2 and STIR signal in the peripheral protruding disc contour which extends to the left L3 root in the foramen as it exits the foramen but does not displace or distort the root. Slightly bulging disc contour. No significant central or lateral recess stenosis and no right lateral stenosis. L4/L5: Bulging disc and facet arthropathy. No significant central or lateral recess stenosis. Mild subarticular foraminal narrowing. L5/S1: Diffuse bulging disc. Small central inferior disc protrusion with very broad-based. No convincing effect on either S1 root or the thecal sac. Mild-moderate left greater than right foraminal stenosis not clearly affecting the L5 roots. At his last clinical appointment, upon reviewing the patient's lumbar MRI films, me sense was that his sxs were due to femoral neuropathy as opposed to spinal pathology at that time. I increased his dose of Neurontin to 600 mg TID. Patient advised to call the office if intolerant to higher dose. The patient returns today with pain location and distribution remain unchanged. He continues to rate his pain 3/10. Patient is tolerating increased dose of Neurontin to 600 mg TID with good additional relief. Pt denies change in bowel or bladder habits.  reviewed. Body mass index is 36.85 kg/m².       PCP: Jorge Florian DO      Past Medical History:   Diagnosis Date    Cardiomyopathy [facility]) 7/21/2014    Chronic pain syndrome 12/5/2018    Gastric bypass status for obesity 8/8/2011    Heart failure [facility] [address]     History of ST elevation myocardial infarction (STEMI) 12/5/2018    HTN (hypertension) 8/8/2011    Hypothyroid 8/8/2011    Post corneal transplant 7/21/2014    STEMI (ST elevation myocardial infarction) ([facility] [address]     08/2018        Social History     Socioeconomic History    Marital status: SINGLE     Spouse name: Not on file    Number of children: Not on file    Years of education: Not on file    Highest education level: Not on file   Occupational History    Not on file   Social Needs    Financial resource strain: Not on file    Food insecurity:     Worry: Not on file     Inability: Not on file    Transportation needs:     Medical: Not on file     Non-medical: Not on file   Tobacco Use    Smoking status: Never Smoker    Smokeless tobacco: Never Used   Substance and Sexual Activity    Alcohol use: Yes     Comment: 6 pk daily    Drug use: No    Sexual activity: Yes   Lifestyle    Physical activity:     Days per week: Not on file     Minutes per session: Not on file    Stress: Not on file   Relationships    Social connections:     Talks on phone: Not on file     Gets together: Not on file     Attends Shinto service: Not on file     Active member of club or organization: Not on file     Attends meetings of clubs or organizations: Not on file     Relationship status: Not on file    Intimate partner violence:     Fear of current or ex partner: Not on file     Emotionally abused: Not on file     Physically abused: Not on file     Forced sexual activity: Not on file   Other Topics Concern    Not on file   Social History Narrative    Not on file       Current Outpatient Medications   Medication Sig Dispense Refill    gabapentin (NEURONTIN) 600 mg tablet Take 1 Tab by mouth three (3) times daily. 90 Tab 1    gabapentin (NEURONTIN) 300 mg capsule Take 1 Cap by mouth three (3) times daily. 90 Cap 1    pantoprazole (PROTONIX) 40 mg tablet Take 1 Tab by mouth two (2) times a day. 180 Tab 4    amLODIPine (NORVASC) 10 mg tablet Take 1 Tab by mouth daily.  80 Tab 4    clopidogrel (PLAVIX) 75 mg tab Take 1 Tab by mouth daily. 90 Tab 4    aspirin delayed-release 81 mg tablet Take 1 Tab by mouth daily. 90 Tab 4    metoprolol tartrate (LOPRESSOR) 100 mg IR tablet Take 1 Tab by mouth every twelve (12) hours. 180 Tab 4    atorvastatin (LIPITOR) 80 mg tablet Take 1 Tab by mouth nightly. 90 Tab 4    chlorthalidone (HYGROTEN) 25 mg tablet Take 1 Tab by mouth daily. 90 Tab 4    levothyroxine (SYNTHROID) 200 mcg tablet TAKE ONE TABLET BY MOUTH ONCE DAILY BEFORE  BREAKFAST 90 Tab 4    enalapril (VASOTEC) 20 mg tablet TAKE ONE TABLET BY MOUTH TWICE DAILY 180 Tab 4       No Known Allergies       PHYSICAL EXAMINATION    Visit Vitals  /78   Pulse 61   Temp 97.3 °F (36.3 °C)   Resp 24   Ht 6' 2\" (1.88 m)   Wt 287 lb (130.2 kg)   SpO2 98%   BMI 36.85 kg/m²       CONSTITUTIONAL: NAD, A&O x 3  SENSATION: Intact to light touch throughout  RANGE OF MOTION: The patient has full passive range of motion in all four extremities. MOTOR:  Straight Leg Raise: Negative, bilateral               Hip Flex Knee Ext Knee Flex Ankle DF GTE Ankle PF Tone   Right +4/5 +4/5 +4/5 +4/5 +4/5 +4/5 +4/5   Left +4/5 +4/5 +4/5 +4/5 +4/5 +4/5 +4/5       ASSESSMENT   Diagnoses and all orders for this visit:    1. Lumbosacral spondylosis without myelopathy    2. Lumbar radiculopathy    3. DDD (degenerative disc disease), lumbar          IMPRESSION AND PLAN:  I will increase his dose of Neurontin to 800 mg TID. Patient advised to call the office if intolerant to higher dose. Patient is neurologically intact. I will see the patient back in 1 month's time or earlier if needed. Written by Ronald Sun, as dictated by Logan Arreola MD  I examined the patient, reviewed and agree with the note.

## 2019-07-08 ENCOUNTER — OFFICE VISIT (OUTPATIENT)
Dept: FAMILY MEDICINE CLINIC | Age: 44
End: 2019-07-08

## 2019-07-08 VITALS
DIASTOLIC BLOOD PRESSURE: 82 MMHG | HEIGHT: 74 IN | OXYGEN SATURATION: 99 % | BODY MASS INDEX: 36.65 KG/M2 | WEIGHT: 285.6 LBS | HEART RATE: 61 BPM | TEMPERATURE: 98 F | RESPIRATION RATE: 18 BRPM | SYSTOLIC BLOOD PRESSURE: 117 MMHG

## 2019-07-08 DIAGNOSIS — G62.9 NEUROPATHY: ICD-10-CM

## 2019-07-08 DIAGNOSIS — M18.11 PRIMARY OSTEOARTHRITIS OF FIRST CARPOMETACARPAL JOINT OF RIGHT HAND: ICD-10-CM

## 2019-07-08 DIAGNOSIS — L08.9 INFECTED SEBACEOUS CYST: Primary | ICD-10-CM

## 2019-07-08 DIAGNOSIS — G89.4 CHRONIC PAIN SYNDROME: ICD-10-CM

## 2019-07-08 DIAGNOSIS — E03.9 ACQUIRED HYPOTHYROIDISM: ICD-10-CM

## 2019-07-08 DIAGNOSIS — L72.3 INFECTED SEBACEOUS CYST: Primary | ICD-10-CM

## 2019-07-08 DIAGNOSIS — R68.89 OTHER GENERAL SYMPTOMS AND SIGNS: ICD-10-CM

## 2019-07-08 DIAGNOSIS — I42.9 CARDIOMYOPATHY, UNSPECIFIED TYPE (HCC): ICD-10-CM

## 2019-07-08 DIAGNOSIS — I10 HYPERTENSION, UNSPECIFIED TYPE: ICD-10-CM

## 2019-07-08 RX ORDER — OXYCODONE AND ACETAMINOPHEN 5; 325 MG/1; MG/1
1 TABLET ORAL
Qty: 60 TAB | Refills: 0 | Status: SHIPPED | OUTPATIENT
Start: 2019-07-08 | End: 2019-08-07 | Stop reason: SDUPTHER

## 2019-07-08 RX ORDER — CEPHALEXIN 500 MG/1
500 CAPSULE ORAL 4 TIMES DAILY
Qty: 40 CAP | Refills: 0 | Status: SHIPPED | OUTPATIENT
Start: 2019-07-08 | End: 2019-07-18

## 2019-07-08 RX ORDER — LEVOTHYROXINE SODIUM 175 UG/1
TABLET ORAL
Qty: 90 TAB | Refills: 0 | Status: SHIPPED | OUTPATIENT
Start: 2019-07-08 | End: 2019-10-17 | Stop reason: SDUPTHER

## 2019-07-08 NOTE — PROGRESS NOTES
1. Have you been to the ER, urgent care clinic since your last visit? Hospitalized since your last visit? No    2. Have you seen or consulted any other health care providers outside of the [address] [place] [address] since your last visit? Include any pap smears or colon screening.  No

## 2019-07-08 NOTE — PROGRESS NOTES
Maria Alejandra Stewart is a 39 y.o.  male and presents with    Chief Complaint   Patient presents with    Medication Refill    Establish Care       Subjective:  Mr. Ivet Clark c/o lump on right side of back; it has become painful; sensitive when he touches it. Thyroid Review:  Patient is seen for followup of hypothyroidism. Thyroid ROS: denies fatigue, weight changes, heat/cold intolerance, bowel/skin changes or CVS symptoms. Osteoarthritis and Chronic Pain:  Patient has neuropathy and orthopedic, primarily affecting the legs and feet and hip and hand  Symptoms onset: problem is longstanding. Rheumatological ROS: ongoing significant pain in legs and feet and hip which is stable and controlled by PRN meds. Response to treatment plan: gradually worsening. ROS   General ROS: negative for - chills or fever  Psychological ROS: negative for - anxiety or depression  Ophthalmic ROS: negative for - blurry vision  ENT ROS: negative for - headaches, nasal congestion or sore throat  Endocrine ROS: negative for - polydipsia/polyuria or temperature intolerance  Respiratory ROS: no cough, shortness of breath, or wheezing  Cardiovascular ROS: no chest pain or dyspnea on exertion  Gastrointestinal ROS: no abdominal pain, change in bowel habits, or black or bloody stools  Genito-Urinary ROS: no dysuria, trouble voiding, or hematuria  Neurological ROS: positive for - numbness/tingling    All other systems reviewed and are negative.     Objective:  Vitals:    07/08/19 1212   BP: 117/82   Pulse: 61   Resp: 18   Temp: 98 °F (36.7 °C)   TempSrc: Oral   SpO2: 99%   Weight: 285 lb 9.6 oz (129.5 kg)   Height: 6' 2\" (1.88 m)   PainSc:   7   PainLoc: Hip       alert, well appearing, and in no distress, oriented to person, place, and time and obese  Mental status - normal mood, behavior, speech, dress, motor activity, and thought processes  Chest - clear to auscultation, no wheezes, rales or rhonchi, symmetric air entry  Heart - normal rate, regular rhythm, normal S1, S2, no murmurs, rubs, clicks or gallops  Musculoskeletal -  abnormal exam of left hip  Extremities - peripheral pulses normal, no pedal edema, no clubbing or cyanosis  Skin - erythematous cyst with ttp  Neuro - antalgic gait    Assessment/Plan:    1. Infected sebaceous cyst  Start abx  - cephALEXin (KEFLEX) 500 mg capsule; Take 1 Cap by mouth four (4) times daily for 10 days. Dispense: 40 Cap; Refill: 0    2. Acquired hypothyroidism    - levothyroxine (SYNTHROID) 175 mcg tablet; TAKE ONE TABLET BY MOUTH ONCE DAILY BEFORE  BREAKFAST  Dispense: 90 Tab; Refill: 0    3. Cardiomyopathy, unspecified type ([address]      4. Hypertension, unspecified type  Goal <130/80    5. Primary osteoarthritis of first carpometacarpal joint of right hand    - REFERRAL TO ORTHOPEDIC SURGERY    6. Chronic pain syndrome  This is a chronic problem that is worsened. Per review of available records and patients , there are not sign of overuse, misuse, diversion, or concerning side effects. Today we reviewed: the risk of overdose, addiction, and dependency proper storage and disposal of medications the goals of treatment (improve functionality, quality of life, and pain)  The following changes were made to the patients current treatment plan: medications adjusted, see orders. - oxyCODONE-acetaminophen (PERCOCET) 5-325 mg per tablet; Take 1 Tab by mouth every eight to twelve (8-12) hours as needed for Pain for up to 30 days. Max Daily Amount: 3 Tabs. Dispense: 60 Tab; Refill: 0    7. Neuropathy    - oxyCODONE-acetaminophen (PERCOCET) 5-325 mg per tablet; Take 1 Tab by mouth every eight to twelve (8-12) hours as needed for Pain for up to 30 days. Max Daily Amount: 3 Tabs. Dispense: 60 Tab; Refill: 0  - VITAMIN B12 & FOLATE; Future    8. Other general symptoms and signs     - VITAMIN B12 & FOLATE;  Future      Lab review: orders written for new lab studies as appropriate; see orders      I have discussed the diagnosis with the patient and the intended plan as seen in the above orders. The patient has received an after-visit summary and questions were answered concerning future plans. I have discussed medication side effects and warnings with the patient as well. I have reviewed the plan of care with the patient, accepted their input and they are in agreement with the treatment goals.

## 2019-07-18 LAB
FOLATE,FOL: 5.79 NG/ML
VIT B12 SERPL-MCNC: 368 PG/ML (ref 211–911)

## 2019-08-07 ENCOUNTER — OFFICE VISIT (OUTPATIENT)
Dept: FAMILY MEDICINE CLINIC | Age: 44
End: 2019-08-07

## 2019-08-07 VITALS
DIASTOLIC BLOOD PRESSURE: 69 MMHG | HEIGHT: 74 IN | RESPIRATION RATE: 18 BRPM | SYSTOLIC BLOOD PRESSURE: 107 MMHG | BODY MASS INDEX: 35.73 KG/M2 | TEMPERATURE: 97.4 F | OXYGEN SATURATION: 98 % | WEIGHT: 278.4 LBS | HEART RATE: 56 BPM

## 2019-08-07 DIAGNOSIS — G62.9 NEUROPATHY: ICD-10-CM

## 2019-08-07 DIAGNOSIS — F11.20 UNCOMPLICATED OPIOID DEPENDENCE (HCC): ICD-10-CM

## 2019-08-07 DIAGNOSIS — I42.9 CARDIOMYOPATHY, UNSPECIFIED TYPE (HCC): ICD-10-CM

## 2019-08-07 DIAGNOSIS — E03.9 ACQUIRED HYPOTHYROIDISM: ICD-10-CM

## 2019-08-07 DIAGNOSIS — L72.3 INFECTED SEBACEOUS CYST: ICD-10-CM

## 2019-08-07 DIAGNOSIS — F11.90 CHRONIC, CONTINUOUS USE OF OPIOIDS: ICD-10-CM

## 2019-08-07 DIAGNOSIS — K40.90 LEFT INGUINAL HERNIA: Primary | ICD-10-CM

## 2019-08-07 DIAGNOSIS — G89.4 CHRONIC PAIN SYNDROME: ICD-10-CM

## 2019-08-07 DIAGNOSIS — L08.9 INFECTED SEBACEOUS CYST: ICD-10-CM

## 2019-08-07 RX ORDER — OXYCODONE AND ACETAMINOPHEN 5; 325 MG/1; MG/1
1 TABLET ORAL
Qty: 60 TAB | Refills: 0 | Status: SHIPPED | OUTPATIENT
Start: 2019-08-07 | End: 2021-12-13 | Stop reason: SDUPTHER

## 2019-08-07 NOTE — PROGRESS NOTES
1. Have you been to the ER, urgent care clinic since your last visit? Hospitalized since your last visit? No    2. Have you seen or consulted any other health care providers outside of the [address] [place] [address] since your last visit? Include any pap smears or colon screening.  No

## 2019-08-08 LAB — TSH SERPL DL<=0.005 MIU/L-ACNC: 0.03 MCU/ML (ref 0.27–4.2)

## 2019-08-14 ENCOUNTER — TELEPHONE (OUTPATIENT)
Dept: FAMILY MEDICINE CLINIC | Age: 44
End: 2019-08-14

## 2019-08-14 ENCOUNTER — OFFICE VISIT (OUTPATIENT)
Dept: SURGERY | Age: 44
End: 2019-08-14

## 2019-08-14 VITALS
BODY MASS INDEX: 35.16 KG/M2 | HEART RATE: 69 BPM | DIASTOLIC BLOOD PRESSURE: 67 MMHG | TEMPERATURE: 97.8 F | WEIGHT: 274 LBS | SYSTOLIC BLOOD PRESSURE: 108 MMHG | RESPIRATION RATE: 20 BRPM | HEIGHT: 74 IN

## 2019-08-14 DIAGNOSIS — K40.90 LEFT INGUINAL HERNIA: Primary | ICD-10-CM

## 2019-08-14 DIAGNOSIS — I42.9 CARDIOMYOPATHY, UNSPECIFIED TYPE (HCC): Primary | ICD-10-CM

## 2019-08-14 RX ORDER — FUROSEMIDE 20 MG/1
20 TABLET ORAL DAILY
Qty: 30 TAB | Refills: 0 | Status: SHIPPED | OUTPATIENT
Start: 2019-08-14 | End: 2021-03-19

## 2019-08-14 RX ORDER — POTASSIUM CHLORIDE 750 MG/1
10 TABLET, EXTENDED RELEASE ORAL DAILY
Qty: 30 TAB | Refills: 0 | Status: SHIPPED | OUTPATIENT
Start: 2019-08-14 | End: 2019-09-23 | Stop reason: SDUPTHER

## 2019-08-14 NOTE — PROGRESS NOTES
General Surgery Consult      Denice Issa  Admit date: (Not on file)    MRN: [id number]     : 1975     Age: 39 y.o. Attending Physician: Joie Wick MD, [facility]      History of Present Illness:     Denice Issa is a 39 y.o. male was referred for evaluation of a symptomatic left inguinal hernia. The patient has stated that he noticed a bulge about 1 month and a half ago. He said that the bulge comes and goes  It is especially protruding when he coughs. He also has some constipation and he said that the bulge is worse during bowel movement. He has some pain localized in the left groin at the site of the bulge. The pain is mild and intermittent. He denies any nausea or vomiting. He had a history of laparoscopic gastric bypass surgery but no other abdominal surgeries.     Patient Active Problem List    Diagnosis Date Noted    S/P primary angioplasty with coronary stent 2019    Pain in right buttock 2019    History of ST elevation myocardial infarction (STEMI) 2018    Chronic pain syndrome 2018    Severe obesity ([address] 2018    Post corneal transplant 2014    Cardiomyopathy ([address] 2014    Gastric bypass status for obesity 2011    HTN (hypertension) 2011    Hypothyroid 2011     Past Medical History:   Diagnosis Date    Cardiomyopathy ([address] 2014    Chronic pain syndrome 2018    Gastric bypass status for obesity 2011    Heart failure ([address]     History of ST elevation myocardial infarction (STEMI) 2018    HTN (hypertension) 2011    Hypothyroid 2011    Post corneal transplant 2014    STEMI (ST elevation myocardial infarction) ([address]     2018      Past Surgical History:   Procedure Laterality Date    ABDOMEN SURGERY PROC UNLISTED      CARDIAC SURG PROCEDURE UNLIST  2018    Stent placed Coronary Artery     HX LAP GASTRIC BYPASS        Social History     Tobacco Use    Smoking status: Never Smoker    Smokeless tobacco: Never Used   Substance Use Topics    Alcohol use: Yes     Comment: 6 pk daily      Social History     Tobacco Use   Smoking Status Never Smoker   Smokeless Tobacco Never Used     History reviewed. No pertinent family history. Current Outpatient Medications   Medication Sig    oxyCODONE-acetaminophen (PERCOCET) 5-325 mg per tablet Take 1 Tab by mouth every eight to twelve (8-12) hours as needed for Pain for up to 30 days. Max Daily Amount: 3 Tabs.  levothyroxine (SYNTHROID) 175 mcg tablet TAKE ONE TABLET BY MOUTH ONCE DAILY BEFORE  BREAKFAST    gabapentin (NEURONTIN) 800 mg tablet Take 1 Tab by mouth three (3) times daily.  pantoprazole (PROTONIX) 40 mg tablet Take 1 Tab by mouth two (2) times a day.  amLODIPine (NORVASC) 10 mg tablet Take 1 Tab by mouth daily.  clopidogrel (PLAVIX) 75 mg tab Take 1 Tab by mouth daily.  aspirin delayed-release 81 mg tablet Take 1 Tab by mouth daily.  metoprolol tartrate (LOPRESSOR) 100 mg IR tablet Take 1 Tab by mouth every twelve (12) hours.  atorvastatin (LIPITOR) 80 mg tablet Take 1 Tab by mouth nightly.  chlorthalidone (HYGROTEN) 25 mg tablet Take 1 Tab by mouth daily.  enalapril (VASOTEC) 20 mg tablet TAKE ONE TABLET BY MOUTH TWICE DAILY     No current facility-administered medications for this visit.        No Known Allergies     Review of Systems:  Constitutional: negative  Eyes: negative  Ears, Nose, Mouth, Throat, and Face: negative  Respiratory: negative  Cardiovascular: negative  Gastrointestinal: positive for abdominal pain and Left groin pain and bulge  Genitourinary:negative  Integument/Breast: negative  Hematologic/Lymphatic: negative  Musculoskeletal:negative  Neurological: negative  Behavioral/Psychiatric: negative  Endocrine: negative  Allergic/Immunologic: negative    Objective:     Visit Vitals  /67 (BP 1 Location: Left arm, BP Patient Position: At rest)   Pulse 69   Temp 97.8 °F (36.6 °C) (Oral)   Resp 20   Ht 6' 2\" (1.88 m)   Wt 124.3 kg (274 lb)   BMI 35.18 kg/m²       Physical Exam:      General:  in no apparent distress, alert, oriented times 3 and cooperative   Eyes:  conjunctivae and sclerae normal, pupils equal, round, reactive to light   Throat & Neck: no erythema or exudates noted and neck supple and symmetrical; no palpable masses   Lungs:   clear to auscultation bilaterally   Heart:  Regular rate and rhythm   Abdomen:   Rounded, soft, nontender, nondistended, no masses or organomegaly. There is a left inguinal hernia that is mildly tender to palpation but easily reducible. Extremities: extremities normal, atraumatic, no cyanosis or edema   Skin: Normal.       Imaging and Lab Review:     CBC:   Lab Results   Component Value Date/Time    WBC 7.6 03/05/2019 08:00 AM    RBC 4.98 03/05/2019 08:00 AM    HGB 13.6 03/05/2019 08:00 AM    HCT 42.8 03/05/2019 08:00 AM    PLATELET 599 40/68/2635 08:00 AM     BMP:   Lab Results   Component Value Date/Time    Glucose 95 03/05/2019 08:00 AM    Sodium 139 03/05/2019 08:00 AM    Potassium 4.4 03/05/2019 08:00 AM    Chloride 105 03/05/2019 08:00 AM    CO2 25 03/05/2019 08:00 AM    BUN 23 (H) 03/05/2019 08:00 AM    Creatinine 1.27 03/05/2019 08:00 AM    Calcium 8.8 03/05/2019 08:00 AM     CMP:  Lab Results   Component Value Date/Time    Glucose 95 03/05/2019 08:00 AM    Sodium 139 03/05/2019 08:00 AM    Potassium 4.4 03/05/2019 08:00 AM    Chloride 105 03/05/2019 08:00 AM    CO2 25 03/05/2019 08:00 AM    BUN 23 (H) 03/05/2019 08:00 AM    Creatinine 1.27 03/05/2019 08:00 AM    Calcium 8.8 03/05/2019 08:00 AM    Anion gap 9 03/05/2019 08:00 AM    BUN/Creatinine ratio 18 03/05/2019 08:00 AM    Alk.  phosphatase 108 03/05/2019 08:00 AM    Protein, total 7.8 03/05/2019 08:00 AM    Albumin 3.8 03/05/2019 08:00 AM    Globulin 4.0 03/05/2019 08:00 AM    A-G Ratio 1.0 03/05/2019 08:00 AM       No results found for this or any previous visit (from the past 24 hour(s)). images and reports reviewed    Assessment:   Corie Olivares is a 39 y.o. male is presenting with a picture of symptomatic left inguinal hernia. I Discussed the possibility of incarceration, strangulation, enlargement in size over time, and the risk of emergency surgery in the face of strangulation. I also discussed the use of prosthetic materials (mesh), including the risk of infection. Also discussed the risk of surgery including recurrence and the possible need for reoperation and removal of mesh if used, possibility of postoperative small bowel injury, obstruction or ileus, and the risks of general anesthetic. I explained to the the patient about the robotic hernia repair procedure. Plan:     1. Schedule for robotic left inguinal hernia repair with placement of mesh. 2. No heavy lifting for 2 weeks after the surgery (More than 15 pounds)  3. Avoid constipation by taking stool softener.      Please call me if you have any questions (cell phone: [phone])     Signed By: Jessica Castañeda MD     August 14, 2019

## 2019-08-14 NOTE — TELEPHONE ENCOUNTER
Patient came in office today checking the status of lacets, patient stated last visit Dr. Tiera Dubois told him he would be put on a low dosage of lacets for his leg swelling. Please advise, thank you.

## 2019-08-14 NOTE — PROGRESS NOTES
Tj Bonner is a 39 y.o. male who presents today with   Chief Complaint   Patient presents with    Inguinal Hernia     Pt presents today for evaluation of Left sided inguinal hernia. 1. Have you been to the ER, urgent care clinic since your last visit? Hospitalized since your last visit? No    2. Have you seen or consulted any other health care providers outside of the [address] [place] [address] since your last visit? Include any pap smears or colon screening.  No

## 2019-08-14 NOTE — PATIENT INSTRUCTIONS
If you have any questions or concerns about today's appointment, the verbal and/or written instructions you were given for follow up care, please call our office at [phone]. [facility] - [facility] [id number] [place] [address], [address] 
 
[phone] office [phone] fax Rosi Schwartz PATIENT PRE AND POST OPERATIVE INSTRUCTIONS [facility] [address] 
[phone] Before Surgery Instructions:  
1) You must have someone available to drive you to and from your procedure and stay with you for the first 24 hours. 2) It is very important that you have nothing to eat or drink after midnight the night before your surgery. This includes chewing gum or sucking on hard candy. Take only heart, blood pressure and cholesterol medications the morning of surgery with only a sip of water. 3) Please stop taking Plavix 5-7 days prior to your surgery, per cardiologist approval. Stop taking Coumadin 5 days prior to your surgery. Stop taking all Aspirin or Aspirin containing products 7 days prior to your surgery. Stop taking Advil, Motrin, Aleve, and etc. 3 days prior to your surgery. 4) If you take any diabetic medications please consult with your primary care physician on how to take them on the day of your surgery 5) Please stop all Herbal products 2 weeks prior to your surgery. 6) Please arrive at the hospital 2 hours prior to your surgery, unless you have been otherwise instructed. Any required labs/urine drug screen/x-rays will be performed on day of surgery. 7) If you are of child bearing age you will have pregnancy test done the morning of your surgery as soon as you arrive. 8) Patients having an operation on their colon will be given a separate instruction sheet on their Bowel Prep. 9) For any pre-operative work up check in at the main entrance to [facility], and then go to Patient Registration.  These studies are done on a walk in basis they are open from 7:00am to 5:00pm Monday through Friday. 10) Please wash your surgical site the morning of your surgery with antibacterial (i.e. Dial) soap and water. 11) You will be contacted by a hospital preadmission nurse approximately one week prior to scheduled surgery to discuss additional instructions and to review your medications. 12) You may be contacted to change your surgery time. At times this is necessary due to equipment, staffing needs or in the event of a cancellation. Surgery Date and Time:  Thursday, September 19, 2019 at 10:00am 
 
Please check in at [facility], enter through the Emergency Room entrance and go up to the second floor. Please check in by 8:00am the day of your surgery. After Surgery Instructions: You will need to be seen in the office for a follow-up visit 7-14 days after your surgery. Please call after you have had the procedure to make this appointment. Unless otherwise instructed, you may remove your outer bandage and shower 48 hours after your surgery. If you develop a fever greater than 101, have any significant drainage, bleeding, swelling and/or pus of the wound. Please call our office immediately. You may contact Amanda Caceres with any questions at [phone].

## 2019-08-15 LAB
MISCELLANEOUS TEST,99000: NORMAL
SENT TO, 434: NORMAL
TEST NAME, 435: NORMAL

## 2019-08-15 NOTE — TELEPHONE ENCOUNTER
Lasix 20 mg ordered along with potassium 10 meq for daily use until swelling goes down then use as needed for swelling.

## 2019-08-15 NOTE — TELEPHONE ENCOUNTER
Called patient. 2 patient identifiers confirmed. Patient advised on medications that were sent to his pharmacy along with instructions noted by Dr. Narcisa France. Patient tolerated well and voiced no further concerns at this time. Encounter closed.

## 2019-08-19 ENCOUNTER — DOCUMENTATION ONLY (OUTPATIENT)
Dept: FAMILY MEDICINE CLINIC | Age: 44
End: 2019-08-19

## 2019-08-19 NOTE — PROGRESS NOTES
Patient authorization for the patient medication, Oxycodone, has been completed and approved. Documentation of approval has been sent to central scanning. Encounter closed.

## 2019-08-26 ENCOUNTER — DOCUMENTATION ONLY (OUTPATIENT)
Dept: FAMILY MEDICINE CLINIC | Age: 44
End: 2019-08-26

## 2019-08-26 NOTE — PROGRESS NOTES
DOCUMENTATION ONLY: [facility] sent over progress notes from the patient appointment on 06/25/2019. After review, the documentation of the notes were sent to central scanning.

## 2019-09-12 NOTE — TELEPHONE ENCOUNTER
Last Visit: 6/24/19 with MD Carlo Turner  Next Appointment: 7/24/19 no show  Previous Refill Encounter(s): 6/24/19 #90 with 1 refill    Requested Prescriptions     Pending Prescriptions Disp Refills    gabapentin (NEURONTIN) 800 mg tablet 90 Tab 1     Sig: Take 1 Tab by mouth three (3) times daily.

## 2019-09-13 ENCOUNTER — OFFICE VISIT (OUTPATIENT)
Dept: FAMILY MEDICINE CLINIC | Age: 44
End: 2019-09-13

## 2019-09-13 ENCOUNTER — DOCUMENTATION ONLY (OUTPATIENT)
Dept: SURGERY | Age: 44
End: 2019-09-13

## 2019-09-13 VITALS
TEMPERATURE: 95.2 F | WEIGHT: 269.6 LBS | RESPIRATION RATE: 17 BRPM | BODY MASS INDEX: 34.6 KG/M2 | DIASTOLIC BLOOD PRESSURE: 51 MMHG | HEART RATE: 55 BPM | SYSTOLIC BLOOD PRESSURE: 85 MMHG | OXYGEN SATURATION: 100 % | HEIGHT: 74 IN

## 2019-09-13 DIAGNOSIS — E03.9 HYPOTHYROIDISM, UNSPECIFIED TYPE: ICD-10-CM

## 2019-09-13 DIAGNOSIS — I42.9 CARDIOMYOPATHY, UNSPECIFIED TYPE (HCC): ICD-10-CM

## 2019-09-13 DIAGNOSIS — G89.4 CHRONIC PAIN SYNDROME: ICD-10-CM

## 2019-09-13 DIAGNOSIS — I10 HYPERTENSION, UNSPECIFIED TYPE: ICD-10-CM

## 2019-09-13 DIAGNOSIS — I21.3 ST ELEVATION MYOCARDIAL INFARCTION (STEMI), UNSPECIFIED ARTERY (HCC): ICD-10-CM

## 2019-09-13 DIAGNOSIS — M10.9 GOUTY ARTHRITIS OF RIGHT GREAT TOE: Primary | ICD-10-CM

## 2019-09-13 DIAGNOSIS — Z23 NEED FOR VACCINATION FOR STREP PNEUMONIAE: ICD-10-CM

## 2019-09-13 DIAGNOSIS — I73.9 PERIPHERAL VASCULAR DISEASE (HCC): ICD-10-CM

## 2019-09-13 DIAGNOSIS — Z23 NEEDS FLU SHOT: ICD-10-CM

## 2019-09-13 DIAGNOSIS — G62.9 NEUROPATHY: ICD-10-CM

## 2019-09-13 RX ORDER — GABAPENTIN 800 MG/1
800 TABLET ORAL 3 TIMES DAILY
Qty: 90 TAB | Refills: 1 | OUTPATIENT
Start: 2019-09-13

## 2019-09-13 RX ORDER — OXYCODONE AND ACETAMINOPHEN 5; 325 MG/1; MG/1
1 TABLET ORAL
Qty: 60 TAB | Refills: 0 | Status: SHIPPED | OUTPATIENT
Start: 2019-09-13 | End: 2019-10-14 | Stop reason: SDUPTHER

## 2019-09-13 RX ORDER — METOPROLOL TARTRATE 100 MG/1
50 TABLET ORAL EVERY 12 HOURS
Qty: 180 TAB | Refills: 4
Start: 2019-09-13 | End: 2019-10-08

## 2019-09-13 RX ORDER — COLCHICINE 0.6 MG/1
0.6 TABLET ORAL DAILY
Qty: 30 TAB | Refills: 1 | Status: SHIPPED | OUTPATIENT
Start: 2019-09-13 | End: 2019-12-09 | Stop reason: SDUPTHER

## 2019-09-13 RX ORDER — GABAPENTIN 800 MG/1
800 TABLET ORAL 3 TIMES DAILY
Qty: 90 TAB | Refills: 1 | Status: SHIPPED | OUTPATIENT
Start: 2019-09-13 | End: 2020-01-07

## 2019-09-13 NOTE — PROGRESS NOTES
Colleen Alcala is a 39 y.o.  male and presents with    Chief Complaint   Patient presents with    Leg Pain     RIght leg pain    Toe Pain     Right Big toe pain     Subjective:  Mr. Claudette Jules presents c/o right 1st toe pain for several months; he continues to drink a 1/4 gallon [place]. Cardiovascular Review:  The patient has hypertension, hyperlipidemia, CHF and obesity. Diet and Lifestyle: not attempting to follow a low fat, low cholesterol diet, not attempting to follow a low sodium diet, does not rigorously follow a diabetic diet, sedentary, nonsmoker  Home BP Monitoring: is not measured at home. Pertinent ROS: taking medications as instructed, no medication side effects noted, no TIA's, no chest pain on exertion, no dyspnea on exertion, no swelling of ankles. Thyroid Review:  Patient is seen for followup of hypothyroidism. Thyroid ROS: denies fatigue, weight changes, heat/cold intolerance, bowel/skin changes or CVS symptoms. Osteoarthritis and Chronic Pain:  Patient has neuropathy and orthopedic, primarily affecting the legs and feet and hip and hand on the right side  Symptoms onset: problem is longstanding. Rheumatological ROS: ongoing significant pain in legs and feet and hip which is stable and controlled by PRN meds.    Response to treatment plan: gradually worsening.      ROS   General ROS: negative for - chills or fever  Psychological ROS: negative for - anxiety or depression  Ophthalmic ROS: negative for - blurry vision  ENT ROS: negative for - headaches, nasal congestion or sore throat  Endocrine ROS: negative for - polydipsia/polyuria or temperature intolerance  Respiratory ROS: no cough, shortness of breath, or wheezing  Cardiovascular ROS: no chest pain or dyspnea on exertion  Gastrointestinal ROS: no abdominal pain, change in bowel habits, or black or bloody stools  Genito-Urinary ROS: no dysuria, trouble voiding, or hematuria  Neurological ROS: positive for - numbness/tingling  - lightheadedness     All other systems reviewed and are negative    Objective:  Vitals:    09/13/19 1308   BP: (!) 85/51   Pulse: (!) 55   Resp: 17   Temp: 95.2 °F (35.1 °C)   TempSrc: Oral   SpO2: 100%   Weight: 269 lb 9.6 oz (122.3 kg)   Height: 6' 2\" (1.88 m)   PainSc:   5   PainLoc: Toe     alert, well appearing, and in no distress, oriented to person, place, and time and obese  Mental status - normal mood, behavior, speech, dress, motor activity, and thought processes  Chest - clear to auscultation, no wheezes, rales or rhonchi, symmetric air entry  Heart - normal rate, regular rhythm, normal S1, S2, no murmurs, rubs, clicks or gallops  Abdomen - left side inguinal hernia; reducible  Musculoskeletal -  abnormal exam of left hip  Extremities - peripheral pulses normal, no pedal edema, no clubbing or cyanosis  Skin - erythematous cyst with ttp  Neuro - antalgic gait    LABS   Drug screen - + oxycodone and cannabanoid    Assessment/Plan:    1. Hypertension, unspecified type  Goal <130/80  - metoprolol tartrate (LOPRESSOR) 100 mg IR tablet; Take 0.5 Tabs by mouth every twelve (12) hours. Dispense: 180 Tab; Refill: 4    2. Cardiomyopathy, unspecified type (HCC)    - metoprolol tartrate (LOPRESSOR) 100 mg IR tablet; Take 0.5 Tabs by mouth every twelve (12) hours. Dispense: 180 Tab; Refill: 4    3. ST elevation myocardial infarction (STEMI), unspecified artery (HCC)    - metoprolol tartrate (LOPRESSOR) 100 mg IR tablet; Take 0.5 Tabs by mouth every twelve (12) hours. Dispense: 180 Tab; Refill: 4    4. Hypothyroidism, unspecified type    5. Peripheral vascular disease ([facility] [address]      6. Chronic pain syndrome  This is a chronic problem that is stable. Per review of available records and patients , there are not sign of overuse, misuse, diversion, or concerning side effects.  Today we reviewed: the risk of overdose, addiction, and dependency proper storage and disposal of medications the goals of treatment (improve functionality, quality of life, and pain)  The following changes were made to the patients current treatment plan: nothing, medications refilled. - oxyCODONE-acetaminophen (PERCOCET) 5-325 mg per tablet; Take 1 Tab by mouth every eight to twelve (8-12) hours as needed for Pain for up to 30 days. Max Daily Amount: 3 Tabs. Dispense: 60 Tab; Refill: 0    7. Gouty arthritis of right great toe    - colchicine (COLCRYS) 0.6 mg tablet; Take 1 Tab by mouth daily. Dispense: 30 Tab; Refill: 1    8. Neuropathy    - gabapentin (NEURONTIN) 800 mg tablet; Take 1 Tab by mouth three (3) times daily. Dispense: 90 Tab; Refill: 1    9. Needs flu shot    - INFLUENZA VIRUS VAC QUAD,SPLIT,PRESV FREE SYRINGE IM  - SC IMMUNIZ ADMIN,1 SINGLE/COMB VAC/TOXOID    10. Need for vaccination for Strep pneumoniae    - PNEUMOCOCCAL POLYSACCHARIDE VACCINE, 23-VALENT, ADULT OR IMMUNOSUPPRESSED PT DOSE,  - SC IMMUNIZ ADMIN,1 SINGLE/COMB VAC/TOXOID      Lab review: labs reviewed, I note that urine drug screen results      I have discussed the diagnosis with the patient and the intended plan as seen in the above orders. The patient has received an after-visit summary and questions were answered concerning future plans. I have discussed medication side effects and warnings with the patient as well. I have reviewed the plan of care with the patient, accepted their input and they are in agreement with the treatment goals.

## 2019-09-13 NOTE — PROGRESS NOTES
Mr. Gunnar Garcia stopped by the office stating he missed his appointment with his cardiologist on September 4, 2019 to get a cardiac clearance for his surgery (inguinal hernia repair) with Dr. Miky Ward. I informed Mr. Gunnar Garcia we'll cancel his surgery and reschedule after cardiac clearance is received. Patient verbalized he understood.

## 2019-09-13 NOTE — PROGRESS NOTES
Brenda Linder is a 39 y.o male that is present in the office for a routine appointment for medication evaluation. Patient complains of right big toe pain and chronic right leg pain. 1. Have you been to the ER, urgent care clinic since your last visit? Hospitalized since your last visit? No    2. Have you seen or consulted any other health care providers outside of the [address] [place] [address] since your last visit? Include any pap smears or colon screening.  No    Health Maintenance Due   Topic Date Due    Pneumococcal 0-64 years (1 of 1 - PPSV23) 02/17/2012    Influenza Age 5 to Adult  08/01/2019

## 2019-09-18 ENCOUNTER — OFFICE VISIT (OUTPATIENT)
Dept: CARDIOLOGY CLINIC | Age: 44
End: 2019-09-18

## 2019-09-18 VITALS
SYSTOLIC BLOOD PRESSURE: 91 MMHG | HEART RATE: 58 BPM | WEIGHT: 269 LBS | BODY MASS INDEX: 34.54 KG/M2 | OXYGEN SATURATION: 98 % | DIASTOLIC BLOOD PRESSURE: 60 MMHG

## 2019-09-18 DIAGNOSIS — Z01.818 PREOPERATIVE CLEARANCE: Primary | ICD-10-CM

## 2019-09-18 NOTE — PROGRESS NOTES
1. Have you been to the ER, urgent care clinic since your last visit? Hospitalized since your last visit? No     2. Have you seen or consulted any other health care providers outside of the [address] [place] [address] since your last visit? Include any pap smears or colon screening.   No

## 2019-09-22 PROBLEM — Z01.818 PREOPERATIVE CLEARANCE: Status: ACTIVE | Noted: 2019-09-22

## 2019-09-23 DIAGNOSIS — I42.9 CARDIOMYOPATHY, UNSPECIFIED TYPE (HCC): ICD-10-CM

## 2019-09-23 RX ORDER — POTASSIUM CHLORIDE 750 MG/1
TABLET, EXTENDED RELEASE ORAL
Qty: 30 TAB | Refills: 0 | Status: SHIPPED | OUTPATIENT
Start: 2019-09-23 | End: 2019-10-07 | Stop reason: SDUPTHER

## 2019-10-03 DIAGNOSIS — I42.9 CARDIOMYOPATHY, UNSPECIFIED TYPE (HCC): ICD-10-CM

## 2019-10-07 RX ORDER — POTASSIUM CHLORIDE 750 MG/1
TABLET, EXTENDED RELEASE ORAL
Qty: 30 TAB | Refills: 0 | Status: SHIPPED | OUTPATIENT
Start: 2019-10-07 | End: 2019-11-27 | Stop reason: SDUPTHER

## 2019-10-08 ENCOUNTER — ANESTHESIA EVENT (OUTPATIENT)
Dept: ENDOSCOPY | Age: 44
End: 2019-10-08
Payer: MEDICARE

## 2019-10-08 RX ORDER — METOPROLOL TARTRATE 25 MG/1
25 TABLET, FILM COATED ORAL 2 TIMES DAILY
COMMUNITY
End: 2020-05-26 | Stop reason: SDUPTHER

## 2019-10-08 RX ORDER — ATORVASTATIN CALCIUM 40 MG/1
40 TABLET, FILM COATED ORAL DAILY
COMMUNITY
End: 2020-05-26 | Stop reason: SDUPTHER

## 2019-10-08 NOTE — PERIOP NOTES
PAT - SURGICAL PRE-ADMISSION INSTRUCTIONS    NAME:  Olayinka Franco                                                          TODAY'S DATE:  10/8/2019    SURGERY DATE:  10/9/2019                                  SURGERY ARRIVAL TIME:   0945    1. Do NOT eat or drink anything, including candy or gum, after MIDNIGHT on 10/08 , unless you have specific instructions from your Surgeon or Anesthesia Provider to do so. 2. No smoking on the day of surgery. 3. No alcohol 24 hours prior to the day of surgery. 4. No recreational drugs for one week prior to the day of surgery. 5. Leave all valuables, including money/purse, at home. 6. Remove all jewelry, nail polish, makeup (including mascara); no lotions, powders, deodorant, or perfume/cologne/after shave. 7. Glasses/Contact lenses and Dentures may be worn to the hospital.  They will be removed prior to surgery. 8. Call your doctor if symptoms of a cold or illness develop within 24 ours prior to surgery. 9. AN ADULT MUST DRIVE YOU HOME AFTER OUTPATIENT SURGERY. 10. If you are having an OUTPATIENT procedure, please make arrangements for a responsible adult to be with you for 24 hours after your surgery. 11. If you are admitted to the hospital, you will be assigned to a bed after surgery is complete. Normally a family member will not be able to see you until you are in your assigned bed. 15. Family is encouraged to accompany you to the hospital.  We ask visitors in the treatment area to be limited to ONE person at a time to ensure patient privacy. EXCEPTIONS WILL BE MADE AS NEEDED. 15. Children under 12 are discouraged from entering the treatment area and need to be supervised by an adult when in the waiting room. Special Instructions: Take these medications the morning of surgery with a sip of water:  Metoprolol,Thyroid and Amlodipine, Complete bowel prep per MD instructions.     Patient Prep:  Shower    These surgical instructions were reviewed with Mike Concepcion during the PAT phone call. Directions: On the morning of surgery, please go to the [address] [place] [address]. Enter the building from the [facility] entrance, 1st floor (next to the Emergency Room entrance). Take the elevator to the 2nd floor. Sign in at the Registration Desk.     If you have any questions and/or concerns, please do not hesitate to call:  (Prior to the day of surgery)  [facility] unit:  [phone]  (Day of surgery)  [facility] unit:  [phone]

## 2019-10-09 ENCOUNTER — ANESTHESIA (OUTPATIENT)
Dept: ENDOSCOPY | Age: 44
End: 2019-10-09
Payer: MEDICARE

## 2019-10-09 ENCOUNTER — HOSPITAL ENCOUNTER (OUTPATIENT)
Age: 44
Setting detail: OUTPATIENT SURGERY
Discharge: HOME | End: 2019-10-09
Attending: INTERNAL MEDICINE | Admitting: INTERNAL MEDICINE
Payer: MEDICARE

## 2019-10-09 VITALS
HEART RATE: 62 BPM | DIASTOLIC BLOOD PRESSURE: 65 MMHG | WEIGHT: 270.19 LBS | SYSTOLIC BLOOD PRESSURE: 115 MMHG | BODY MASS INDEX: 34.68 KG/M2 | HEIGHT: 74 IN | RESPIRATION RATE: 15 BRPM | TEMPERATURE: 97.7 F | OXYGEN SATURATION: 100 %

## 2019-10-09 PROCEDURE — 74011250636 HC RX REV CODE- 250/636

## 2019-10-09 PROCEDURE — 76060000032 HC ANESTHESIA 0.5 TO 1 HR: Performed by: INTERNAL MEDICINE

## 2019-10-09 PROCEDURE — 77030021593 HC FCPS BIOP ENDOSC BSC -A: Performed by: INTERNAL MEDICINE

## 2019-10-09 PROCEDURE — 76040000007: Performed by: INTERNAL MEDICINE

## 2019-10-09 PROCEDURE — 77030039961 HC KT CUST COLON BSC -D: Performed by: INTERNAL MEDICINE

## 2019-10-09 PROCEDURE — 74011250636 HC RX REV CODE- 250/636: Performed by: NURSE ANESTHETIST, CERTIFIED REGISTERED

## 2019-10-09 PROCEDURE — 88305 TISSUE EXAM BY PATHOLOGIST: CPT

## 2019-10-09 PROCEDURE — 74011000250 HC RX REV CODE- 250

## 2019-10-09 RX ORDER — SODIUM CHLORIDE 0.9 % (FLUSH) 0.9 %
5-40 SYRINGE (ML) INJECTION EVERY 8 HOURS
Status: CANCELLED | OUTPATIENT
Start: 2019-10-09

## 2019-10-09 RX ORDER — LIDOCAINE HYDROCHLORIDE 20 MG/ML
INJECTION, SOLUTION EPIDURAL; INFILTRATION; INTRACAUDAL; PERINEURAL AS NEEDED
Status: DISCONTINUED | OUTPATIENT
Start: 2019-10-09 | End: 2019-10-09 | Stop reason: HOSPADM

## 2019-10-09 RX ORDER — FLUMAZENIL 0.1 MG/ML
0.2 INJECTION INTRAVENOUS
Status: CANCELLED | OUTPATIENT
Start: 2019-10-09 | End: 2019-10-09

## 2019-10-09 RX ORDER — SODIUM CHLORIDE 0.9 % (FLUSH) 0.9 %
5-40 SYRINGE (ML) INJECTION AS NEEDED
Status: CANCELLED | OUTPATIENT
Start: 2019-10-09

## 2019-10-09 RX ORDER — DEXTROMETHORPHAN/PSEUDOEPHED 2.5-7.5/.8
1.2 DROPS ORAL
Status: CANCELLED | OUTPATIENT
Start: 2019-10-09

## 2019-10-09 RX ORDER — PROPOFOL 10 MG/ML
INJECTION, EMULSION INTRAVENOUS AS NEEDED
Status: DISCONTINUED | OUTPATIENT
Start: 2019-10-09 | End: 2019-10-09 | Stop reason: HOSPADM

## 2019-10-09 RX ORDER — FAMOTIDINE 20 MG/1
20 TABLET, FILM COATED ORAL ONCE
Status: DISCONTINUED | OUTPATIENT
Start: 2019-10-09 | End: 2019-10-09 | Stop reason: HOSPADM

## 2019-10-09 RX ORDER — EPINEPHRINE 0.1 MG/ML
1 INJECTION INTRACARDIAC; INTRAVENOUS
Status: CANCELLED | OUTPATIENT
Start: 2019-10-09 | End: 2019-10-10

## 2019-10-09 RX ORDER — SODIUM CHLORIDE, SODIUM LACTATE, POTASSIUM CHLORIDE, CALCIUM CHLORIDE 600; 310; 30; 20 MG/100ML; MG/100ML; MG/100ML; MG/100ML
25 INJECTION, SOLUTION INTRAVENOUS CONTINUOUS
Status: DISCONTINUED | OUTPATIENT
Start: 2019-10-09 | End: 2019-10-09 | Stop reason: HOSPADM

## 2019-10-09 RX ORDER — NALOXONE HYDROCHLORIDE 0.4 MG/ML
0.4 INJECTION, SOLUTION INTRAMUSCULAR; INTRAVENOUS; SUBCUTANEOUS
Status: CANCELLED | OUTPATIENT
Start: 2019-10-09 | End: 2019-10-09

## 2019-10-09 RX ORDER — PROPOFOL 10 MG/ML
INJECTION, EMULSION INTRAVENOUS
Status: DISCONTINUED | OUTPATIENT
Start: 2019-10-09 | End: 2019-10-09 | Stop reason: HOSPADM

## 2019-10-09 RX ORDER — ATROPINE SULFATE 0.1 MG/ML
0.5 INJECTION INTRAVENOUS
Status: CANCELLED | OUTPATIENT
Start: 2019-10-09 | End: 2019-10-10

## 2019-10-09 RX ADMIN — PROPOFOL 100 MG: 10 INJECTION, EMULSION INTRAVENOUS at 11:33

## 2019-10-09 RX ADMIN — LIDOCAINE HYDROCHLORIDE 40 MG: 20 INJECTION, SOLUTION EPIDURAL; INFILTRATION; INTRACAUDAL; PERINEURAL at 11:33

## 2019-10-09 RX ADMIN — PROPOFOL 160 MCG/KG/MIN: 10 INJECTION, EMULSION INTRAVENOUS at 11:33

## 2019-10-09 RX ADMIN — SODIUM CHLORIDE, SODIUM LACTATE, POTASSIUM CHLORIDE, AND CALCIUM CHLORIDE 25 ML/HR: 600; 310; 30; 20 INJECTION, SOLUTION INTRAVENOUS at 10:47

## 2019-10-09 NOTE — ANESTHESIA PREPROCEDURE EVALUATION
Relevant Problems   No relevant active problems       Anesthetic History   No history of anesthetic complications            Review of Systems / Medical History  Patient summary reviewed, nursing notes reviewed and pertinent labs reviewed    Pulmonary  Within defined limits                 Neuro/Psych   Within defined limits           Cardiovascular    Hypertension          CAD      Comments: Cad stable, no recent angina   GI/Hepatic/Renal  Within defined limits              Endo/Other      Hypothyroidism  Morbid obesity     Other Findings              Physical Exam    Airway  Mallampati: III  TM Distance: 4 - 6 cm  Neck ROM: normal range of motion   Mouth opening: Normal     Cardiovascular  Regular rate and rhythm,  S1 and S2 normal,  no murmur, click, rub, or gallop             Dental    Dentition: Poor dentition     Pulmonary  Breath sounds clear to auscultation               Abdominal  Abdominal exam normal       Other Findings            Anesthetic Plan    ASA: 3  Anesthesia type: MAC          Induction: Intravenous  Anesthetic plan and risks discussed with: Patient

## 2019-10-09 NOTE — H&P
History and Physical    Cris Jenifer  1975  [id number]  [id number]    Pre-Procedure Diagnosis:  anastomatic ulcer k28.9  screening z12.11      Evaluation of past illnesses, surgeries, or injuries:   YES  Past Medical History:   Diagnosis Date    Cardiomyopathy ([facility] [address] 7/21/2014    Chronic pain syndrome 12/5/2018    Gastric bypass status for obesity 8/8/2011    Heart failure ([facility] [address]     History of ST elevation myocardial infarction (STEMI) 12/5/2018    HTN (hypertension) 8/8/2011    Hypothyroid 8/8/2011    Post corneal transplant 7/21/2014    STEMI (ST elevation myocardial infarction) ([facility] [address]     08/2018     Past Surgical History:   Procedure Laterality Date    CARDIAC SURG PROCEDURE UNLIST  2018    Stent placed Coronary Artery     HX CORNEAL TRANSPLANT Right 2015    HX GASTRIC BYPASS  2015    HX LAP GASTRIC BYPASS  2011       Allergies:  No Known Allergies    Previous reactions to sedation/analgesia? NO    Review of current medications, supplement, herbals and nutraceuticals complete:  YES  Current Facility-Administered Medications   Medication Dose Route Frequency Provider Last Rate Last Dose    lactated Ringers infusion  25 mL/hr IntraVENous CONTINUOUS Bennett Allen CRNA 25 mL/hr at 10/09/19 1047 25 mL/hr at 10/09/19 1047    famotidine (PEPCID) tablet 20 mg  20 mg Oral ONCE Bennett Allen CRNA              Pertinent labs reviewed? YES    History of substance abuse? NO  History reviewed. No pertinent family history.   Social History     Socioeconomic History    Marital status: SINGLE     Spouse name: Not on file    Number of children: Not on file    Years of education: Not on file    Highest education level: Not on file   Occupational History    Not on file   Social Needs    Financial resource strain: Not on file    Food insecurity:     Worry: Not on file     Inability: Not on file    Transportation needs:     Medical: Not on file     Non-medical: Not on file   Tobacco Use    Smoking status: Never Smoker    Smokeless tobacco: Never Used   Substance and Sexual Activity    Alcohol use:  Yes     Alcohol/week: 6.0 standard drinks     Types: 6 Cans of beer per week     Comment: occasional    Drug use: Yes     Types: Marijuana     Comment: last use 10/8/19    Sexual activity: Yes   Lifestyle    Physical activity:     Days per week: Not on file     Minutes per session: Not on file    Stress: Not on file   Relationships    Social connections:     Talks on phone: Not on file     Gets together: Not on file     Attends Orthodoxy service: Not on file     Active member of club or organization: Not on file     Attends meetings of clubs or organizations: Not on file     Relationship status: Not on file    Intimate partner violence:     Fear of current or ex partner: Not on file     Emotionally abused: Not on file     Physically abused: Not on file     Forced sexual activity: Not on file   Other Topics Concern    Not on file   Social History Narrative    Not on file       Cardiac Status:  Cardiomyopathy, prior STEMI, heart failure    Mental Status:  WNL     Pulmonary Status:  WNL    NPO:  >4    Assessment/Impression: Anastomotic ulcer, colon cancer screening    Plan of treatment: EGD and Colonoscopy        Leola Cohen MD  10/9/2019  10:53 AM10:53 AM

## 2019-10-09 NOTE — PROCEDURES
Endoscopy Procedure Note    Patient: Juan Manuel Odell MRN: [id number]  SSN: [id number]    YOB: 1975  Age: 38 y.o. Sex: male      Date/Time:  10/9/2019 12:03 PM    Esophagogastroduodenoscopy (EGD) Procedure Note    Procedure: Esophagogastroduodenoscopy with biopsy    IMPRESSION:   1. 1cm tongues of possible Nunez's; biopsied  2. Post-surgical changes consistent with prior RYGB  3. No persistent anastomotic ulcers    RECOMMENDATIONS:  1. Resume regular diet. 2. Await biopsies    Indication: surveillance of anastomotic uclers  :  Martínez Booker MD  Assistants: Endoscopy Technician-1: Janay Black CNA  Endoscopy RN-1: Breezy Aquino RN    Referring Provider:   Penny Woo MD  History: The history and physical exam were reviewed and updated. Endoscope: Olympus GIF-190 diagnostic endoscope  Extent of Exam: proximal jejunum  ASA: Per Anesthesia  Anethesia/Sedation:  MAC anesthesia    Description of the procedure: The procedure was discussed with the patient including risks, benefits, alternatives including risks of iv sedation, bleeding, perforation and aspiration. A safety timeout was performed. The patient was placed in the left lateral decubitus position. A bite block was placed. The patient was given incremental doses of intravenous sedation until moderate sedation was achieved. The patients vital signs were monitored at all times including heart rate/rhythm, blood pressure and oxygen saturation. The endoscope was then passed under direct visualization to the proximal jejunum. The endoscope was then slowly withdrawn while visualizing the mucosa. In the stomach a retroflexion was performed and gastric fundus and cardia visualized. The endoscope was then slowly withdrawn. The patient was then transferred to recovery in stable condition. Findings:    Esophagus: There are two 1cm tongues of possible Nunez's in the distal eosphagus; (C0M1 if confirmed). No nodule or mass was seen on white light or narrow band imaging. Biopsies were taken for pathology. Otherwise, the esophageal mucosa was normal with no ulceration, mass or stricture. There was no evidence of   reflux esophagitis. Stomach: The gastric mucosa was normal with no ulceration, mass, stricture. Changes consistent with prior hayden-en-y gastric bypass are noted. No persistent anastomotic ulcer is seen   Jejunum: The jejunal mucosa was normal with no ulceration, mass, stricture and no evidence of villous atrophy. Therapies:  None    Specimens:   ID Type Source Tests Collected by Time Destination   1 : bx r/o Nunez's esophagus  Preservative Esophagus, Distal  Maxine Armstrong MD 10/9/2019 1140 Pathology   2 : (cold snare) polyps x 2 Preservative Colon, Transverse  Maxine Armstrong MD 10/9/2019 1159 Pathology   3 : (cold snare) polyp  Preservative Colon, Descending  Maxine Armstrong MD 10/9/2019 1200 Pathology   4 : (cold snare) polyp  Preservative Rectum  Maxine Armstrong MD 10/9/2019 1200 Pathology               Complications:   None; patient tolerated the procedure well. EBL:Minimal           Colonoscopy Report    Patient: Lucila Hoover MRN: [id number]  SSN: [id number]    YOB: 1975  Age: 38 y.o. Sex: male      Date of Procedure: 10/9/2019    IMPRESSION:  1. Four sub-cm polyps removed  2. Small internal hemorrhoids   3. Few sigmoid diverticula    RECOMMENDATIONS:  1. Resume regular diet, Recommend high fiber. 2. Will contact with polyp results in 2 weeks. These results will determine timing to next screening. Patient will be instructed to contact our office if they have not received the results by three weeks.     Indication:  Screening colonoscopy  Procedure Performed: Colonoscopy polypectomy (cold snare)  Endoscopist: Teresa Butts MD  Assistant: Endoscopy Technician-1: Toni Reynaga CNA  Endoscopy RN-1: Ryley Harrell RN  ASA: Per Anesthesia  Mallampati Score: See anesthesia report  Anesthesia: MAC anesthesia  Endoscope:     [x]  CF H 190AL   []  PCF H190AL   []  GIF H 190    Extent of Examination:terminal ileum  Quality of Preparation:     [x]  Excellent   []  Very Good   [] Fair but adequate   [] Fair, inadequate   []  Poor      Technique: The procedure was discussed with the patient including risks, benefits, alternatives including risks of IV sedation, bleeding, perforation and missed polyp. A safety timeout was performed. The patient was given incremental doses of intravenous sedation to achieve moderate sedation. The patients vital signs were monitored at all times including heart rate, rhythm, blood pressure and oxygen saturation. The patient was placed in left lateral position. When adequate sedation was achieved a perianal inspection and a digital rectal exam were performed. Video colonoscope was introduced into the rectum and advanced under direct vision up to the terminal ileum. The cecum was identified by IC valve, appendiceal orifice and crows foot. With adequate insufflation and maneuvering of the withdrawing scope, the colonic mucosa was visualized carefully. Retroflexion was performed in the rectum and the distal rectum visualized. The patient tolerated the procedure very well and was transferred to recovery area. Findings:  1. Normal rectal examination with small internal hemorrhoids noted in the anorectum  2. A few sigmoid diverticula were noted  3. Four 2-4mm sessile polyps were removed with cold snare from the transverse colon (2), descending colon (1), and rectum (1)  4. The colonic mucosa is otherwise normal without inflammatory changes, mass, or vascular abnormality  5.  The terminal ileum is intubated and evaluated for the distal 10cm and appeared normal      EBL:Minimal  Specimen:   ID Type Source Tests Collected by Time Destination   1 : bx r/o Nunez's esophagus  Preservative Esophagus, Distal  Piper Eduardo Skinner MD 10/9/2019 1140 Pathology   2 : (cold snare) polyps x 2 Preservative Colon, Transverse  Dontrell Fuller MD 10/9/2019 1159 Pathology   3 : (cold snare) polyp  Preservative Colon, Descending  Dontrell Fuller MD 10/9/2019 1200 Pathology   4 : (cold snare) polyp  Preservative Rectum  Dontrell Fuller MD 10/9/2019 1200 Pathology       Ronan Dee MD  October 9, 2019  12:03 PM

## 2019-10-09 NOTE — DISCHARGE INSTRUCTIONS
EGD/COLONSCOPY DISCHARGE    You have just had an examination of your esophagus (swallowing tube), stomach and duodenum (the first part of your small intestine) and of your colon (large intestine). The medication given to you during your procedure will be acting in your body for the next 12 hours, gradually wearing off. Your face may be flushed, skin may be warm and sweaty, you might feel a little bloated or nauseated and sleepy. 1. For the next 12 hours you SHOULD NOT:    a. Drive, operate any machinery. b. Drink alcohol.  c. Engage in activities that require mental sharpness or manual dexterity such as cooking. d. Take any medications other than prescribed by a physician.  e. Make any legal or financial decisions. 2. Call this office immediately, if:    a. Onset of new or increase of becky rectal bleeding.  b. Fever > 101  c. Increased abdominal pain    Additional instructions:    a. Diet as recommended  b. Due to risk of dehydration after colon prep and sedation, avoid extended periods of time outdoors if the day is hot and humid. c. If biopsies were taken, you will receive a post card or telephone call with results of your biopsies and suggestion for your next colonoscopy. Please allow us at least 3 weeks to get back with you about your results. If we have not contacted you by the, please call us for results. # [phone]    Patient Education   Patient Education   Patient Education   Patient armband removed and given to patient to take home. Patient was informed of the privacy risks if armband lost or stolen       High-Fiber Diet: Care Instructions  Your Care Instructions    A high-fiber diet may help you relieve constipation and feel less bloated. Your doctor and dietitian will help you make a high-fiber eating plan based on your personal needs. The plan will include the things you like to eat. It will also make sure that you get 30 grams of fiber a day.   Before you make changes to the way you eat, be sure to talk with your doctor or dietitian. Follow-up care is a key part of your treatment and safety. Be sure to make and go to all appointments, and call your doctor if you are having problems. It's also a good idea to know your test results and keep a list of the medicines you take. How can you care for yourself at home? · You can increase how much fiber you get if you eat more of certain foods. These foods include:  ? Whole-grain breads and cereals. ? Fruits, such as pears, apples, and peaches. Eat the skins, peels, and seeds, if you can.  ? Vegetables, such as broccoli, cabbage, spinach, carrots, asparagus, and squash. ? Starchy vegetables. These include potatoes with skins, kidney beans, and lima beans. · Take a fiber supplement every day if your doctor recommends it. Examples are Benefiber, Citrucel, FiberCon, and Metamucil. Ask your doctor how much to take. · Drink plenty of fluids, enough so that your urine is light yellow or clear like water. If you have kidney, heart, or liver disease and have to limit fluids, talk with your doctor before you increase the amount of fluids you drink. · Get some exercise every day. Exercise helps stool move through the colon. It also helps prevent constipation. · Keep a food diary. Try to notice and write down what foods cause gas, pain, or other symptoms. Then you can avoid these foods. Where can you learn more? Go to http://darrius-mirian.info/. Enter C700 in the search box to learn more about \"High-Fiber Diet: Care Instructions. \"  Current as of: November 7, 2018  Content Version: 12.2  © 3733-1841 [employer].. Care instructions adapted under license by [employer] (which disclaims liability or warranty for this information).  If you have questions about a medical condition or this instruction, always ask your healthcare professional. [address]  any warranty or liability for your use of this information. Upper GI Endoscopy: What to Expect at [address] [place] [address]  After you have an endoscopy, you will stay at the hospital or clinic for 1 to 2 hours. This will allow the medicine to wear off. You will be able to go home after your doctor or nurse checks to make sure you are not having any problems. You may have to stay overnight if you had treatment during the test. You may have a sore throat for a day or two after the test.  This care sheet gives you a general idea about what to expect after the test.  How can you care for yourself at home? Activity  · Rest as much as you need to after you go home. · You should be able to go back to your usual activities the day after the test.  Diet  · Follow your doctor's directions for eating after the test.  · Drink plenty of fluids (unless your doctor has told you not to). Medications  · If you have a sore throat the day after the test, use an over-the-counter spray to numb your throat. Follow-up care is a key part of your treatment and safety. Be sure to make and go to all appointments, and call your doctor if you are having problems. It's also a good idea to know your test results and keep a list of the medicines you take. When should you call for help? Call 911 anytime you think you may need emergency care.  For example, call if:    · You passed out (loses consciousness).     · You have trouble breathing.     · You pass maroon or bloody stools.    Call your doctor now or seek immediate medical care if:    · You have pain that does not get better after your take pain medicine.     · You have new or worse belly pain.     · You have blood in your stools.     · You are sick to your stomach and cannot keep fluids down.     · You have a fever.     · You cannot pass stools or gas.    Watch closely for changes in your health, and be sure to contact your doctor if:    · Your throat still hurts after a day or two.     · You do not get better as expected. Where can you learn more? Go to http://darrius-mirian.info/. Enter [phone] in the search box to learn more about \"Upper GI Endoscopy: What to Expect at Home. \"  Current as of: November 7, 2018  Content Version: 12.2  © 7757-6884 [employer]. Care instructions adapted under license by [employer] (which disclaims liability or warranty for this information). If you have questions about a medical condition or this instruction, always ask your healthcare professional. [address] any warranty or liability for your use of this information. Colonoscopy: What to Expect at [address] [place] [address]  After you have a colonoscopy, you will stay at the clinic for 1 to 2 hours until the medicines wear off. Then you can go home. But you will need to arrange for a ride. Your doctor will tell you when you can eat and do your other usual activities. Your doctor will talk to you about when you will need your next colonoscopy. Your doctor can help you decide how often you need to be checked. This will depend on the results of your test and your risk for colorectal cancer. After the test, you may be bloated or have gas pains. You may need to pass gas. If a biopsy was done or a polyp was removed, you may have streaks of blood in your stool (feces) for a few days. Problems such as heavy rectal bleeding may not occur until several weeks after the test. This isn't common. But it can happen after polyps are removed. This care sheet gives you a general idea about how long it will take for you to recover. But each person recovers at a different pace. Follow the steps below to get better as quickly as possible. How can you care for yourself at home? Activity    · Rest when you feel tired.     · You can do your normal activities when it feels okay to do so.    Diet    · Follow your doctor's directions for eating.     · Unless your doctor has told you not to, drink plenty of fluids. This helps to replace the fluids that were lost during the colon prep.     · Do not drink alcohol. Medicines    · Your doctor will tell you if and when you can restart your medicines. He or she will also give you instructions about taking any new medicines.     · If you take blood thinners, such as warfarin (Coumadin), clopidogrel (Plavix), or aspirin, be sure to talk to your doctor. He or she will tell you if and when to start taking those medicines again. Make sure that you understand exactly what your doctor wants you to do.     · If polyps were removed or a biopsy was done during the test, your doctor may tell you not to take aspirin or other anti-inflammatory medicines for a few days. These include ibuprofen (Advil, Motrin) and naproxen (Aleve). Other instructions    · For your safety, do not drive or operate machinery until the medicine wears off and you can think clearly. Your doctor may tell you not to drive or operate machinery until the day after your test.     · Do not sign legal documents or make major decisions until the medicine wears off and you can think clearly. The anesthesia can make it hard for you to fully understand what you are agreeing to. Follow-up care is a key part of your treatment and safety. Be sure to make and go to all appointments, and call your doctor if you are having problems. It's also a good idea to know your test results and keep a list of the medicines you take. When should you call for help? Call 911 anytime you think you may need emergency care.  For example, call if:    · You passed out (lost consciousness).     · You pass maroon or bloody stools.     · You have trouble breathing.    Call your doctor now or seek immediate medical care if:    · You have pain that does not get better after you take pain medicine.     · You are sick to your stomach or cannot drink fluids.     · You have new or worse belly pain.     · You have blood in your stools.     · You have a fever.     · You cannot pass stools or gas.    Watch closely for changes in your health, and be sure to contact your doctor if you have any problems. Where can you learn more? Go to http://darrius-mirian.info/. Enter E264 in the search box to learn more about \"Colonoscopy: What to Expect at Home. \"  Current as of: December 19, 2018  Content Version: 12.2  © 6677-3537 [employer]. Care instructions adapted under license by [employer] (which disclaims liability or warranty for this information). If you have questions about a medical condition or this instruction, always ask your healthcare professional. [address] any warranty or liability for your use of this information.

## 2019-10-09 NOTE — ANESTHESIA POSTPROCEDURE EVALUATION
Procedure(s):  COLONOSCOPY  ESOPHAGOGASTRODUODENOSCOPY (EGD).     MAC    Anesthesia Post Evaluation      Multimodal analgesia: multimodal analgesia not used between 6 hours prior to anesthesia start to PACU discharge  Patient location during evaluation: bedside  Patient participation: complete - patient cannot participate  Level of consciousness: awake and alert  Pain management: satisfactory to patient  Airway patency: patent  Anesthetic complications: no  Cardiovascular status: acceptable  Respiratory status: acceptable  Hydration status: acceptable  Post anesthesia nausea and vomiting:  none      Vitals Value Taken Time   /65 10/9/2019 12:05 PM   Temp     Pulse 57 10/9/2019 12:05 PM   Resp 15 10/9/2019 12:05 PM   SpO2 100 % 10/9/2019 12:05 PM

## 2019-10-09 NOTE — PERIOP NOTES
Pre-Op Summary    Pt arrived via car with family/friend and is oriented to time, place, person and situation. Patient with steady gait with none assistive devices. Visit Vitals  /79   Pulse 66   Temp 97.7 °F (36.5 °C)   Resp 16   Ht 6' 2\" (1.88 m)   Wt 122.6 kg (270 lb 3 oz)   SpO2 100%   BMI 34.69 kg/m²       Peripheral IV located on Right hand . Patients belongings are located with friend. Patient's point of contact is Rosy Mcclellan and their contact number is: [phone]. They will be in the waiting room. They are able to receive medication information. They will be their ride home.

## 2019-10-09 NOTE — ADDENDUM NOTE
Addendum  created 10/09/19 1350 by Rachelle Thompson DO    Attestation recorded in [employer] filed

## 2019-10-09 NOTE — PERIOP NOTES
Phase 2 Endo-Recovery Summary  [facility]    Vitals:    10/08/19 0850 10/09/19 1021 10/09/19 1205   BP:  125/79 115/65   Pulse:  66 62   Resp:  16 15   Temp:  97.7 °F (36.5 °C)    SpO2:  100% 100%   Weight: 122.5 kg (270 lb) 122.6 kg (270 lb 3 oz)    Height: 6' 2\" (1.88 m) 6' 2\" (1.88 m)        oriented to time, place, person and situation    Patient up in chair and friend  at the bedside. Lines and Drains  Peripheral Intravenous Line:   Peripheral IV 10/09/19 Right Hand (Active)   Site Assessment Clean, dry, & intact 10/9/2019 10:40 AM   Phlebitis Assessment 0 10/9/2019 10:40 AM   Infiltration Assessment 0 10/9/2019 10:40 AM   Dressing Status Clean, dry, & intact 10/9/2019 10:40 AM   Dressing Type Tape;Transparent 10/9/2019 10:40 AM   Hub Color/Line Status Pink; Infusing 10/9/2019 10:40 AM   Action Taken Open ports on tubing capped 10/9/2019 10:40 AM   Alcohol Cap Used Yes 10/9/2019 10:40 AM         Doctor came in to speak to patient and family. Patient assisted to chair with minimal assist. Pateint tolerating liquids well. Patient's family at bedside. Discharge discussed with patient and family. No questions or concerns at this time. Patient had time to ask any questions. Patient discharged to home, with friend.  Abdelrahman Hays RN

## 2019-10-14 ENCOUNTER — OFFICE VISIT (OUTPATIENT)
Dept: FAMILY MEDICINE CLINIC | Age: 44
End: 2019-10-14

## 2019-10-14 VITALS
DIASTOLIC BLOOD PRESSURE: 71 MMHG | SYSTOLIC BLOOD PRESSURE: 103 MMHG | TEMPERATURE: 97.1 F | WEIGHT: 270.8 LBS | OXYGEN SATURATION: 97 % | HEART RATE: 84 BPM | RESPIRATION RATE: 16 BRPM | HEIGHT: 74 IN | BODY MASS INDEX: 34.75 KG/M2

## 2019-10-14 DIAGNOSIS — E66.01 OBESITY, MORBID (HCC): ICD-10-CM

## 2019-10-14 DIAGNOSIS — F10.20 ALCOHOLISM (HCC): ICD-10-CM

## 2019-10-14 DIAGNOSIS — I21.3 ST ELEVATION MYOCARDIAL INFARCTION (STEMI), UNSPECIFIED ARTERY (HCC): ICD-10-CM

## 2019-10-14 DIAGNOSIS — I42.9 CARDIOMYOPATHY, UNSPECIFIED TYPE (HCC): ICD-10-CM

## 2019-10-14 DIAGNOSIS — G89.4 CHRONIC PAIN SYNDROME: ICD-10-CM

## 2019-10-14 DIAGNOSIS — Z00.00 MEDICARE ANNUAL WELLNESS VISIT, INITIAL: Primary | ICD-10-CM

## 2019-10-14 DIAGNOSIS — Z71.89 ADVANCE CARE PLANNING: ICD-10-CM

## 2019-10-14 DIAGNOSIS — I10 HYPERTENSION, UNSPECIFIED TYPE: ICD-10-CM

## 2019-10-14 RX ORDER — LIDOCAINE 50 MG/G
PATCH TOPICAL
Qty: 90 EACH | Refills: 5 | Status: SHIPPED | OUTPATIENT
Start: 2019-10-14 | End: 2019-11-13 | Stop reason: CLARIF

## 2019-10-14 RX ORDER — OXYCODONE AND ACETAMINOPHEN 5; 325 MG/1; MG/1
1 TABLET ORAL
Qty: 60 TAB | Refills: 0 | Status: SHIPPED | OUTPATIENT
Start: 2019-10-14 | End: 2019-11-13 | Stop reason: SDUPTHER

## 2019-10-14 RX ORDER — DISULFIRAM 250 MG/1
250 TABLET ORAL DAILY
Qty: 30 TAB | Refills: 0 | Status: SHIPPED | OUTPATIENT
Start: 2019-10-14 | End: 2019-11-13

## 2019-10-14 NOTE — ACP (ADVANCE CARE PLANNING)
Advance Care Planning (ACP) Provider Note - Comprehensive     Date of ACP Conversation: 10/14/19  Persons included in Conversation:  patient and POA  Length of ACP Conversation in minutes:  16 minutes    Authorized Decision Maker (if patient is incapable of making informed decisions): This person is:      Primary Decision Maker: Eitan Johnson - [phone]        General ACP for ALL Patients with Decision Making Capacity:   Importance of advance care planning, including choosing a healthcare agent to communicate patient's healthcare decisions if patient lost the ability to make decisions, such as after a sudden illness or accident  Understanding of the healthcare agent role was assessed and information provided  Exploration of values, goals, and preferences if recovery is not expected, even with continued medical treatment in the event of: Imminent death  Severe, permanent brain injury  \"In these circumstances, what matters most to you? \"  Care focused more on comfort or quality of life. \"What, if any, treatments would you want to avoid? \" none  Opportunity offered to explore how cultural, Gnosticism, spiritual, or personal beliefs would affect decisions for future care     Review of Existing Advance Directive:  What information were you given about medical decisions to consider before completing your advance directive?  he named his medical power of  prior to procedures    For Serious or Chronic Illness:  Understanding of medical condition      Interventions Provided:  Recommended completion of Advance Directive form after review of ACP materials and conversation with prospective healthcare agent

## 2019-10-14 NOTE — PATIENT INSTRUCTIONS
Learning About Alcohol Use Disorder What is alcohol use disorder? Alcohol use disorder means that a person drinks alcohol even though it causes harm to themselves or others. It can range from mild to severe. The more signs of this disorder you have, the more severe it may be. Moderate to severe alcohol use disorder is sometimes called addiction. People who have it may find it hard to control their use of alcohol. People who have this disorder may argue with others about how much they're drinking. Their job may be affected because of drinking. They may drink when it's dangerous or illegal, such as when they drive. They also may have a strong need, or craving, to drink. They may feel like they must drink just to get by. Their drinking may increase their risk of getting hurt or being in a car crash. Over time, drinking too much alcohol may cause health problems. These may include high blood pressure, liver problems, or problems with digestion. What are the signs? Maybe you've wondered about your alcohol habits, or how to tell if your drinking is becoming a problem. Here are some of the signs of alcohol use disorder. You may have it if you have two or more of the following signs: 
· You drink larger amounts of alcohol than you ever meant to. Or you've been drinking for a longer time than you ever meant to. · You can't cut down or control your use. Or you constantly wish you could cut down. · You spend a lot of time getting or drinking alcohol or recovering from its effects. · You have strong cravings for alcohol. · You can no longer do your main jobs at work, at school, or at home. · You keep drinking alcohol, even though your use hurts your relationships. · You have stopped doing important activities because of your alcohol use. · You drink alcohol in situations where doing so is dangerous. · You keep drinking alcohol even though you know it's causing health problems. · You need more and more alcohol to get the same effect, or you get less effect from the same amount over time. This is called tolerance. · You have uncomfortable symptoms when you stop drinking alcohol or use less. This is called withdrawal. 
Alcohol use disorder can range from mild to severe. The more signs you have, the more severe the disorder may be. Moderate to severe alcohol use disorder is sometimes called addiction. You might not realize that your drinking is a problem. You might not drink large amounts when you drink. Or you might go for days or weeks between drinking episodes. But even if you don't drink very often, your drinking could still be harmful and put you at risk. How is alcohol use disorder treated? Getting help is up to you. But you don't have to do it alone. There are many people and kinds of treatments that can help. Treatment for alcohol use disorder can include: · Group therapy, one or more types of counseling, and alcohol education. · Medicines that help to: 
? Reduce withdrawal symptoms and help you safely stop drinking. ? Reduce cravings for alcohol. · Support groups. These groups include Alcoholics Anonymous and [facility] (Self-Management and Recovery Training). Some people are able to stop or cut back on drinking with help from a counselor. People who have moderate to severe alcohol use disorder may need medical treatment. They may need to stay in a hospital or treatment center. You may have a treatment team to help you. This team may include a psychologist or psychiatrist, counselors, doctors, social workers, nurses, and a . A  helps plan and manage your treatment. Follow-up care is a key part of your treatment and safety. Be sure to make and go to all appointments, and call your doctor if you are having problems. It's also a good idea to know your test results and keep a list of the medicines you take. Where can you learn more? Go to http://darrius-mirian.info/. Enter [id number] in the search box to learn more about \"Learning About Alcohol Use Disorder. \" Current as of: February 5, 2019 Content Version: 12.2 © 9379-8071 [employer]. Care instructions adapted under license by [employer] (which disclaims liability or warranty for this information). If you have questions about a medical condition or this instruction, always ask your healthcare professional. [place] [address] any warranty or liability for your use of this information. Well Visit, Ages 25 to 48: Care Instructions Your Care Instructions Physical exams can help you stay healthy. Your doctor has checked your overall health and may have suggested ways to take good care of yourself. He or she also may have recommended tests. At home, you can help prevent illness with healthy eating, regular exercise, and other steps. Follow-up care is a key part of your treatment and safety. Be sure to make and go to all appointments, and call your doctor if you are having problems. It's also a good idea to know your test results and keep a list of the medicines you take. How can you care for yourself at home? · Reach and stay at a healthy weight. This will lower your risk for many problems, such as obesity, diabetes, heart disease, and high blood pressure. · Get at least 30 minutes of physical activity on most days of the week. Walking is a good choice. You also may want to do other activities, such as running, swimming, cycling, or playing tennis or team sports. Discuss any changes in your exercise program with your doctor. · Do not smoke or allow others to smoke around you. If you need help quitting, talk to your doctor about stop-smoking programs and medicines. These can increase your chances of quitting for good.  
· Talk to your doctor about whether you have any risk factors for sexually transmitted infections (STIs). Having one sex partner (who does not have STIs and does not have sex with anyone else) is a good way to avoid these infections. · Use birth control if you do not want to have children at this time. Talk with your doctor about the choices available and what might be best for you. · Protect your skin from too much sun. When you're outdoors from 10 a.m. to 4 p.m., stay in the shade or cover up with clothing and a hat with a wide brim. Wear sunglasses that block UV rays. Even when it's cloudy, put broad-spectrum sunscreen (SPF 30 or higher) on any exposed skin. · See a dentist one or two times a year for checkups and to have your teeth cleaned. · Wear a seat belt in the car. Follow your doctor's advice about when to have certain tests. These tests can spot problems early. For everyone · Cholesterol. Have the fat (cholesterol) in your blood tested after age 21. Your doctor will tell you how often to have this done based on your age, family history, or other things that can increase your risk for heart disease. · Blood pressure. Have your blood pressure checked during a routine doctor visit. Your doctor will tell you how often to check your blood pressure based on your age, your blood pressure results, and other factors. · Vision. Talk with your doctor about how often to have a glaucoma test. 
· Diabetes. Ask your doctor whether you should have tests for diabetes. · Colon cancer. Your risk for colorectal cancer gets higher as you get older. Some experts say that adults should start regular screening at age 48 and stop at age 76. Others say to start before age 48 or continue after age 76. Talk with your doctor about your risk and when to start and stop screening. For women · Breast exam and mammogram. Talk to your doctor about when you should have a clinical breast exam and a mammogram. Medical experts differ on whether and how often women under 50 should have these tests. Your doctor can help you decide what is right for you. · Cervical cancer screening test and pelvic exam. Begin with a Pap test at age 24. The test often is part of a pelvic exam. Starting at age 27, you may choose to have a Pap test, an HPV test, or both tests at the same time (called co-testing). Talk with your doctor about how often to have testing. · Tests for sexually transmitted infections (STIs). Ask whether you should have tests for STIs. You may be at risk if you have sex with more than one person, especially if your partners do not wear condoms. For men · Tests for sexually transmitted infections (STIs). Ask whether you should have tests for STIs. You may be at risk if you have sex with more than one person, especially if you do not wear a condom. · Testicular cancer exam. Ask your doctor whether you should check your testicles regularly. · Prostate exam. Talk to your doctor about whether you should have a blood test (called a PSA test) for prostate cancer. Experts differ on whether and when men should have this test. Some experts suggest it if you are older than 39 and are -American or have a father or brother who got prostate cancer when he was younger than 72. When should you call for help? Watch closely for changes in your health, and be sure to contact your doctor if you have any problems or symptoms that concern you. Where can you learn more? Go to http://darrius-mirian.info/. Enter P072 in the search box to learn more about \"Well Visit, Ages 25 to 48: Care Instructions. \" Current as of: December 13, 2018 Content Version: 12.2 © 0147-7766 [employer]. Care instructions adapted under license by [employer] (which disclaims liability or warranty for this information).  If you have questions about a medical condition or this instruction, always ask your healthcare professional. [place] [address] any warranty or liability for your use of this information.

## 2019-10-14 NOTE — PROGRESS NOTES
Teodoro Devi is a 38 y.o male that is present in the office for a routine appointment for medication evaluation. Patient complains of back pain. 1. Have you been to the ER, urgent care clinic since your last visit? Hospitalized since your last visit? No    2. Have you seen or consulted any other health care providers outside of the [address] [place] [address] since your last visit? Include any pap smears or colon screening.  Yes When: [facility] - 10/9/2019 - Colonoscopy     Health Maintenance Due   Topic Date Due    MEDICARE YEARLY EXAM  09/23/2019

## 2019-10-14 NOTE — PROGRESS NOTES
(AWV) The Medicare Annual Wellness Exam PROGRESS NOTE    This is a Medicare Annual Wellness Exam (AWV)     I have reviewed the patient's medical history in detail and updated the computerized patient record. Abbey Lopez is a 38 y.o.  male and presents for an annual wellness exam     ROS   General ROS: negative for - chills or fever  Psychological ROS: negative for - anxiety or depression  Ophthalmic ROS: negative for - blurry vision  ENT ROS: negative for - headaches, nasal congestion or sore throat  Endocrine ROS: negative for - polydipsia/polyuria or temperature intolerance  Respiratory ROS: no cough, shortness of breath, or wheezing  Cardiovascular ROS: no chest pain or dyspnea on exertion  Gastrointestinal ROS: no abdominal pain, change in bowel habits, or black or bloody stools  Genito-Urinary ROS: no dysuria, trouble voiding, or hematuria  Neurological ROS: positive for - numbness/tingling  - lightheadedness    All other systems reviewed and are negative. History     Past Medical History:   Diagnosis Date    Cardiomyopathy ([facility] [address] 7/21/2014    Chronic pain syndrome 12/5/2018    Gastric bypass status for obesity 8/8/2011    Heart failure ([facility] [address]     History of ST elevation myocardial infarction (STEMI) 12/5/2018    HTN (hypertension) 8/8/2011    Hypothyroid 8/8/2011    Post corneal transplant 7/21/2014    STEMI (ST elevation myocardial infarction) ([facility] [address]     08/2018      Past Surgical History:   Procedure Laterality Date    CARDIAC SURG PROCEDURE UNLIST  2018    Stent placed Coronary Artery     COLONOSCOPY N/A 10/9/2019    COLONOSCOPY performed by Isabela Stewart MD at . [facility] [address] Right 2015    HX GASTRIC BYPASS  2015    HX LAP GASTRIC BYPASS  2011     Current Outpatient Medications   Medication Sig Dispense Refill    atorvastatin (LIPITOR) 40 mg tablet Take 40 mg by mouth daily.       metoprolol tartrate (LOPRESSOR) 50 mg tablet Take 50 mg by mouth two (2) times a day.  potassium chloride (KLOR-CON) 10 mEq tablet TAKE ONE TABLET BY MOUTH DAILY 30 Tab 0    gabapentin (NEURONTIN) 800 mg tablet Take 1 Tab by mouth three (3) times daily. 90 Tab 1    colchicine (COLCRYS) 0.6 mg tablet Take 1 Tab by mouth daily. (Patient taking differently: Take 0.6 mg by mouth as needed.) 30 Tab 1    furosemide (LASIX) 20 mg tablet Take 1 Tab by mouth daily. (Patient taking differently: Take 20 mg by mouth daily as needed.) 30 Tab 0    levothyroxine (SYNTHROID) 175 mcg tablet TAKE ONE TABLET BY MOUTH ONCE DAILY BEFORE  BREAKFAST 90 Tab 0    pantoprazole (PROTONIX) 40 mg tablet Take 1 Tab by mouth two (2) times a day. 180 Tab 4    amLODIPine (NORVASC) 10 mg tablet Take 1 Tab by mouth daily. 90 Tab 4    aspirin delayed-release 81 mg tablet Take 1 Tab by mouth daily. 90 Tab 4    enalapril (VASOTEC) 20 mg tablet TAKE ONE TABLET BY MOUTH TWICE DAILY 180 Tab 4     No Known Allergies  No family history on file. Social History     Tobacco Use    Smoking status: Never Smoker    Smokeless tobacco: Never Used   Substance Use Topics    Alcohol use:  Yes     Alcohol/week: 6.0 standard drinks     Types: 6 Cans of beer per week     Comment: occasional     Patient Active Problem List   Diagnosis Code    Gastric bypass status for obesity Z98.84    HTN (hypertension) I5    Hypothyroid E03.9    Post corneal transplant Z94.7    Cardiomyopathy ([facility] [address] I42.9    History of ST elevation myocardial infarction (STEMI) I25.2    Chronic pain syndrome G89.4    Severe obesity (HCC) E66.01    S/P primary angioplasty with coronary stent Z95.5    Pain in right buttock M79.18    Peripheral vascular disease ([facility] [address] I73.9    Preoperative clearance Z01.818       Health Maintenance History  Immunizations reviewed, dtap up to date , pneumovax up to date, flu up to date  Colonoscopy: up to date,   Eye exam: due    Depression Risk Factor Screening:      Patient Health Questionnaire (PHQ-2) Over the last 2 weeks, how often have you been bothered by any of the following problems? · Little interest or pleasure in doing things? · Several days. [1]  · Feeling down, depressed, or hopeless? · Several days. [1]    Total Score: 2/6  PHQ-2 Assessment Scoring:   A score of 2 or more requires further screening with the PHQ-9    Alcohol Risk Factor Screening:   Men: On any occasion during the past 3 months, have you had more than 4 drinks containing alcohol? yes  Do you average more than 14 drinks per week? yes    Functional Ability and Level of Safety:     Hearing Loss    Hearing is good. Activities of Daily Living   Self-care. Requires assistance with: no ADLs    Fall Risk   Gait weak (10 pts)  Score: 10    Abuse Screen   Patient is not abused    Examination   Physical Examination  Vitals:    10/14/19 1231   BP: 103/71   Pulse: 84   Resp: 16   Temp: 97.1 °F (36.2 °C)   TempSrc: Oral   SpO2: 97%   Weight: 270 lb 12.8 oz (122.8 kg)   Height: 6' 2\" (1.88 m)   PainSc:   4   PainLoc: Back      Body mass index is 34.77 kg/m².      Evaluation of Cognitive Function:  Mood/affect:good mood with appropriate affect  Appearance: well kempt  Family member/caregiver input: his roommate is attentive to his medications and any symptom changes    alert, well appearing, and in no distress, oriented to person, place, and time and obese    Patient Care Team:  Lissette Combs MD as PCP - General (Family Practice)  Sally Emanuel MD (Orthopedic Surgery)  Velvet Sanchez MD (General Surgery)  Daryn Muse MD (Ophthalmology)  Bigg Cohn MD (Cardiology)  Shar Gonzalez MD (Physical Medicine and Rehab)    End-of-life planning  Advanced Directive in the case than an injury or illness causes the patient to be unable to make health care decisions    Health Care Directive or Living Will: yes    Advice/Referrals/Counselling/Plan:   Education and counseling provided:  Are appropriate based on today's review and evaluation  End-of-Life planning (with patient's consent)  Diabetes screening test  weight loss; alcohol cessation  Include in education list (weight loss, physical activity, smoking cessation, fall prevention, and nutrition)    ICD-10-CM ICD-9-CM    1. Medicare annual wellness visit, initial Z00.00 V70.0    2. Chronic pain syndrome G89.4 338.4 oxyCODONE-acetaminophen (PERCOCET) 5-325 mg per tablet      lidocaine (LIDODERM) 5 %   3. Cardiomyopathy, unspecified type ([facility] [facility] I42.9 425.4    4. Hypertension, unspecified type I10 401.9    5. ST elevation myocardial infarction (STEMI), unspecified artery (HCC) I21.3 410.90    6. Advance care planning Z71.89 V65.49 ADVANCE CARE PLANNING FIRST 30 MINS   7. Obesity, morbid ([facility] E66.01 278.01    8. Alcoholism ([address] F10.20 303.90 disulfiram (ANTABUSE) 250 mg tablet   . Brief written plan, checklist    I have discussed the diagnosis with the patient and the intended plan as seen in the above orders. The patient has received an after-visit summary and questions were answered concerning future plans. I have discussed medication side effects and warnings with the patient as well. I have reviewed the plan of care with the patient, accepted their input and they are in agreement with the treatment goals. ____________________________________________________________    Problem Assessment    for treatment of   Chief Complaint   Patient presents with    Medication Evaluation    Back Pain     Lower back pain         SUBJECTIVE  Mr. Daryl Mosley returns for f/u after EGD and colonoscopy and was found to have ulcers and polyps; he is instructed by GI to have f/u colonoscopy in 3 years. Cardiovascular Review:  The patient has hypertension, hyperlipidemia, CHF and obesity.   Diet and Lifestyle: not attempting to follow a low fat, low cholesterol diet, not attempting to follow a low sodium diet, does not rigorously follow a diabetic diet, sedentary, nonsmoker  Home BP Monitoring: is not measured at home. Pertinent ROS: taking medications as instructed, no medication side effects noted, no TIA's, no chest pain on exertion, no dyspnea on exertion, no swelling of ankles.      Thyroid Review:  Patient is seen for followup of hypothyroidism. Thyroid ROS: denies fatigue, weight changes, heat/cold intolerance, bowel/skin changes or CVS symptoms. Osteoarthritis and Chronic Pain:  Patient has neuropathy and orthopedic, primarily affecting the legs and feet and hip and hand on the right side  Symptoms onset: problem is longstanding. Rheumatological ROS: ongoing significant pain in legs and feet and hip which is stable and controlled by PRN meds. Response to treatment plan: gradually worsening.      He wants to quit drinking alcohol and is requesting assistance. Visit Vitals  /71 (BP 1 Location: Right arm, BP Patient Position: Sitting)   Pulse 84   Temp 97.1 °F (36.2 °C) (Oral)   Resp 16   Ht 6' 2\" (1.88 m)   Wt 270 lb 12.8 oz (122.8 kg)   SpO2 97%   BMI 34.77 kg/m²     General:  Alert, cooperative, no distress, appears stated age. Head:  Normocephalic, without obvious abnormality, atraumatic. Eyes:  Conjunctivae/corneas clear. PERRL, EOMs intact. Fundi benign   Ears:  Normal TMs and external ear canals both ears. Nose: Nares normal. Septum midline. Mucosa normal. No drainage or sinus tenderness. Throat: Lips, mucosa, and tongue normal. Teeth and gums normal.   Neck: Supple, symmetrical, trachea midline, no adenopathy, thyroid: no enlargement/tenderness/nodules, no carotid bruit and no JVD. Back:   Symmetric, no curvature. ROM normal. No CVA tenderness. Lungs:   Clear to auscultation bilaterally. Chest wall:  No tenderness or deformity. Heart:  Regular rate and rhythm, S1, S2 normal, no murmur, click, rub or gallop. Abdomen:   Soft, non-tender. Bowel sounds normal. No masses,  No organomegaly.    Genitalia:  deferred   Rectal:  deferred   Extremities: Extremities normal, atraumatic, no cyanosis or edema. Pulses: 2+ and symmetric all extremities. Skin: Skin color, texture, turgor normal. No rashes or lesions   Lymph nodes: Cervical, supraclavicular, and axillary nodes normal.   Neurologic: CNII-XII intact. Normal strength, sensation and reflexes throughout. LABS   Tubular adenomata    Assessment/Plan:    1. Chronic pain syndrome  This is a chronic problem that is stable. Per review of available records and patients , there are not sign of overuse, misuse, diversion, or concerning side effects. Today we reviewed: the risk of overdose, addiction, and dependency proper storage and disposal of medications the goals of treatment (improve functionality, quality of life, and pain)  The following changes were made to the patients current treatment plan: medications adjusted, see orders. - oxyCODONE-acetaminophen (PERCOCET) 5-325 mg per tablet; Take 1 Tab by mouth every eight to twelve (8-12) hours as needed for Pain for up to 30 days. Max Daily Amount: 3 Tabs. Dispense: 60 Tab; Refill: 0  - lidocaine (LIDODERM) 5 %; Apply patch to the affected area for 12 hours a day and remove for 12 hours a day. Dispense: 90 Each; Refill: 5    2. Medicare annual wellness visit, initial  Reviewed preventive recommendations    3. Cardiomyopathy, unspecified type [facility])  F/u with cardiology    4. Hypertension, unspecified type  Goal <130/80    5. ST elevation myocardial infarction (STEMI), unspecified artery ([facility] [address]  F/u with cardiology    6. Advance care planning  See ACP  - ADVANCE CARE PLANNING FIRST 30 MINS    7. Obesity, morbid ([facility] [address]  I have reviewed/discussed the above normal BMI with the patient. I have recommended the following interventions: dietary management education, guidance, and counseling, encourage exercise and monitor weight . .        8. Alcoholism ([facility] [address]  Encourage cessation; recommend AA; start medication to deter alcohol intake  - disulfiram (ANTABUSE) 250 mg tablet;  Take 1 Tab by mouth daily. Dispense: 30 Tab;  Refill: 0    Lab review: labs reviewed, I note that biopsies from endoscopies are benign

## 2019-10-17 DIAGNOSIS — E03.9 ACQUIRED HYPOTHYROIDISM: ICD-10-CM

## 2019-10-18 RX ORDER — LEVOTHYROXINE SODIUM 175 UG/1
TABLET ORAL
Qty: 30 TAB | Refills: 0 | Status: SHIPPED | OUTPATIENT
Start: 2019-10-18 | End: 2019-11-27 | Stop reason: SDUPTHER

## 2019-10-21 ENCOUNTER — ANESTHESIA EVENT (OUTPATIENT)
Dept: SURGERY | Age: 44
End: 2019-10-21
Payer: MEDICARE

## 2019-10-21 RX ORDER — CLOPIDOGREL BISULFATE 75 MG/1
75 TABLET ORAL
COMMUNITY
End: 2020-10-09

## 2019-10-21 NOTE — PERIOP NOTES
PAT - SURGICAL PRE-ADMISSION INSTRUCTIONS    NAME:  Aubree Fields                                                          TODAY'S DATE:  10/21/2019    SURGERY DATE:  10/22/2019                                  SURGERY ARRIVAL TIME:   TBV    1. Do NOT eat or drink anything, including candy or gum, after MIDNIGHT on 10/21/2019 , unless you have specific instructions from your Surgeon or Anesthesia Provider to do so. 2. No smoking on the day of surgery. 3. No alcohol 24 hours prior to the day of surgery. 4. No recreational drugs for one week prior to the day of surgery. 5. Leave all valuables, including money/purse, at home. 6. Remove all jewelry, nail polish, makeup (including mascara); no lotions, powders, deodorant, or perfume/cologne/after shave. 7. Glasses/Contact lenses and Dentures may be worn to the hospital.  They will be removed prior to surgery. 8. Call your doctor if symptoms of a cold or illness develop within 24 ours prior to surgery. 9. AN ADULT MUST DRIVE YOU HOME AFTER OUTPATIENT SURGERY. 10. If you are having an OUTPATIENT procedure, please make arrangements for a responsible adult to be with you for 24 hours after your surgery. 11. If you are admitted to the hospital, you will be assigned to a bed after surgery is complete. Normally a family member will not be able to see you until you are in your assigned bed. 15. Family is encouraged to accompany you to the hospital.  We ask visitors in the treatment area to be limited to ONE person at a time to ensure patient privacy. EXCEPTIONS WILL BE MADE AS NEEDED. 15. Children under 12 are discouraged from entering the treatment area and need to be supervised by an adult when in the waiting room. Special Instructions:     Take these medications the morning of surgery with a sip of water:  Levothyroxine,Metoprolol, Amlodipine, Antabuse, STOP anticoagulants AT LEAST 1 WEEK PRIOR to your surgery or, follow other MD instructions: Stopped Plavix 10/01/2019    Patient Prep:    shower with anti-bacterial soap    These surgical instructions were reviewed with Fly Robin during the PAT phone call. Directions: On the morning of surgery, please go to the [facility]. Enter the building from the [facility] entrance, 1st floor (next to the Emergency Room entrance). Take the elevator to the 2nd floor. Sign in at the Registration Desk.     If you have any questions and/or concerns, please do not hesitate to call:  (Prior to the day of surgery)  [facility] unit:  [phone]  (Day of surgery)  [facility] unit:  [phone]

## 2019-10-22 ENCOUNTER — HOSPITAL ENCOUNTER (OUTPATIENT)
Age: 44
Setting detail: OUTPATIENT SURGERY
Discharge: HOME | End: 2019-10-22
Attending: SURGERY | Admitting: SURGERY
Payer: MEDICARE

## 2019-10-22 ENCOUNTER — ANESTHESIA (OUTPATIENT)
Dept: SURGERY | Age: 44
End: 2019-10-22
Payer: MEDICARE

## 2019-10-22 VITALS
DIASTOLIC BLOOD PRESSURE: 57 MMHG | OXYGEN SATURATION: 98 % | TEMPERATURE: 97.8 F | BODY MASS INDEX: 35.29 KG/M2 | SYSTOLIC BLOOD PRESSURE: 96 MMHG | HEART RATE: 51 BPM | RESPIRATION RATE: 18 BRPM | WEIGHT: 275 LBS | HEIGHT: 74 IN

## 2019-10-22 DIAGNOSIS — Z87.19 S/P HERNIA REPAIR: Primary | ICD-10-CM

## 2019-10-22 DIAGNOSIS — Z98.890 S/P HERNIA REPAIR: Primary | ICD-10-CM

## 2019-10-22 PROBLEM — Z01.818 PREOPERATIVE CLEARANCE: Status: RESOLVED | Noted: 2019-09-22 | Resolved: 2019-10-22

## 2019-10-22 PROCEDURE — 74011250636 HC RX REV CODE- 250/636: Performed by: NURSE ANESTHETIST, CERTIFIED REGISTERED

## 2019-10-22 PROCEDURE — C1781 MESH (IMPLANTABLE): HCPCS | Performed by: SURGERY

## 2019-10-22 PROCEDURE — 76010000933 HC OR TIME 0.5 TO 1HR INTENSV - TIER 2: Performed by: SURGERY

## 2019-10-22 PROCEDURE — 77030032490 HC SLV COMPR SCD KNE COVD -B: Performed by: SURGERY

## 2019-10-22 PROCEDURE — 76210000021 HC REC RM PH II 0.5 TO 1 HR: Performed by: SURGERY

## 2019-10-22 PROCEDURE — 77030022704 HC SUT VLOC COVD -B: Performed by: SURGERY

## 2019-10-22 PROCEDURE — 74011000250 HC RX REV CODE- 250: Performed by: SURGERY

## 2019-10-22 PROCEDURE — 77030008683 HC TU ET CUF COVD -A: Performed by: ANESTHESIOLOGY

## 2019-10-22 PROCEDURE — 77030020703 HC SEAL CANN DISP INTU -B: Performed by: SURGERY

## 2019-10-22 PROCEDURE — 77030035277 HC OBTRTR BLDELSS DISP INTU -B: Performed by: SURGERY

## 2019-10-22 PROCEDURE — 77030031139 HC SUT VCRL2 J&J -A: Performed by: SURGERY

## 2019-10-22 PROCEDURE — 74011000250 HC RX REV CODE- 250: Performed by: NURSE ANESTHETIST, CERTIFIED REGISTERED

## 2019-10-22 PROCEDURE — 77030018842 HC SOL IRR SOD CL 9% BAXT -A: Performed by: SURGERY

## 2019-10-22 PROCEDURE — 74011250636 HC RX REV CODE- 250/636: Performed by: SURGERY

## 2019-10-22 PROCEDURE — 74011250636 HC RX REV CODE- 250/636

## 2019-10-22 PROCEDURE — 77030010507 HC ADH SKN DERMBND J&J -B: Performed by: SURGERY

## 2019-10-22 PROCEDURE — 74011250637 HC RX REV CODE- 250/637: Performed by: NURSE ANESTHETIST, CERTIFIED REGISTERED

## 2019-10-22 PROCEDURE — 76060000032 HC ANESTHESIA 0.5 TO 1 HR: Performed by: SURGERY

## 2019-10-22 PROCEDURE — 76210000016 HC OR PH I REC 1 TO 1.5 HR: Performed by: SURGERY

## 2019-10-22 PROCEDURE — 77030002933 HC SUT MCRYL J&J -A: Performed by: SURGERY

## 2019-10-22 DEVICE — LAPAROSCOPIC SELF-FIXATING MESH POLYESTER WITH POLYLACTIC ACID GRIPS AND COLLAGEN FILM
Type: IMPLANTABLE DEVICE | Site: INGUINAL | Status: FUNCTIONAL
Brand: PROGRIP

## 2019-10-22 RX ORDER — MAGNESIUM SULFATE 100 %
16 CRYSTALS MISCELLANEOUS AS NEEDED
Status: DISCONTINUED | OUTPATIENT
Start: 2019-10-22 | End: 2019-10-22 | Stop reason: HOSPADM

## 2019-10-22 RX ORDER — SUCCINYLCHOLINE CHLORIDE 100 MG/5ML
SYRINGE (ML) INTRAVENOUS AS NEEDED
Status: DISCONTINUED | OUTPATIENT
Start: 2019-10-22 | End: 2019-10-22 | Stop reason: HOSPADM

## 2019-10-22 RX ORDER — HYDROMORPHONE HYDROCHLORIDE 1 MG/ML
INJECTION, SOLUTION INTRAMUSCULAR; INTRAVENOUS; SUBCUTANEOUS
Status: COMPLETED
Start: 2019-10-22 | End: 2019-10-22

## 2019-10-22 RX ORDER — DIPHENHYDRAMINE HYDROCHLORIDE 50 MG/ML
12.5 INJECTION, SOLUTION INTRAMUSCULAR; INTRAVENOUS
Status: DISCONTINUED | OUTPATIENT
Start: 2019-10-22 | End: 2019-10-22 | Stop reason: HOSPADM

## 2019-10-22 RX ORDER — PROPOFOL 10 MG/ML
INJECTION, EMULSION INTRAVENOUS AS NEEDED
Status: DISCONTINUED | OUTPATIENT
Start: 2019-10-22 | End: 2019-10-22 | Stop reason: HOSPADM

## 2019-10-22 RX ORDER — FAMOTIDINE 20 MG/1
20 TABLET, FILM COATED ORAL ONCE
Status: COMPLETED | OUTPATIENT
Start: 2019-10-22 | End: 2019-10-22

## 2019-10-22 RX ORDER — NEOSTIGMINE METHYLSULFATE 1 MG/ML
INJECTION, SOLUTION INTRAVENOUS AS NEEDED
Status: DISCONTINUED | OUTPATIENT
Start: 2019-10-22 | End: 2019-10-22 | Stop reason: HOSPADM

## 2019-10-22 RX ORDER — MIDAZOLAM HYDROCHLORIDE 1 MG/ML
INJECTION, SOLUTION INTRAMUSCULAR; INTRAVENOUS AS NEEDED
Status: DISCONTINUED | OUTPATIENT
Start: 2019-10-22 | End: 2019-10-22 | Stop reason: HOSPADM

## 2019-10-22 RX ORDER — BUPIVACAINE HYDROCHLORIDE AND EPINEPHRINE 5; 5 MG/ML; UG/ML
INJECTION, SOLUTION EPIDURAL; INTRACAUDAL; PERINEURAL AS NEEDED
Status: DISCONTINUED | OUTPATIENT
Start: 2019-10-22 | End: 2019-10-22 | Stop reason: HOSPADM

## 2019-10-22 RX ORDER — LIDOCAINE HYDROCHLORIDE 20 MG/ML
INJECTION, SOLUTION EPIDURAL; INFILTRATION; INTRACAUDAL; PERINEURAL AS NEEDED
Status: DISCONTINUED | OUTPATIENT
Start: 2019-10-22 | End: 2019-10-22 | Stop reason: HOSPADM

## 2019-10-22 RX ORDER — GLYCOPYRROLATE 0.6MG/3ML
SYRINGE (ML) INTRAVENOUS AS NEEDED
Status: DISCONTINUED | OUTPATIENT
Start: 2019-10-22 | End: 2019-10-22 | Stop reason: HOSPADM

## 2019-10-22 RX ORDER — FENTANYL CITRATE 50 UG/ML
INJECTION, SOLUTION INTRAMUSCULAR; INTRAVENOUS AS NEEDED
Status: DISCONTINUED | OUTPATIENT
Start: 2019-10-22 | End: 2019-10-22 | Stop reason: HOSPADM

## 2019-10-22 RX ORDER — VECURONIUM BROMIDE FOR INJECTION 1 MG/ML
INJECTION, POWDER, LYOPHILIZED, FOR SOLUTION INTRAVENOUS AS NEEDED
Status: DISCONTINUED | OUTPATIENT
Start: 2019-10-22 | End: 2019-10-22 | Stop reason: HOSPADM

## 2019-10-22 RX ORDER — OXYCODONE AND ACETAMINOPHEN 5; 325 MG/1; MG/1
1 TABLET ORAL
Qty: 12 TAB | Refills: 0 | Status: SHIPPED | OUTPATIENT
Start: 2019-10-22 | End: 2019-10-25

## 2019-10-22 RX ORDER — INSULIN LISPRO 100 [IU]/ML
INJECTION, SOLUTION INTRAVENOUS; SUBCUTANEOUS ONCE
Status: DISCONTINUED | OUTPATIENT
Start: 2019-10-22 | End: 2019-10-22 | Stop reason: HOSPADM

## 2019-10-22 RX ORDER — SODIUM CHLORIDE, SODIUM LACTATE, POTASSIUM CHLORIDE, CALCIUM CHLORIDE 600; 310; 30; 20 MG/100ML; MG/100ML; MG/100ML; MG/100ML
50 INJECTION, SOLUTION INTRAVENOUS CONTINUOUS
Status: DISCONTINUED | OUTPATIENT
Start: 2019-10-22 | End: 2019-10-22 | Stop reason: HOSPADM

## 2019-10-22 RX ORDER — DEXAMETHASONE SODIUM PHOSPHATE 4 MG/ML
INJECTION, SOLUTION INTRA-ARTICULAR; INTRALESIONAL; INTRAMUSCULAR; INTRAVENOUS; SOFT TISSUE AS NEEDED
Status: DISCONTINUED | OUTPATIENT
Start: 2019-10-22 | End: 2019-10-22 | Stop reason: HOSPADM

## 2019-10-22 RX ORDER — SODIUM CHLORIDE 0.9 % (FLUSH) 0.9 %
5-40 SYRINGE (ML) INJECTION AS NEEDED
Status: DISCONTINUED | OUTPATIENT
Start: 2019-10-22 | End: 2019-10-22 | Stop reason: HOSPADM

## 2019-10-22 RX ORDER — SODIUM CHLORIDE, SODIUM LACTATE, POTASSIUM CHLORIDE, CALCIUM CHLORIDE 600; 310; 30; 20 MG/100ML; MG/100ML; MG/100ML; MG/100ML
100 INJECTION, SOLUTION INTRAVENOUS CONTINUOUS
Status: DISCONTINUED | OUTPATIENT
Start: 2019-10-22 | End: 2019-10-22 | Stop reason: HOSPADM

## 2019-10-22 RX ORDER — SODIUM CHLORIDE 0.9 % (FLUSH) 0.9 %
5-40 SYRINGE (ML) INJECTION EVERY 8 HOURS
Status: DISCONTINUED | OUTPATIENT
Start: 2019-10-22 | End: 2019-10-22 | Stop reason: HOSPADM

## 2019-10-22 RX ORDER — HYDROMORPHONE HYDROCHLORIDE 1 MG/ML
0.5 INJECTION, SOLUTION INTRAMUSCULAR; INTRAVENOUS; SUBCUTANEOUS
Status: DISCONTINUED | OUTPATIENT
Start: 2019-10-22 | End: 2019-10-22 | Stop reason: HOSPADM

## 2019-10-22 RX ORDER — ONDANSETRON 2 MG/ML
INJECTION INTRAMUSCULAR; INTRAVENOUS AS NEEDED
Status: DISCONTINUED | OUTPATIENT
Start: 2019-10-22 | End: 2019-10-22 | Stop reason: HOSPADM

## 2019-10-22 RX ORDER — FENTANYL CITRATE 50 UG/ML
25 INJECTION, SOLUTION INTRAMUSCULAR; INTRAVENOUS
Status: DISCONTINUED | OUTPATIENT
Start: 2019-10-22 | End: 2019-10-22 | Stop reason: HOSPADM

## 2019-10-22 RX ORDER — OXYCODONE AND ACETAMINOPHEN 5; 325 MG/1; MG/1
1 TABLET ORAL
Status: DISCONTINUED | OUTPATIENT
Start: 2019-10-22 | End: 2019-10-22 | Stop reason: HOSPADM

## 2019-10-22 RX ORDER — ALBUTEROL SULFATE 0.83 MG/ML
2.5 SOLUTION RESPIRATORY (INHALATION) AS NEEDED
Status: DISCONTINUED | OUTPATIENT
Start: 2019-10-22 | End: 2019-10-22 | Stop reason: HOSPADM

## 2019-10-22 RX ADMIN — FENTANYL CITRATE 100 MCG: 50 INJECTION, SOLUTION INTRAMUSCULAR; INTRAVENOUS at 07:27

## 2019-10-22 RX ADMIN — Medication 0.4 MG: at 08:10

## 2019-10-22 RX ADMIN — HYDROMORPHONE HYDROCHLORIDE 0.5 MG: 1 INJECTION, SOLUTION INTRAMUSCULAR; INTRAVENOUS; SUBCUTANEOUS at 08:40

## 2019-10-22 RX ADMIN — MIDAZOLAM 2 MG: 1 INJECTION INTRAMUSCULAR; INTRAVENOUS at 07:27

## 2019-10-22 RX ADMIN — SODIUM CHLORIDE, SODIUM LACTATE, POTASSIUM CHLORIDE, AND CALCIUM CHLORIDE 100 ML/HR: 600; 310; 30; 20 INJECTION, SOLUTION INTRAVENOUS at 09:03

## 2019-10-22 RX ADMIN — CEFAZOLIN 3 G: 1 INJECTION, POWDER, FOR SOLUTION INTRAMUSCULAR; INTRAVENOUS; PARENTERAL at 07:45

## 2019-10-22 RX ADMIN — VECURONIUM BROMIDE FOR INJECTION 1 MG: 1 INJECTION, POWDER, LYOPHILIZED, FOR SOLUTION INTRAVENOUS at 07:38

## 2019-10-22 RX ADMIN — SODIUM CHLORIDE, SODIUM LACTATE, POTASSIUM CHLORIDE, AND CALCIUM CHLORIDE 50 ML/HR: 600; 310; 30; 20 INJECTION, SOLUTION INTRAVENOUS at 06:43

## 2019-10-22 RX ADMIN — LIDOCAINE HYDROCHLORIDE 100 MG: 20 INJECTION, SOLUTION INTRAVENOUS at 07:38

## 2019-10-22 RX ADMIN — Medication 180 MG: at 07:38

## 2019-10-22 RX ADMIN — FAMOTIDINE 20 MG: 20 TABLET ORAL at 06:33

## 2019-10-22 RX ADMIN — Medication 0.2 MG: at 07:27

## 2019-10-22 RX ADMIN — ONDANSETRON 4 MG: 2 SOLUTION INTRAMUSCULAR; INTRAVENOUS at 07:45

## 2019-10-22 RX ADMIN — Medication 3 MG: at 08:10

## 2019-10-22 RX ADMIN — DEXAMETHASONE SODIUM PHOSPHATE 8 MG: 4 INJECTION, SOLUTION INTRA-ARTICULAR; INTRALESIONAL; INTRAMUSCULAR; INTRAVENOUS; SOFT TISSUE at 07:45

## 2019-10-22 RX ADMIN — PROPOFOL 200 MG: 10 INJECTION, EMULSION INTRAVENOUS at 07:38

## 2019-10-22 NOTE — H&P
General Surgery Consult        Vj Franklin  Admit date: (Not on file)    MRN: [id number]     : 1975     Age: 39 y.o. Attending Physician: Anne Franco MD [facility]        History of Present Illness:      Vj Franklin is a 39 y.o. male was referred for evaluation of a symptomatic left inguinal hernia. The patient has stated that he noticed a bulge about 1 month and a half ago. He said that the bulge comes and goes  It is especially protruding when he coughs. He also has some constipation and he said that the bulge is worse during bowel movement. He has some pain localized in the left groin at the site of the bulge. The pain is mild and intermittent. He denies any nausea or vomiting.  He had a history of laparoscopic gastric bypass surgery but no other abdominal surgeries.          Patient Active Problem List     Diagnosis Date Noted    S/P primary angioplasty with coronary stent 2019    Pain in right buttock 2019    History of ST elevation myocardial infarction (STEMI) 2018    Chronic pain syndrome 2018    Severe obesity ([address] 2018    Post corneal transplant 2014    Cardiomyopathy ([address] 2014    Gastric bypass status for obesity 2011    HTN (hypertension) 2011    Hypothyroid 2011           Past Medical History:   Diagnosis Date    Cardiomyopathy ([address] 2014    Chronic pain syndrome 2018    Gastric bypass status for obesity 2011    Heart failure ([address]      History of ST elevation myocardial infarction (STEMI) 2018    HTN (hypertension) 2011    Hypothyroid 2011    Post corneal transplant 2014    STEMI (ST elevation myocardial infarction) [facility])       2018            Past Surgical History:   Procedure Laterality Date    ABDOMEN SURGERY PROC UNLISTED        CARDIAC SURG PROCEDURE UNLIST   2018     Stent placed Coronary Artery     HX LAP GASTRIC BYPASS         Social History            Tobacco Use    Smoking status: Never Smoker    Smokeless tobacco: Never Used   Substance Use Topics    Alcohol use: Yes       Comment: 6 pk daily      Social History          Tobacco Use   Smoking Status Never Smoker   Smokeless Tobacco Never Used      History reviewed. No pertinent family history. Current Outpatient Medications   Medication Sig    oxyCODONE-acetaminophen (PERCOCET) 5-325 mg per tablet Take 1 Tab by mouth every eight to twelve (8-12) hours as needed for Pain for up to 30 days. Max Daily Amount: 3 Tabs.  levothyroxine (SYNTHROID) 175 mcg tablet TAKE ONE TABLET BY MOUTH ONCE DAILY BEFORE  BREAKFAST    gabapentin (NEURONTIN) 800 mg tablet Take 1 Tab by mouth three (3) times daily.  pantoprazole (PROTONIX) 40 mg tablet Take 1 Tab by mouth two (2) times a day.  amLODIPine (NORVASC) 10 mg tablet Take 1 Tab by mouth daily.  clopidogrel (PLAVIX) 75 mg tab Take 1 Tab by mouth daily.  aspirin delayed-release 81 mg tablet Take 1 Tab by mouth daily.  metoprolol tartrate (LOPRESSOR) 100 mg IR tablet Take 1 Tab by mouth every twelve (12) hours.  atorvastatin (LIPITOR) 80 mg tablet Take 1 Tab by mouth nightly.  chlorthalidone (HYGROTEN) 25 mg tablet Take 1 Tab by mouth daily.  enalapril (VASOTEC) 20 mg tablet TAKE ONE TABLET BY MOUTH TWICE DAILY      No current facility-administered medications for this visit.        No Known Allergies      Review of Systems:  Constitutional: negative  Eyes: negative  Ears, Nose, Mouth, Throat, and Face: negative  Respiratory: negative  Cardiovascular: negative  Gastrointestinal: positive for abdominal pain and Left groin pain and bulge  Genitourinary:negative  Integument/Breast: negative  Hematologic/Lymphatic: negative  Musculoskeletal:negative  Neurological: negative  Behavioral/Psychiatric: negative  Endocrine: negative  Allergic/Immunologic: negative     Objective:      Visit Vitals  /67 (BP 1 Location: Left arm, BP Patient Position: At rest)   Pulse 69   Temp 97.8 °F (36.6 °C) (Oral)   Resp 20   Ht 6' 2\" (1.88 m)   Wt 124.3 kg (274 lb)   BMI 35.18 kg/m²         Physical Exam:       General:  in no apparent distress, alert, oriented times 3 and cooperative   Eyes:  conjunctivae and sclerae normal, pupils equal, round, reactive to light   Throat & Neck: no erythema or exudates noted and neck supple and symmetrical; no palpable masses   Lungs:   clear to auscultation bilaterally   Heart:  Regular rate and rhythm   Abdomen:   Rounded, soft, nontender, nondistended, no masses or organomegaly. There is a left inguinal hernia that is mildly tender to palpation but easily reducible. Extremities: extremities normal, atraumatic, no cyanosis or edema   Skin: Normal.       Imaging and Lab Review:      CBC:         Lab Results   Component Value Date/Time     WBC 7.6 03/05/2019 08:00 AM     RBC 4.98 03/05/2019 08:00 AM     HGB 13.6 03/05/2019 08:00 AM     HCT 42.8 03/05/2019 08:00 AM     PLATELET 240 05/25/4049 08:00 AM      BMP:         Lab Results   Component Value Date/Time     Glucose 95 03/05/2019 08:00 AM     Sodium 139 03/05/2019 08:00 AM     Potassium 4.4 03/05/2019 08:00 AM     Chloride 105 03/05/2019 08:00 AM     CO2 25 03/05/2019 08:00 AM     BUN 23 (H) 03/05/2019 08:00 AM     Creatinine 1.27 03/05/2019 08:00 AM     Calcium 8.8 03/05/2019 08:00 AM      CMP:  Lab Results   Component Value Date/Time     Glucose 95 03/05/2019 08:00 AM     Sodium 139 03/05/2019 08:00 AM     Potassium 4.4 03/05/2019 08:00 AM     Chloride 105 03/05/2019 08:00 AM     CO2 25 03/05/2019 08:00 AM     BUN 23 (H) 03/05/2019 08:00 AM     Creatinine 1.27 03/05/2019 08:00 AM     Calcium 8.8 03/05/2019 08:00 AM     Anion gap 9 03/05/2019 08:00 AM     BUN/Creatinine ratio 18 03/05/2019 08:00 AM     Alk.  phosphatase 108 03/05/2019 08:00 AM     Protein, total 7.8 03/05/2019 08:00 AM     Albumin 3.8 03/05/2019 08:00 AM     Globulin 4.0 03/05/2019 08:00 AM     A-G Ratio 1.0 03/05/2019 08:00 AM         Recent Results   No results found for this or any previous visit (from the past 24 hour(s)).        images and reports reviewed     Assessment:   Dedra Purcell is a 39 y.o. male is presenting with a picture of symptomatic left inguinal hernia. I Discussed the possibility of incarceration, strangulation, enlargement in size over time, and the risk of emergency surgery in the face of strangulation. I also discussed the use of prosthetic materials (mesh), including the risk of infection. Also discussed the risk of surgery including recurrence and the possible need for reoperation and removal of mesh if used, possibility of postoperative small bowel injury, obstruction or ileus, and the risks of general anesthetic.  I explained to the the patient about the robotic hernia repair procedure.      Plan:      Robotic left inguinal hernia repair with placement of mesh.

## 2019-10-22 NOTE — PERIOP NOTES
Pre-Op Summary    Pt arrived via car with family/friend and is oriented to time, place, person and situation. Patient with steady gait with none assistive devices. Visit Vitals  /67 (BP 1 Location: Left arm, BP Patient Position: At rest)   Pulse (!) 53   Temp 97.7 °F (36.5 °C)   Resp 16   Ht 6' 2\" (1.88 m)   Wt 124.7 kg (275 lb)   SpO2 98%   BMI 35.31 kg/m²       Peripheral IV located on Right hand . Patients belongings are located  With friend. Patient's point of contact is shivam Mckeon and their contact number is: [phone]. They will be leaving and coming back. They are able to receive medication information. They will be their ride home.

## 2019-10-22 NOTE — BRIEF OP NOTE
BRIEF OPERATIVE NOTE    Date of Procedure: 10/22/2019   Preoperative Diagnosis: left inguinal hernia  k40.90  Postoperative Diagnosis: left inguinal hernia  k40.90    Procedure(s):  Davinci repair of left inguinal hernia with meshXI ROBOTIC ASSISTED  Surgeon(s) and Role:     * Ida Zaragoza MD - Primary  Surgical Staff:  Circ-1: Marium Pineda RN  Scrub Tech-1: Joaquin Reeder  Scrub Tech-2: Nicole Seymour  Surg Asst-1: Amy Suarez  Event Time In Time Out   Incision Start 7017    Incision Close       Anesthesia: General   Estimated Blood Loss: Minimal.  Specimens: * No specimens in log *   Findings: Large indirect and smaller direct left inguinal hernias. Complications: None. Implants:   Implant Name Type Inv.  Item Serial No.  Lot No. LRB No. Used Action   MESH ABSORB SURG PROGRIP 10X15 -- PROGRIP - [id number]  MESH ABSORB SURG PROGRIP 10X15 -- PROGRIP  [employer]OVID[employer]  SURGICAL [id number] Left 1 Implanted

## 2019-10-22 NOTE — ANESTHESIA PREPROCEDURE EVALUATION
Relevant Problems   No relevant active problems       Anesthetic History   No history of anesthetic complications            Review of Systems / Medical History  Patient summary reviewed and pertinent labs reviewed    Pulmonary  Within defined limits                 Neuro/Psych   Within defined limits           Cardiovascular    Hypertension          Past MI and CAD    Exercise tolerance: >4 METS     GI/Hepatic/Renal  Within defined limits              Endo/Other      Hypothyroidism  Morbid obesity     Other Findings   Comments:                  Physical Exam    Airway  Mallampati: III  TM Distance: 4 - 6 cm  Neck ROM: normal range of motion   Mouth opening: Normal     Cardiovascular  Regular rate and rhythm,  S1 and S2 normal,  no murmur, click, rub, or gallop  Rhythm: regular  Rate: normal         Dental  No notable dental hx       Pulmonary  Breath sounds clear to auscultation               Abdominal  GI exam deferred       Other Findings            Anesthetic Plan    ASA: 3  Anesthesia type: general          Induction: Intravenous  Anesthetic plan and risks discussed with: Patient

## 2019-10-22 NOTE — PERIOP NOTES
900: patient family updated in waiting oswald,    910: OR notifed in dermabond needed for one incision on abdomen, drainage noted    915: OR here to put dermabond on incision

## 2019-10-22 NOTE — OP NOTES
[facility]  OPERATIVE REPORT    Name:  Darryle Fester  MR#:   [id number]  :  1975  ACCOUNT #:  [de-identified]  DATE OF SERVICE:  10/22/2019      PREOPERATIVE DIAGNOSIS:  Left inguinal hernia. POSTOPERATIVE DIAGNOSIS:  Left large indirect and smaller direct inguinal hernias. PROCEDURE PERFORMED:  Robotic repair of left inguinal hernia with placement of mesh. SURGEON:  Mitchel Woody. Edna Pepper MD    ASSISTANT:  Herlinda Herrera SA    ANESTHESIA:  General.    COMPLICATIONS:  None. SPECIMENS REMOVED:  None. IMPLANTS:  ProGrip Covidien mesh 4 x 6 inches. ESTIMATED BLOOD LOSS:  Minimal.    PROCEDURE:  The patient was brought to operating room. Anesthesia was induced. Scrubbing and draping of the abdomen was done in usual manner. A time-out was performed. Skin incision in the supraumbilical area was performed. Veress needle was inserted. Abdomen was insufflated. An 8 mm port was inserted. Abdomen was explored. There was a large left indirect inguinal hernia and small direct inguinal hernia. Two other 8 mm ports were placed on the right and left side of the abdominal wall under direct visualization. The robot was docked. The peritoneum was opened in the left groin. The preperitoneal space was dissected. The inferior epigastric vessel was identified and protected. The hernia sac was completely dissected on the indirect and direct one. The Cresencio's ligament was identified. At this point, a 15 x 10 cm ProGrip Covidien mesh was placed in the preperitoneal space. This was fixed with interrupted 2-0 Vicryl sutures and then the peritoneum was closed on top of the mesh with a 2-0 V-Loc suture in running fashion. Hemostasis was secured. Instruments were removed. The robot was undocked and the skin incisions were closed with 4-0 Monocryl.       Tyler Moore MD      [id number]  D:  10/22/2019 8:23  T:  10/22/2019 11:46  JOB #:  [id number]

## 2019-10-22 NOTE — ANESTHESIA POSTPROCEDURE EVALUATION
Procedure(s):  Davinci repair of left inguinal hernia with meshXI ROBOTIC ASSISTED. general    Anesthesia Post Evaluation      Multimodal analgesia: multimodal analgesia used between 6 hours prior to anesthesia start to PACU discharge  Patient location during evaluation: bedside  Patient participation: complete - patient participated  Level of consciousness: awake  Pain management: adequate  Airway patency: patent  Anesthetic complications: no  Cardiovascular status: stable  Respiratory status: acceptable  Hydration status: acceptable  Post anesthesia nausea and vomiting:  controlled      Vitals Value Taken Time   BP 96/54 10/22/2019  9:39 AM   Temp 36.6 °C (97.8 °F) 10/22/2019  9:19 AM   Pulse 53 10/22/2019  9:40 AM   Resp 20 10/22/2019  9:40 AM   SpO2 97 % 10/22/2019  9:40 AM   Vitals shown include unvalidated device data.

## 2019-10-22 NOTE — DISCHARGE INSTRUCTIONS
Discharge Instructions Following Surgery    Patient: Elizabeth Gomez MRN: [id number]  SSN: [id number]    YOB: 1975  Age: 38 y.o. Sex: male      Activity  · As tolerated, walking encourage, stairs are okay. · Avoid strenuous activities - no lifting anything heavier than 15 pounds till seen in the clinic. · You may shower at home after 48 hours. Diet  · Regular diet after nausea from the anesthetic has passed. Pain  · Take pain medication as directed by your doctor. Please add Aleve or Ibuprofen as needed (If no contraindications for these types of medications). ·  Call your doctor if pain is NOT relieved by medication. Wound and Dressing Care  · There is glue on the wounds. No need for any dressing care. · Apply ice packs to the area of the surgery (like in inguinal hernia apply to the groin not the incisions) for the first 1 to 2 days  · Apply warm compresses after 2 days for pain relieve if needed    After Anesthesia  · For the first 24 hours: DO NOT Drive, Drink alcoholic beverages, or Make important decisions. · Be aware of dizziness following anesthesia and while taking pain medication. Call your doctor if  · Excessive bleeding that does not stop after holding mild pressure over the area. · Temperature of 101 degrees F or above. · Redness,excessive swelling or bruising, and/or green or yellow, smelly discharge from incision. · If nausea and vomiting continues. Appointment date/time Follow-Up Phone Calls    · Call the office at [phone] to make your follow-up appointment in 2 weeks after the surgery (if not already set up) . Dr. Presley Campbell cell phone number is [phone]. Please call me if you have any concerns or questions.        DISCHARGE SUMMARY from Nurse    PATIENT INSTRUCTIONS:    After general anesthesia or intravenous sedation, for 24 hours or while taking prescription Narcotics:  · Limit your activities  · Do not drive and operate hazardous machinery  · Do not make important personal or business decisions  · Do  not drink alcoholic beverages  · If you have not urinated within 8 hours after discharge, please contact your surgeon on call. Report the following to your surgeon:  · Excessive pain, swelling, redness or odor of or around the surgical area  · Temperature over 100.5  · Nausea and vomiting lasting longer than 4 hours or if unable to take medications  · Any signs of decreased circulation or nerve impairment to extremity: change in color, persistent  numbness, tingling, coldness or increase pain  · Any questions      These are general instructions for a healthy lifestyle:    No smoking/ No tobacco products/ Avoid exposure to second hand smoke  Surgeon General's Warning:  Quitting smoking now greatly reduces serious risk to your health. Obesity, smoking, and sedentary lifestyle greatly increases your risk for illness    A healthy diet, regular physical exercise & weight monitoring are important for maintaining a healthy lifestyle    You may be retaining fluid if you have a history of heart failure or if you experience any of the following symptoms:  Weight gain of 3 pounds or more overnight or 5 pounds in a week, increased swelling in our hands or feet or shortness of breath while lying flat in bed. Please call your doctor as soon as you notice any of these symptoms; do not wait until your next office visit. The discharge information has been reviewed with the patient. The patient verbalized understanding. Discharge medications reviewed with the patient and appropriate educational materials and side effects teaching were provided. ________Patient armband removed and given to patient to take home.   Patient was informed of the privacy risks if armband lost or stolen  ___________________________________________________________________________________________________________________________

## 2019-10-22 NOTE — PROGRESS NOTES
Date of Surgery Update:  Lisa Ortez was seen and examined. History and physical has been reviewed. The patient has been examined. There have been no significant clinical changes since the completion of the originally dated History and Physical. Will proceed with robotic left inguinal hernia repair with mesh.      Signed By: Almas Garcia MD     October 22, 2019 6:20 AM
0

## 2019-10-22 NOTE — PERIOP NOTES
Phase 2 Recovery Summary    Report received from RN Cat, PACU. Friend at bedside. Pt denies any apin. 3 surgical sites to abd, CDI. Pt bishop fluids. Vitals:    10/22/19 0917 10/22/19 0919 10/22/19 0937 10/22/19 0946   BP: 95/54 95/50 96/54 96/57   Pulse: (!) 48 (!) 54 (!) 49 (!) 51   Resp: 12 19 16 18   Temp:  97.8 °F (36.6 °C)     SpO2: 99% 98% 97% 98%   Weight:       Height:           oriented to time, place, person and situation          Lines and Drains  Peripheral Intravenous Line:      Wound  Wound Abdomen Anterior trocar sites x 3 (Active)   Dressing Status Clean, dry, and intact 10/22/2019 10:12 AM   Dressing Type Topical skin adhesive/glue 10/22/2019 10:12 AM   Incision Site Well Approximated Yes 10/22/2019 10:12 AM   Drainage Amount Scant 10/22/2019  9:10 AM   Drainage Color Serosanguinous 10/22/2019  9:10 AM   Number of days: 0       Wound Abdomen (Active)   Number of days: 0        Patient assisted to chair with minimal assist. Pateint tolerating liquids well. Patient's family at bedside. Discharge discussed with patient and family. No questions or concerns at this time. Patient had time to ask any questions. Patient discharged to home, with friend.       Teresa Parkinson RN

## 2019-11-06 ENCOUNTER — OFFICE VISIT (OUTPATIENT)
Dept: SURGERY | Age: 44
End: 2019-11-06

## 2019-11-06 VITALS
OXYGEN SATURATION: 98 % | SYSTOLIC BLOOD PRESSURE: 133 MMHG | WEIGHT: 271 LBS | TEMPERATURE: 97 F | BODY MASS INDEX: 34.78 KG/M2 | HEIGHT: 74 IN | HEART RATE: 105 BPM | DIASTOLIC BLOOD PRESSURE: 95 MMHG

## 2019-11-06 DIAGNOSIS — Z98.890 S/P HERNIA REPAIR: ICD-10-CM

## 2019-11-06 DIAGNOSIS — Z87.19 S/P HERNIA REPAIR: ICD-10-CM

## 2019-11-06 DIAGNOSIS — Z09 POSTOPERATIVE EXAMINATION: Primary | ICD-10-CM

## 2019-11-06 NOTE — PROGRESS NOTES
Rachel Tompkins is a 38 y.o. male (: 1975) presenting to address:    Chief Complaint   Patient presents with    Post OP Follow Up     Left inguinal hernia repair       Medication list and allergies have been reviewed with Rachel Tompkins and updated as of today's date. I have gone over all Medical, Surgical and Social History with Rachel Tompkins and updated/added the information accordingly. 1. Have you been to the ER, Urgent Care or Hospitalized since your last visit? NO      2. Have you followed up with your PCP or any other Physicians since your procedure/ last office visit? YES.  Dr Ortez Links 10/14/19 routine care

## 2019-11-06 NOTE — PROGRESS NOTES
Patient seen and examined. Doing well. Abdomen is soft and non-tender. Wounds are healing well  Left groin exam is normal.  Follow-up as needed.

## 2019-11-13 ENCOUNTER — OFFICE VISIT (OUTPATIENT)
Dept: FAMILY MEDICINE CLINIC | Age: 44
End: 2019-11-13

## 2019-11-13 VITALS
RESPIRATION RATE: 18 BRPM | BODY MASS INDEX: 34.91 KG/M2 | SYSTOLIC BLOOD PRESSURE: 105 MMHG | OXYGEN SATURATION: 97 % | HEART RATE: 69 BPM | DIASTOLIC BLOOD PRESSURE: 77 MMHG | WEIGHT: 272 LBS | TEMPERATURE: 95.5 F | HEIGHT: 74 IN

## 2019-11-13 DIAGNOSIS — G89.4 CHRONIC PAIN SYNDROME: ICD-10-CM

## 2019-11-13 DIAGNOSIS — E03.9 ACQUIRED HYPOTHYROIDISM: Primary | ICD-10-CM

## 2019-11-13 DIAGNOSIS — F10.20 ALCOHOLISM (HCC): ICD-10-CM

## 2019-11-13 DIAGNOSIS — G89.29 CHRONIC LEFT-SIDED LOW BACK PAIN WITHOUT SCIATICA: ICD-10-CM

## 2019-11-13 DIAGNOSIS — M54.50 CHRONIC LEFT-SIDED LOW BACK PAIN WITHOUT SCIATICA: ICD-10-CM

## 2019-11-13 RX ORDER — DISULFIRAM 500 MG/1
500 TABLET ORAL DAILY
Qty: 30 TAB | Refills: 2 | Status: SHIPPED | OUTPATIENT
Start: 2019-11-13 | End: 2020-07-29 | Stop reason: ALTCHOICE

## 2019-11-13 RX ORDER — OXYCODONE AND ACETAMINOPHEN 5; 325 MG/1; MG/1
1 TABLET ORAL
Qty: 60 TAB | Refills: 0 | Status: SHIPPED | OUTPATIENT
Start: 2019-11-13 | End: 2019-12-13 | Stop reason: SDUPTHER

## 2019-11-13 RX ORDER — LIDOCAINE HYDROCHLORIDE 30 MG/G
CREAM TOPICAL 2 TIMES DAILY
Qty: 453.6 G | Refills: 1 | Status: SHIPPED | OUTPATIENT
Start: 2019-11-13 | End: 2020-07-29 | Stop reason: ALTCHOICE

## 2019-11-13 NOTE — PROGRESS NOTES
Lucila Hoover is a 38 y.o.  male and presents with    Chief Complaint   Patient presents with    Medication Evaluation     Subjective:  Osteoarthritis and Chronic Pain:  Mr. Alyssa Bardales has neuropathy and orthopedic, primarily affecting the legs and feet and hip and hand on the right side  Symptoms onset: problem is longstanding. Rheumatological ROS: ongoing significant pain in legs and feet and hip which is stable and controlled by PRN meds. Response to treatment plan: gradually worsening.    Cardiovascular Review:  The patient has hypertension, hyperlipidemia, CHF and obesity. Diet and Chris Cho attempting to follow a low fat, low cholesterol diet, not attempting to follow a low sodium diet, does not rigorously follow a diabetic diet, sedentary, nonsmoker  Home BP Monitoring: is not measured at home. Pertinent ROS: taking medications as instructed, no medication side effects noted, no TIA's, no chest pain on exertion, no dyspnea on exertion, no swelling of ankles.      Thyroid Review:  Patient is seen for followup of hypothyroidism. Thyroid ROS: denies fatigue, weight changes, heat/cold intolerance, bowel/skin changes or CVS symptoms. Additional Concerns: he has been taking antabuse for alcoholism but the medication did not effect him when he drank.       ROS   General ROS: negative for - chills or fever  Psychological ROS: negative for - anxiety or depression  Ophthalmic ROS: negative for - blurry vision  ENT ROS: negative for - headaches, nasal congestion or sore throat  Endocrine ROS: negative for - polydipsia/polyuria or temperature intolerance  Respiratory ROS: no cough, shortness of breath, or wheezing  Cardiovascular ROS: no chest pain or dyspnea on exertion  Gastrointestinal ROS: no abdominal pain, change in bowel habits, or black or bloody stools  Genito-Urinary ROS: no dysuria, trouble voiding, or hematuria  Neurological ROS: positive for - numbness/tingling  - lightheadedness    All other systems reviewed and are negative. Objective:  Vitals:    11/13/19 1129   BP: 105/77   Pulse: 69   Resp: 18   Temp: 95.5 °F (35.3 °C)   TempSrc: Oral   SpO2: 97%   Weight: 272 lb (123.4 kg)   Height: 6' 2\" (1.88 m)   PainSc:   5   PainLoc: Back     alert, well appearing, and in no distress, oriented to person, place, and time and obese  Mental status - normal mood, behavior, speech, dress, motor activity, and thought processes  Chest - clear to auscultation, no wheezes, rales or rhonchi, symmetric air entry  Heart - normal rate, regular rhythm, normal S1, S2, no murmurs, rubs, clicks or gallops  Abdomen - left side inguinal hernia; reducible  Musculoskeletal -  abnormal exam of left hip  Extremities - peripheral pulses normal, no pedal edema, no clubbing or cyanosis  Skin - excess abdominal skin  Neuro - antalgic gait    Assessment/Plan:    1. Chronic pain syndrome  This is a chronic problem that is stable. Per review of available records and patients , there are not sign of overuse, misuse, diversion, or concerning side effects. Today we reviewed: the risk of overdose, addiction, and dependency proper storage and disposal of medications the goals of treatment (improve functionality, quality of life, and pain)  The following changes were made to the patients current treatment plan: nothing, medications refilled. - oxyCODONE-acetaminophen (PERCOCET) 5-325 mg per tablet; Take 1 Tab by mouth every eight to twelve (8-12) hours as needed for Pain for up to 30 days. Max Daily Amount: 3 Tabs. Dispense: 60 Tab; Refill: 0    2. Alcoholism ([address]  Increase dose of disulfiram  - disulfiram (ANTABUSE) 500 mg tablet; Take 1 Tab by mouth daily. Dispense: 30 Tab; Refill: 2    3. Acquired hypothyroidism  Continue treatment    4. Chronic left-sided low back pain without sciatica  Topical analgesic  - lidocaine HCl (XYLOCAINE) 3 % topical cream; Apply  to affected area two (2) times a day. Dispense: 453.6 g; Refill: 1    Lab review: no lab studies available for review at time of visit      I have discussed the diagnosis with the patient and the intended plan as seen in the above orders. The patient has received an after-visit summary and questions were answered concerning future plans. I have discussed medication side effects and warnings with the patient as well. I have reviewed the plan of care with the patient, accepted their input and they are in agreement with the treatment goals.

## 2019-11-27 DIAGNOSIS — E03.9 ACQUIRED HYPOTHYROIDISM: ICD-10-CM

## 2019-11-27 DIAGNOSIS — I42.9 CARDIOMYOPATHY, UNSPECIFIED TYPE (HCC): ICD-10-CM

## 2019-11-29 RX ORDER — POTASSIUM CHLORIDE 750 MG/1
TABLET, EXTENDED RELEASE ORAL
Qty: 30 TAB | Refills: 0 | Status: SHIPPED | OUTPATIENT
Start: 2019-11-29 | End: 2020-01-07

## 2019-11-29 RX ORDER — LEVOTHYROXINE SODIUM 175 UG/1
TABLET ORAL
Qty: 30 TAB | Refills: 0 | Status: SHIPPED | OUTPATIENT
Start: 2019-11-29 | End: 2020-01-07

## 2019-12-13 ENCOUNTER — OFFICE VISIT (OUTPATIENT)
Dept: FAMILY MEDICINE CLINIC | Age: 44
End: 2019-12-13

## 2019-12-13 VITALS
HEIGHT: 74 IN | RESPIRATION RATE: 16 BRPM | BODY MASS INDEX: 35.22 KG/M2 | OXYGEN SATURATION: 97 % | HEART RATE: 72 BPM | WEIGHT: 274.4 LBS | TEMPERATURE: 95.4 F | SYSTOLIC BLOOD PRESSURE: 104 MMHG | DIASTOLIC BLOOD PRESSURE: 68 MMHG

## 2019-12-13 DIAGNOSIS — F10.20 ALCOHOLISM (HCC): ICD-10-CM

## 2019-12-13 DIAGNOSIS — I10 HYPERTENSION, UNSPECIFIED TYPE: ICD-10-CM

## 2019-12-13 DIAGNOSIS — K52.9 GASTROENTERITIS: ICD-10-CM

## 2019-12-13 DIAGNOSIS — E66.01 OBESITY, MORBID (HCC): ICD-10-CM

## 2019-12-13 DIAGNOSIS — Z53.21 PATIENT LEFT WITHOUT BEING SEEN: Primary | ICD-10-CM

## 2019-12-13 DIAGNOSIS — E03.9 ACQUIRED HYPOTHYROIDISM: ICD-10-CM

## 2019-12-13 DIAGNOSIS — K52.9 CHRONIC DIARRHEA: Primary | ICD-10-CM

## 2019-12-13 DIAGNOSIS — G89.4 CHRONIC PAIN SYNDROME: ICD-10-CM

## 2019-12-13 RX ORDER — OXYCODONE AND ACETAMINOPHEN 5; 325 MG/1; MG/1
1 TABLET ORAL
Qty: 60 TAB | Refills: 0 | Status: SHIPPED | OUTPATIENT
Start: 2019-12-13 | End: 2020-01-29 | Stop reason: SDUPTHER

## 2019-12-13 RX ORDER — DIPHENOXYLATE HYDROCHLORIDE AND ATROPINE SULFATE 2.5; .025 MG/1; MG/1
1 TABLET ORAL
Qty: 30 TAB | Refills: 1 | Status: SHIPPED | OUTPATIENT
Start: 2019-12-13 | End: 2020-02-11

## 2019-12-13 NOTE — PROGRESS NOTES
Nicole Magallanes is a 38 y.o male that is present in the office for a routine appointment for medication evaluation. Patient complains of bilateral leg pain. 1. Have you been to the ER, urgent care clinic since your last visit? Hospitalized since your last visit? No    2. Have you seen or consulted any other health care providers outside of the [address] [place] [address] since your last visit? Include any pap smears or colon screening. No    There are no preventive care reminders to display for this patient.

## 2019-12-13 NOTE — PROGRESS NOTES
Roxanna Almanzar is a 38 y.o male that is present in the office for a routine appointment for bilateral leg pain. 1. Have you been to the ER, urgent care clinic since your last visit? Hospitalized since your last visit? No    2. Have you seen or consulted any other health care providers outside of the [address] [place] [address] since your last visit? Include any pap smears or colon screening. No    There are no preventive care reminders to display for this patient.

## 2019-12-13 NOTE — PROGRESS NOTES
Francis Rucker is a 38 y.o.  male and presents with    Chief Complaint   Patient presents with    Leg Pain     Bilateral leg pain    Follow-up       Subjective:  Gastroenteritis  Mr. Leggett Speaker complains of non-bilious vomiting 2  times per day, diarrhea 8  times per day for 2 weeks. There is no report of diarrhea 10  times per day. Patient's oral intake has been normal.  Patient's urine output has been stable. Other contacts with similar symptoms include other: noone. Patient denies recent travel history. Patient denies recent ingestion of possible contaminated food, toxic plants, inappropriate medications/poisons. Osteoarthritis and Chronic Pain:  He has neuropathy and orthopedic, primarily affecting the legs and feet and hip and hand on the right side  Symptoms onset: problem is longstanding. Rheumatological ROS: ongoing significant pain in legs and feet and hip which is stable and controlled by PRN meds. Response to treatment plan: gradually worsening.    Cardiovascular Review:  The patient has hypertension, hyperlipidemia, CHF and obesity. Diet and Justice Shoe attempting to follow a low fat, low cholesterol diet, not attempting to follow a low sodium diet, does not rigorously follow a diabetic diet, sedentary, nonsmoker  Home BP Monitoring: is not measured at home. Pertinent ROS: taking medications as instructed, no medication side effects noted, no TIA's, no chest pain on exertion, no dyspnea on exertion, no swelling of ankles.      Thyroid Review:  Patient is seen for followup of hypothyroidism.    Thyroid ROS: denies fatigue, weight changes, heat/cold intolerance, bowel/skin changes or CVS symptoms.     Additional Concerns: he has been taking antabuse for alcoholism but the medication did not effect him when he drank.       ROS   General ROS: negative for - chills or fever  Psychological ROS: negative for - anxiety or depression  Ophthalmic ROS: negative for -[employer]  ENT ROS: negative for - headaches, nasal congestion or sore throat  Endocrine ROS: negative for - polydipsia/polyuria or temperature intolerance  Respiratory ROS: no cough, shortness of breath, or wheezing  Cardiovascular ROS: no chest pain or dyspnea on exertion  Gastrointestinal ROS: no abdominal pain, change in bowel habits, or black or bloody stools  Genito-Urinary ROS: no dysuria, trouble voiding, or hematuria  Neurological ROS: positive for - numbness/tingling  - lightheadedness     All other systems reviewed and are negative. Objective:  Vitals:    12/13/19 1501   BP: 104/68   Pulse: 72   Resp: 16   Temp: 95.4 °F (35.2 °C)   TempSrc: Oral   SpO2: 97%   Weight: 274 lb 6.4 oz (124.5 kg)   Height: 6' 2\" (1.88 m)   PainSc:   3   PainLoc: Leg     alert, well appearing, and in no distress, oriented to person, place, and time and obese  Mental status - normal mood, behavior, speech, dress, motor activity, and thought processes  Chest - clear to auscultation, no wheezes, rales or rhonchi, symmetric air entry  Heart - normal rate, regular rhythm, normal S1, S2, no murmurs, rubs, clicks or gallops  Abdomen - left side inguinal hernia; reducible  Musculoskeletal -  abnormal exam of left hip  Extremities - peripheral pulses normal, no pedal edema, no clubbing or cyanosis  Skin - excess abdominal skin  Neuro - antalgic gait    Assessment/Plan:    1. Chronic diarrhea  Antidiarrheal ordered  - diphenoxylate-atropine (LOMOTIL) 2.5-0.025 mg per tablet; Take 1 Tab by mouth four (4) times daily as needed for Diarrhea. Max Daily Amount: 4 Tabs. Dispense: 30 Tab; Refill: 1    2. Gastroenteritis  Treat symptoms    3. Chronic pain syndrome  Pain management  - oxyCODONE-acetaminophen (PERCOCET) 5-325 mg per tablet; Take 1 Tab by mouth every eight to twelve (8-12) hours as needed for Pain for up to 30 days. Max Daily Amount: 3 Tabs. Dispense: 60 Tab;  Refill: 0      Lab review: no lab studies available for review at time of visit      I have discussed the diagnosis with the patient and the intended plan as seen in the above orders. The patient has received an after-visit summary and questions were answered concerning future plans. I have discussed medication side effects and warnings with the patient as well. I have reviewed the plan of care with the patient, accepted their input and they are in agreement with the treatment goals.

## 2019-12-18 ENCOUNTER — OFFICE VISIT (OUTPATIENT)
Dept: CARDIOLOGY CLINIC | Age: 44
End: 2019-12-18

## 2019-12-18 VITALS
HEART RATE: 51 BPM | DIASTOLIC BLOOD PRESSURE: 68 MMHG | OXYGEN SATURATION: 95 % | HEIGHT: 74 IN | SYSTOLIC BLOOD PRESSURE: 98 MMHG | BODY MASS INDEX: 35.55 KG/M2 | WEIGHT: 277 LBS

## 2019-12-18 DIAGNOSIS — I25.10 CORONARY ARTERY DISEASE INVOLVING NATIVE CORONARY ARTERY OF NATIVE HEART WITHOUT ANGINA PECTORIS: Primary | ICD-10-CM

## 2019-12-22 NOTE — PROGRESS NOTES
Subjective:   Mr. Abi Rudd is here today for cardiac reevaluation. He was hospitalized at [facility] in August 2018 with chest pain and an acute MI. His initial electrocardiogram was consistent with acute inferior STEMI. He was taken to cardiac cath lab and was found to have a totally occluded RCA. A RUT was placed. He also had some mild disease in a diagonal branch and in the circumflex coronary artery. He had an admission to [facility] in [facility] 2018 for syncope. At that time, he had a Hemoccult positive stool. He was evaluated by GI and underwent EGD which showed two G-J anastomotic ulcers without high risk stigmata, edges biopsied, gastric erosions, biopsies of gastric pouch for Hpylori, slight esophagitis and possible short segment Nunez's. He was told to avoid NSAID's, but that he could continue with ASA and Plavix. He did report that was was taking Ibuprofen \"like candy\" for cramps and right buttock pain. At his last appointment, the Plavix was discontinued. In the office today, he reports no chest pain. He has been feeling [employer] of the time. Of note is that his blood pressure today is only 98/68. His breathing has been \"good\". He takes Lasix rarely. 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  Max Daily Amount: 4 Tabs. 30 Tab 1    oxyCODONE-acetaminophen (PERCOCET) 5-325 mg per tablet Take 1 Tab by mouth every eight to twelve (8-12) hours as needed for Pain for up to 30 days. Max Daily Amount: 3 Tabs. 60 Tab 0    colchicine 0.6 mg tablet TAKE ONE TABLET BY MOUTH DAILY 30 Tab 0    levothyroxine (SYNTHROID) 175 mcg tablet TAKE ONE TABLET BY MOUTH DAILY BEFORE BREAKFAST 30 Tab 0    potassium chloride (KLOR-CON) 10 mEq tablet TAKE ONE TABLET BY MOUTH DAILY 30 Tab 0    disulfiram (ANTABUSE) 500 mg tablet Take 1 Tab by mouth daily. 30 Tab 2    lidocaine HCl (XYLOCAINE) 3 % topical cream Apply  to affected area two (2) times a day. 453.6 g 1    clopidogrel (PLAVIX) 75 mg tab Take 75 mg by mouth.  atorvastatin (LIPITOR) 40 mg tablet Take 40 mg by mouth daily.  metoprolol tartrate (LOPRESSOR) 25 mg tablet Take 25 mg by mouth two (2) times a day.  gabapentin (NEURONTIN) 800 mg tablet Take 1 Tab by mouth three (3) times daily. 90 Tab 1    furosemide (LASIX) 20 mg tablet Take 1 Tab by mouth daily. (Patient taking differently: Take 20 mg by mouth daily as needed.) 30 Tab 0    pantoprazole (PROTONIX) 40 mg tablet Take 1 Tab by mouth two (2) times a day. 180 Tab 4    amLODIPine (NORVASC) 10 mg tablet Take 1 Tab by mouth daily. 90 Tab 4    aspirin delayed-release 81 mg tablet Take 1 Tab by mouth daily.  90 Tab 4    enalapril (VASOTEC) 20 mg tablet TAKE ONE TABLET BY MOUTH TWICE DAILY 180 Tab 4     No Known Allergies  Past Medical History:   Diagnosis Date    Cardiomyopathy ([facility] 7/21/2014    Chronic pain syndrome 12/5/2018    Gastric bypass status for obesity 8/8/2011    Heart failure ([facility]     History of ST elevation myocardial infarction (STEMI) 12/5/2018    HTN (hypertension) 8/8/2011    Hypothyroid 8/8/2011    Ill-defined condition     Alcoholism    STEMI (ST elevation myocardial infarction) ([facility]     08/2018     Past Surgical History:   Procedure Laterality Date    CARDIAC SURG PROCEDURE UNLIST  2018    Stent placed Coronary Artery     COLONOSCOPY N/A 10/9/2019    COLONOSCOPY performed by Darleen Devlin MD at [facility] ENDOSCOPY    HX GASTRIC BYPASS  2015    HX LAP GASTRIC BYPASS  2011     No family history on file. Social History     Tobacco Use   Smoking Status Never Smoker   Smokeless Tobacco Never Used          Review of Systems, additional:  Constitutional: negative  Eyes: negative  Respiratory: negative for dyspnea on exertion  Cardiovascular: per HPI  Gastrointestinal: negative  Musculoskeletal:+right buttock pain radiates to right thigh  Neurological: negative  Behvioral/Psych: negative  Endocrine: negative  ENT: negative    Objective:     Visit Vitals  BP 98/68   Pulse (!) 51   Ht 6' 2\" (1.88 m)   Wt 125.6 kg (277 lb)   SpO2 95%   BMI 35.56 kg/m²     General:  alert, cooperative, no distress, appears stated age   Chest Wall: inspection normal - no chest wall deformities or tenderness, respiratory effort normal   Lung: clear to auscultation bilaterally   Heart:  normal rate, regular rhythm, normal S1, S2, no murmurs, rubs, clicks or gallops   Abdomen: soft, non-tender. Bowel sounds normal. No masses,  no organomegaly   Extremities: extremities normal, atraumatic, no cyanosis or edema Skin: no rashes   Neuro: alert, oriented, normal speech, no focal findings or movement disorder noted       Assessment/Plan:         ICD-10-CM ICD-9-CM    1. Hypertension, unspecified type-- BP quite low in office today, he was instructed to discontinue his chlorthalidone. He did take some today as it is in his daily planner. He will discontinue it. I10 401.9 atorvastatin (LIPITOR) 80 mg tablet      metoprolol tartrate (LOPRESSOR) 100 mg IR tablet   2. Cardiomyopathy, unspecified type ([facility]-- EF 45%, advised of salt restriction. Continue with current cardiac regimen. I42.9 425.4 atorvastatin (LIPITOR) 80 mg tablet      metoprolol tartrate (LOPRESSOR) 100 mg IR tablet   3.  ST elevation myocardial infarction (STEMI), ([facility]-- S/P RUT to RCA Resolute Integrity 4X22 mm, on 8/13/2018. He is on ASA, Plavix, Lopressor and Atorvastatin. Asked patient to discontinue Plavix at last appointment of 9/18/2019. Continue  aspirin 81 mg daily. Decrease metoprolol to 25 mg twice daily. Return in 6 months I21.3 410.90 atorvastatin (LIPITOR) 80 mg tablet      metoprolol tartrate (LOPRESSOR) 100 mg IR tablet      REFERRAL TO CARDIAC REHAB   4. Hypothyroidism, unspecified type E03.9 244.9 atorvastatin (LIPITOR) 80 mg tablet      metoprolol tartrate (LOPRESSOR) 100 mg IR tablet   5.       Lumbar radiculopathy:

## 2020-01-06 DIAGNOSIS — E03.9 ACQUIRED HYPOTHYROIDISM: ICD-10-CM

## 2020-01-06 DIAGNOSIS — G62.9 NEUROPATHY: ICD-10-CM

## 2020-01-06 DIAGNOSIS — I42.9 CARDIOMYOPATHY, UNSPECIFIED TYPE (HCC): ICD-10-CM

## 2020-01-07 RX ORDER — LEVOTHYROXINE SODIUM 175 UG/1
TABLET ORAL
Qty: 30 TAB | Refills: 0 | Status: SHIPPED | OUTPATIENT
Start: 2020-01-07 | End: 2020-02-22

## 2020-01-07 RX ORDER — GABAPENTIN 800 MG/1
TABLET ORAL
Qty: 90 TAB | Refills: 0 | Status: SHIPPED | OUTPATIENT
Start: 2020-01-07 | End: 2020-01-29 | Stop reason: SDUPTHER

## 2020-01-07 RX ORDER — POTASSIUM CHLORIDE 750 MG/1
TABLET, EXTENDED RELEASE ORAL
Qty: 30 TAB | Refills: 0 | Status: SHIPPED | OUTPATIENT
Start: 2020-01-07 | End: 2020-02-11

## 2020-01-29 ENCOUNTER — OFFICE VISIT (OUTPATIENT)
Dept: FAMILY MEDICINE CLINIC | Age: 45
End: 2020-01-29

## 2020-01-29 VITALS
RESPIRATION RATE: 18 BRPM | WEIGHT: 270.2 LBS | BODY MASS INDEX: 34.68 KG/M2 | DIASTOLIC BLOOD PRESSURE: 69 MMHG | HEIGHT: 74 IN | TEMPERATURE: 96.4 F | OXYGEN SATURATION: 98 % | SYSTOLIC BLOOD PRESSURE: 99 MMHG | HEART RATE: 93 BPM

## 2020-01-29 DIAGNOSIS — M70.22 OLECRANON BURSITIS OF LEFT ELBOW: Primary | ICD-10-CM

## 2020-01-29 DIAGNOSIS — G89.4 CHRONIC PAIN SYNDROME: ICD-10-CM

## 2020-01-29 DIAGNOSIS — I10 HYPERTENSION, UNSPECIFIED TYPE: ICD-10-CM

## 2020-01-29 DIAGNOSIS — I21.3 ST ELEVATION MYOCARDIAL INFARCTION (STEMI), UNSPECIFIED ARTERY (HCC): ICD-10-CM

## 2020-01-29 DIAGNOSIS — I42.9 CARDIOMYOPATHY, UNSPECIFIED TYPE (HCC): ICD-10-CM

## 2020-01-29 DIAGNOSIS — E03.9 HYPOTHYROIDISM, UNSPECIFIED TYPE: ICD-10-CM

## 2020-01-29 DIAGNOSIS — G62.9 NEUROPATHY: ICD-10-CM

## 2020-01-29 RX ORDER — CEPHALEXIN 500 MG/1
500 CAPSULE ORAL 4 TIMES DAILY
Qty: 40 CAP | Refills: 0 | Status: SHIPPED | OUTPATIENT
Start: 2020-01-29 | End: 2020-02-25

## 2020-01-29 RX ORDER — OXYCODONE AND ACETAMINOPHEN 5; 325 MG/1; MG/1
1 TABLET ORAL
Qty: 60 TAB | Refills: 0 | Status: SHIPPED | OUTPATIENT
Start: 2020-01-29 | End: 2021-12-13 | Stop reason: SDUPTHER

## 2020-01-29 RX ORDER — AMLODIPINE BESYLATE 5 MG/1
5 TABLET ORAL DAILY
Qty: 90 TAB | Refills: 3 | Status: SHIPPED | OUTPATIENT
Start: 2020-01-29 | End: 2020-07-29 | Stop reason: ALTCHOICE

## 2020-01-29 RX ORDER — ENALAPRIL MALEATE 20 MG/1
TABLET ORAL
Qty: 90 TAB | Refills: 4 | Status: SHIPPED | OUTPATIENT
Start: 2020-01-29 | End: 2021-02-19

## 2020-01-29 RX ORDER — PREDNISONE 10 MG/1
TABLET ORAL
Qty: 30 TAB | Refills: 0 | Status: SHIPPED | OUTPATIENT
Start: 2020-01-29 | End: 2020-02-22

## 2020-01-29 RX ORDER — GABAPENTIN 800 MG/1
TABLET ORAL
Qty: 90 TAB | Refills: 5 | Status: SHIPPED | OUTPATIENT
Start: 2020-01-29 | End: 2020-04-10 | Stop reason: SDUPTHER

## 2020-01-29 NOTE — PROGRESS NOTES
Therese Leyva is a 38 y.o.  male and presents with    Chief Complaint   Patient presents with    Elbow Pain    Ankle swelling    Knee Pain    Medication Refill     Pain medication refill requested     Subjective:  Mr. Deniz Hernandez presents for swelling in his left elbow with swelling and redness. This started 4 days ago. He has had worsening during this time. He has h/o bilateral elbow swelling. He has not had any procedure in the past.  He cannot take nsaid. He has had no relief; He has had low blood pressure since his last visit; he has used 1/2 dose of amlidipine but continues to take enalipril twice a day     Osteoarthritis and Chronic Pain:  He has neuropathy and orthopedic, primarily affecting the legs and feet and hip and hand on the right side  Symptoms onset: problem is longstanding. Rheumatological ROS: ongoing significant pain in legs and feet and hip which is stable and controlled by PRN meds. Response to treatment plan: gradually worsening.    Cardiovascular Review:  The patient has hypertension, hyperlipidemia, CHF and obesity. Diet and Natalee Evens attempting to follow a low fat, low cholesterol diet, not attempting to follow a low sodium diet, does not rigorously follow a diabetic diet, sedentary, nonsmoker  Home BP Monitoring: is not measured at home. Pertinent ROS: taking medications as instructed, no medication side effects noted, no TIA's, no chest pain on exertion, no dyspnea on exertion, no swelling of ankles.      Thyroid Review:  Patient is seen for followup of hypothyroidism.    Thyroid ROS: denies fatigue, weight changes, heat/cold intolerance, bowel/skin changes or CVS symptoms.     Additional Concerns: he has been taking antabuse for alcoholism but the medication did not effect him when he drank.       ROS   General ROS: negative for - chills or fever  Psychological ROS: negative for - anxiety or depression  Ophthalmic ROS: negative for - blurry vision  ENT ROS: negative for - headaches, nasal congestion or sore throat  Endocrine ROS: negative for - polydipsia/polyuria or temperature intolerance  Respiratory ROS: no cough, shortness of breath, or wheezing  Cardiovascular ROS: no chest pain or dyspnea on exertion  Gastrointestinal ROS: no abdominal pain, change in bowel habits, or black or bloody stools  Genito-Urinary ROS: no dysuria, trouble voiding, or hematuria  Neurological ROS: positive for - numbness/tingling  - lightheadedness     All other systems reviewed and are negative.       Objective:  Vitals:    01/29/20 1004   BP: 99/69   Pulse: 93   Resp: 18   Temp: 96.4 °F (35.8 °C)   TempSrc: Oral   SpO2: 98%   Weight: 270 lb 3.2 oz (122.6 kg)   Height: 6' 2\" (1.88 m)   PainSc:  10 - Worst pain ever   PainLoc: Knee     alert, well appearing, and in no distress, oriented to person, place, and time and obese  Mental status - normal mood, behavior, speech, dress, motor activity, and thought processes  Chest - clear to auscultation, no wheezes, rales or rhonchi, symmetric air entry  Heart - normal rate, regular rhythm, normal S1, S2, no murmurs, rubs, clicks or gallops  Abdomen - left side inguinal hernia; reducible  Musculoskeletal -  abnormal exam of left hip; left elbow with edema, bogginess and erythema  Extremities - peripheral pulses normal, no pedal edema, no clubbing or cyanosis  Skin - excess abdominal skin  Neuro - antalgic gait    Assessment/Plan:    1. Hypertension, unspecified type  Episodes of hypotension; decrease dose of amlodipine and enalapril  - amLODIPine (NORVASC) 5 mg tablet; Take 1 Tab by mouth daily. Dispense: 90 Tab; Refill: 3  - enalapril (VASOTEC) 20 mg tablet; TAKE ONE TABLET BY MOUTH DAILY  Dispense: 90 Tab; Refill: 4    2. Cardiomyopathy, unspecified type (HCC)    - amLODIPine (NORVASC) 5 mg tablet; Take 1 Tab by mouth daily. Dispense: 90 Tab;  Refill: 3  - enalapril (VASOTEC) 20 mg tablet; TAKE ONE TABLET BY MOUTH DAILY  Dispense: 90 Tab; Refill: 4    3. ST elevation myocardial infarction (STEMI), unspecified artery (HCC)  Goal <130/80  - amLODIPine (NORVASC) 5 mg tablet; Take 1 Tab by mouth daily. Dispense: 90 Tab; Refill: 3    4. Hypothyroidism, unspecified type    - amLODIPine (NORVASC) 5 mg tablet; Take 1 Tab by mouth daily. Dispense: 90 Tab; Refill: 3  - enalapril (VASOTEC) 20 mg tablet; TAKE ONE TABLET BY MOUTH DAILY  Dispense: 90 Tab; Refill: 4    5. Neuropathy    - gabapentin (NEURONTIN) 800 mg tablet; TAKE ONE TABLET BY MOUTH THREE TIMES A DAY  Dispense: 90 Tab; Refill: 5    6. Chronic pain syndrome    - oxyCODONE-acetaminophen (PERCOCET) 5-325 mg per tablet; Take 1 Tab by mouth every eight to twelve (8-12) hours as needed for Pain for up to 30 days. Max Daily Amount: 3 Tabs. Dispense: 60 Tab; Refill: 0    7. Olecranon bursitis of left elbow  Start abx  - cephALEXin (KEFLEX) 500 mg capsule; Take 1 Cap by mouth four (4) times daily for 10 days. Dispense: 40 Cap; Refill: 0      Lab review: no lab studies available for review at time of visit      I have discussed the diagnosis with the patient and the intended plan as seen in the above orders. The patient has received an after-visit summary and questions were answered concerning future plans. I have discussed medication side effects and warnings with the patient as well. I have reviewed the plan of care with the patient, accepted their input and they are in agreement with the treatment goals.

## 2020-01-29 NOTE — PROGRESS NOTES
1. Have you been to the ER, urgent care clinic since your last visit? Hospitalized since your last visit? No    2. Have you seen or consulted any other health care providers outside of the [address] [place] [address] since your last visit? Include any pap smears or colon screening.  No

## 2020-02-10 DIAGNOSIS — I42.9 CARDIOMYOPATHY, UNSPECIFIED TYPE (HCC): ICD-10-CM

## 2020-02-10 DIAGNOSIS — K52.9 CHRONIC DIARRHEA: ICD-10-CM

## 2020-02-11 RX ORDER — DIPHENOXYLATE HYDROCHLORIDE AND ATROPINE SULFATE 2.5; .025 MG/1; MG/1
TABLET ORAL
Qty: 30 TAB | Refills: 0 | Status: SHIPPED | OUTPATIENT
Start: 2020-02-11 | End: 2020-03-13

## 2020-02-11 RX ORDER — POTASSIUM CHLORIDE 750 MG/1
TABLET, EXTENDED RELEASE ORAL
Qty: 30 TAB | Refills: 0 | Status: SHIPPED | OUTPATIENT
Start: 2020-02-11 | End: 2020-02-24

## 2020-02-20 DIAGNOSIS — M70.22 OLECRANON BURSITIS OF LEFT ELBOW: ICD-10-CM

## 2020-02-20 DIAGNOSIS — E03.9 ACQUIRED HYPOTHYROIDISM: ICD-10-CM

## 2020-02-22 RX ORDER — PREDNISONE 10 MG/1
TABLET ORAL
Qty: 30 TAB | Refills: 0 | Status: SHIPPED | OUTPATIENT
Start: 2020-02-22 | End: 2020-04-03

## 2020-02-22 RX ORDER — LEVOTHYROXINE SODIUM 175 UG/1
TABLET ORAL
Qty: 30 TAB | Refills: 0 | Status: SHIPPED | OUTPATIENT
Start: 2020-02-22 | End: 2020-02-24

## 2020-02-23 DIAGNOSIS — I42.9 CARDIOMYOPATHY, UNSPECIFIED TYPE (HCC): ICD-10-CM

## 2020-02-23 DIAGNOSIS — E03.9 ACQUIRED HYPOTHYROIDISM: ICD-10-CM

## 2020-02-24 DIAGNOSIS — M70.22 OLECRANON BURSITIS OF LEFT ELBOW: ICD-10-CM

## 2020-02-24 RX ORDER — LEVOTHYROXINE SODIUM 175 UG/1
TABLET ORAL
Qty: 30 TAB | Refills: 0 | Status: SHIPPED | OUTPATIENT
Start: 2020-02-24 | End: 2020-05-11 | Stop reason: SDUPTHER

## 2020-02-24 RX ORDER — POTASSIUM CHLORIDE 750 MG/1
TABLET, EXTENDED RELEASE ORAL
Qty: 30 TAB | Refills: 0 | Status: SHIPPED | OUTPATIENT
Start: 2020-02-24 | End: 2020-04-28

## 2020-02-25 RX ORDER — CEPHALEXIN 500 MG/1
CAPSULE ORAL
Qty: 40 CAP | Refills: 0 | Status: SHIPPED | OUTPATIENT
Start: 2020-02-25 | End: 2020-07-10 | Stop reason: ALTCHOICE

## 2020-03-11 ENCOUNTER — OFFICE VISIT (OUTPATIENT)
Dept: FAMILY MEDICINE CLINIC | Age: 45
End: 2020-03-11

## 2020-03-11 VITALS
HEIGHT: 74 IN | SYSTOLIC BLOOD PRESSURE: 130 MMHG | OXYGEN SATURATION: 98 % | WEIGHT: 279.8 LBS | DIASTOLIC BLOOD PRESSURE: 90 MMHG | HEART RATE: 66 BPM | TEMPERATURE: 96.7 F | RESPIRATION RATE: 18 BRPM | BODY MASS INDEX: 35.91 KG/M2

## 2020-03-11 DIAGNOSIS — M70.22 OLECRANON BURSITIS OF LEFT ELBOW: Primary | ICD-10-CM

## 2020-03-11 DIAGNOSIS — E03.9 ACQUIRED HYPOTHYROIDISM: ICD-10-CM

## 2020-03-11 DIAGNOSIS — F11.90 CHRONIC, CONTINUOUS USE OF OPIOIDS: ICD-10-CM

## 2020-03-11 DIAGNOSIS — G89.4 CHRONIC PAIN SYNDROME: ICD-10-CM

## 2020-03-11 DIAGNOSIS — I10 HYPERTENSION, UNSPECIFIED TYPE: ICD-10-CM

## 2020-03-11 DIAGNOSIS — F10.20 ALCOHOLISM (HCC): ICD-10-CM

## 2020-03-11 DIAGNOSIS — R55 SYNCOPE, UNSPECIFIED SYNCOPE TYPE: ICD-10-CM

## 2020-03-11 DIAGNOSIS — I42.9 CARDIOMYOPATHY, UNSPECIFIED TYPE (HCC): ICD-10-CM

## 2020-03-11 DIAGNOSIS — E66.01 OBESITY, MORBID (HCC): ICD-10-CM

## 2020-03-11 RX ORDER — OXYCODONE AND ACETAMINOPHEN 5; 325 MG/1; MG/1
1 TABLET ORAL
Qty: 60 TAB | Refills: 0 | Status: SHIPPED | OUTPATIENT
Start: 2020-03-11 | End: 2020-04-10 | Stop reason: SDUPTHER

## 2020-03-11 NOTE — PROGRESS NOTES
Yecenia Hidalgo presents today for   Chief Complaint   Patient presents with    Leg Pain    Medication Refill       Is someone accompanying this pt? NO    Is the patient using any DME equipment during OV? NO    Depression Screening:  3 most recent PHQ Screens 3/11/2020   PHQ Not Done -   Little interest or pleasure in doing things Not at all   Feeling down, depressed, irritable, or hopeless Not at all   Total Score PHQ 2 0       Learning Assessment:  Learning Assessment 11/6/2019   PRIMARY LEARNER Patient   HIGHEST LEVEL OF EDUCATION - PRIMARY LEARNER  -   BARRIERS PRIMARY LEARNER -   CO-LEARNER CAREGIVER -   PRIMARY LANGUAGE ENGLISH   LEARNER PREFERENCE PRIMARY LISTENING   ANSWERED BY patient   RELATIONSHIP SELF       Abuse Screening:  Abuse Screening Questionnaire 12/13/2019   Do you ever feel afraid of your partner? N   Are you in a relationship with someone who physically or mentally threatens you? N   Is it safe for you to go home? Y       Fall Risk  Fall Risk Assessment, last 12 mths 12/13/2019   Able to walk? Yes   Fall in past 12 months? No   Fall with injury? -   Number of falls in past 12 months -   Fall Risk Score -       Health Maintenance reviewed and discussed and ordered per Provider. Health Maintenance Due   Topic Date Due    Lipid Screen  03/05/2020   . Coordination of Care:  1. Have you been to the ER, urgent care clinic since your last visit? Hospitalized since your last visit? NO    2. Have you seen or consulted any other health care providers outside of the [address] [place] [address] since your last visit? Include any pap smears or colon screening.  NO      Last  Checked n/a  Last UDS Checked n/a  Last Pain contract signed:n/a

## 2020-03-12 DIAGNOSIS — K52.9 CHRONIC DIARRHEA: ICD-10-CM

## 2020-03-13 RX ORDER — DIPHENOXYLATE HYDROCHLORIDE AND ATROPINE SULFATE 2.5; .025 MG/1; MG/1
TABLET ORAL
Qty: 30 TAB | Refills: 0 | Status: SHIPPED | OUTPATIENT
Start: 2020-03-13 | End: 2020-04-28

## 2020-04-01 DIAGNOSIS — M70.22 OLECRANON BURSITIS OF LEFT ELBOW: ICD-10-CM

## 2020-04-03 RX ORDER — PREDNISONE 10 MG/1
TABLET ORAL
Qty: 30 TAB | Refills: 0 | Status: SHIPPED | OUTPATIENT
Start: 2020-04-03 | End: 2020-05-11 | Stop reason: ALTCHOICE

## 2020-04-10 ENCOUNTER — VIRTUAL VISIT (OUTPATIENT)
Dept: FAMILY MEDICINE CLINIC | Age: 45
End: 2020-04-10

## 2020-04-10 DIAGNOSIS — G89.4 CHRONIC PAIN SYNDROME: ICD-10-CM

## 2020-04-10 DIAGNOSIS — M70.21 OLECRANON BURSITIS OF RIGHT ELBOW: Primary | ICD-10-CM

## 2020-04-10 DIAGNOSIS — G62.9 NEUROPATHY: ICD-10-CM

## 2020-04-10 DIAGNOSIS — F10.20 ALCOHOLISM (HCC): ICD-10-CM

## 2020-04-10 DIAGNOSIS — F11.90 CHRONIC, CONTINUOUS USE OF OPIOIDS: ICD-10-CM

## 2020-04-10 RX ORDER — GABAPENTIN 800 MG/1
TABLET ORAL
Qty: 90 TAB | Refills: 5 | Status: SHIPPED | OUTPATIENT
Start: 2020-04-10 | End: 2020-10-19

## 2020-04-10 RX ORDER — OXYCODONE AND ACETAMINOPHEN 5; 325 MG/1; MG/1
1 TABLET ORAL
Qty: 60 TAB | Refills: 0 | Status: SHIPPED | OUTPATIENT
Start: 2020-04-10 | End: 2020-05-11 | Stop reason: SDUPTHER

## 2020-04-10 NOTE — PROGRESS NOTES
Annelise Dos Santos is a 38 y.o.  male and has    Chief Complaint   Patient presents with    Pain (Chronic)    Thyroid Problem    Hypertension     Annelise Dos Santos is a 38 y.o. male evaluated via doxy. me on 4/10/2020. Consent:  He and/or health care decision maker is aware that that he may receive a bill for this audio/video service, depending on his insurance coverage, and has provided verbal consent to proceed: Yes   Pt is at his home, and I am at [facility]. Lc Small LPN assisted with visit. Documentation:  I communicated with the patient and/or health care decision maker about right arm edema and pain. Details of this discussion including any medical advice provided: see below      I affirm this is a Patient Initiated Episode with an Established Patient who has not had a related appointment within my department in the past 7 days or scheduled within the next 24 hours. Total Time: minutes: 5-10 minutes    Note: not billable if this call serves to triage the patient into an appointment for the relevant concern      Asif Escobedo MD     Subjective:  He has right arm pain which is surrounding the elbow and this started 5 days ago rated 9/10; he had similar pain and swelling of his left elbow which improved. He has used ice packs and wrapping it. This gives him improvement.     Osteoarthritis and Chronic Pain:  He has neuropathy and orthopedic, primarily affecting the legs and feet and hip and hand on the right side  Symptoms onset: problem is longstanding. Rheumatological ROS: ongoing significant pain in legs and feet and hip which is stable and controlled by PRN meds. Response to treatment plan: gradually worsening.    Cardiovascular Review:  The patient has hypertension, hyperlipidemia, CHF and obesity.   Diet and Clarkridge Monsivais attempting to follow a low fat, low cholesterol diet, not attempting to follow a low sodium diet, does not rigorously follow a diabetic diet, sedentary, nonsmoker  Home BP Monitoring: is not measured at home. Pertinent ROS: taking medications as instructed, no medication side effects noted, no TIA's, no chest pain on exertion, no dyspnea on exertion, no swelling of ankles.      Thyroid Review:  Patient is seen for followup of hypothyroidism. Thyroid ROS: denies fatigue, weight changes, heat/cold intolerance, bowel/skin changes or CVS symptoms.     Additional Concerns: he has been taking antabuse for alcoholism but the medication did not effect him when he drank.       ROS   General ROS: negative for - chills or fever  Psychological ROS: negative for - anxiety or depression  Ophthalmic ROS: negative for - blurry vision  ENT ROS: negative for - headaches, nasal congestion or sore throat  Endocrine ROS: negative for - polydipsia/polyuria or temperature intolerance  Respiratory ROS: no cough, shortness of breath, or wheezing  Cardiovascular ROS: no chest pain or dyspnea on exertion  Gastrointestinal ROS: no abdominal pain, change in bowel habits, or black or bloody stools  Genito-Urinary ROS: no dysuria, trouble voiding, or hematuria  Neurological ROS: positive for - numbness/tingling  - lightheadedness     All other systems reviewed and are negative      Objective:  Vitals:    04/10/20 1133   PainSc:   8   PainLoc: Generalized       alert, well appearing, and in no distress, oriented to person, place, and time and obese  Mental status - normal mood, behavior, speech, dress, motor activity, and thought processes  Chest - normal work of breathing  Neurological - cranial nerves II through XII intact    Assessment/Plan:    1. Olecranon bursitis of right elbow  Continue ice and compression wrap    2. Neuropathy  Pain management  - gabapentin (NEURONTIN) 800 mg tablet; TAKE ONE TABLET BY MOUTH THREE TIMES A DAY  Dispense: 90 Tab; Refill: 5    3. Chronic, continuous use of opioids  This is a chronic problem that is stable.   Per review of available records and patients , there are not sign of overuse, misuse, diversion, or concerning side effects. Today we reviewed: the risk of overdose, addiction, and dependency proper storage and disposal of medications the goals of treatment (improve functionality, quality of life, and pain)  The following changes were made to the patients current treatment plan: nothing, medications refilled. - oxyCODONE-acetaminophen (PERCOCET) 5-325 mg per tablet; Take 1 Tab by mouth every six (6) hours as needed for Pain for up to 30 days. Max Daily Amount: 4 Tabs. Dispense: 60 Tab; Refill: 0    4. Chronic pain syndrome    - oxyCODONE-acetaminophen (PERCOCET) 5-325 mg per tablet; Take 1 Tab by mouth every six (6) hours as needed for Pain for up to 30 days. Max Daily Amount: 4 Tabs. Dispense: 60 Tab; Refill: 0    5. Alcoholism ([address]  Encourage cessation      Lab review: no lab studies available for review at time of visit      I have discussed the diagnosis with the patient and the intended plan as seen in the above orders. I have discussed medication side effects and warnings with the patient as well. I have reviewed the plan of care with the patient, accepted their input and they are in agreement with the treatment goals.

## 2020-04-10 NOTE — PROGRESS NOTES
Annelise Dos Santos presents today for   Chief Complaint   Patient presents with    Pain (Chronic)    Thyroid Problem    Hypertension       Is someone accompanying this pt? na    Is the patient using any DME equipment during OV? na    Depression Screening:  3 most recent PHQ Screens 3/11/2020   PHQ Not Done -   Little interest or pleasure in doing things Not at all   Feeling down, depressed, irritable, or hopeless Not at all   Total Score PHQ 2 0       Learning Assessment:  Learning Assessment 11/6/2019   PRIMARY LEARNER Patient   HIGHEST LEVEL OF EDUCATION - PRIMARY LEARNER  -   BARRIERS PRIMARY LEARNER -   CO-LEARNER CAREGIVER -   PRIMARY LANGUAGE ENGLISH   LEARNER PREFERENCE PRIMARY LISTENING   ANSWERED BY patient   RELATIONSHIP SELF       Abuse Screening:  Abuse Screening Questionnaire 12/13/2019   Do you ever feel afraid of your partner? N   Are you in a relationship with someone who physically or mentally threatens you? N   Is it safe for you to go home? Y       Fall Risk  Fall Risk Assessment, last 12 mths 12/13/2019   Able to walk? Yes   Fall in past 12 months? No   Fall with injury? -   Number of falls in past 12 months -   Fall Risk Score -       Health Maintenance reviewed and discussed and ordered per Provider. Health Maintenance Due   Topic Date Due    Lipid Screen  03/05/2020   . Coordination of Care:  1. Have you been to the ER, urgent care clinic since your last visit? Hospitalized since your last visit? no    2. Have you seen or consulted any other health care providers outside of the [address] [place] [address] [place] since your last visit? Include any pap smears or colon screening. no      Last  Checked na  Last UDS Checked na  Last Pain contract signed: na    Patient presents in office today for routine care.   Patient concerns: med refills

## 2020-04-27 DIAGNOSIS — I42.9 CARDIOMYOPATHY, UNSPECIFIED TYPE (HCC): ICD-10-CM

## 2020-04-27 DIAGNOSIS — K52.9 CHRONIC DIARRHEA: ICD-10-CM

## 2020-04-28 RX ORDER — POTASSIUM CHLORIDE 750 MG/1
TABLET, EXTENDED RELEASE ORAL
Qty: 30 TAB | Refills: 0 | Status: SHIPPED | OUTPATIENT
Start: 2020-04-28 | End: 2020-06-10

## 2020-04-28 RX ORDER — DIPHENOXYLATE HYDROCHLORIDE AND ATROPINE SULFATE 2.5; .025 MG/1; MG/1
TABLET ORAL
Qty: 30 TAB | Refills: 0 | Status: SHIPPED | OUTPATIENT
Start: 2020-04-28 | End: 2020-06-10

## 2020-04-29 ENCOUNTER — PATIENT MESSAGE (OUTPATIENT)
Dept: FAMILY MEDICINE CLINIC | Age: 45
End: 2020-04-29

## 2020-05-11 ENCOUNTER — VIRTUAL VISIT (OUTPATIENT)
Dept: FAMILY MEDICINE CLINIC | Age: 45
End: 2020-05-11

## 2020-05-11 DIAGNOSIS — F11.90 CHRONIC, CONTINUOUS USE OF OPIOIDS: ICD-10-CM

## 2020-05-11 DIAGNOSIS — G89.4 CHRONIC PAIN SYNDROME: ICD-10-CM

## 2020-05-11 DIAGNOSIS — I42.9 CARDIOMYOPATHY, UNSPECIFIED TYPE (HCC): ICD-10-CM

## 2020-05-11 DIAGNOSIS — I21.3 ST ELEVATION MYOCARDIAL INFARCTION (STEMI), UNSPECIFIED ARTERY (HCC): ICD-10-CM

## 2020-05-11 DIAGNOSIS — I10 HYPERTENSION, UNSPECIFIED TYPE: ICD-10-CM

## 2020-05-11 DIAGNOSIS — E03.9 ACQUIRED HYPOTHYROIDISM: ICD-10-CM

## 2020-05-11 RX ORDER — LEVOTHYROXINE SODIUM 175 UG/1
175 TABLET ORAL
Qty: 90 TAB | Refills: 0 | Status: SHIPPED | OUTPATIENT
Start: 2020-05-11 | End: 2020-08-17

## 2020-05-11 RX ORDER — ASPIRIN 81 MG/1
81 TABLET ORAL DAILY
Qty: 90 TAB | Refills: 4 | Status: SHIPPED | OUTPATIENT
Start: 2020-05-11 | End: 2021-06-16

## 2020-05-11 RX ORDER — OXYCODONE AND ACETAMINOPHEN 5; 325 MG/1; MG/1
1 TABLET ORAL
Qty: 60 TAB | Refills: 0 | Status: SHIPPED | OUTPATIENT
Start: 2020-05-11 | End: 2020-06-10 | Stop reason: SDUPTHER

## 2020-05-11 NOTE — PROGRESS NOTES
Yarelisjonatan Faby presents today for   Chief Complaint   Patient presents with    Pain (Chronic)    Peripheral Neuropathy       Is someone accompanying this pt? na    Is the patient using any DME equipment during OV? na    Depression Screening:  3 most recent PHQ Screens 3/11/2020   PHQ Not Done -   Little interest or pleasure in doing things Not at all   Feeling down, depressed, irritable, or hopeless Not at all   Total Score PHQ 2 0       Learning Assessment:  Learning Assessment 11/6/2019   PRIMARY LEARNER Patient   HIGHEST LEVEL OF EDUCATION - PRIMARY LEARNER  -   BARRIERS PRIMARY LEARNER -   CO-LEARNER CAREGIVER -   PRIMARY LANGUAGE ENGLISH   LEARNER PREFERENCE PRIMARY LISTENING   ANSWERED BY patient   RELATIONSHIP SELF       Abuse Screening:  Abuse Screening Questionnaire 12/13/2019   Do you ever feel afraid of your partner? N   Are you in a relationship with someone who physically or mentally threatens you? N   Is it safe for you to go home? Y       Fall Risk  Fall Risk Assessment, last 12 mths 12/13/2019   Able to walk? Yes   Fall in past 12 months? No   Fall with injury? -   Number of falls in past 12 months -   Fall Risk Score -       Health Maintenance reviewed and discussed and ordered per Provider. Health Maintenance Due   Topic Date Due    Lipid Screen  03/05/2020   . Coordination of Care:  1. Have you been to the ER, urgent care clinic since your last visit? Hospitalized since your last visit? no    2. Have you seen or consulted any other health care providers outside of the [address] [place] [address] [place] since your last visit? Include any pap smears or colon screening. no      Last  Checked na  Last UDS Checked na  Last Pain contract signed: na    Patient presents in office today for routine care.   Patient concerns: med refills

## 2020-05-11 NOTE — PROGRESS NOTES
Noel Peterson is a 38 y.o.  male and presents with    Chief Complaint   Patient presents with    Pain (Chronic)    Peripheral Neuropathy     He is evaluated via doxy. me on 5/11/2020.       Consent:  He and/or health care decision maker is aware that that he may receive a bill for this audio/video service, depending on his insurance coverage, and has provided verbal consent to proceed: Yes   Pt is at his home, and I am at [facility]. Luis Angel Kenney LPN assisted with visit.     Documentation:  I communicated with the patient and/or health care decision maker about right arm edema and pain. Details of this discussion including any medical advice provided: see below        I affirm this is a Patient Initiated Episode with an Established Patient who has not had a related appointment within my department in the past 7 days or scheduled within the next 24 hours.     Total Time: minutes: 5-10 minutes  This visit was conducted as a synchronous telemedicine service rendered via real-time interactive audio and video telecommunications system. On March 11, 2020, the [facility] declared the COVID-19 (Novel Coronavirus) viral disease to be a pandemic. As a result of this emergency, a rapidly evolving situation, practice patterns for physicians, physician assistants, and nurse practitioners are shifting to accommodate the need to treat in conjunction with unprecedented guidance from federal, state, and local authorities which include, but are not limited to, self-quarantines and/or limiting physical proximity to others under any number of circumstances. It is within this context (and with the understanding that this method of patient encounter is in the patient's best interest as well as the health and safety of other patients and the public) that Zo Paul is being provided for this patient encounter rather than a face-to-face visit.  This patient encounter is appropriate and reasonable under the circumstances given the patient's particular presentation at this time.     Subjective:    Osteoarthritis and Chronic Pain:  He has neuropathy and orthopedic, primarily affecting the legs and feet and hip and hand on the right side  Symptoms onset: problem is longstanding. Rheumatological ROS: ongoing significant pain in legs and feet and hip which is stable and controlled by PRN meds. Response to treatment plan: gradually worsening.  he has #3 tabs remaining.   Cardiovascular Review:  The patient has hypertension, hyperlipidemia, CHF and obesity. Diet and Alexa Reil attempting to follow a low fat, low cholesterol diet, not attempting to follow a low sodium diet, does not rigorously follow a diabetic diet, sedentary, nonsmoker  Home BP Monitoring: is not measured at home. Pertinent ROS: taking medications as instructed, no medication side effects noted, no TIA's, no chest pain on exertion, no dyspnea on exertion, no swelling of ankles.      Thyroid Review:  Patient is seen for followup of hypothyroidism.    Thyroid ROS: denies fatigue, weight changes, heat/cold intolerance, bowel/skin changes or CVS symptoms.     Additional Concerns: he has been taking antabuse for alcoholism but the medication did not effect him when he drank.       ROS   General ROS: negative for - chills or fever  Psychological ROS: negative for - anxiety or depression  Ophthalmic ROS: negative for - blurry vision  ENT ROS: negative for - headaches, nasal congestion or sore throat  Endocrine ROS: negative for - polydipsia/polyuria or temperature intolerance  Respiratory ROS: no cough, shortness of breath, or wheezing  Cardiovascular ROS: no chest pain or dyspnea on exertion  Gastrointestinal ROS: no abdominal pain, change in bowel habits, or black or bloody stools  Genito-Urinary ROS: no dysuria, trouble voiding, or hematuria  Neurological ROS: positive for - numbness/tingling     All other systems reviewed and are negative    Objective:  Vitals:    05/11/20 1006   PainSc:   8   PainLoc: Generalized     alert, well appearing, and in no distress, oriented to person, place, and time and obese  Mental status - normal mood, behavior, speech, dress, motor activity, and thought processes  Chest - normal work of breathing  Neurological - cranial nerves II through XII intact    Assessment/Plan:    1. Chronic, continuous use of opioids  This is a chronic problem that is stable. Per review of available records and patients , there are not sign of overuse, misuse, diversion, or concerning side effects. Today we reviewed: the risk of overdose, addiction, and dependency proper storage and disposal of medications the goals of treatment (improve functionality, quality of life, and pain)  The following changes were made to the patients current treatment plan: nothing, medications refilled. - oxyCODONE-acetaminophen (PERCOCET) 5-325 mg per tablet; Take 1 Tab by mouth every six (6) hours as needed for Pain for up to 30 days. Max Daily Amount: 4 Tabs. Dispense: 60 Tab; Refill: 0    2. Chronic pain syndrome    - oxyCODONE-acetaminophen (PERCOCET) 5-325 mg per tablet; Take 1 Tab by mouth every six (6) hours as needed for Pain for up to 30 days. Max Daily Amount: 4 Tabs. Dispense: 60 Tab; Refill: 0    3. Hypertension, unspecified type  Goal <130/80; he has had low blood pressure; recommend his taking 1/2 tab enalopril  - aspirin delayed-release 81 mg tablet; Take 1 Tab by mouth daily. Dispense: 90 Tab; Refill: 4    4. Cardiomyopathy, unspecified type ([address]  F/u with cardiology  - aspirin delayed-release 81 mg tablet; Take 1 Tab by mouth daily. Dispense: 90 Tab; Refill: 4    5. ST elevation myocardial infarction (STEMI), unspecified artery ([address]  F/u with cardiology  - aspirin delayed-release 81 mg tablet; Take 1 Tab by mouth daily. Dispense: 90 Tab; Refill: 4    6.  Acquired hypothyroidism  Continue therapeutic dose  - levothyroxine (SYNTHROID) 175 mcg tablet; Take 1 Tab by mouth Daily (before breakfast). Dispense: 90 Tab; Refill: 0      Lab review: no lab studies available for review at time of visit      I have discussed the diagnosis with the patient and the intended plan as seen in the above orders. I have discussed medication side effects and warnings with the patient as well. I have reviewed the plan of care with the patient, accepted their input and they are in agreement with the treatment goals.

## 2020-05-26 RX ORDER — METOPROLOL TARTRATE 25 MG/1
25 TABLET, FILM COATED ORAL 2 TIMES DAILY
Qty: 180 TAB | Refills: 2 | Status: SHIPPED | OUTPATIENT
Start: 2020-05-26 | End: 2020-07-23 | Stop reason: SDUPTHER

## 2020-05-26 RX ORDER — ATORVASTATIN CALCIUM 40 MG/1
80 TABLET, FILM COATED ORAL DAILY
Qty: 90 TAB | Refills: 2 | Status: SHIPPED | OUTPATIENT
Start: 2020-05-26 | End: 2020-06-18 | Stop reason: ALTCHOICE

## 2020-06-04 ENCOUNTER — E-VISIT (OUTPATIENT)
Dept: FAMILY MEDICINE CLINIC | Age: 45
End: 2020-06-04

## 2020-06-04 DIAGNOSIS — F11.90 CHRONIC, CONTINUOUS USE OF OPIOIDS: Primary | ICD-10-CM

## 2020-06-04 DIAGNOSIS — G62.9 NEUROPATHY: ICD-10-CM

## 2020-06-04 DIAGNOSIS — G89.4 CHRONIC PAIN SYNDROME: ICD-10-CM

## 2020-06-04 NOTE — TELEPHONE ENCOUNTER
Mirna King is a 38 y.o. male evaluated via e-visit only technology on 6/4/2020. Consent: He and/or his health care decision maker is aware that he may receive a bill for this audio only encounter, depending on his insurance coverage, and has provided verbal consent to proceed: Yes    I communicated with the patient and/or health care decision maker about the nature and details of the following:  Assessment & Plan:   Diagnoses and all orders for this visit:    1. Chronic, continuous use of opioids    2. Chronic pain syndrome  Recommend salonpas patches for pain  3. Neuropathy        12  Subjective:   Mirna King is a 38 y.o. male who was seen for No chief complaint on file. he has chronic pain. He is requesting refill of medication. He is a week early for pain medication refill. Prior to Admission medications    Medication Sig Start Date End Date Taking? Authorizing Provider   atorvastatin (LIPITOR) 40 mg tablet Take 2 Tabs by mouth daily. 5/26/20   Goran Hughes MD   metoprolol tartrate (LOPRESSOR) 25 mg tablet Take 1 Tab by mouth two (2) times a day. 5/26/20   Goran Hughes MD   oxyCODONE-acetaminophen (PERCOCET) 5-325 mg per tablet Take 1 Tab by mouth every six (6) hours as needed for Pain for up to 30 days. Max Daily Amount: 4 Tabs. 5/11/20 6/10/20  Goran Hughes MD   aspirin delayed-release 81 mg tablet Take 1 Tab by mouth daily. 5/11/20   Goran Hughes MD   levothyroxine (SYNTHROID) 175 mcg tablet Take 1 Tab by mouth Daily (before breakfast).  5/11/20   Goran Hughes MD   diphenoxylate-atropine (LOMOTIL) 2.5-0.025 mg per tablet TAKE ONE TABLET BY MOUTH FOUR TIMES A DAY AS NEEDED FOR DIARRHEA --- MAX OF 4 TABLETS DAILY 4/28/20   Goran Place, MD   potassium chloride (KLOR-CON) 10 mEq tablet TAKE ONE TABLET BY MOUTH DAILY 4/28/20   Goran Hughes MD   gabapentin (NEURONTIN) 800 mg tablet TAKE ONE TABLET BY MOUTH THREE TIMES A DAY 4/10/20   Goran Hughes MD   cephALEXin (KEFLEX) 500 mg capsule TAKE ONE CAPSULE BY MOUTH FOUR TIMES A DAY FOR 10 DAYS 2/25/20   Ariadna Thorne MD   amLODIPine (NORVASC) 5 mg tablet Take 1 Tab by mouth daily. 1/29/20   Ariadna Thorne MD   enalapril (VASOTEC) 20 mg tablet TAKE ONE TABLET BY MOUTH DAILY 1/29/20   Ariadna Thorne MD   colchicine 0.6 mg tablet TAKE ONE TABLET BY MOUTH DAILY 12/10/19   Ariadna Thorne MD   disulfiram (ANTABUSE) 500 mg tablet Take 1 Tab by mouth daily. 11/13/19   Ariadna Thorne MD   lidocaine HCl (XYLOCAINE) 3 % topical cream Apply  to affected area two (2) times a day. 11/13/19   Ariadna Thorne MD   clopidogrel (PLAVIX) 75 mg tab Take 75 mg by mouth. Provider, Tan   furosemide (LASIX) 20 mg tablet Take 1 Tab by mouth daily. Patient taking differently: Take 20 mg by mouth daily as needed. 8/14/19   Ariadna Thorne MD   pantoprazole (PROTONIX) 40 mg tablet Take 1 Tab by mouth two (2) times a day. 5/7/19   Gabriel Ag., DO     No Known Allergies    @S@    I affirm this is a Patient-Initiated Episode with a Patient who has not had a related appointment within my department in the past 7 days or scheduled within the next 24 hours.     Total Time: minutes: 5-10 minutes    Note: not billable if this call serves to triage the patient into an appointment for the relevant concern      Allen Watts MD

## 2020-06-07 DIAGNOSIS — I42.9 CARDIOMYOPATHY, UNSPECIFIED TYPE (HCC): ICD-10-CM

## 2020-06-07 DIAGNOSIS — K52.9 CHRONIC DIARRHEA: ICD-10-CM

## 2020-06-10 ENCOUNTER — VIRTUAL VISIT (OUTPATIENT)
Dept: FAMILY MEDICINE CLINIC | Age: 45
End: 2020-06-10

## 2020-06-10 DIAGNOSIS — I10 HYPERTENSION, UNSPECIFIED TYPE: ICD-10-CM

## 2020-06-10 DIAGNOSIS — F11.90 CHRONIC, CONTINUOUS USE OF OPIOIDS: ICD-10-CM

## 2020-06-10 DIAGNOSIS — G89.4 CHRONIC PAIN SYNDROME: ICD-10-CM

## 2020-06-10 DIAGNOSIS — I42.9 CARDIOMYOPATHY, UNSPECIFIED TYPE (HCC): ICD-10-CM

## 2020-06-10 DIAGNOSIS — M10.9 GOUTY ARTHRITIS OF RIGHT HAND: Primary | ICD-10-CM

## 2020-06-10 DIAGNOSIS — K52.9 CHRONIC DIARRHEA: ICD-10-CM

## 2020-06-10 DIAGNOSIS — G62.9 NEUROPATHY: ICD-10-CM

## 2020-06-10 RX ORDER — DIPHENOXYLATE HYDROCHLORIDE AND ATROPINE SULFATE 2.5; .025 MG/1; MG/1
TABLET ORAL
Qty: 30 TAB | Refills: 0 | Status: SHIPPED | OUTPATIENT
Start: 2020-06-10 | End: 2020-07-13

## 2020-06-10 RX ORDER — OXYCODONE AND ACETAMINOPHEN 5; 325 MG/1; MG/1
1 TABLET ORAL
Qty: 60 TAB | Refills: 0 | Status: SHIPPED | OUTPATIENT
Start: 2020-06-10 | End: 2020-07-10 | Stop reason: SDUPTHER

## 2020-06-10 RX ORDER — COLCHICINE 0.6 MG/1
TABLET ORAL
Qty: 30 TAB | Refills: 1 | Status: SHIPPED | OUTPATIENT
Start: 2020-06-10 | End: 2020-09-11 | Stop reason: SDUPTHER

## 2020-06-10 RX ORDER — POTASSIUM CHLORIDE 750 MG/1
TABLET, EXTENDED RELEASE ORAL
Qty: 30 TAB | Refills: 0 | Status: SHIPPED | OUTPATIENT
Start: 2020-06-10 | End: 2020-07-13

## 2020-06-10 NOTE — PROGRESS NOTES
Rl Mancuso presents today for   Chief Complaint   Patient presents with    Pain (Chronic)    Arthritis    Thyroid Problem       Is someone accompanying this pt? na    Is the patient using any DME equipment during OV? na    Depression Screening:  3 most recent PHQ Screens 3/11/2020   PHQ Not Done -   Little interest or pleasure in doing things Not at all   Feeling down, depressed, irritable, or hopeless Not at all   Total Score PHQ 2 0       Learning Assessment:  Learning Assessment 11/6/2019   PRIMARY LEARNER Patient   HIGHEST LEVEL OF EDUCATION - PRIMARY LEARNER  -   BARRIERS PRIMARY LEARNER -   CO-LEARNER CAREGIVER -   PRIMARY LANGUAGE ENGLISH   LEARNER PREFERENCE PRIMARY LISTENING   ANSWERED BY patient   RELATIONSHIP SELF       Abuse Screening:  Abuse Screening Questionnaire 12/13/2019   Do you ever feel afraid of your partner? N   Are you in a relationship with someone who physically or mentally threatens you? N   Is it safe for you to go home? Y       Fall Risk  Fall Risk Assessment, last 12 mths 12/13/2019   Able to walk? Yes   Fall in past 12 months? No   Fall with injury? -   Number of falls in past 12 months -   Fall Risk Score -       Health Maintenance reviewed and discussed and ordered per Provider. Health Maintenance Due   Topic Date Due    Lipid Screen  03/05/2020   . Coordination of Care:  1. Have you been to the ER, urgent care clinic since your last visit? Hospitalized since your last visit? no    2. Have you seen or consulted any other health care providers outside of the [address] [place] [address] since your last visit? Include any pap smears or colon screening.  no      Last  Checked na  Last UDS Checked na  Last Pain contract signed: souleymane    Patient concerns:  Med refills

## 2020-06-10 NOTE — PROGRESS NOTES
Latonya Hobbs is a 38 y.o.  male and presents with    Chief Complaint   Patient presents with    Pain (Chronic)    Arthritis    Thyroid Problem     He is evaluated via doxy. me. Consent: Latonya Hobbs, who was seen by synchronous (real-time) audio-video technology, and/or his healthcare decision maker, is aware that this patient-initiated, Telehealth encounter on 6/10/2020 is a billable service, with coverage as determined by his insurance carrier. He is aware that he may receive a bill and has provided verbal consent to proceed: Yes. Pt is at his home, and I am at [facility]. This visit was conducted as a synchronous telemedicine service rendered via real-time interactive audio and video telecommunications system. On March 11, 2020, the [facility] declared the COVID-19 (Novel Coronavirus) viral disease to be a pandemic. As a result of this emergency, a rapidly evolving situation, practice patterns for physicians, physician assistants, and nurse practitioners are shifting to accommodate the need to treat in conjunction with unprecedented guidance from federal, state, and local authorities which include, but are not limited to, self-quarantines and/or limiting physical proximity to others under any number of circumstances. It is within this context (and with the understanding that this method of patient encounter is in the patient's best interest as well as the health and safety of other patients and the public) that Andrew Daigle is being provided for this patient encounter rather than a face-to-face visit. This patient encounter is appropriate and reasonable under the circumstances given the patient's particular presentation at this time.       Subjective:  He has right side hand swelling which has been present for several days; he states that it is hot to the touch; he has been using voltaren gel without relief; he used pain patches without relief; he has used tylenol and percocet with some pain relief. He has weakness in the right hand. Osteoarthritis and Chronic Pain:  He has neuropathy and orthopedic, primarily affecting the legs and feet and hip and hand on the right side  Symptoms onset: problem is longstanding. Rheumatological ROS: ongoing significant pain in legs and feet and hip which is stable and controlled by PRN meds. Response to treatment plan: gradually worsening.  he has #3 tabs remaining.   Cardiovascular Review:  The patient has hypertension, hyperlipidemia, CHF and obesity. Diet and Ashanti John Paulim attempting to follow a low fat, low cholesterol diet, not attempting to follow a low sodium diet, does not rigorously follow a diabetic diet, sedentary, nonsmoker  Home BP Monitoring: is not measured at home. Pertinent ROS: taking medications as instructed, no medication side effects noted, no TIA's, no chest pain on exertion, no dyspnea on exertion, no swelling of ankles.      Thyroid Review:  Patient is seen for followup of hypothyroidism. Thyroid ROS: denies fatigue, weight changes, heat/cold intolerance, bowel/skin changes or CVS symptoms. ROS   General ROS: negative for - chills or fever  Psychological ROS: negative for - anxiety or depression  Ophthalmic ROS: negative for - blurry vision  ENT ROS: negative for - headaches, nasal congestion or sore throat  Endocrine ROS: negative for - polydipsia/polyuria or temperature intolerance  Respiratory ROS: no cough, shortness of breath, or wheezing  Cardiovascular ROS: no chest pain or dyspnea on exertion  Gastrointestinal ROS: no abdominal pain, change in bowel habits, or black or bloody stools  Genito-Urinary ROS: no dysuria, trouble voiding, or hematuria  Neurological ROS: positive for - numbness/tingling     All other systems reviewed and are negative      Objective: There were no vitals filed for this visit.     alert, well appearing, and in no distress, oriented to person, place, and time and obese  Mental status - normal mood, behavior, speech, dress, motor activity, and thought processes  Chest - normal work of breathing  Neurological - cranial nerves II through XII intact  Musculoskeletal - abnormal exam of right hand with edema and decreased range of motion    Assessment/Plan:    1. Gouty arthritis of right hand  Start treatment for acute pain  - colchicine 0.6 mg tablet; TAKE ONE TABLET BY MOUTH DAILY  Dispense: 30 Tab; Refill: 1    2. Chronic, continuous use of opioids  This is a chronic problem that is stable. Per review of available records and patients , there are not sign of overuse, misuse, diversion, or concerning side effects. Today we reviewed: the risk of overdose, addiction, and dependency proper storage and disposal of medications the goals of treatment (improve functionality, quality of life, and pain)  The following changes were made to the patients current treatment plan: nothing, medications refilled. - oxyCODONE-acetaminophen (PERCOCET) 5-325 mg per tablet; Take 1 Tab by mouth every six (6) hours as needed for Pain for up to 30 days. Max Daily Amount: 4 Tabs. Dispense: 60 Tab; Refill: 0    3. Chronic pain syndrome    - oxyCODONE-acetaminophen (PERCOCET) 5-325 mg per tablet; Take 1 Tab by mouth every six (6) hours as needed for Pain for up to 30 days. Max Daily Amount: 4 Tabs. Dispense: 60 Tab; Refill: 0    4. Cardiomyopathy, unspecified type [facility])  F/u with cardiology as scheduled    5. Chronic diarrhea  Improved with lomotil    6. Neuropathy  Continue treatment    7. Hypertension, unspecified type  Goal <130/80; current medications effective      Lab review: no lab studies available for review at time of visit      I have discussed the diagnosis with the patient and the intended plan as seen in the above orders. I have discussed medication side effects and warnings with the patient as well.  I have reviewed the plan of care with the patient, accepted their input and they are in agreement with the treatment goals.

## 2020-06-16 ENCOUNTER — TELEPHONE (OUTPATIENT)
Dept: FAMILY MEDICINE CLINIC | Age: 45
End: 2020-06-16

## 2020-06-16 NOTE — TELEPHONE ENCOUNTER
Denton Guevara from [employer] stated pt's insurance will not pay for Lipitor 40mg twice a day, so they are requesting a prescription for 80mg once a day.  Please assist.

## 2020-06-18 DIAGNOSIS — I21.3 ST ELEVATION MYOCARDIAL INFARCTION (STEMI), UNSPECIFIED ARTERY (HCC): Primary | ICD-10-CM

## 2020-06-18 RX ORDER — ATORVASTATIN CALCIUM 80 MG/1
80 TABLET, FILM COATED ORAL DAILY
Qty: 90 TAB | Refills: 3 | Status: SHIPPED | OUTPATIENT
Start: 2020-06-18 | End: 2021-07-06

## 2020-07-09 DIAGNOSIS — I42.9 CARDIOMYOPATHY, UNSPECIFIED TYPE (HCC): ICD-10-CM

## 2020-07-09 DIAGNOSIS — K52.9 CHRONIC DIARRHEA: ICD-10-CM

## 2020-07-10 ENCOUNTER — VIRTUAL VISIT (OUTPATIENT)
Dept: FAMILY MEDICINE CLINIC | Age: 45
End: 2020-07-10

## 2020-07-10 DIAGNOSIS — E03.9 ACQUIRED HYPOTHYROIDISM: ICD-10-CM

## 2020-07-10 DIAGNOSIS — G89.4 CHRONIC PAIN SYNDROME: ICD-10-CM

## 2020-07-10 DIAGNOSIS — F11.90 CHRONIC, CONTINUOUS USE OF OPIOIDS: ICD-10-CM

## 2020-07-10 DIAGNOSIS — I42.9 CARDIOMYOPATHY, UNSPECIFIED TYPE (HCC): Primary | ICD-10-CM

## 2020-07-10 DIAGNOSIS — G62.9 NEUROPATHY: ICD-10-CM

## 2020-07-10 DIAGNOSIS — F10.20 ALCOHOLISM (HCC): ICD-10-CM

## 2020-07-10 RX ORDER — OXYCODONE AND ACETAMINOPHEN 5; 325 MG/1; MG/1
1 TABLET ORAL
Qty: 60 TAB | Refills: 0 | Status: SHIPPED | OUTPATIENT
Start: 2020-07-10 | End: 2020-08-10 | Stop reason: SDUPTHER

## 2020-07-10 NOTE — PROGRESS NOTES
Tello Alvarez presents today for   Chief Complaint   Patient presents with    Pain (Chronic)    Arthritis    Thyroid Problem       Is someone accompanying this pt? na    Is the patient using any DME equipment during OV? na    Depression Screening:  3 most recent PHQ Screens 3/11/2020   PHQ Not Done -   Little interest or pleasure in doing things Not at all   Feeling down, depressed, irritable, or hopeless Not at all   Total Score PHQ 2 0       Learning Assessment:  Learning Assessment 11/6/2019   PRIMARY LEARNER Patient   HIGHEST LEVEL OF EDUCATION - PRIMARY LEARNER  -   BARRIERS PRIMARY LEARNER -   CO-LEARNER CAREGIVER -   PRIMARY LANGUAGE ENGLISH   LEARNER PREFERENCE PRIMARY LISTENING   ANSWERED BY patient   RELATIONSHIP SELF       Abuse Screening:  Abuse Screening Questionnaire 12/13/2019   Do you ever feel afraid of your partner? N   Are you in a relationship with someone who physically or mentally threatens you? N   Is it safe for you to go home? Y       Fall Risk  Fall Risk Assessment, last 12 mths 12/13/2019   Able to walk? Yes   Fall in past 12 months? No   Fall with injury? -   Number of falls in past 12 months -   Fall Risk Score -       Health Maintenance reviewed and discussed and ordered per Provider. Health Maintenance Due   Topic Date Due    Lipid Screen  03/05/2020   . Coordination of Care:  1. Have you been to the ER, urgent care clinic since your last visit? Hospitalized since your last visit? no    2. Have you seen or consulted any other health care providers outside of the [address] [place] [address] since your last visit? Include any pap smears or colon screening.  no      Last  Checked na  Last UDS Checked na  Last Pain contract signed: souleymane    Patient concerns today:  Med refills

## 2020-07-10 NOTE — PROGRESS NOTES
Cris De Jesus is a 38 y.o.  male and presents with    Chief Complaint   Patient presents with    Pain (Chronic)    Arthritis    Thyroid Problem     Cris De Jesus, who was evaluated through a synchronous (real-time) audio-video encounter, and/or his healthcare decision maker, is aware that it is a billable service, with coverage as determined by his insurance carrier. He provided verbal consent to proceed: Yes, and patient identification was verified. It was conducted pursuant to the emergency declaration under the [facility], [address] [place] [address] and the [employer] Act. A caregiver was present when appropriate. Ability to conduct physical exam was limited. I was in the office. The patient was at home. Subjective:  Osteoarthritis and Chronic Pain:  He has neuropathy and orthopedic, primarily affecting the legs and feet and hip and hand on the right side  Symptoms onset: problem is longstanding. Rheumatological ROS: ongoing significant pain in legs and feet and hip which is stable and controlled by PRN meds. Response to treatment plan: gradually worsening.  he has #3 tabs remaining.   Cardiovascular Review:  The patient has hypertension, hyperlipidemia, CHF and obesity. Diet and Ivonne Wilton attempting to follow a low fat, low cholesterol diet, not attempting to follow a low sodium diet, does not rigorously follow a diabetic diet, sedentary, nonsmoker  Home BP Monitoring: is not measured at home. Pertinent ROS: taking medications as instructed, no medication side effects noted, no TIA's, no chest pain on exertion, no dyspnea on exertion, no swelling of ankles.      Thyroid Review:  Patient is seen for followup of hypothyroidism.    Thyroid ROS: denies fatigue, weight changes, heat/cold intolerance, bowel/skin changes or CVS symptoms.     ROS   General ROS: negative for - chills or fever  Psychological ROS: negative for - anxiety or depression  Ophthalmic ROS: negative for - blurry vision  ENT ROS: negative for - headaches, nasal congestion or sore throat  Endocrine ROS: negative for - polydipsia/polyuria or temperature intolerance  Respiratory ROS: no cough, shortness of breath, or wheezing  Cardiovascular ROS: no chest pain or dyspnea on exertion  Gastrointestinal ROS: no abdominal pain, change in bowel habits, or black or bloody stools  Genito-Urinary ROS: no dysuria, trouble voiding, or hematuria  Neurological ROS: positive for - numbness/tingling     All other systems reviewed and are negative     Objective: There were no vitals filed for this visit. alert, well appearing, and in no distress, oriented to person, place, and time and obese  Mental status - normal mood, behavior, speech, dress, motor activity, and thought processes  Chest - normal work of breathing  Neurological - cranial nerves II through XII intact    LABS     TESTS      Assessment/Plan:    1. Chronic, continuous use of opioids  This is a chronic problem that is stable. Per review of available records and patients , there are not sign of overuse, misuse, diversion, or concerning side effects. Today we reviewed: the risk of overdose, addiction, and dependency proper storage and disposal of medications the goals of treatment (improve functionality, quality of life, and pain)  The following changes were made to the patients current treatment plan: nothing, medications refilled. - oxyCODONE-acetaminophen (PERCOCET) 5-325 mg per tablet; Take 1 Tab by mouth every six (6) hours as needed for Pain for up to 30 days. Max Daily Amount: 4 Tabs. Dispense: 60 Tab; Refill: 0    2. Chronic pain syndrome    - oxyCODONE-acetaminophen (PERCOCET) 5-325 mg per tablet; Take 1 Tab by mouth every six (6) hours as needed for Pain for up to 30 days. Max Daily Amount: 4 Tabs. Dispense: 60 Tab; Refill: 0    3.  Cardiomyopathy, unspecified type ([facility]  Continue diuretic    4. Neuropathy  Continue gabapentin    5. Acquired hypothyroidism  Therapeutic dosing    6. Alcoholism ([facility]  Encourage cessation      Lab review: no lab studies available for review at time of visit      I have discussed the diagnosis with the patient and the intended plan as seen in the above orders. I have discussed medication side effects and warnings with the patient as well. I have reviewed the plan of care with the patient, accepted their input and they are in agreement with the treatment goals.

## 2020-07-13 RX ORDER — POTASSIUM CHLORIDE 750 MG/1
TABLET, EXTENDED RELEASE ORAL
Qty: 30 TAB | Refills: 0 | Status: SHIPPED | OUTPATIENT
Start: 2020-07-13 | End: 2020-08-24

## 2020-07-13 RX ORDER — DIPHENOXYLATE HYDROCHLORIDE AND ATROPINE SULFATE 2.5; .025 MG/1; MG/1
TABLET ORAL
Qty: 30 TAB | Refills: 0 | Status: SHIPPED | OUTPATIENT
Start: 2020-07-13 | End: 2020-09-11 | Stop reason: SDUPTHER

## 2020-07-23 RX ORDER — METOPROLOL TARTRATE 25 MG/1
25 TABLET, FILM COATED ORAL 2 TIMES DAILY
Qty: 180 TAB | Refills: 2 | Status: SHIPPED | OUTPATIENT
Start: 2020-07-23 | End: 2021-09-04

## 2020-07-27 ENCOUNTER — VIRTUAL VISIT (OUTPATIENT)
Dept: CARDIOLOGY CLINIC | Age: 45
End: 2020-07-27

## 2020-07-29 ENCOUNTER — VIRTUAL VISIT (OUTPATIENT)
Dept: CARDIOLOGY CLINIC | Age: 45
End: 2020-07-29

## 2020-07-29 NOTE — PROGRESS NOTES
Davin Jung presents today for   Chief Complaint   Patient presents with    Follow-up       Davin Jung preferred language for health care discussion is english/other. Is someone accompanying this pt? No     Is the patient using any DME equipment during OV? No     Depression Screening:  3 most recent PHQ Screens 3/11/2020   PHQ Not Done -   Little interest or pleasure in doing things Not at all   Feeling down, depressed, irritable, or hopeless Not at all   Total Score PHQ 2 0       Learning Assessment:  Learning Assessment 11/6/2019   PRIMARY LEARNER Patient   HIGHEST LEVEL OF EDUCATION - PRIMARY LEARNER  -   BARRIERS PRIMARY LEARNER -   CO-LEARNER CAREGIVER -   PRIMARY LANGUAGE ENGLISH   LEARNER PREFERENCE PRIMARY LISTENING   ANSWERED BY patient   RELATIONSHIP SELF       Abuse Screening:  Abuse Screening Questionnaire 12/13/2019   Do you ever feel afraid of your partner? N   Are you in a relationship with someone who physically or mentally threatens you? N   Is it safe for you to go home? Y       Fall Risk  Fall Risk Assessment, last 12 mths 12/13/2019   Able to walk? Yes   Fall in past 12 months? No   Fall with injury? -   Number of falls in past 12 months -   Fall Risk Score -       Pt currently taking Anticoagulant therapy? No    Coordination of Care:  1. Have you been to the ER, urgent care clinic since your last visit? Hospitalized since your last visit? Yes; hernia repair in december    2. Have you seen or consulted any other health care providers outside of the [address] [place] [address] since your last visit? Include any pap smears or colon screening.   no

## 2020-08-10 ENCOUNTER — VIRTUAL VISIT (OUTPATIENT)
Dept: FAMILY MEDICINE CLINIC | Age: 45
End: 2020-08-10

## 2020-08-10 DIAGNOSIS — R73.9 HYPERGLYCEMIA: ICD-10-CM

## 2020-08-10 DIAGNOSIS — I42.9 CARDIOMYOPATHY, UNSPECIFIED TYPE (HCC): ICD-10-CM

## 2020-08-10 DIAGNOSIS — R55 SYNCOPE AND COLLAPSE: Primary | ICD-10-CM

## 2020-08-10 DIAGNOSIS — E78.00 HYPERCHOLESTEREMIA: ICD-10-CM

## 2020-08-10 DIAGNOSIS — E03.9 ACQUIRED HYPOTHYROIDISM: ICD-10-CM

## 2020-08-10 DIAGNOSIS — F11.90 CHRONIC, CONTINUOUS USE OF OPIOIDS: ICD-10-CM

## 2020-08-10 DIAGNOSIS — F10.20 ALCOHOLISM (HCC): ICD-10-CM

## 2020-08-10 DIAGNOSIS — G62.9 NEUROPATHY: ICD-10-CM

## 2020-08-10 DIAGNOSIS — G89.4 CHRONIC PAIN SYNDROME: ICD-10-CM

## 2020-08-10 RX ORDER — OXYCODONE AND ACETAMINOPHEN 5; 325 MG/1; MG/1
1 TABLET ORAL
Qty: 60 TAB | Refills: 0 | Status: SHIPPED | OUTPATIENT
Start: 2020-08-10 | End: 2020-09-11 | Stop reason: SDUPTHER

## 2020-08-10 NOTE — PROGRESS NOTES
Luca Lomas is a 38 y.o.  male and presents with    Chief Complaint   Patient presents with    Pain (Chronic)    Arthritis    Thyroid Problem     Luca Lomas, who was evaluated through a synchronous (real-time) audio-video encounter, and/or his healthcare decision maker, is aware that it is a billable service, with coverage as determined by his insurance carrier. He provided verbal consent to proceed: Yes, and patient identification was verified. It was conducted pursuant to the emergency declaration under the [address] [place] [address], [address] [place] [address] and the [email or web address] General Act. A caregiver was present when appropriate. Ability to conduct physical exam was limited. I was in the office. The patient was at home. Subjective:  He reports that he had an episode of syncope about 1 week ago. He reports that a friend caught him. He reports that he got up quickly and everything went white and he was on the ground. He reports that he struck his elbow in the fall but that the pain is improving; no deformity. He denies change in blood pressure. This is the 3rd or 4th episode. Osteoarthritis and Chronic Pain:  He has neuropathy and orthopedic, primarily affecting the legs and feet and hip and hand on the right side  Symptoms onset: problem is longstanding. Rheumatological ROS: ongoing significant pain in legs and feet and hip which is stable and controlled by PRN meds. Response to treatment plan: gradually worsening.  he has #3 tabs remaining.   Cardiovascular Review:  The patient has hypertension, hyperlipidemia, CHF and obesity. Hanane Jernigan attempting to follow a low fat, low cholesterol diet, not attempting to follow a low sodium diet, does not rigorously follow a diabetic diet, sedentary, nonsmoker  Home BP Monitoring: is not measured at home.   Pertinent ROS: taking medications as instructed, no medication side effects noted, no TIA's, no chest pain on exertion, no dyspnea on exertion, no swelling of ankles.      Thyroid Review:  Patient is seen for followup of hypothyroidism. Thyroid ROS: denies fatigue, weight changes, heat/cold intolerance, bowel/skin changes or CVS symptoms.     ROS   General ROS: negative for - chills or fever  Psychological ROS: negative for - anxiety or depression  Ophthalmic ROS: negative for - blurry vision  ENT ROS: negative for - headaches, nasal congestion or sore throat  Endocrine ROS: negative for - polydipsia/polyuria or temperature intolerance  Respiratory ROS: no cough, shortness of breath, or wheezing  Cardiovascular ROS: no chest pain or dyspnea on exertion  Gastrointestinal ROS: no abdominal pain, change in bowel habits, or black or bloody stools  Genito-Urinary ROS: no dysuria, trouble voiding, or hematuria  Neurological ROS: positive for - numbness/tingling; syncopal episodes as per above.     All other systems reviewed and are negative      Objective: There were no vitals filed for this visit. alert, well appearing, and in no distress, oriented to person, place, and time and obese  Mental status - normal mood, behavior, speech, dress, motor activity, and thought processes  Chest - normal work of breathing  Neurological - cranial nerves II through XII intact  LABS     TESTS      Assessment/Plan:    1. Syncope and collapse  Assess for cardiogenic syncope  - NUCLEAR CARDIAC STRESS TEST; Future    2. Chronic, continuous use of opioids  This is a chronic problem that is stable. Per review of available records and patients , there are not sign of overuse, misuse, diversion, or concerning side effects.  Today we reviewed: the risk of overdose, addiction, and dependency proper storage and disposal of medications the goals of treatment (improve functionality, quality of life, and pain)  The following changes were made to the patients current treatment plan: nothing, medications refilled. - oxyCODONE-acetaminophen (PERCOCET) 5-325 mg per tablet; Take 1 Tab by mouth every six (6) hours as needed for Pain for up to 30 days. Max Daily Amount: 4 Tabs. Dispense: 60 Tab; Refill: 0    3. Chronic pain syndrome    - oxyCODONE-acetaminophen (PERCOCET) 5-325 mg per tablet; Take 1 Tab by mouth every six (6) hours as needed for Pain for up to 30 days. Max Daily Amount: 4 Tabs. Dispense: 60 Tab; Refill: 0    4. Cardiomyopathy, unspecified type ([facility]    - LIPID PANEL; Future  - HEMOGLOBIN A1C WITH EAG; Future    5. Neuropathy    - NUCLEAR CARDIAC STRESS TEST; Future  - oxyCODONE-acetaminophen (PERCOCET) 5-325 mg per tablet; Take 1 Tab by mouth every six (6) hours as needed for Pain for up to 30 days. Max Daily Amount: 4 Tabs. Dispense: 60 Tab; Refill: 0  - TSH 3RD GENERATION; Future  - LIPID PANEL; Future  - HEMOGLOBIN A1C WITH EAG; Future    6. Acquired hypothyroidism    - TSH 3RD GENERATION; Future    7. Alcoholism ([facility]      8. Hyperglycemia    - HEMOGLOBIN A1C WITH EAG; Future    9. Hypercholesteremia    - LIPID PANEL; Future      Lab review: orders written for new lab studies as appropriate; see orders      I have discussed the diagnosis with the patient and the intended plan as seen in the above orders. I have discussed medication side effects and warnings with the patient as well. I have reviewed the plan of care with the patient, accepted their input and they are in agreement with the treatment goals.

## 2020-08-10 NOTE — PROGRESS NOTES
Danielle Mom presents today for   Chief Complaint   Patient presents with    Pain (Chronic)    Arthritis    Thyroid Problem       Is someone accompanying this pt? na    Is the patient using any DME equipment during OV? na    Depression Screening:  3 most recent PHQ Screens 3/11/2020   PHQ Not Done -   Little interest or pleasure in doing things Not at all   Feeling down, depressed, irritable, or hopeless Not at all   Total Score PHQ 2 0       Learning Assessment:  Learning Assessment 11/6/2019   PRIMARY LEARNER Patient   HIGHEST LEVEL OF EDUCATION - PRIMARY LEARNER  -   BARRIERS PRIMARY LEARNER -   CO-LEARNER CAREGIVER -   PRIMARY LANGUAGE ENGLISH   LEARNER PREFERENCE PRIMARY LISTENING   ANSWERED BY patient   RELATIONSHIP SELF       Abuse Screening:  Abuse Screening Questionnaire 12/13/2019   Do you ever feel afraid of your partner? N   Are you in a relationship with someone who physically or mentally threatens you? N   Is it safe for you to go home? Y       Fall Risk  Fall Risk Assessment, last 12 mths 12/13/2019   Able to walk? Yes   Fall in past 12 months? No   Fall with injury? -   Number of falls in past 12 months -   Fall Risk Score -       Health Maintenance reviewed and discussed and ordered per Provider. Health Maintenance Due   Topic Date Due    Lipid Screen  03/05/2020    Influenza Age 5 to Adult  08/01/2020   . Coordination of Care:  1. Have you been to the ER, urgent care clinic since your last visit? Hospitalized since your last visit? no    2. Have you seen or consulted any other health care providers outside of the [address] [place] [address] since your last visit? Include any pap smears or colon screening.  no      Last  Checked na  Last UDS Checked na  Last Pain contract signed: na    Patients concerns today: med refills

## 2020-08-14 ENCOUNTER — HOSPITAL ENCOUNTER (OUTPATIENT)
Dept: NUCLEAR MEDICINE | Age: 45
Discharge: HOME | End: 2020-08-14
Attending: FAMILY MEDICINE
Payer: MEDICARE

## 2020-08-14 ENCOUNTER — HOSPITAL ENCOUNTER (OUTPATIENT)
Dept: LAB | Age: 45
Discharge: HOME | End: 2020-08-14
Attending: FAMILY MEDICINE
Payer: MEDICARE

## 2020-08-14 ENCOUNTER — HOSPITAL ENCOUNTER (OUTPATIENT)
Dept: NON INVASIVE DIAGNOSTICS | Age: 45
Discharge: HOME | End: 2020-08-14
Attending: FAMILY MEDICINE
Payer: MEDICARE

## 2020-08-14 VITALS
SYSTOLIC BLOOD PRESSURE: 134 MMHG | HEIGHT: 74 IN | WEIGHT: 294.2 LBS | DIASTOLIC BLOOD PRESSURE: 83 MMHG | BODY MASS INDEX: 37.76 KG/M2

## 2020-08-14 DIAGNOSIS — R73.9 HYPERGLYCEMIA: ICD-10-CM

## 2020-08-14 DIAGNOSIS — G62.9 NEUROPATHY: ICD-10-CM

## 2020-08-14 DIAGNOSIS — I42.9 CARDIOMYOPATHY, UNSPECIFIED TYPE (HCC): ICD-10-CM

## 2020-08-14 DIAGNOSIS — R55 SYNCOPE AND COLLAPSE: ICD-10-CM

## 2020-08-14 DIAGNOSIS — E03.9 ACQUIRED HYPOTHYROIDISM: ICD-10-CM

## 2020-08-14 DIAGNOSIS — E78.00 HYPERCHOLESTEREMIA: ICD-10-CM

## 2020-08-14 LAB
BASOPHILS # BLD: 0 K/UL (ref 0–0.1)
BASOPHILS NFR BLD: 0 % (ref 0–2)
CHOLEST SERPL-MCNC: 140 MG/DL
DIFFERENTIAL METHOD BLD: ABNORMAL
EOSINOPHIL # BLD: 0.4 K/UL (ref 0–0.4)
EOSINOPHIL NFR BLD: 6 % (ref 0–5)
ERYTHROCYTE [DISTWIDTH] IN BLOOD BY AUTOMATED COUNT: 17.6 % (ref 11.6–14.5)
EST. AVERAGE GLUCOSE BLD GHB EST-MCNC: 103 MG/DL
HBA1C MFR BLD: 5.2 % (ref 4.2–5.6)
HCT VFR BLD AUTO: 41.2 % (ref 36–48)
HDLC SERPL-MCNC: 82 MG/DL (ref 40–60)
HDLC SERPL: 1.7 {RATIO} (ref 0–5)
HGB BLD-MCNC: 12.7 G/DL (ref 13–16)
LDLC SERPL CALC-MCNC: 32 MG/DL (ref 0–100)
LIPID PROFILE,FLP: ABNORMAL
LYMPHOCYTES # BLD: 1.6 K/UL (ref 0.9–3.6)
LYMPHOCYTES NFR BLD: 29 % (ref 21–52)
MCH RBC QN AUTO: 26.2 PG (ref 24–34)
MCHC RBC AUTO-ENTMCNC: 30.8 G/DL (ref 31–37)
MCV RBC AUTO: 84.9 FL (ref 74–97)
MONOCYTES # BLD: 0.6 K/UL (ref 0.05–1.2)
MONOCYTES NFR BLD: 10 % (ref 3–10)
NEUTS SEG # BLD: 3.1 K/UL (ref 1.8–8)
NEUTS SEG NFR BLD: 55 % (ref 40–73)
PLATELET # BLD AUTO: 182 K/UL (ref 135–420)
PMV BLD AUTO: 10.5 FL (ref 9.2–11.8)
RBC # BLD AUTO: 4.85 M/UL (ref 4.7–5.5)
STRESS BASELINE HR: 55 BPM
STRESS ESTIMATED WORKLOAD: 1.3 METS
STRESS EXERCISE DUR MIN: NORMAL
STRESS PEAK DIAS BP: 99 MMHG
STRESS PEAK SYS BP: 181 MMHG
STRESS PERCENT HR ACHIEVED: 71 %
STRESS POST PEAK HR: 125 BPM
STRESS RATE PRESSURE PRODUCT: NORMAL BPM*MMHG
STRESS ST DEPRESSION: 0 MM
STRESS ST ELEVATION: 0 MM
STRESS TARGET HR: 175 BPM
TRIGL SERPL-MCNC: 130 MG/DL (ref ?–150)
TSH SERPL DL<=0.05 MIU/L-ACNC: 1.48 UIU/ML (ref 0.36–3.74)
VLDLC SERPL CALC-MCNC: 26 MG/DL
WBC # BLD AUTO: 5.6 K/UL (ref 4.6–13.2)

## 2020-08-14 PROCEDURE — 84443 ASSAY THYROID STIM HORMONE: CPT

## 2020-08-14 PROCEDURE — 93017 CV STRESS TEST TRACING ONLY: CPT

## 2020-08-14 PROCEDURE — A9500 TC99M SESTAMIBI: HCPCS

## 2020-08-14 PROCEDURE — 85025 COMPLETE CBC W/AUTO DIFF WBC: CPT

## 2020-08-14 PROCEDURE — 83036 HEMOGLOBIN GLYCOSYLATED A1C: CPT

## 2020-08-14 PROCEDURE — 74011250636 HC RX REV CODE- 250/636: Performed by: FAMILY MEDICINE

## 2020-08-14 PROCEDURE — 36415 COLL VENOUS BLD VENIPUNCTURE: CPT

## 2020-08-14 PROCEDURE — 80061 LIPID PANEL: CPT

## 2020-08-14 RX ADMIN — REGADENOSON 0.4 MG: 0.08 INJECTION, SOLUTION INTRAVENOUS at 10:16

## 2020-08-17 DIAGNOSIS — E03.9 ACQUIRED HYPOTHYROIDISM: ICD-10-CM

## 2020-08-17 RX ORDER — LEVOTHYROXINE SODIUM 175 UG/1
TABLET ORAL
Qty: 90 TAB | Refills: 0 | Status: SHIPPED | OUTPATIENT
Start: 2020-08-17 | End: 2020-11-19

## 2020-08-24 DIAGNOSIS — I42.9 CARDIOMYOPATHY, UNSPECIFIED TYPE (HCC): ICD-10-CM

## 2020-08-24 RX ORDER — POTASSIUM CHLORIDE 750 MG/1
TABLET, EXTENDED RELEASE ORAL
Qty: 30 TAB | Refills: 0 | Status: SHIPPED | OUTPATIENT
Start: 2020-08-24 | End: 2020-10-02

## 2020-09-11 ENCOUNTER — VIRTUAL VISIT (OUTPATIENT)
Dept: FAMILY MEDICINE CLINIC | Age: 45
End: 2020-09-11

## 2020-09-11 DIAGNOSIS — G89.4 CHRONIC PAIN SYNDROME: ICD-10-CM

## 2020-09-11 DIAGNOSIS — E03.9 HYPOTHYROIDISM, UNSPECIFIED TYPE: ICD-10-CM

## 2020-09-11 DIAGNOSIS — K21.9 GASTROESOPHAGEAL REFLUX DISEASE WITHOUT ESOPHAGITIS: Primary | ICD-10-CM

## 2020-09-11 DIAGNOSIS — I10 HYPERTENSION, UNSPECIFIED TYPE: ICD-10-CM

## 2020-09-11 DIAGNOSIS — F11.90 CHRONIC, CONTINUOUS USE OF OPIOIDS: ICD-10-CM

## 2020-09-11 DIAGNOSIS — G62.9 NEUROPATHY: ICD-10-CM

## 2020-09-11 DIAGNOSIS — K52.9 CHRONIC DIARRHEA: ICD-10-CM

## 2020-09-11 DIAGNOSIS — M17.0 BILATERAL PRIMARY OSTEOARTHRITIS OF KNEE: ICD-10-CM

## 2020-09-11 DIAGNOSIS — I42.9 CARDIOMYOPATHY, UNSPECIFIED TYPE (HCC): ICD-10-CM

## 2020-09-11 DIAGNOSIS — I21.3 ST ELEVATION MYOCARDIAL INFARCTION (STEMI), UNSPECIFIED ARTERY (HCC): ICD-10-CM

## 2020-09-11 DIAGNOSIS — M10.9 GOUTY ARTHRITIS OF RIGHT HAND: ICD-10-CM

## 2020-09-11 RX ORDER — COLCHICINE 0.6 MG/1
TABLET ORAL
Qty: 30 TAB | Refills: 1 | Status: SHIPPED | OUTPATIENT
Start: 2020-09-11 | End: 2020-11-23

## 2020-09-11 RX ORDER — PANTOPRAZOLE SODIUM 40 MG/1
40 TABLET, DELAYED RELEASE ORAL 2 TIMES DAILY
Qty: 180 TAB | Refills: 4 | Status: SHIPPED | OUTPATIENT
Start: 2020-09-11 | End: 2021-12-23

## 2020-09-11 RX ORDER — DIPHENOXYLATE HYDROCHLORIDE AND ATROPINE SULFATE 2.5; .025 MG/1; MG/1
TABLET ORAL
Qty: 30 TAB | Refills: 2 | Status: SHIPPED | OUTPATIENT
Start: 2020-09-11 | End: 2020-12-16

## 2020-09-11 RX ORDER — OXYCODONE AND ACETAMINOPHEN 5; 325 MG/1; MG/1
1 TABLET ORAL
Qty: 60 TAB | Refills: 0 | Status: SHIPPED | OUTPATIENT
Start: 2020-09-11 | End: 2020-10-09 | Stop reason: SDUPTHER

## 2020-09-11 NOTE — PROGRESS NOTES
Colby Benitez is a 38 y.o.  male and presents with    Chief Complaint   Patient presents with    Pain (Chronic)    Arthritis    Thyroid Problem     Colby Benitez, who was evaluated through a synchronous (real-time) audio-video encounter, and/or his healthcare decision maker, is aware that it is a billable service, with coverage as determined by his insurance carrier. He provided verbal consent to proceed: Yes, and patient identification was verified. It was conducted pursuant to the emergency declaration under the [address] [place] [address], [address] [place] [address] and the [employer] Act. A caregiver was present when appropriate. Ability to conduct physical exam was limited. I was in the office. The patient was at home. Subjective:    Osteoarthritis and Chronic Pain:  He has neuropathy and orthopedic, he reports that his knees gave out on him last week; he is requesting a cortisone shot; pain primarily affecting the legs and feet and hip and hand on the right side  Symptoms onset: problem is longstanding. Rheumatological ROS: ongoing significant pain in legs and feet and hip which is stable and controlled by PRN meds. Response to treatment plan: gradually worsening.  he has #3 tabs remaining.   Cardiovascular Review:  The patient has hypertension, hyperlipidemia, CHF and obesity. Diet and Sunni Cornel attempting to follow a low fat, low cholesterol diet, not attempting to follow a low sodium diet, does not rigorously follow a diabetic diet, sedentary, nonsmoker  Home BP Monitoring: is not measured at home. Pertinent ROS: taking medications as instructed, no medication side effects noted, no TIA's, no chest pain on exertion, no dyspnea on exertion, no swelling of ankles.      Thyroid Review:  Patient is seen for followup of hypothyroidism.    Thyroid ROS: denies fatigue, weight changes, heat/cold intolerance, bowel/skin changes or CVS symptoms.     ROS   General ROS: negative for - chills or fever  Psychological ROS: negative for - anxiety or depression  Ophthalmic ROS: negative for - blurry vision  ENT ROS: negative for - headaches, nasal congestion or sore throat  Endocrine ROS: negative for - polydipsia/polyuria or temperature intolerance  Respiratory ROS: no cough, shortness of breath, or wheezing  Cardiovascular ROS: no chest pain or dyspnea on exertion  Gastrointestinal ROS: no abdominal pain, change in bowel habits, or black or bloody stools  Genito-Urinary ROS: no dysuria, trouble voiding, or hematuria  Neurological ROS: positive for - numbness/tingling; syncopal episodes as per above.     All other systems reviewed and are negative     Objective: There were no vitals filed for this visit. alert, well appearing, and in no distress, oriented to person, place, and time and obese  Mental status - normal mood, behavior, speech, dress, motor activity, and thought processes  Chest - normal work of breathing  Neurological - cranial nerves II through XII intact    LABS   hgb a1c 5.2  LDL 32  TESTS      Assessment/Plan:    1. Chronic diarrhea  Continue treatment  - diphenoxylate-atropine (LOMOTIL) 2.5-0.025 mg per tablet; TAKE ONE TABLET BY MOUTH FOUR TIMES A DAY AS NEEDED FOR DIARRHEA *MAX OF 4 TABLETS DAILY  Dispense: 30 Tab; Refill: 2    2. Gouty arthritis of right hand  Continue treatment  - colchicine 0.6 mg tablet; TAKE ONE TABLET BY MOUTH DAILY  Dispense: 30 Tab; Refill: 1    3. Cardiomyopathy, unspecified type [facility])  F/u with cardiology    4. Hypertension, unspecified type  Goal <130/80    5. Hypothyroidism, unspecified type  Therapeutic dose of levothyroxine    6. ST elevation myocardial infarction (STEMI), unspecified artery ([facility] [address]  F/u with cardiology    7. Chronic, continuous use of opioids  This is a chronic problem that is worsened.   Per review of available records and patients , there are not sign of overuse, misuse, diversion, or concerning side effects. Today we reviewed: the risk of overdose, addiction, and dependency proper storage and disposal of medications the goals of treatment (improve functionality, quality of life, and pain)  The following changes were made to the patients current treatment plan: nothing, medications refilled  appt scheduled for bilateral knee injections. - oxyCODONE-acetaminophen (PERCOCET) 5-325 mg per tablet; Take 1 Tab by mouth every six (6) hours as needed for Pain for up to 30 days. Max Daily Amount: 4 Tabs. Dispense: 60 Tab; Refill: 0    8. Chronic pain syndrome    - oxyCODONE-acetaminophen (PERCOCET) 5-325 mg per tablet; Take 1 Tab by mouth every six (6) hours as needed for Pain for up to 30 days. Max Daily Amount: 4 Tabs. Dispense: 60 Tab; Refill: 0    9. Neuropathy    - oxyCODONE-acetaminophen (PERCOCET) 5-325 mg per tablet; Take 1 Tab by mouth every six (6) hours as needed for Pain for up to 30 days. Max Daily Amount: 4 Tabs. Dispense: 60 Tab; Refill: 0    10. Gastroesophageal reflux disease without esophagitis  ppi ordered  - pantoprazole (PROTONIX) 40 mg tablet; Take 1 Tab by mouth two (2) times a day. Dispense: 180 Tab; Refill: 4    11. Bilateral primary osteoarthritis of knee  Assess for appropriate use of controlled medications  - 11-DRUG SCREEN, URINE; Future      Lab review: labs reviewed, I note that glycosylated hemoglobin normal, lipids LDL result meets goal, HDL normal, triglycerides normal, TSH normal, orders written for new lab studies as appropriate; see orders      I have discussed the diagnosis with the patient and the intended plan as seen in the above orders. I have discussed medication side effects and warnings with the patient as well. I have reviewed the plan of care with the patient, accepted their input and they are in agreement with the treatment goals.

## 2020-09-11 NOTE — PROGRESS NOTES
Anita Pierce presents today for   Chief Complaint   Patient presents with    Pain (Chronic)    Arthritis    Thyroid Problem       Is someone accompanying this pt? na    Is the patient using any DME equipment during OV? na    Depression Screening:  3 most recent PHQ Screens 9/11/2020   PHQ Not Done -   Little interest or pleasure in doing things Not at all   Feeling down, depressed, irritable, or hopeless Not at all   Total Score PHQ 2 0       Learning Assessment:  Learning Assessment 11/6/2019   PRIMARY LEARNER Patient   HIGHEST LEVEL OF EDUCATION - PRIMARY LEARNER  -   BARRIERS PRIMARY LEARNER -   CO-LEARNER CAREGIVER -   PRIMARY LANGUAGE ENGLISH   LEARNER PREFERENCE PRIMARY LISTENING   ANSWERED BY patient   RELATIONSHIP SELF       Abuse Screening:  Abuse Screening Questionnaire 12/13/2019   Do you ever feel afraid of your partner? N   Are you in a relationship with someone who physically or mentally threatens you? N   Is it safe for you to go home? Y       Fall Risk  Fall Risk Assessment, last 12 mths 12/13/2019   Able to walk? Yes   Fall in past 12 months? No   Fall with injury? -   Number of falls in past 12 months -   Fall Risk Score -       Health Maintenance reviewed and discussed and ordered per Provider. Health Maintenance Due   Topic Date Due    Flu Vaccine (1) 09/01/2020   . Coordination of Care:  1. Have you been to the ER, urgent care clinic since your last visit? Hospitalized since your last visit? no    2. Have you seen or consulted any other health care providers outside of the [address] [place] [address] since your last visit? Include any pap smears or colon screening.  no      Last  Checked na  Last UDS Checked na  Last Pain contract signed: na    Patients concerns today:  Med refills

## 2020-09-18 ENCOUNTER — HOSPITAL ENCOUNTER (EMERGENCY)
Age: 45
Discharge: HOME | End: 2020-09-18
Attending: EMERGENCY MEDICINE
Payer: MEDICARE

## 2020-09-18 VITALS
TEMPERATURE: 98.5 F | HEIGHT: 74 IN | BODY MASS INDEX: 37.22 KG/M2 | OXYGEN SATURATION: 98 % | HEART RATE: 98 BPM | WEIGHT: 290 LBS | RESPIRATION RATE: 16 BRPM | SYSTOLIC BLOOD PRESSURE: 132 MMHG | DIASTOLIC BLOOD PRESSURE: 82 MMHG

## 2020-09-18 DIAGNOSIS — S81.811A LACERATION OF RIGHT LOWER LEG, INITIAL ENCOUNTER: Primary | ICD-10-CM

## 2020-09-18 PROCEDURE — 99283 EMERGENCY DEPT VISIT LOW MDM: CPT

## 2020-09-18 PROCEDURE — 74011250637 HC RX REV CODE- 250/637: Performed by: EMERGENCY MEDICINE

## 2020-09-18 PROCEDURE — 90471 IMMUNIZATION ADMIN: CPT

## 2020-09-18 PROCEDURE — 90715 TDAP VACCINE 7 YRS/> IM: CPT | Performed by: EMERGENCY MEDICINE

## 2020-09-18 PROCEDURE — 75810000293 HC SIMP/SUPERF WND  RPR

## 2020-09-18 PROCEDURE — 74011250636 HC RX REV CODE- 250/636: Performed by: EMERGENCY MEDICINE

## 2020-09-18 RX ORDER — LIDOCAINE HYDROCHLORIDE 20 MG/ML
0.3 INJECTION, SOLUTION INFILTRATION; PERINEURAL ONCE
Status: DISCONTINUED | OUTPATIENT
Start: 2020-09-18 | End: 2020-09-19 | Stop reason: HOSPADM

## 2020-09-18 RX ORDER — CEPHALEXIN 500 MG/1
500 CAPSULE ORAL 4 TIMES DAILY
Qty: 20 CAP | Refills: 0 | Status: SHIPPED | OUTPATIENT
Start: 2020-09-18 | End: 2020-09-23

## 2020-09-18 RX ORDER — BACITRACIN ZINC 500 [USP'U]/G
1 CREAM TOPICAL
Status: DISCONTINUED | OUTPATIENT
Start: 2020-09-18 | End: 2020-09-19 | Stop reason: HOSPADM

## 2020-09-18 RX ORDER — CEPHALEXIN 250 MG/1
500 CAPSULE ORAL
Status: COMPLETED | OUTPATIENT
Start: 2020-09-18 | End: 2020-09-18

## 2020-09-18 RX ORDER — LIDOCAINE HYDROCHLORIDE 20 MG/ML
INJECTION, SOLUTION EPIDURAL; INFILTRATION; INTRACAUDAL; PERINEURAL
Status: DISCONTINUED
Start: 2020-09-18 | End: 2020-09-19 | Stop reason: HOSPADM

## 2020-09-18 RX ADMIN — CEPHALEXIN 500 MG: 250 CAPSULE ORAL at 21:55

## 2020-09-18 RX ADMIN — TETANUS TOXOID, REDUCED DIPHTHERIA TOXOID AND ACELLULAR PERTUSSIS VACCINE, ADSORBED 0.5 ML: 5; 2.5; 8; 8; 2.5 SUSPENSION INTRAMUSCULAR at 21:53

## 2020-09-19 NOTE — ED PROVIDER NOTES
Patient is a 79-year-old male with multiple medical problems who states that he was drinking alcohol today 5 shots of rum. He was with his friends he got up and hit a wall. And had a laceration to his leg. No head injury no loss of consciousness patient is a good historian. Tetanus is up-to-date. The history is provided by the patient. No  was used. Laceration    The incident occurred less than 1 hour ago. The laceration is located on the right leg. The laceration is 11-20 cm in size. The injury mechanism is other. Foreign body present: no. The pain is at a severity of 3/10. The pain is moderate. The pain has been constant since onset. Pertinent negatives include no numbness, no tingling, no weakness, no loss of motion, no coolness and no discoloration. The patient's last tetanus shot was less than 5 years ago. Past Medical History:   Diagnosis Date    Cardiomyopathy [facility]) 7/21/2014    Chronic pain syndrome 12/5/2018    Gastric bypass status for obesity 8/8/2011    Heart failure ([facility] [address]     History of ST elevation myocardial infarction (STEMI) 12/5/2018    HTN (hypertension) 8/8/2011    Hypothyroid 8/8/2011    Ill-defined condition     Alcoholism    STEMI (ST elevation myocardial infarction) ([facility] [address]     08/2018       Past Surgical History:   Procedure Laterality Date    CARDIAC SURG PROCEDURE UNLIST  2018    Stent placed Coronary Artery     COLONOSCOPY N/A 10/9/2019    COLONOSCOPY performed by Nico Smyth MD at [facility]    HX GASTRIC BYPASS  2015    HX LAP GASTRIC BYPASS  2011         History reviewed. No pertinent family history.     Social History     Socioeconomic History    Marital status: SINGLE     Spouse name: Not on file    Number of children: Not on file    Years of education: Not on file    Highest education level: Not on file   Occupational History    Not on file   Social Needs    Financial resource strain: Not on file    Food insecurity Worry: Not on file     Inability: Not on file    Transportation needs     Medical: Not on file     Non-medical: Not on file   Tobacco Use    Smoking status: Never Smoker    Smokeless tobacco: Never Used   Substance and Sexual Activity    Alcohol use: Yes     Alcohol/week: 6.0 standard drinks     Types: 6 Cans of beer per week     Comment: Daily    Drug use: Yes     Types: Marijuana     Comment: last use 10/21/2019    Sexual activity: Yes   Lifestyle    Physical activity     Days per week: Not on file     Minutes per session: Not on file    Stress: Not on file   Relationships    Social connections     Talks on phone: Not on file     Gets together: Not on file     Attends Rastafari service: Not on file     Active member of club or organization: Not on file     Attends meetings of clubs or organizations: Not on file     Relationship status: Not on file    Intimate partner violence     Fear of current or ex partner: Not on file     Emotionally abused: Not on file     Physically abused: Not on file     Forced sexual activity: Not on file   Other Topics Concern    Not on file   Social History Narrative    Not on file         ALLERGIES: Patient has no known allergies. Review of Systems   Eyes: Negative. Respiratory: Negative. Cardiovascular: Negative. Gastrointestinal: Negative. Genitourinary: Negative. Musculoskeletal: Negative. Skin: Positive for wound. Neurological: Negative. Negative for tingling, weakness and numbness. All other systems reviewed and are negative. Vitals:    09/18/20 2040   BP: 132/82   Pulse: 98   Resp: 16   Temp: 98.5 °F (36.9 °C)   SpO2: 98%   Weight: 131.5 kg (290 lb)   Height: 6' 2\" (1.88 m)            Physical Exam  Vitals signs and nursing note reviewed. Constitutional:       Appearance: Normal appearance. HENT:      Head: Normocephalic and atraumatic.       Nose: Nose normal.      Mouth/Throat:      Mouth: Mucous membranes are moist.   Neck: Musculoskeletal: Normal range of motion and neck supple. Cardiovascular:      Rate and Rhythm: Normal rate and regular rhythm. Pulses: Normal pulses. Heart sounds: Normal heart sounds. Pulmonary:      Effort: Pulmonary effort is normal.      Breath sounds: Normal breath sounds. Abdominal:      General: There is no distension. Palpations: Abdomen is soft. There is no mass. Tenderness: There is no abdominal tenderness. There is no right CVA tenderness, left CVA tenderness, guarding or rebound. Hernia: No hernia is present. Skin:     General: Skin is warm. Capillary Refill: Capillary refill takes less than 2 seconds. Comments: Patient has a large laceration to right shin no foreign body noted normal color. Laceration is approximately 8 cm vertical and 7 cm horizontal.  It makes the shape of a right angle. Mild bleeding   Neurological:      General: No focal deficit present. Mental Status: He is alert and oriented to person, place, and time. MDM  Number of Diagnoses or Management Options  Diagnosis management comments: 50-year-old with laceration tetanus up-to-date will give antibiotics discharge home patient tolerated procedure well. Patient's tetanus is not up-to-date will give tetanus    Risk of Complications, Morbidity, and/or Mortality  Presenting problems: moderate  Diagnostic procedures: moderate  Management options: low    Patient Progress  Patient progress: improved         Wound Closure by Adhesive    Date/Time: 9/18/2020 9:26 PM  Performed by: Raymundo Sarmiento MD  Authorized by: Raymundo Sarmiento MD     Consent:     Consent obtained:  Verbal    Consent given by:  Patient    Risks discussed:  Pain, infection, need for additional repair, nerve damage, poor wound healing, poor cosmetic result, tendon damage, vascular damage and retained foreign body    Alternatives discussed:  No treatment  Anesthesia (see MAR for exact dosages):      Anesthesia method: Local infiltration    Local anesthetic:  Lidocaine 2% w/o epi  Laceration details:     Location:  Leg    Leg location:  R lower leg    Length (cm):  20    Depth (mm):  1  Repair type:     Repair type:  Simple  Pre-procedure details:     Preparation:  Patient was prepped and draped in usual sterile fashion  Exploration:     Hemostasis achieved with:  Direct pressure    Wound exploration: wound explored through full range of motion and entire depth of wound probed and visualized      Wound extent: no areolar tissue violation noted, no fascia violation noted, no foreign bodies/material noted, no muscle damage noted, no nerve damage noted, no tendon damage noted, no underlying fracture noted and no vascular damage noted      Contaminated: no    Treatment:     Area cleansed with:  Betadine    Amount of cleaning:  Standard    Irrigation solution:  Sterile saline    Irrigation volume:  100     Irrigation method:  Pressure wash    Visualized foreign bodies/material removed: no    Skin repair:     Repair method:  Sutures    Suture size:  3-0    Suture material:  Prolene    Suture technique:  Simple interrupted    Number of sutures:  15  Approximation:     Approximation:  Close  Post-procedure details:     Dressing:  Antibiotic ointment and bulky dressing    Patient tolerance of procedure:   Tolerated well, no immediate complications

## 2020-09-19 NOTE — ED TRIAGE NOTES
Patient has large laceration to right leg, patient not sure where he got it from, states \"I have had a bit to drink tonight\". Patient thinks he cut his leg on a wooden porch but not for sure. Last tetanus shot unknown.

## 2020-10-01 DIAGNOSIS — I42.9 CARDIOMYOPATHY, UNSPECIFIED TYPE (HCC): ICD-10-CM

## 2020-10-02 RX ORDER — POTASSIUM CHLORIDE 750 MG/1
TABLET, EXTENDED RELEASE ORAL
Qty: 30 TAB | Refills: 0 | Status: SHIPPED | OUTPATIENT
Start: 2020-10-02 | End: 2020-11-04 | Stop reason: SDUPTHER

## 2020-10-09 ENCOUNTER — OFFICE VISIT (OUTPATIENT)
Dept: FAMILY MEDICINE CLINIC | Age: 45
End: 2020-10-09
Payer: MEDICARE

## 2020-10-09 VITALS
TEMPERATURE: 98.6 F | RESPIRATION RATE: 16 BRPM | WEIGHT: 286 LBS | BODY MASS INDEX: 36.7 KG/M2 | HEART RATE: 70 BPM | DIASTOLIC BLOOD PRESSURE: 90 MMHG | SYSTOLIC BLOOD PRESSURE: 145 MMHG | HEIGHT: 74 IN | OXYGEN SATURATION: 100 %

## 2020-10-09 DIAGNOSIS — T14.8XXA WOUND INFECTION: ICD-10-CM

## 2020-10-09 DIAGNOSIS — I21.3 ST ELEVATION MYOCARDIAL INFARCTION (STEMI), UNSPECIFIED ARTERY (HCC): ICD-10-CM

## 2020-10-09 DIAGNOSIS — G89.4 CHRONIC PAIN SYNDROME: ICD-10-CM

## 2020-10-09 DIAGNOSIS — I42.9 CARDIOMYOPATHY, UNSPECIFIED TYPE (HCC): ICD-10-CM

## 2020-10-09 DIAGNOSIS — Z48.02 VISIT FOR SUTURE REMOVAL: ICD-10-CM

## 2020-10-09 DIAGNOSIS — Z23 NEEDS FLU SHOT: ICD-10-CM

## 2020-10-09 DIAGNOSIS — Z71.89 ADVANCE CARE PLANNING: ICD-10-CM

## 2020-10-09 DIAGNOSIS — S81.811A LACERATION OF RIGHT LEG EXCLUDING THIGH, INITIAL ENCOUNTER: ICD-10-CM

## 2020-10-09 DIAGNOSIS — Z00.00 MEDICARE ANNUAL WELLNESS VISIT, SUBSEQUENT: ICD-10-CM

## 2020-10-09 DIAGNOSIS — F10.20 ALCOHOLISM (HCC): ICD-10-CM

## 2020-10-09 DIAGNOSIS — M17.0 PRIMARY OSTEOARTHRITIS OF BOTH KNEES: Primary | ICD-10-CM

## 2020-10-09 DIAGNOSIS — L08.9 WOUND INFECTION: ICD-10-CM

## 2020-10-09 DIAGNOSIS — F11.90 CHRONIC, CONTINUOUS USE OF OPIOIDS: ICD-10-CM

## 2020-10-09 DIAGNOSIS — G62.9 NEUROPATHY: ICD-10-CM

## 2020-10-09 PROCEDURE — G0463 HOSPITAL OUTPT CLINIC VISIT: HCPCS | Performed by: FAMILY MEDICINE

## 2020-10-09 PROCEDURE — G0444 DEPRESSION SCREEN ANNUAL: HCPCS | Performed by: FAMILY MEDICINE

## 2020-10-09 PROCEDURE — 20610 DRAIN/INJ JOINT/BURSA W/O US: CPT | Performed by: FAMILY MEDICINE

## 2020-10-09 PROCEDURE — 99497 ADVNCD CARE PLAN 30 MIN: CPT | Performed by: FAMILY MEDICINE

## 2020-10-09 PROCEDURE — G8755 DIAS BP > OR = 90: HCPCS | Performed by: FAMILY MEDICINE

## 2020-10-09 PROCEDURE — G8427 DOCREV CUR MEDS BY ELIG CLIN: HCPCS | Performed by: FAMILY MEDICINE

## 2020-10-09 PROCEDURE — G8753 SYS BP > OR = 140: HCPCS | Performed by: FAMILY MEDICINE

## 2020-10-09 PROCEDURE — 17250 CHEM CAUT OF GRANLTJ TISSUE: CPT | Performed by: FAMILY MEDICINE

## 2020-10-09 PROCEDURE — G8417 CALC BMI ABV UP PARAM F/U: HCPCS | Performed by: FAMILY MEDICINE

## 2020-10-09 PROCEDURE — G0439 PPPS, SUBSEQ VISIT: HCPCS | Performed by: FAMILY MEDICINE

## 2020-10-09 PROCEDURE — G8432 DEP SCR NOT DOC, RNG: HCPCS | Performed by: FAMILY MEDICINE

## 2020-10-09 PROCEDURE — 99215 OFFICE O/P EST HI 40 MIN: CPT | Performed by: FAMILY MEDICINE

## 2020-10-09 PROCEDURE — 90686 IIV4 VACC NO PRSV 0.5 ML IM: CPT

## 2020-10-09 RX ORDER — CEPHALEXIN 500 MG/1
500 CAPSULE ORAL 4 TIMES DAILY
Qty: 40 CAP | Refills: 0 | Status: SHIPPED | OUTPATIENT
Start: 2020-10-09 | End: 2020-10-19

## 2020-10-09 RX ORDER — TRIAMCINOLONE ACETONIDE 40 MG/ML
40 INJECTION, SUSPENSION INTRA-ARTICULAR; INTRAMUSCULAR ONCE
Qty: 1 ML | Refills: 0
Start: 2020-10-09 | End: 2020-10-09

## 2020-10-09 RX ORDER — OXYCODONE AND ACETAMINOPHEN 5; 325 MG/1; MG/1
1 TABLET ORAL
Qty: 60 TAB | Refills: 0 | Status: SHIPPED | OUTPATIENT
Start: 2020-10-09 | End: 2021-07-01 | Stop reason: SDUPTHER

## 2020-10-09 NOTE — ACP (ADVANCE CARE PLANNING)
Advance Care Planning (ACP) Provider Note - Comprehensive      Date of ACP Conversation: 10/09/2020  Persons included in Conversation:  patient and POA  Length of ACP Conversation in minutes:  16 minutes     Authorized Decision Maker (if patient is incapable of making informed decisions): This person is:       Primary Decision Maker: Chriss Cuellar - his mother - [phone]                                                    General ACP for ALL Patients with Decision Making Capacity:   Importance of advance care planning, including choosing a healthcare agent to communicate patient's healthcare decisions if patient lost the ability to make decisions, such as after a sudden illness or accident  Understanding of the healthcare agent role was assessed and information provided  Exploration of values, goals, and preferences if recovery is not expected, even with continued medical treatment in the event of: Imminent death  Severe, permanent brain injury  \"In these circumstances, what matters most to you? \"  Care focused more on comfort or quality of life. \"What, if any, treatments would you want to avoid? \" none  Opportunity offered to explore how cultural, Islam, spiritual, or personal beliefs would affect decisions for future care      Review of Existing Advance Directive:  What information were you given about medical decisions to consider before completing your advance directive?  he named his medical power of  prior to procedures     For Serious or Chronic Illness:  Understanding of medical condition       Interventions Provided:  Recommended completion of Advance Directive form after review of ACP materials and conversation with prospective healthcare agent

## 2020-10-09 NOTE — PROGRESS NOTES
Arthur Haddad presents today for   Chief Complaint   Patient presents with    Knee Pain    Suture Removal       Is someone accompanying this pt? no    Is the patient using any DME equipment during OV? no    Depression Screening:  3 most recent PHQ Screens 10/9/2020   PHQ Not Done -   Little interest or pleasure in doing things Not at all   Feeling down, depressed, irritable, or hopeless Not at all   Total Score PHQ 2 0       Learning Assessment:  Learning Assessment 11/6/2019   PRIMARY LEARNER Patient   HIGHEST LEVEL OF EDUCATION - PRIMARY LEARNER  -   BARRIERS PRIMARY LEARNER -   CO-LEARNER CAREGIVER -   PRIMARY LANGUAGE ENGLISH   LEARNER PREFERENCE PRIMARY LISTENING   ANSWERED BY patient   RELATIONSHIP SELF       Abuse Screening:  Abuse Screening Questionnaire 12/13/2019   Do you ever feel afraid of your partner? N   Are you in a relationship with someone who physically or mentally threatens you? N   Is it safe for you to go home? Y       Fall Risk  Fall Risk Assessment, last 12 mths 10/9/2020   Able to walk? Yes   Fall in past 12 months? Yes   Fall with injury? Yes   Number of falls in past 12 months -   Fall Risk Score -       Health Maintenance reviewed and discussed and ordered per Provider. Health Maintenance Due   Topic Date Due    Flu Vaccine (1) 09/01/2020    Medicare Yearly Exam  10/14/2020   . Coordination of Care:  1. Have you been to the ER, urgent care clinic since your last visit? Hospitalized since your last visit? Yes, 9/18/20, [facility] ER, right leg laceration    2. Have you seen or consulted any other health care providers outside of the [address] [place] [address] since your last visit? Include any pap smears or colon screening. no      Last  Checked na  Last UDS Checked na  Last Pain contract signed: na    Patient presents in office today for routine care. Patient concerns: bilateral knee injections, stitch removal, and med refills.

## 2020-10-09 NOTE — PROGRESS NOTES
(AWV) The  Medicare Annual Wellness Exam PROGRESS NOTE    This is a Medicare Annual Wellness Exam (AWV)   I have reviewed the patient's medical history in detail and updated the computerized patient record. Terrance Katz is a 54 y.o.  male and presents for an annual wellness exam       ROS   General ROS: negative for - chills or fever  Psychological ROS: negative for - anxiety or depression  Ophthalmic ROS: negative for - blurry vision  ENT ROS: negative for - headaches, nasal congestion or sore throat  Endocrine ROS: negative for - polydipsia/polyuria or temperature intolerance  Respiratory ROS: no cough, shortness of breath, or wheezing  Cardiovascular ROS: no chest pain or dyspnea on exertion  Gastrointestinal ROS: no abdominal pain, change in bowel habits, or black or bloody stools  Genito-Urinary ROS: no dysuria, trouble voiding, or hematuria  Neurological ROS: positive for - numbness/tingling; syncopal episodes as per above. Dermatological ROS: right lower leg laceration; he was drinking 3 weeks ago and had 5 shots of rum.   He struck the coffee table with his leg and went to the ER for evaluation  All other systems reviewed and are negative    History     Past Medical History:   Diagnosis Date    Cardiomyopathy ([address] 7/21/2014    Chronic pain syndrome 12/5/2018    Gastric bypass status for obesity 8/8/2011    Heart failure ([address]     History of ST elevation myocardial infarction (STEMI) 12/5/2018    HTN (hypertension) 8/8/2011    Hypothyroid 8/8/2011    Ill-defined condition     Alcoholism    STEMI (ST elevation [facility] infarction) ([facility]     08/2018      Past Surgical History:   Procedure Laterality Date    CARDIAC SURG PROCEDURE UNLIST  2018    Stent placed Coronary Artery     COLONOSCOPY N/A 10/9/2019    COLONOSCOPY performed by Itz Lawrence [facility] at [facility] ENDOSCOPY    HX GASTRIC BYPASS  2015    HX LAP GASTRIC BYPASS  2011     Current Outpatient Medications Medication Sig Dispense Refill    triamcinolone acetonide (Kenalog) 40 mg/mL injection 1 mL by Intra artICUlar route once for 1 dose. 1 mL 0    triamcinolone acetonide (Kenalog) 40 mg/mL injection 1 mL by Intra artICUlar route once for 1 dose. 1 mL 0    potassium chloride (KLOR-CON) 10 mEq tablet TAKE ONE TABLET BY MOUTH DAILY 30 Tab 0    diphenoxylate-atropine (LOMOTIL) 2.5-0.025 mg per tablet TAKE ONE TABLET BY MOUTH FOUR TIMES A DAY AS NEEDED FOR DIARRHEA *MAX OF 4 TABLETS DAILY 30 Tab 2    colchicine 0.6 mg tablet TAKE ONE TABLET BY MOUTH DAILY 30 Tab 1    pantoprazole (PROTONIX) 40 mg tablet Take 1 Tab by mouth two (2) times a day. 180 Tab 4    oxyCODONE-acetaminophen (PERCOCET) 5-325 mg per tablet Take 1 Tab by mouth every six (6) hours as needed for Pain for up to 30 days. Max Daily Amount: 4 Tabs. 60 Tab 0    levothyroxine (SYNTHROID) 175 mcg tablet TAKE ONE TABLET BY MOUTH DAILY BEFORE BREAKFAST 90 Tab 0    metoprolol tartrate (LOPRESSOR) 25 mg tablet Take 1 Tab by mouth two (2) times a day. 180 Tab 2    atorvastatin (LIPITOR) 80 mg tablet Take 1 Tab by mouth daily. 90 Tab 3    aspirin delayed-release 81 mg tablet Take 1 Tab by mouth daily. 90 Tab 4    gabapentin (NEURONTIN) 800 mg tablet TAKE ONE TABLET BY MOUTH THREE TIMES A DAY 90 Tab 5    enalapril (VASOTEC) 20 mg tablet TAKE ONE TABLET BY MOUTH DAILY (Patient taking differently: 10 mg two (2) times a day.) 90 Tab 4    clopidogrel (PLAVIX) 75 mg tab Take 75 mg by mouth.  furosemide (LASIX) 20 mg tablet Take 1 Tab by mouth daily. (Patient taking differently: Take 20 mg by mouth daily as needed.) 30 Tab 0     No Known Allergies  No family history on file. Social History     Tobacco Use    Smoking status: Never Smoker    Smokeless tobacco: Never Used   Substance Use Topics    Alcohol use:  Yes     Alcohol/week: 6.0 standard drinks     Types: 6 Cans of beer per week     Comment: Daily     Patient Active Problem List   Diagnosis Code  Gastric bypass status for obesity Z98.84    HTN (hypertension) I10    Hypothyroid E03.9    Post corneal transplant Z94.7    Cardiomyopathy ([facility] I42.9    History of ST elevation myocardial infarction (STEMI) I25.2    Chronic pain syndrome G89.4    Severe obesity (HCC) E66.01    S/P primary angioplasty with coronary stent Z95.5    Pain in right buttock M79.18    Peripheral vascular disease (HCC) I73.9       Health Maintenance History  Immunizations reviewed, dtap up to date , pneumovax up to date, flu up to date, zoster n/a  Colonoscopy: n/a,   Eye exam: n/a    Depression Risk Factor Screening:      Patient Health Questionnaire (PHQ-2)   Over the last 2 weeks, how often have you been bothered by any of the following problems? · Little interest or pleasure in doing things? · Not at all. [0]  · Feeling down, depressed, or hopeless? · Several days. [1]    Total Score: 1/6  PHQ-2 Assessment Scoring:   A score of 2 or more requires further screening with the PHQ-9    Alcohol Risk Factor Screening:     Men: On any occasion during the past 3 months, have you had more than 4 drinks containing alcohol?  no  Do you average more than 14 drinks per week?  no    Functional Ability and Level of Safety:     Hearing Loss    Hearing is good. Activities of Daily Living   Self-care. Requires assistance with: no ADLs    Fall Risk   Forgets limitations (15 pts)  Score: 15    Abuse Screen   Patient is not abused    Examination   Physical Examination  Vitals:    10/09/20 0911   BP: (!) 145/90   Pulse: 70   Resp: 16   Temp: 98.6 °F (37 °C)   TempSrc: Oral   SpO2: 100%   Weight: 286 lb (129.7 kg)   Height: 6' 2\" (1.88 m)   PainSc:  10 - Worst pain ever   PainLoc: Generalized      Body mass index is 36.72 kg/m².      Evaluation of Cognitive Function:  Mood/affect:anxious  Appearance: well kempt  Family member/caregiver input: n/a    alert, well appearing, and in no distress, oriented to person, place, and time and obese    Patient Care Team:  Wojciech Magdaleno MD as PCP - General (Family Medicine)  Wojciech Magdaleno MD as PCP - [address] [place] [address]  [facility] Provider  Laura Ambrosio MD (Orthopedic Surgery)  Rosales Charlton MD (General Surgery)  Nick Hunt MD (Ophthalmology)  Vanessa Martinez MD (Cardiology)  Suzanna Copeland MD (Physical Medicine and Rehabilitation)    End-of-life planning  Advanced Directive in the case than an injury or illness causes the patient to be unable to make health care decisions    Health Care Directive or Living Will: no    Advice/Referrals/Counselling/Plan:   Education and counseling provided:  End-of-Life planning (with patient's consent)  Influenza Vaccine  Cardiovascular screening blood test  Diabetes screening test  Include in education list (weight loss, physical activity, smoking cessation, fall prevention, and nutrition)    ICD-10-CM ICD-9-CM    1. Primary osteoarthritis of both knees  M17.0 715.16 DRAIN/INJECT LARGE JOINT/BURSA      TRIAMCINOLONE ACETONIDE INJ      triamcinolone acetonide (Kenalog) 40 mg/mL injection      TRIAMCINOLONE ACETONIDE INJ      triamcinolone acetonide (Kenalog) 40 mg/mL injection   2. Medicare annual wellness visit, subsequent  Z00.00 V70.0 75682 Score The Board   3. Advance care planning  Z71.89 V65.49 ADVANCE CARE PLANNING FIRST 30 MINS   4. Cardiomyopathy, unspecified type ([facility]  I42.9 425.4    5. ST elevation myocardial infarction (STEMI), unspecified artery (HCC)  I21.3 410.90    6. Neuropathy  G62.9 355.9 oxyCODONE-acetaminophen (PERCOCET) 5-325 mg per tablet   7. Alcoholism ([facility]  F10.20 303.90    8. Chronic, continuous use of opioids  F11.90 305.51 oxyCODONE-acetaminophen (PERCOCET) 5-325 mg per tablet   9. Chronic pain syndrome  G89.4 338.4 oxyCODONE-acetaminophen (PERCOCET) 5-325 mg per tablet   10. Needs flu shot  Z23 V04.81 INFLUENZA VIRUS VAC QUAD,SPLIT,PRESV FREE SYRINGE IM      ADMIN INFLUENZA VIRUS VAC   11. Wound infection  T14. 8XXA 958.3 cephALEXin (KEFLEX) 500 mg capsule    L08.9     12. Laceration of right leg excluding thigh, initial encounter  S81.811A 891.0    13. Visit for suture removal  Z48.02 V58.32    .  Brief written plan, checklist    I have discussed the diagnosis with the patient and the intended plan as seen in the above orders. The patient has received an after-visit summary and questions were answered concerning future plans. I have discussed medication side effects and warnings with the patient as well. I have reviewed the plan of care with the patient, accepted their input and they are in agreement with the treatment goals. ____________________________________________________________    Problem Assessment    for treatment of   Chief Complaint   Patient presents with    Knee Pain    Suture Removal         SUBJECTIVE    Well Adult Physical   Patient here for a comprehensive physical exam.The patient reports problems - bilateral knee pain, right lower leg laceration, heart failure, obesity  Do you take any herbs or supplements that were not prescribed by a doctor? no Are you taking calcium supplements? no Are you taking aspirin daily? no    Osteoarthritis and Chronic Pain:  He has neuropathy and orthopedic, he reports that his knees gave out on him last week; he is requesting a cortisone shot; pain primarily affecting the legs and feet and hip and hand on the right side  Symptoms onset: problem is longstanding. Rheumatological ROS: ongoing significant pain in legs and feet and hip which is stable and controlled by PRN meds. Response to treatment plan: gradually worsening.  he has #3 tabs remaining.   Cardiovascular Review:  The patient has hypertension, hyperlipidemia, CHF and obesity.   Diet and Brett Foster attempting to follow a low fat, low cholesterol diet, not attempting to follow a low sodium diet, does not rigorously follow a diabetic diet, sedentary, nonsmoker  Home BP Monitoring: is not measured at home.  Pertinent ROS: taking medications as instructed, no medication side effects noted, no TIA's, no chest pain on exertion, no dyspnea on exertion, no swelling of ankles.      Thyroid Review:  Patient is seen for followup of hypothyroidism. Thyroid ROS: denies fatigue, weight changes, heat/cold intolerance, bowel/skin changes or CVS symptoms.       Visit Vitals  BP (!) 145/90 (BP 1 Location: Right arm, BP Patient Position: Sitting)   Pulse 70   Temp 98.6 °F (37 °C) (Oral)   Resp 16   Ht 6' 2\" (1.88 m)   Wt 286 lb (129.7 kg)   SpO2 100%   BMI 36.72 kg/m²     General:  Alert, cooperative, no distress, appears stated age. Head:  Normocephalic, without obvious abnormality, atraumatic. Eyes:  Conjunctivae/corneas clear. PERRL, EOMs intact. Ears:  Normal TMs and external ear canals both ears. Nose: Nares normal. Septum midline. Mucosa normal. No drainage or sinus tenderness. Throat: Lips, mucosa, and tongue normal. Teeth and gums normal.   Neck: Supple, symmetrical, trachea midline, no adenopathy, thyroid: no enlargement/tenderness/nodules   Back:   Symmetric, no curvature. ROM normal. No CVA tenderness. Lungs:   Clear to auscultation bilaterally. Chest wall:  No tenderness or deformity. Heart:  Regular rate and rhythm, S1, S2 normal, no murmur, click, rub or gallop. Abdomen:   Soft, non-tender. Bowel sounds normal. No masses,  No organomegaly. Genitalia:  deferred   Rectal:  deferred   Extremities: Extremities normal, atraumatic, no cyanosis or edema. Pulses: 2+ and symmetric all extremities. Skin: Right lower leg with 9 cm laceration with 15 sutures and eschar and surrounding erythema and edema   Lymph nodes: Cervical, supraclavicular, and axillary nodes normal.   Neurologic: CNII-XII intact. Normal strength, sensation and reflexes throughout.        LABS     TESTS  [facility]  OFFICE PROCEDURE PROGRESS NOTE        Chart reviewed for the following:   Victor Manuel Tran MD, have reviewed the History, Physical and updated the Allergic reactions for [address] performed immediately prior to start of procedure:   Delmi Hilton MD, have performed the following reviews on Ada Boudreaux prior to the start of the procedure:            * Patient was identified by name and date of birth   * Agreement on procedure being performed was verified  * Risks and Benefits explained to the patient  * Procedure site verified and marked as necessary  * Patient was positioned for comfort  * Understanding confirmed and consent was signed and verified     Time: .10:07 AM       Date of procedure: 10/9/2020    Procedure performed by:  Tati Robles MD    Provider assisted by: Suki Moore LPN    Patient assisted by: self    How tolerated by patient: tolerated the procedure well with no complications    Comments: none    after obtaining informed consent the both knees were prepped in sterile fashion; ethyl chloride used for topical anesthesia; 3 cc 1:1:1 marcaine 0.5%, lidocaine 1% without epi and kenalog 40 mg/cc injected into each knee joint; EBL < 1 cc; post procedure pain 8/10    15 suture (s) were removed by  MD without difficulty. Wound edges were well approximated. Steri-strips were used. There was evidence of infection. Patient did tolerate well. Assessment/Plan:    1. Primary osteoarthritis of both knees  Immediate improvement  - DRAIN/INJECT LARGE JOINT/BURSA  - TRIAMCINOLONE ACETONIDE INJ  - triamcinolone acetonide (Kenalog) 40 mg/mL injection; 1 mL by Intra artICUlar route once for 1 dose. Dispense: 1 mL; Refill: 0  - TRIAMCINOLONE ACETONIDE INJ  - triamcinolone acetonide (Kenalog) 40 mg/mL injection; 1 mL by Intra artICUlar route once for 1 dose. Dispense: 1 mL; Refill: 0    2. Medicare annual wellness visit, subsequent  Reviewed preventive recommendations  - [address] [employer]    3.  Advance care planning  See ACP  - ADVANCE CARE PLANNING FIRST 30 MINS    4. Cardiomyopathy, unspecified type [facility])  F/u with cardiology    5. ST elevation myocardial infarction (STEMI), unspecified artery ([facility]  F/u with cardiology; start aspirin    6. Neuropathy  This is a chronic problem that is worsened. Per review of available records and patients , there are not sign of overuse, misuse, diversion, or concerning side effects. Today we reviewed: the risk of overdose, addiction, and dependency proper storage and disposal of medications the goals of treatment (improve functionality, quality of life, and pain)  The following changes were made to the patients current treatment plan: medications adjusted, see orders. - oxyCODONE-acetaminophen (PERCOCET) 5-325 mg per tablet; Take 1 Tab by mouth every six (6) hours as needed for Pain for up to 30 days. Max Daily Amount: 4 Tabs. Dispense: 60 Tab; Refill: 0    7. Alcoholism ([facility]  Counseled on stopping alcohol use; he reports determination to stop; he will enroll in an outpatient treatment facility    8. Chronic, continuous use of opioids    - oxyCODONE-acetaminophen (PERCOCET) 5-325 mg per tablet; Take 1 Tab by mouth every six (6) hours as needed for Pain for up to 30 days. Max Daily Amount: 4 Tabs. Dispense: 60 Tab; Refill: 0    9. Chronic pain syndrome    - oxyCODONE-acetaminophen (PERCOCET) 5-325 mg per tablet; Take 1 Tab by mouth every six (6) hours as needed for Pain for up to 30 days. Max Daily Amount: 4 Tabs. Dispense: 60 Tab; Refill: 0    10. Needs flu shot    - INFLUENZA VIRUS VAC QUAD,SPLIT,PRESV FREE SYRINGE IM  - ADMIN INFLUENZA VIRUS VAC    11. Wound infection  Start abx  - cephALEXin (KEFLEX) 500 mg capsule; Take 1 Cap by mouth four (4) times daily for 10 days. Dispense: 40 Cap; Refill: 0    12. Laceration of right leg excluding thigh, initial encounter  Sutures removed    13.  Visit for suture removal      Lab review: no lab studies available for review at time of visit

## 2020-10-19 DIAGNOSIS — G62.9 NEUROPATHY: ICD-10-CM

## 2020-10-19 RX ORDER — GABAPENTIN 800 MG/1
TABLET ORAL
Qty: 90 TAB | Refills: 4 | Status: SHIPPED | OUTPATIENT
Start: 2020-10-19 | End: 2021-04-29

## 2020-11-04 DIAGNOSIS — I42.9 CARDIOMYOPATHY, UNSPECIFIED TYPE (HCC): ICD-10-CM

## 2020-11-04 RX ORDER — POTASSIUM CHLORIDE 750 MG/1
TABLET, EXTENDED RELEASE ORAL
Qty: 30 TAB | Refills: 0 | Status: SHIPPED | OUTPATIENT
Start: 2020-11-04 | End: 2020-12-16

## 2020-11-12 ENCOUNTER — VIRTUAL VISIT (OUTPATIENT)
Dept: FAMILY MEDICINE CLINIC | Age: 45
End: 2020-11-12
Payer: MEDICARE

## 2020-11-12 DIAGNOSIS — F11.90 CHRONIC, CONTINUOUS USE OF OPIOIDS: ICD-10-CM

## 2020-11-12 DIAGNOSIS — G89.4 CHRONIC PAIN SYNDROME: ICD-10-CM

## 2020-11-12 DIAGNOSIS — I42.9 CARDIOMYOPATHY, UNSPECIFIED TYPE (HCC): Primary | ICD-10-CM

## 2020-11-12 DIAGNOSIS — F10.20 ALCOHOLISM (HCC): ICD-10-CM

## 2020-11-12 DIAGNOSIS — G62.9 NEUROPATHY: ICD-10-CM

## 2020-11-12 DIAGNOSIS — I21.3 ST ELEVATION MYOCARDIAL INFARCTION (STEMI), UNSPECIFIED ARTERY (HCC): ICD-10-CM

## 2020-11-12 PROCEDURE — G0463 HOSPITAL OUTPT CLINIC VISIT: HCPCS | Performed by: FAMILY MEDICINE

## 2020-11-12 PROCEDURE — G8756 NO BP MEASURE DOC: HCPCS | Performed by: FAMILY MEDICINE

## 2020-11-12 PROCEDURE — 99214 OFFICE O/P EST MOD 30 MIN: CPT | Performed by: FAMILY MEDICINE

## 2020-11-12 PROCEDURE — G8432 DEP SCR NOT DOC, RNG: HCPCS | Performed by: FAMILY MEDICINE

## 2020-11-12 PROCEDURE — G8427 DOCREV CUR MEDS BY ELIG CLIN: HCPCS | Performed by: FAMILY MEDICINE

## 2020-11-12 RX ORDER — NALTREXONE HYDROCHLORIDE 50 MG/1
50 TABLET, FILM COATED ORAL DAILY
COMMUNITY
End: 2021-07-01 | Stop reason: ALTCHOICE

## 2020-11-12 NOTE — PROGRESS NOTES
Leonardo Mcguire is a 54 y.o.  male and presents with    Chief Complaint   Patient presents with    Pain (Chronic)     Leonardo Mcguire, who was evaluated through a synchronous (real-time) audio-video encounter, and/or his healthcare decision maker, is aware that it is a billable service, with coverage as determined by his insurance carrier. He provided verbal consent to proceed: Yes, and patient identification was verified. It was conducted pursuant to the emergency declaration under the [facility] [place]note 2[facility], [address] [place] [address] and the [employer] General Act. A caregiver was present when appropriate. Ability to conduct physical exam was limited. I was in the office. The patient was at home. Subjective:  He is seeking assistance with alcohol dependence; he has started clean slate and is not taking naltrexone.       Osteoarthritis and Chronic Pain:  He has neuropathy and orthopedic, he reports that his knees gave out on him last week; he is requesting a cortisone shot; pain primarily affecting the legs and feet and hip and hand on the right side  Symptoms onset: problem is longstanding. Rheumatological ROS: ongoing significant pain in legs and feet and hip which is stable and controlled by PRN meds. Response to treatment plan: gradually worsening.  he has #3 tabs remaining.   Cardiovascular Review:  The patient has hypertension, hyperlipidemia, CHF and obesity. Diet and Merida Pique attempting to follow a low fat, low cholesterol diet, not attempting to follow a low sodium diet, does not rigorously follow a diabetic diet, sedentary, nonsmoker  Home BP Monitoring: is not measured at home.   Pertinent ROS: taking medications as instructed, no medication side effects noted, no TIA's, no chest pain on exertion, no dyspnea on exertion, no swelling of ankles.      Thyroid Review:  Patient is seen for followup of hypothyroidism. Thyroid ROS: denies fatigue, weight changes, heat/cold intolerance, bowel/skin changes or CVS symptoms. ROS   General ROS: negative for - chills or fever  Psychological ROS: negative for - anxiety or depression  Ophthalmic ROS: negative for - blurry vision  ENT ROS: negative for - headaches, nasal congestion or sore throat  Endocrine ROS: negative for - polydipsia/polyuria or temperature intolerance  Respiratory ROS: no cough, shortness of breath, or wheezing  Cardiovascular ROS: no chest pain or dyspnea on exertion  Gastrointestinal ROS: no abdominal pain, change in bowel habits, or black or bloody stools  Genito-Urinary ROS: no dysuria, trouble voiding, or hematuria  Neurological ROS: positive for - numbness/tingling; syncopal episodes as per above. Dermatological ROS: well healing  All other systems reviewed and are negative    Objective: There were no vitals filed for this visit. alert, well appearing, and in no distress, oriented to person, place, and time and morbidly obese  Mental status - depressed mood  Chest - normal work of breathing  Neurological - cranial nerves II through XII intact    LABS     TESTS      Assessment/Plan:    1. Chronic, continuous use of opioids  Stop opioids at this time due to use of naltrexone and with improvement s/p corticosteroid injection in knee    2. Chronic pain syndrome      3. Neuropathy  gabapentin    4. Cardiomyopathy, unspecified type [facility])  F/u with cardiology as scheduled    5. Alcoholism ([facility]  Start naltrexone and f/u with addiction medicine    6. ST elevation myocardial infarction (STEMI), unspecified artery ([facility]  F/u with cardiology      Lab review: orders written for new lab studies as appropriate; see orders      I have discussed the diagnosis with the patient and the intended plan as seen in the above orders. I have discussed medication side effects and warnings with the patient as well.  I have reviewed the plan of care with the patient, accepted their input and they are in agreement with the treatment goals.

## 2020-11-12 NOTE — PROGRESS NOTES
Ildefonso Bland presents today for   Chief Complaint   Patient presents with    Pain (Chronic)       Is someone accompanying this pt? na    Is the patient using any DME equipment during OV? na    Depression Screening:  3 most recent PHQ Screens 11/12/2020   PHQ Not Done -   Little interest or pleasure in doing things Not at all   Feeling down, depressed, irritable, or hopeless Not at all   Total Score PHQ 2 0       Learning Assessment:  Learning Assessment 11/6/2019   PRIMARY LEARNER Patient   HIGHEST LEVEL OF EDUCATION - PRIMARY LEARNER  -   BARRIERS PRIMARY LEARNER -   CO-LEARNER CAREGIVER -   PRIMARY LANGUAGE ENGLISH   LEARNER PREFERENCE PRIMARY LISTENING   ANSWERED BY patient   RELATIONSHIP SELF       Abuse Screening:  Abuse Screening Questionnaire 12/13/2019   Do you ever feel afraid of your partner? N   Are you in a relationship with someone who physically or mentally threatens you? N   Is it safe for you to go home? Y       Fall Risk  Fall Risk Assessment, last 12 mths 10/9/2020   Able to walk? Yes   Fall in past 12 months? Yes   Fall with injury? Yes   Number of falls in past 12 months -   Fall Risk Score -       Health Maintenance reviewed and discussed and ordered per Provider. There are no preventive care reminders to display for this patient. .      Coordination of Care:  1. Have you been to the ER, urgent care clinic since your last visit? Hospitalized since your last visit? no    2. Have you seen or consulted any other health care providers outside of the [address] [place] [address] [place] since your last visit? Include any pap smears or colon screening.  no      Last  Checked na  Last UDS Checked na  Last Pain contract signed: na    Patients concerns today:  Med refills

## 2020-11-23 DIAGNOSIS — M10.9 GOUTY ARTHRITIS OF RIGHT HAND: ICD-10-CM

## 2020-11-23 RX ORDER — COLCHICINE 0.6 MG/1
TABLET ORAL
Qty: 30 TAB | Refills: 0 | Status: SHIPPED | OUTPATIENT
Start: 2020-11-23 | End: 2021-01-05

## 2020-12-15 DIAGNOSIS — I42.9 CARDIOMYOPATHY, UNSPECIFIED TYPE (HCC): ICD-10-CM

## 2020-12-15 DIAGNOSIS — K52.9 CHRONIC DIARRHEA: ICD-10-CM

## 2020-12-16 ENCOUNTER — VIRTUAL VISIT (OUTPATIENT)
Dept: FAMILY MEDICINE CLINIC | Age: 45
End: 2020-12-16
Payer: MEDICARE

## 2020-12-16 DIAGNOSIS — K52.9 CHRONIC DIARRHEA: Primary | ICD-10-CM

## 2020-12-16 DIAGNOSIS — E03.9 ACQUIRED HYPOTHYROIDISM: ICD-10-CM

## 2020-12-16 DIAGNOSIS — I42.9 CARDIOMYOPATHY, UNSPECIFIED TYPE (HCC): ICD-10-CM

## 2020-12-16 DIAGNOSIS — G89.4 CHRONIC PAIN SYNDROME: ICD-10-CM

## 2020-12-16 DIAGNOSIS — G62.9 NEUROPATHY: ICD-10-CM

## 2020-12-16 DIAGNOSIS — F10.20 ALCOHOLISM (HCC): ICD-10-CM

## 2020-12-16 DIAGNOSIS — M10.9 GOUTY ARTHRITIS OF RIGHT HAND: ICD-10-CM

## 2020-12-16 PROCEDURE — 99213 OFFICE O/P EST LOW 20 MIN: CPT | Performed by: FAMILY MEDICINE

## 2020-12-16 PROCEDURE — G8756 NO BP MEASURE DOC: HCPCS | Performed by: FAMILY MEDICINE

## 2020-12-16 PROCEDURE — G8510 SCR DEP NEG, NO PLAN REQD: HCPCS | Performed by: FAMILY MEDICINE

## 2020-12-16 PROCEDURE — G8428 CUR MEDS NOT DOCUMENT: HCPCS | Performed by: FAMILY MEDICINE

## 2020-12-16 PROCEDURE — G0463 HOSPITAL OUTPT CLINIC VISIT: HCPCS | Performed by: FAMILY MEDICINE

## 2020-12-16 RX ORDER — POTASSIUM CHLORIDE 750 MG/1
TABLET, EXTENDED RELEASE ORAL
Qty: 30 TAB | Refills: 0 | Status: SHIPPED | OUTPATIENT
Start: 2020-12-16 | End: 2021-01-19 | Stop reason: SDUPTHER

## 2020-12-16 RX ORDER — DIPHENOXYLATE HYDROCHLORIDE AND ATROPINE SULFATE 2.5; .025 MG/1; MG/1
TABLET ORAL
Qty: 30 TAB | Refills: 1 | Status: SHIPPED | OUTPATIENT
Start: 2020-12-16 | End: 2021-02-09 | Stop reason: SDUPTHER

## 2020-12-16 NOTE — PROGRESS NOTES
Vince Patterson is a 54 y.o.  male and presents with    Chief Complaint   Patient presents with    Medication Refill     Vince Patterson, who was evaluated through a synchronous (real-time) audio-video encounter, and/or his healthcare decision maker, is aware that it is a billable service, with coverage as determined by his insurance carrier. He provided verbal consent to proceed: Yes, and patient identification was verified. It was conducted pursuant to the emergency declaration under the [facility] [address] [facility], [address] [place] [address] authority and the [employer] Act. A caregiver was present when appropriate. Ability to conduct physical exam was limited. I was in the office. The patient was at home. Subjective:  Osteoarthritis and Chronic Pain:  He has neuropathy and orthopedic, his pain has been controlled with gabapentin. He last used percocet several weeks ago. Symptoms onset: problem is longstanding. Rheumatological ROS: ongoing significant pain in legs and feet and hip which is stable and controlled by PRN meds. Response to treatment plan: gradually worsening.    Cardiovascular Review:  The patient has hypertension, hyperlipidemia, CHF and obesity. Kavya Hanley attempting to follow a low fat, low cholesterol diet, not attempting to follow a low sodium diet, does not rigorously follow a diabetic diet, sedentary, nonsmoker  Home BP Monitoring: is not measured at home. Pertinent ROS: taking medications as instructed, no medication side effects noted, no TIA's, no chest pain on exertion, no dyspnea on exertion, no swelling of ankles.      Thyroid Review:  Patient is seen for followup of hypothyroidism.    Thyroid ROS: denies fatigue, weight changes, heat/cold intolerance, bowel/skin changes or CVS symptoms.     ROS   General ROS: negative for - chills or fever  Psychological ROS: negative for - anxiety or depression  Ophthalmic ROS: negative for - blurry vision  ENT ROS: negative for - headaches, nasal congestion or sore throat  Endocrine ROS: negative for - polydipsia/polyuria or temperature intolerance  Respiratory ROS: no cough, shortness of breath, or wheezing  Cardiovascular ROS: no chest pain or dyspnea on exertion  Gastrointestinal ROS: no abdominal pain, change in bowel habits, or black or bloody stools  Genito-Urinary ROS: no dysuria, trouble voiding, or hematuria  Neurological ROS: positive for - numbness/tingling; syncopal episodes as per above. Dermatological ROS: well healing  All other systems reviewed and are negative      Objective: There were no vitals filed for this visit. alert, well appearing, and in no distress, oriented to person, place, and time and morbidly obese  Mental status - normal mood, behavior, speech, dress, motor activity, and thought processes  Chest - normal work of breathing  Neurological - cranial nerves II through XII intact    LABS     TESTS      Assessment/Plan:    1. Chronic diarrhea  Continue antidiarrheal    2. Cardiomyopathy, unspecified type [facility])  F/u with cardiology    3. Gouty arthritis of right hand  Improved pain    4. Acquired hypothyroidism  therapeutic    5. Chronic pain syndrome  Pain controlled with gabapentin    6. Neuropathy      7. Alcoholism ([facility] [address]  Sober for 2 weeks; encourage continued abstinence      Lab review: no lab studies available for review at time of visit      I have discussed the diagnosis with the patient and the intended plan as seen in the above orders. I have discussed medication side effects and warnings with the patient as well. I have reviewed the plan of care with the patient, accepted their input and they are in agreement with the treatment goals.

## 2020-12-16 NOTE — PROGRESS NOTES
Ildefonso Bland presents today for   Chief Complaint   Patient presents with    Medication Refill       Is someone accompanying this pt? no    Is the patient using any DME equipment during OV? no    Depression Screening:  3 most recent PHQ Screens 12/16/2020   PHQ Not Done -   Little interest or pleasure in doing things Not at all   Feeling down, depressed, irritable, or hopeless Not at all   Total Score PHQ 2 0       Learning Assessment:  Learning Assessment 11/6/2019   PRIMARY LEARNER Patient   HIGHEST LEVEL OF EDUCATION - PRIMARY LEARNER  -   BARRIERS PRIMARY LEARNER -   CO-LEARNER CAREGIVER -   PRIMARY LANGUAGE ENGLISH   LEARNER PREFERENCE PRIMARY LISTENING   ANSWERED BY patient   RELATIONSHIP SELF       Abuse Screening:  Abuse Screening Questionnaire 12/16/2020   Do you ever feel afraid of your partner? N   Are you in a relationship with someone who physically or mentally threatens you? N   Is it safe for you to go home? Y       Fall Risk  Fall Risk Assessment, last 12 mths 12/16/2020   Able to walk? Yes   Fall in past 12 months? No   Fall with injury? -   Number of falls in past 12 months -   Fall Risk Score -       Health Maintenance reviewed and discussed and ordered per Provider. There are no preventive care reminders to display for this patient. .      Coordination of Care:  1. Have you been to the ER, urgent care clinic since your last visit? Hospitalized since your last visit? no    2. Have you seen or consulted any other health care providers outside of the [address] [place] [address] [place] since your last visit? Include any pap smears or colon screening.  no      Last  Checked n/a  Last UDS Checked n/a  Last Pain contract signed: n/a

## 2021-01-02 DIAGNOSIS — M10.9 GOUTY ARTHRITIS OF RIGHT HAND: ICD-10-CM

## 2021-01-05 RX ORDER — COLCHICINE 0.6 MG/1
TABLET ORAL
Qty: 30 TAB | Refills: 0 | Status: SHIPPED | OUTPATIENT
Start: 2021-01-05 | End: 2021-02-09 | Stop reason: SDUPTHER

## 2021-01-17 DIAGNOSIS — I42.9 CARDIOMYOPATHY, UNSPECIFIED TYPE (HCC): ICD-10-CM

## 2021-01-19 DIAGNOSIS — I21.3 ST ELEVATION MYOCARDIAL INFARCTION (STEMI), UNSPECIFIED ARTERY (HCC): ICD-10-CM

## 2021-01-19 DIAGNOSIS — I10 HYPERTENSION, UNSPECIFIED TYPE: ICD-10-CM

## 2021-01-19 DIAGNOSIS — I42.9 CARDIOMYOPATHY, UNSPECIFIED TYPE (HCC): ICD-10-CM

## 2021-01-19 DIAGNOSIS — E03.9 HYPOTHYROIDISM, UNSPECIFIED TYPE: ICD-10-CM

## 2021-01-19 RX ORDER — POTASSIUM CHLORIDE 750 MG/1
TABLET, EXTENDED RELEASE ORAL
Qty: 30 TAB | Refills: 0 | Status: SHIPPED | OUTPATIENT
Start: 2021-01-19 | End: 2021-02-24

## 2021-01-19 RX ORDER — AMLODIPINE BESYLATE 5 MG/1
TABLET ORAL
Qty: 90 TAB | Refills: 2 | OUTPATIENT
Start: 2021-01-19

## 2021-01-22 ENCOUNTER — VIRTUAL VISIT (OUTPATIENT)
Dept: FAMILY MEDICINE CLINIC | Age: 46
End: 2021-01-22
Payer: MEDICARE

## 2021-01-22 DIAGNOSIS — E66.01 OBESITY, MORBID (HCC): ICD-10-CM

## 2021-01-22 DIAGNOSIS — I10 HYPERTENSION, UNSPECIFIED TYPE: ICD-10-CM

## 2021-01-22 DIAGNOSIS — E03.9 HYPOTHYROIDISM, UNSPECIFIED TYPE: Primary | ICD-10-CM

## 2021-01-22 DIAGNOSIS — F10.20 ALCOHOLISM (HCC): ICD-10-CM

## 2021-01-22 DIAGNOSIS — R68.89 COLD INTOLERANCE: ICD-10-CM

## 2021-01-22 DIAGNOSIS — R63.8 OTHER SYMPTOMS AND SIGNS CONCERNING FOOD AND FLUID INTAKE: ICD-10-CM

## 2021-01-22 DIAGNOSIS — I42.9 CARDIOMYOPATHY, UNSPECIFIED TYPE (HCC): ICD-10-CM

## 2021-01-22 DIAGNOSIS — G62.9 NEUROPATHY: ICD-10-CM

## 2021-01-22 DIAGNOSIS — I73.9 PERIPHERAL VASCULAR DISEASE (HCC): ICD-10-CM

## 2021-01-22 PROCEDURE — G0463 HOSPITAL OUTPT CLINIC VISIT: HCPCS | Performed by: FAMILY MEDICINE

## 2021-01-22 PROCEDURE — 99214 OFFICE O/P EST MOD 30 MIN: CPT | Performed by: FAMILY MEDICINE

## 2021-01-22 PROCEDURE — G8756 NO BP MEASURE DOC: HCPCS | Performed by: FAMILY MEDICINE

## 2021-01-22 PROCEDURE — G8432 DEP SCR NOT DOC, RNG: HCPCS | Performed by: FAMILY MEDICINE

## 2021-01-22 PROCEDURE — G8428 CUR MEDS NOT DOCUMENT: HCPCS | Performed by: FAMILY MEDICINE

## 2021-01-22 NOTE — PROGRESS NOTES
Noel Peterson is a 54 y.o.  male and presents with    Chief Complaint   Patient presents with    Pain (Chronic)    Thyroid Problem     Noel Peterson, who was evaluated through a synchronous (real-time) audio-video encounter, and/or his healthcare decision maker, is aware that it is a billable service, with coverage as determined by his insurance carrier. He provided verbal consent to proceed: Yes, and patient identification was verified. It was conducted pursuant to the emergency declaration under the [facility], [address] [place] [address] and the [employer] Act. A caregiver was present when appropriate. Ability to conduct physical exam was limited. I was in the office. The patient was at home. Subjective: Thyroid Review:  Patient is seen for followup of hypothyroidism. +cold intolerance  Thyroid ROS: denies fatigue, weight changes, bowel/skin changes or CVS symptoms. Osteoarthritis and Chronic Pain:  He has neuropathy and orthopedic, his pain has been controlled with gabapentin. Symptoms onset: problem is longstanding. Rheumatological ROS: ongoing significant pain in legs and feet and hip which is stable and controlled by PRN meds. Response to treatment plan: gradually worsening.   Cardiovascular Review:  The patient has hypertension, hyperlipidemia, CHF and obesity. Diet and Seth Corns attempting to follow a low fat, low cholesterol diet, not attempting to follow a low sodium diet, does not rigorously follow a diabetic diet, sedentary, nonsmoker  Home BP Monitoring: is not measured at home.   Pertinent ROS: taking medications as instructed, no medication side effects noted, no TIA's, no chest pain on exertion, no dyspnea on exertion, no swelling of ankles.      ROS   General ROS: negative for - chills or fever  Psychological ROS: negative for - anxiety or depression  Ophthalmic ROS: negative for - blurry vision  ENT ROS: negative for - headaches, nasal congestion or sore throat  Endocrine ROS: negative for - polydipsia/polyuria or temperature intolerance  Respiratory ROS: no cough, shortness of breath, or wheezing  Cardiovascular ROS: no chest pain or dyspnea on exertion  Gastrointestinal ROS: no abdominal pain, change in bowel habits, or black or bloody stools  Genito-Urinary ROS: no dysuria, trouble voiding, or hematuria  Neurological ROS: positive for - numbness/tingling; syncopal episodes as per above. Dermatological ROS: well healing  All other systems reviewed and are negative         Objective: There were no vitals filed for this visit. alert, well appearing, and in no distress, oriented to person, place, and time and obese  Mental status - normal mood, behavior, speech, dress, motor activity, and thought processes  Chest - normal work of breathing  Neurological - cranial nerves II through XII intact    LABS     TESTS      Assessment/Plan:    1. Hypothyroidism, unspecified type  Assess for therapeutic dose  - TSH 3RD GENERATION; Future    2. Hypertension, unspecified type  Goal <130/80; continue current medications    3. Neuropathy  Likely secondary to alcoholism and vitamin deficiency/malnourishment; encourage multivitamin;assess for anemia  - IRON PROFILE; Future  - CBC WITH AUTOMATED DIFF; Future    4. Alcoholism ([facility]  Encourage abstinence; needs support group    5. Cardiomyopathy, unspecified type [facility])  F/u cardiology; continue current management    6. Peripheral vascular disease ([facility]  Continue current management    7. Obesity, morbid ([facility]  I have reviewed/discussed the above normal BMI with the patient. I have recommended the following interventions: dietary management education, guidance, and counseling, encourage exercise and monitor weight . .        8. Cold intolerance  Ddx: anemia, subtherapeutic thyroid dose  - IRON PROFILE; Future    9.  Other symptoms and signs concerning food and fluid intake   Assess for adequate iron intake  - IRON PROFILE; Future      Lab review: orders written for new lab studies as appropriate; see orders      I have discussed the diagnosis with the patient and the intended plan as seen in the above orders. I have discussed medication side effects and warnings with the patient as well. I have reviewed the plan of care with the patient, accepted their input and they are in agreement with the treatment goals.

## 2021-01-22 NOTE — PROGRESS NOTES
Alie Boyer presents today for   Chief Complaint   Patient presents with    Pain (Chronic)    Thyroid Problem       Is someone accompanying this pt? na    Is the patient using any DME equipment during OV? na    Depression Screening:  3 most recent PHQ Screens 12/16/2020   PHQ Not Done -   Little interest or pleasure in doing things Not at all   Feeling down, depressed, irritable, or hopeless Not at all   Total Score PHQ 2 0       Learning Assessment:  Learning Assessment 11/6/2019   PRIMARY LEARNER Patient   HIGHEST LEVEL OF EDUCATION - PRIMARY LEARNER  -   BARRIERS PRIMARY LEARNER -   CO-LEARNER CAREGIVER -   PRIMARY LANGUAGE ENGLISH   LEARNER PREFERENCE PRIMARY LISTENING   ANSWERED BY patient   RELATIONSHIP SELF       Abuse Screening:  Abuse Screening Questionnaire 12/16/2020   Do you ever feel afraid of your partner? N   Are you in a relationship with someone who physically or mentally threatens you? N   Is it safe for you to go home? Y       Fall Risk  Fall Risk Assessment, last 12 mths 12/16/2020   Able to walk? Yes   Fall in past 12 months? No   Number of falls in past 12 months -   Fall with injury? -       Health Maintenance reviewed and discussed and ordered per Provider. Health Maintenance Due   Topic Date Due    COVID-19 Vaccine (1 of 2) 09/26/1990   . Coordination of Care:  1. Have you been to the ER, urgent care clinic since your last visit? Hospitalized since your last visit? no    2. Have you seen or consulted any other health care providers outside of the [address] [place] [address] since your last visit? Include any pap smears or colon screening.  no      Last  Checked na  Last UDS Checked na  Last Pain contract signed: na    Patients concerns today:  Med refills

## 2021-02-09 ENCOUNTER — TELEPHONE (OUTPATIENT)
Dept: FAMILY MEDICINE CLINIC | Age: 46
End: 2021-02-09

## 2021-02-09 DIAGNOSIS — M10.9 GOUTY ARTHRITIS OF RIGHT HAND: ICD-10-CM

## 2021-02-09 DIAGNOSIS — K52.9 CHRONIC DIARRHEA: ICD-10-CM

## 2021-02-09 RX ORDER — COLCHICINE 0.6 MG/1
0.6 TABLET ORAL DAILY
Qty: 30 TAB | Refills: 11 | Status: SHIPPED | OUTPATIENT
Start: 2021-02-09 | End: 2022-03-04 | Stop reason: SDUPTHER

## 2021-02-09 RX ORDER — DIPHENOXYLATE HYDROCHLORIDE AND ATROPINE SULFATE 2.5; .025 MG/1; MG/1
TABLET ORAL
Qty: 30 TAB | Refills: 1 | Status: SHIPPED | OUTPATIENT
Start: 2021-02-09 | End: 2021-03-26

## 2021-02-09 NOTE — TELEPHONE ENCOUNTER
Pt. Calling stating that he can out of refills for colchicine 0.6 mg tablet  And diphenoxylate-atropine (LOMOTIL) 2.5-0.025 mg per tablet. The insurance company stated that the provider needed to approve of medications. Please assist. Up coming appt. On 02/12/2021 for lab and 02/26/2021 for follow up.

## 2021-02-12 ENCOUNTER — HOSPITAL ENCOUNTER (OUTPATIENT)
Dept: LAB | Age: 46
Discharge: HOME | End: 2021-02-12
Payer: MEDICARE

## 2021-02-12 DIAGNOSIS — R68.89 COLD INTOLERANCE: ICD-10-CM

## 2021-02-12 DIAGNOSIS — R63.8 OTHER SYMPTOMS AND SIGNS CONCERNING FOOD AND FLUID INTAKE: ICD-10-CM

## 2021-02-12 DIAGNOSIS — E03.9 HYPOTHYROIDISM, UNSPECIFIED TYPE: ICD-10-CM

## 2021-02-12 DIAGNOSIS — G62.9 NEUROPATHY: ICD-10-CM

## 2021-02-12 LAB
BASOPHILS # BLD: 0 K/UL (ref 0–0.1)
BASOPHILS NFR BLD: 0 % (ref 0–2)
DIFFERENTIAL METHOD BLD: ABNORMAL
EOSINOPHIL # BLD: 0.3 K/UL (ref 0–0.4)
EOSINOPHIL NFR BLD: 5 % (ref 0–5)
ERYTHROCYTE [DISTWIDTH] IN BLOOD BY AUTOMATED COUNT: 16.6 % (ref 11.6–14.5)
HCT VFR BLD AUTO: 42.1 % (ref 36–48)
HGB BLD-MCNC: 13.4 G/DL (ref 13–16)
IRON SATN MFR SERPL: 9 % (ref 20–50)
IRON SERPL-MCNC: 40 UG/DL (ref 50–175)
LYMPHOCYTES # BLD: 1.5 K/UL (ref 0.9–3.6)
LYMPHOCYTES NFR BLD: 23 % (ref 21–52)
MCH RBC QN AUTO: 26.2 PG (ref 24–34)
MCHC RBC AUTO-ENTMCNC: 31.8 G/DL (ref 31–37)
MCV RBC AUTO: 82.2 FL (ref 74–97)
MONOCYTES # BLD: 0.7 K/UL (ref 0.05–1.2)
MONOCYTES NFR BLD: 10 % (ref 3–10)
NEUTS SEG # BLD: 3.9 K/UL (ref 1.8–8)
NEUTS SEG NFR BLD: 62 % (ref 40–73)
PLATELET # BLD AUTO: 201 K/UL (ref 135–420)
PMV BLD AUTO: 10.3 FL (ref 9.2–11.8)
RBC # BLD AUTO: 5.12 M/UL (ref 4.7–5.5)
TIBC SERPL-MCNC: 451 UG/DL (ref 250–450)
TSH SERPL DL<=0.05 MIU/L-ACNC: 0.06 UIU/ML (ref 0.36–3.74)
WBC # BLD AUTO: 6.4 K/UL (ref 4.6–13.2)

## 2021-02-12 PROCEDURE — 36415 COLL VENOUS BLD VENIPUNCTURE: CPT

## 2021-02-12 PROCEDURE — 84443 ASSAY THYROID STIM HORMONE: CPT

## 2021-02-12 PROCEDURE — 83540 ASSAY OF IRON: CPT

## 2021-02-12 PROCEDURE — 85025 COMPLETE CBC W/AUTO DIFF WBC: CPT

## 2021-02-15 ENCOUNTER — PATIENT MESSAGE (OUTPATIENT)
Dept: FAMILY MEDICINE CLINIC | Age: 46
End: 2021-02-15

## 2021-02-15 DIAGNOSIS — E61.1 IRON DEFICIENCY: Primary | ICD-10-CM

## 2021-02-15 DIAGNOSIS — E07.9 THYROID DISEASE: ICD-10-CM

## 2021-02-15 RX ORDER — FERROUS SULFATE 325(65) MG
325 TABLET, DELAYED RELEASE (ENTERIC COATED) ORAL
Qty: 100 TAB | Refills: 12 | Status: SHIPPED
Start: 2021-02-15 | End: 2021-07-01

## 2021-02-15 NOTE — TELEPHONE ENCOUNTER
supratherapeutic dose of levothyroxine; recommend skipping 3 days a week. Iron deficiency; start iron supplement.

## 2021-02-18 DIAGNOSIS — I42.9 CARDIOMYOPATHY, UNSPECIFIED TYPE (HCC): ICD-10-CM

## 2021-02-18 DIAGNOSIS — E03.9 HYPOTHYROIDISM, UNSPECIFIED TYPE: ICD-10-CM

## 2021-02-18 DIAGNOSIS — I10 HYPERTENSION, UNSPECIFIED TYPE: ICD-10-CM

## 2021-02-19 RX ORDER — ENALAPRIL MALEATE 10 MG/1
10 TABLET ORAL 2 TIMES DAILY
Qty: 180 TAB | Refills: 3 | Status: SHIPPED | OUTPATIENT
Start: 2021-02-19 | End: 2021-08-11

## 2021-02-22 DIAGNOSIS — I42.9 CARDIOMYOPATHY, UNSPECIFIED TYPE (HCC): ICD-10-CM

## 2021-02-24 RX ORDER — POTASSIUM CHLORIDE 750 MG/1
TABLET, EXTENDED RELEASE ORAL
Qty: 30 TAB | Refills: 0 | Status: SHIPPED | OUTPATIENT
Start: 2021-02-24 | End: 2021-02-26 | Stop reason: SDUPTHER

## 2021-02-26 ENCOUNTER — VIRTUAL VISIT (OUTPATIENT)
Dept: FAMILY MEDICINE CLINIC | Age: 46
End: 2021-02-26
Payer: MEDICARE

## 2021-02-26 DIAGNOSIS — E61.1 IRON DEFICIENCY: ICD-10-CM

## 2021-02-26 DIAGNOSIS — G62.9 NEUROPATHY: ICD-10-CM

## 2021-02-26 DIAGNOSIS — F51.04 PSYCHOPHYSIOLOGIC INSOMNIA: Primary | ICD-10-CM

## 2021-02-26 DIAGNOSIS — F10.20 ALCOHOLISM (HCC): ICD-10-CM

## 2021-02-26 DIAGNOSIS — I42.9 CARDIOMYOPATHY, UNSPECIFIED TYPE (HCC): ICD-10-CM

## 2021-02-26 PROCEDURE — G0463 HOSPITAL OUTPT CLINIC VISIT: HCPCS | Performed by: FAMILY MEDICINE

## 2021-02-26 PROCEDURE — G8428 CUR MEDS NOT DOCUMENT: HCPCS | Performed by: FAMILY MEDICINE

## 2021-02-26 PROCEDURE — G8432 DEP SCR NOT DOC, RNG: HCPCS | Performed by: FAMILY MEDICINE

## 2021-02-26 PROCEDURE — G8756 NO BP MEASURE DOC: HCPCS | Performed by: FAMILY MEDICINE

## 2021-02-26 PROCEDURE — 99214 OFFICE O/P EST MOD 30 MIN: CPT | Performed by: FAMILY MEDICINE

## 2021-02-26 RX ORDER — TRAZODONE HYDROCHLORIDE 100 MG/1
TABLET ORAL
COMMUNITY
Start: 2021-02-22 | End: 2021-12-13

## 2021-02-26 RX ORDER — POTASSIUM CHLORIDE 750 MG/1
TABLET, EXTENDED RELEASE ORAL
Qty: 90 TAB | Refills: 3 | Status: SHIPPED | OUTPATIENT
Start: 2021-02-26 | End: 2022-02-11 | Stop reason: SDUPTHER

## 2021-02-26 NOTE — PROGRESS NOTES
Noel Peterson is a 54 y.o.  male and presents with    Chief Complaint   Patient presents with    Pain (Chronic)    Thyroid Problem     Noel Peterson, who was evaluated through a synchronous (real-time) audio-video encounter, and/or his healthcare decision maker, is aware that it is a billable service, with coverage as determined by his insurance carrier. He provided verbal consent to proceed: Yes, and patient identification was verified. It was conducted pursuant to the emergency declaration under the [address] [facility], [address] [place] [address] authority and the [employer] General Act. A caregiver was present when appropriate. Ability to conduct physical exam was limited. I was in the office. The patient was at home. Subjective:  Cardiovascular Review:  The patient has hypertension, hyperlipidemia, CHF and obesity. Diet and Seth Corns attempting to follow a low fat, low cholesterol diet, not attempting to follow a low sodium diet, does not rigorously follow a diabetic diet, sedentary, nonsmoker  Home BP Monitoring: is not measured at home. Pertinent ROS: taking medications as instructed, no medication side effects noted, no TIA's, no chest pain on exertion, no dyspnea on exertion, no swelling of ankles.      Anxiety Review:  Patient is seen for sleep disturbance. Current treatment includes trazodone which was increased from 50 mg to 100 mg and individual therapy. Ongoing symptoms include: insomnia. Patient denies: palpitations, sweating, chest pain, shortness of breath, racing thoughts, psychomotor agitation, feelings of losing control, difficulty concentrating, suicidal ideation. Reported side effects from the treatment: none. Thyroid Review:  Patient is seen for followup of hypothyroidism.   +cold intolerance  Thyroid ROS: denies fatigue, weight changes, bowel/skin changes or CVS symptoms.     Osteoarthritis and Chronic Pain:  He has neuropathy and orthopedic, his pain has been controlled with gabapentin. Symptoms onset: problem is longstanding. Rheumatological ROS: ongoing significant pain in legs and feet and hip which is stable and controlled by PRN meds. Response to treatment plan: gradually worsening.   ROS   General ROS: negative for - chills or fever  Psychological ROS: negative for - anxiety or depression  Ophthalmic ROS: negative for - blurry vision  ENT ROS: negative for - headaches, nasal congestion or sore throat  Endocrine ROS: negative for - polydipsia/polyuria or temperature intolerance  Respiratory ROS: no cough, shortness of breath, or wheezing  Cardiovascular ROS: no chest pain or dyspnea on exertion  Gastrointestinal ROS: no abdominal pain, change in bowel habits, or black or bloody stools  Genito-Urinary ROS: no dysuria, trouble voiding, or hematuria  Neurological ROS: positive for - numbness/tingling; syncopal episodes as per above. Dermatological ROS: well healing  All other systems reviewed and are negative      Objective: There were no vitals filed for this visit. alert, well appearing, and in no distress, oriented to person, place, and time and obese  Mental status - normal mood, behavior, speech, dress, motor activity, and thought processes  Chest - normal work of breathing  Neurological - cranial nerves II through XII intact    LABS   Iron 40  TESTS      Assessment/Plan:    1. Cardiomyopathy, unspecified type ([facility] [address]  Continue potassium to be used with furosemide  - potassium chloride (KLOR-CON) 10 mEq tablet; TAKE ONE TABLET BY MOUTH DAILY  Dispense: 90 Tab; Refill: 3    2. Psychophysiologic insomnia  Trazodone increased to 100 mg dose for improved sleep by Murray Weiss    3. Alcoholism ([facility] [address]  Continue naltrexone    4. Neuropathy  Continue gabapentine    5. Iron deficiency  Continue iron supplement  - IRON PROFILE;  Future      Lab review: orders written for new lab studies as appropriate; see orders      I have discussed the diagnosis with the patient and the intended plan as seen in the above orders. I have discussed medication side effects and warnings with the patient as well. I have reviewed the plan of care with the patient, accepted their input and they are in agreement with the treatment goals.

## 2021-02-26 NOTE — PROGRESS NOTES
Caro Powell presents today for   Chief Complaint   Patient presents with    Pain (Chronic)    Thyroid Problem       Is someone accompanying this pt? na    Is the patient using any DME equipment during OV? na    Depression Screening:  3 most recent PHQ Screens 12/16/2020   PHQ Not Done -   Little interest or pleasure in doing things Not at all   Feeling down, depressed, irritable, or hopeless Not at all   Total Score PHQ 2 0       Learning Assessment:  Learning Assessment 11/6/2019   PRIMARY LEARNER Patient   HIGHEST LEVEL OF EDUCATION - PRIMARY LEARNER  -   BARRIERS PRIMARY LEARNER -   CO-LEARNER CAREGIVER -   PRIMARY LANGUAGE ENGLISH   LEARNER PREFERENCE PRIMARY LISTENING   ANSWERED BY patient   RELATIONSHIP SELF       Abuse Screening:  Abuse Screening Questionnaire 12/16/2020   Do you ever feel afraid of your partner? N   Are you in a relationship with someone who physically or mentally threatens you? N   Is it safe for you to go home? Y       Fall Risk  Fall Risk Assessment, last 12 mths 12/16/2020   Able to walk? Yes   Fall in past 12 months? No   Number of falls in past 12 months -   Fall with injury? -       Health Maintenance reviewed and discussed and ordered per Provider. Health Maintenance Due   Topic Date Due    COVID-19 Vaccine (1 of 2) 09/26/1990   . Coordination of Care:  1. Have you been to the ER, urgent care clinic since your last visit? Hospitalized since your last visit? no    2. Have you seen or consulted any other health care providers outside of the [address] [place] [address] since your last visit? Include any pap smears or colon screening.  no      Last  Checked na  Last UDS Checked na  Last Pain contract signed: na    Patients concerns today:  Lab results

## 2021-03-18 DIAGNOSIS — I42.9 CARDIOMYOPATHY, UNSPECIFIED TYPE (HCC): ICD-10-CM

## 2021-03-19 RX ORDER — FUROSEMIDE 20 MG/1
20 TABLET ORAL DAILY
Qty: 90 TAB | Refills: 3 | Status: SHIPPED | OUTPATIENT
Start: 2021-03-19 | End: 2022-10-11 | Stop reason: SDUPTHER

## 2021-03-22 ENCOUNTER — HOSPITAL ENCOUNTER (OUTPATIENT)
Dept: LAB | Age: 46
Discharge: HOME | End: 2021-03-22
Payer: MEDICARE

## 2021-03-22 ENCOUNTER — APPOINTMENT (OUTPATIENT)
Dept: FAMILY MEDICINE CLINIC | Age: 46
End: 2021-03-22

## 2021-03-22 DIAGNOSIS — M17.0 BILATERAL PRIMARY OSTEOARTHRITIS OF KNEE: ICD-10-CM

## 2021-03-22 DIAGNOSIS — E61.1 IRON DEFICIENCY: ICD-10-CM

## 2021-03-22 LAB
IRON SATN MFR SERPL: 16 % (ref 20–50)
IRON SERPL-MCNC: 62 UG/DL (ref 50–175)
TIBC SERPL-MCNC: 379 UG/DL (ref 250–450)

## 2021-03-22 PROCEDURE — 80307 DRUG TEST PRSMV CHEM ANLYZR: CPT

## 2021-03-22 PROCEDURE — 36415 COLL VENOUS BLD VENIPUNCTURE: CPT

## 2021-03-22 PROCEDURE — 83540 ASSAY OF IRON: CPT

## 2021-03-23 DIAGNOSIS — K52.9 CHRONIC DIARRHEA: ICD-10-CM

## 2021-03-23 LAB
AMPHETAMINE SCREEN, URINE, 701828: ABNORMAL NG/ML
AMPHETAMINES UR QL SCN: NEGATIVE NG/ML
BARBITURATES SCREEN, URINE, 701831: ABNORMAL NG/ML
BARBITURATES UR QL SCN: NEGATIVE NG/ML
BENZODIAZ UR QL SCN: NEGATIVE NG/ML
BENZODIAZEPINES SCREEN, URINE, 701832: ABNORMAL NG/ML
BUPRENORPHINE UR QL: NEGATIVE NG/ML
BUPRENORPHINE, URINE: ABNORMAL NG/ML
BZE UR QL SCN: NEGATIVE NG/ML
CANNABINOID SCREEN, URINE, 701833: ABNORMAL NG/ML
CANNABINOIDS UR QL SCN: POSITIVE NG/ML
COCAINE METAB. SCREEN, URINE, 701834: ABNORMAL NG/ML
CREAT UR-MCNC: 136.3 MG/DL (ref 20–300)
METHADONE SCREEN, URINE, 701837: ABNORMAL NG/ML
METHADONE UR QL SCN: NEGATIVE NG/ML
OPIATE SCREEN, URINE, 701835: ABNORMAL NG/ML
OPIATES UR QL SCN: NEGATIVE NG/ML
OXYCODONE+OXYMORPHONE UR QL SCN: NEGATIVE NG/ML
OXYCODONE/OXYMORPHONE, URINE, 701858: ABNORMAL NG/ML
PCP UR QL: NEGATIVE NG/ML
PH UR: 5.2 [PH] (ref 4.5–8.9)
PHENCYCLIDINE SCREEN, URINE, 701836: ABNORMAL NG/ML
PLEASE NOTE:, 733163: ABNORMAL
PROPOXYPH UR QL SCN: NEGATIVE NG/ML
PROPOXYPHENE SCREEN, URINE, 701838: ABNORMAL NG/ML

## 2021-03-26 RX ORDER — DIPHENOXYLATE HYDROCHLORIDE AND ATROPINE SULFATE 2.5; .025 MG/1; MG/1
TABLET ORAL
Qty: 30 TAB | Refills: 0 | Status: SHIPPED | OUTPATIENT
Start: 2021-03-26 | End: 2021-04-23

## 2021-04-21 DIAGNOSIS — K52.9 CHRONIC DIARRHEA: ICD-10-CM

## 2021-04-23 RX ORDER — DIPHENOXYLATE HYDROCHLORIDE AND ATROPINE SULFATE 2.5; .025 MG/1; MG/1
TABLET ORAL
Qty: 30 TAB | Refills: 0 | Status: SHIPPED | OUTPATIENT
Start: 2021-04-23 | End: 2021-05-24

## 2021-04-26 DIAGNOSIS — G62.9 NEUROPATHY: ICD-10-CM

## 2021-04-29 RX ORDER — GABAPENTIN 800 MG/1
TABLET ORAL
Qty: 90 TAB | Refills: 3 | Status: SHIPPED | OUTPATIENT
Start: 2021-04-29 | End: 2021-07-01 | Stop reason: SDUPTHER

## 2021-05-13 ENCOUNTER — VIRTUAL VISIT (OUTPATIENT)
Dept: FAMILY MEDICINE CLINIC | Age: 46
End: 2021-05-13
Payer: MEDICARE

## 2021-05-13 DIAGNOSIS — K58.0 IRRITABLE BOWEL SYNDROME WITH DIARRHEA: Primary | ICD-10-CM

## 2021-05-13 DIAGNOSIS — F51.04 PSYCHOPHYSIOLOGIC INSOMNIA: ICD-10-CM

## 2021-05-13 DIAGNOSIS — I42.9 CARDIOMYOPATHY, UNSPECIFIED TYPE (HCC): ICD-10-CM

## 2021-05-13 DIAGNOSIS — F10.20 ALCOHOLISM (HCC): ICD-10-CM

## 2021-05-13 DIAGNOSIS — E03.9 ACQUIRED HYPOTHYROIDISM: ICD-10-CM

## 2021-05-13 DIAGNOSIS — Z98.84 H/O BARIATRIC SURGERY: ICD-10-CM

## 2021-05-13 DIAGNOSIS — G62.9 NEUROPATHY: ICD-10-CM

## 2021-05-13 DIAGNOSIS — K52.9 CHRONIC DIARRHEA: ICD-10-CM

## 2021-05-13 PROCEDURE — G8756 NO BP MEASURE DOC: HCPCS | Performed by: FAMILY MEDICINE

## 2021-05-13 PROCEDURE — G8427 DOCREV CUR MEDS BY ELIG CLIN: HCPCS | Performed by: FAMILY MEDICINE

## 2021-05-13 PROCEDURE — G8432 DEP SCR NOT DOC, RNG: HCPCS | Performed by: FAMILY MEDICINE

## 2021-05-13 PROCEDURE — 99214 OFFICE O/P EST MOD 30 MIN: CPT | Performed by: FAMILY MEDICINE

## 2021-05-13 RX ORDER — LEVOTHYROXINE SODIUM 150 UG/1
150 TABLET ORAL
Qty: 90 TAB | Refills: 1 | Status: SHIPPED | OUTPATIENT
Start: 2021-05-13 | End: 2021-11-22

## 2021-05-13 NOTE — PROGRESS NOTES
Chin Lynch is a 54 y.o.  male and presents with    Chief Complaint   Patient presents with    Pain (Chronic)    Thyroid Problem    Anxiety    Insomnia     Chin Lynch, who was evaluated through a synchronous (real-time) audio-video encounter, and/or his healthcare decision maker, is aware that it is a billable service, with coverage as determined by his insurance carrier. He provided verbal consent to proceed: Yes, and patient identification was verified. This visit was conducted pursuant to the emergency declaration under the [facility], [address] [place] [address] authority and the [employer] General Act. A caregiver was present when appropriate. Ability to conduct physical exam was limited. The patient was located in a state where the provider was credentialed to provide care. --Garry Marc MD on 5/13/2021 at 11:15 AM    Subjective:  He is c/o defecation urgency; he reports that a little bit of motion leads to his having to defecate. He has had colonoscopy within the past year. He has chronic loose stool which has been present since his bariatric surgery 8 years ago. He continues to use lomotil. Cardiovascular Review:  The patient has hypertension, hyperlipidemia, CHF and obesity. Diet and Patricia Gave attempting to follow a low fat, low cholesterol diet, not attempting to follow a low sodium diet, does not rigorously follow a diabetic diet, sedentary, nonsmoker  Home BP Monitoring: is not measured at home. Pertinent ROS: taking medications as instructed, no medication side effects noted, no TIA's, no chest pain on exertion, no dyspnea on exertion, no swelling of ankles.      Anxiety Review:  Patient is seen for sleep disturbance. Current treatment includes trazodone which was increased from 50 mg to 100 mg and individual therapy. Ongoing symptoms include: insomnia.    Patient denies: palpitations, sweating, chest pain, shortness of breath, racing thoughts, psychomotor agitation, feelings of losing control, difficulty concentrating, suicidal ideation. Reported side effects from the treatment: none.     Thyroid Review:  Patient is seen for followup of hypothyroidism.  +cold intolerance  Thyroid ROS: denies fatigue, weight changes, bowel/skin changes or CVS symptoms.     Osteoarthritis and Chronic Pain:  He has neuropathy and orthopedic, his pain has been controlled with gabapentin. Symptoms onset: problem is longstanding. Rheumatological ROS: ongoing significant pain in legs and feet and hip which is stable and controlled by PRN meds. Response to treatment plan: gradually worsening.   ROS   General ROS: negative for - chills or fever  Psychological ROS: negative for - anxiety or depression  Ophthalmic ROS: negative for - blurry vision  ENT ROS: negative for - headaches, nasal congestion or sore throat  Endocrine ROS: negative for - polydipsia/polyuria or temperature intolerance  Respiratory ROS: no cough, shortness of breath, or wheezing  Cardiovascular ROS: no chest pain or dyspnea on exertion  Gastrointestinal ROS: no abdominal pain, change in bowel habits, or black or bloody stools  Genito-Urinary ROS: no dysuria, trouble voiding, or hematuria  Neurological ROS: positive for - numbness/tingling; syncopal episodes as per above. Dermatological ROS: well healing  All other systems reviewed and are negative        Objective: There were no vitals filed for this visit.     alert, well appearing, and in no distress, oriented to person, place, and time and obese  Mental status - normal mood, behavior, speech, dress, motor activity, and thought processes  Chest - normal work of breathing  Neurological - cranial nerves II through XII intact    LABS   TSH <0.06  TESTS    Assessment/Plan:    1.  Irritable bowel syndrome with diarrhea  Start treatment for spasm symptoms causing worsening symptoms; continue lomotil. Start linaclotide  - linaCLOtide (Linzess) 72 mcg cap capsule; Take 1 Cap by mouth Daily (before breakfast). Dispense: 30 Cap; Refill: 1    2. Cardiomyopathy, unspecified type ([facility] [address]  Continue current medications    3. Chronic diarrhea  Continue lomotil    4. Neuropathy  Gabapentin continued    5. Psychophysiologic insomnia  Sleep hygiene    6. Alcoholism ([facility] [address]  Sober for 21 days    7. H/O bariatric surgery    - linaCLOtide (Linzess) 72 mcg cap capsule; Take 1 Cap by mouth Daily (before breakfast). Dispense: 30 Cap; Refill: 1    8. Acquired hypothyroidism  Medication induced hyperthyroid; decrease dose of levothyroxine  - levothyroxine (SYNTHROID) 150 mcg tablet; Take 1 Tab by mouth Daily (before breakfast). Dispense: 90 Tab; Refill: 1  - TSH 3RD GENERATION; Future      Lab review: labs reviewed, I note that TSH abnormal low, orders written for new lab studies as appropriate; see orders      I have discussed the diagnosis with the patient and the intended plan as seen in the above orders. I have discussed medication side effects and warnings with the patient as well. I have reviewed the plan of care with the patient, accepted their input and they are in agreement with the treatment goals.

## 2021-05-13 NOTE — PROGRESS NOTES
Bryan Bhatia presents today for   Chief Complaint   Patient presents with    Pain (Chronic)    Thyroid Problem    Anxiety    Insomnia       Is someone accompanying this pt? na    Is the patient using any DME equipment during OV? na    Depression Screening:  3 most recent PHQ Screens 12/16/2020   PHQ Not Done -   Little interest or pleasure in doing things Not at all   Feeling down, depressed, irritable, or hopeless Not at all   Total Score PHQ 2 0       Learning Assessment:  Learning Assessment 11/6/2019   PRIMARY LEARNER Patient   HIGHEST LEVEL OF EDUCATION - PRIMARY LEARNER  -   BARRIERS PRIMARY LEARNER -   CO-LEARNER CAREGIVER -   PRIMARY LANGUAGE ENGLISH   LEARNER PREFERENCE PRIMARY LISTENING   ANSWERED BY patient   RELATIONSHIP SELF       Abuse Screening:  Abuse Screening Questionnaire 12/16/2020   Do you ever feel afraid of your partner? N   Are you in a relationship with someone who physically or mentally threatens you? N   Is it safe for you to go home? Y       Fall Risk  Fall Risk Assessment, last 12 mths 12/16/2020   Able to walk? Yes   Fall in past 12 months? No   Number of falls in past 12 months -   Fall with injury? -       Health Maintenance reviewed and discussed and ordered per Provider. Health Maintenance Due   Topic Date Due    COVID-19 Vaccine (1) Never done   . Coordination of Care:  1. Have you been to the ER, urgent care clinic since your last visit? Hospitalized since your last visit? no    2. Have you seen or consulted any other health care providers outside of the [address] [place] [address] since your last visit? Include any pap smears or colon screening.  no      Last  Checked na  Last UDS Checked na  Last Pain contract signed: na    Patients concerns today:  Lab results

## 2021-05-21 DIAGNOSIS — K52.9 CHRONIC DIARRHEA: ICD-10-CM

## 2021-05-24 RX ORDER — DIPHENOXYLATE HYDROCHLORIDE AND ATROPINE SULFATE 2.5; .025 MG/1; MG/1
TABLET ORAL
Qty: 30 TABLET | Refills: 0 | Status: SHIPPED | OUTPATIENT
Start: 2021-05-24 | End: 2021-06-04

## 2021-06-03 DIAGNOSIS — K52.9 CHRONIC DIARRHEA: ICD-10-CM

## 2021-06-04 RX ORDER — DIPHENOXYLATE HYDROCHLORIDE AND ATROPINE SULFATE 2.5; .025 MG/1; MG/1
TABLET ORAL
Qty: 30 TABLET | Refills: 0 | Status: SHIPPED | OUTPATIENT
Start: 2021-06-04 | End: 2021-07-01 | Stop reason: SDUPTHER

## 2021-06-13 DIAGNOSIS — I10 HYPERTENSION, UNSPECIFIED TYPE: ICD-10-CM

## 2021-06-13 DIAGNOSIS — I21.3 ST ELEVATION MYOCARDIAL INFARCTION (STEMI), UNSPECIFIED ARTERY (HCC): ICD-10-CM

## 2021-06-13 DIAGNOSIS — I42.9 CARDIOMYOPATHY, UNSPECIFIED TYPE (HCC): ICD-10-CM

## 2021-06-16 RX ORDER — ASPIRIN 81 MG/1
TABLET ORAL
Qty: 90 TABLET | Refills: 2 | Status: SHIPPED | OUTPATIENT
Start: 2021-06-16 | End: 2022-03-10

## 2021-07-01 ENCOUNTER — OFFICE VISIT (OUTPATIENT)
Dept: FAMILY MEDICINE CLINIC | Age: 46
End: 2021-07-01
Payer: MEDICARE

## 2021-07-01 ENCOUNTER — HOSPITAL ENCOUNTER (OUTPATIENT)
Dept: LAB | Age: 46
Discharge: HOME | End: 2021-07-01
Payer: MEDICARE

## 2021-07-01 VITALS
HEIGHT: 74 IN | WEIGHT: 298 LBS | BODY MASS INDEX: 38.24 KG/M2 | TEMPERATURE: 97.6 F | SYSTOLIC BLOOD PRESSURE: 151 MMHG | OXYGEN SATURATION: 100 % | HEART RATE: 66 BPM | DIASTOLIC BLOOD PRESSURE: 96 MMHG | RESPIRATION RATE: 16 BRPM

## 2021-07-01 DIAGNOSIS — F10.21 ALCOHOLISM IN RECOVERY (HCC): ICD-10-CM

## 2021-07-01 DIAGNOSIS — E66.01 SEVERE OBESITY (BMI 35.0-35.9 WITH COMORBIDITY) (HCC): ICD-10-CM

## 2021-07-01 DIAGNOSIS — G62.9 NEUROPATHY: ICD-10-CM

## 2021-07-01 DIAGNOSIS — I10 HYPERTENSION, UNSPECIFIED TYPE: ICD-10-CM

## 2021-07-01 DIAGNOSIS — E03.9 ACQUIRED HYPOTHYROIDISM: ICD-10-CM

## 2021-07-01 DIAGNOSIS — I21.3 ST ELEVATION MYOCARDIAL INFARCTION (STEMI), UNSPECIFIED ARTERY (HCC): ICD-10-CM

## 2021-07-01 DIAGNOSIS — F51.04 PSYCHOPHYSIOLOGIC INSOMNIA: ICD-10-CM

## 2021-07-01 DIAGNOSIS — G89.4 CHRONIC PAIN SYNDROME: ICD-10-CM

## 2021-07-01 DIAGNOSIS — I42.9 CARDIOMYOPATHY, UNSPECIFIED TYPE (HCC): ICD-10-CM

## 2021-07-01 DIAGNOSIS — K58.0 IRRITABLE BOWEL SYNDROME WITH DIARRHEA: ICD-10-CM

## 2021-07-01 DIAGNOSIS — K52.9 CHRONIC DIARRHEA: Primary | ICD-10-CM

## 2021-07-01 DIAGNOSIS — F11.90 CHRONIC, CONTINUOUS USE OF OPIOIDS: ICD-10-CM

## 2021-07-01 LAB — TSH SERPL DL<=0.05 MIU/L-ACNC: 0.86 UIU/ML (ref 0.36–3.74)

## 2021-07-01 PROCEDURE — G8753 SYS BP > OR = 140: HCPCS | Performed by: FAMILY MEDICINE

## 2021-07-01 PROCEDURE — 84443 ASSAY THYROID STIM HORMONE: CPT

## 2021-07-01 PROCEDURE — 99214 OFFICE O/P EST MOD 30 MIN: CPT | Performed by: FAMILY MEDICINE

## 2021-07-01 PROCEDURE — G8755 DIAS BP > OR = 90: HCPCS | Performed by: FAMILY MEDICINE

## 2021-07-01 PROCEDURE — G8417 CALC BMI ABV UP PARAM F/U: HCPCS | Performed by: FAMILY MEDICINE

## 2021-07-01 PROCEDURE — G8427 DOCREV CUR MEDS BY ELIG CLIN: HCPCS | Performed by: FAMILY MEDICINE

## 2021-07-01 PROCEDURE — G8510 SCR DEP NEG, NO PLAN REQD: HCPCS | Performed by: FAMILY MEDICINE

## 2021-07-01 PROCEDURE — 36415 COLL VENOUS BLD VENIPUNCTURE: CPT

## 2021-07-01 PROCEDURE — 80307 DRUG TEST PRSMV CHEM ANLYZR: CPT

## 2021-07-01 RX ORDER — DIPHENOXYLATE HYDROCHLORIDE AND ATROPINE SULFATE 2.5; .025 MG/1; MG/1
TABLET ORAL
Qty: 60 TABLET | Refills: 5 | Status: SHIPPED | OUTPATIENT
Start: 2021-07-01 | End: 2022-02-09

## 2021-07-01 RX ORDER — GABAPENTIN 800 MG/1
TABLET ORAL
Qty: 120 TABLET | Refills: 3 | Status: SHIPPED | OUTPATIENT
Start: 2021-07-01 | End: 2021-11-09

## 2021-07-01 RX ORDER — OXYCODONE AND ACETAMINOPHEN 5; 325 MG/1; MG/1
1 TABLET ORAL
Qty: 60 TABLET | Refills: 0 | Status: SHIPPED | OUTPATIENT
Start: 2021-07-01 | End: 2021-08-11 | Stop reason: SDUPTHER

## 2021-07-01 NOTE — PROGRESS NOTES
Stephen Elliott presents today for   Chief Complaint   Patient presents with    Leg Pain     right    Thyroid Problem    Anxiety    Insomnia    Abdominal Pain       Is someone accompanying this pt? no    Is the patient using any DME equipment during OV? no    Depression Screening:  3 most recent PHQ Screens 7/1/2021   PHQ Not Done -   Little interest or pleasure in doing things Not at all   Feeling down, depressed, irritable, or hopeless Not at all   Total Score PHQ 2 0       Learning Assessment:  Learning Assessment 11/6/2019   PRIMARY LEARNER Patient   HIGHEST LEVEL OF EDUCATION - PRIMARY LEARNER  -   BARRIERS PRIMARY LEARNER -   CO-LEARNER CAREGIVER -   PRIMARY LANGUAGE ENGLISH   LEARNER PREFERENCE PRIMARY LISTENING   ANSWERED BY patient   RELATIONSHIP SELF       Abuse Screening:  Abuse Screening Questionnaire 12/16/2020   Do you ever feel afraid of your partner? N   Are you in a relationship with someone who physically or mentally threatens you? N   Is it safe for you to go home? Y       Fall Risk  Fall Risk Assessment, last 12 mths 12/16/2020   Able to walk? Yes   Fall in past 12 months? No   Number of falls in past 12 months -   Fall with injury? -       Health Maintenance reviewed and discussed and ordered per Provider. Health Maintenance Due   Topic Date Due    COVID-19 Vaccine (1) Never done   . Coordination of Care:  1. Have you been to the ER, urgent care clinic since your last visit? Hospitalized since your last visit? no    2. Have you seen or consulted any other health care providers outside of the [address] [place] [address] since your last visit? Include any pap smears or colon screening. no      Last  Checked na  Last UDS Checked na  Last Pain contract signed: na    Patient presents in office today for routine care.   Patient concerns: med refill

## 2021-07-01 NOTE — PROGRESS NOTES
Halie Malhotra is a 54 y.o.  male and presents with    Chief Complaint   Patient presents with    Leg Pain     right    Thyroid Problem    Anxiety    Insomnia    Abdominal Pain       Subjective:  Mr. Phil Matute presents for f/u diarrhea; he was prescribed linzess and has had worsening diarrhea. He reports that the lomotil works but he needs refills frequently. He has had EGD and colonoscopy from Gastroenterology. Cardiovascular Review:  The patient has hypertension, hyperlipidemia, CHF and obesity. Diet and Suyapa Contes attempting to follow a low fat, low cholesterol diet, not attempting to follow a low sodium diet, does not rigorously follow a diabetic diet, sedentary, nonsmoker  Home BP Monitoring: is not measured at home. Pertinent ROS: taking medications as instructed, no medication side effects noted, no TIA's, no chest pain on exertion, no dyspnea on exertion, no swelling of ankles.      Anxiety Review:  Patient is seen for sleep disturbance. Current treatment includes trazodone which was increased from 50 mg to 100 mg and individual therapy. Ongoing symptoms include: insomnia. Patient denies: palpitations, sweating, chest pain, shortness of breath, racing thoughts, psychomotor agitation, feelings of losing control, difficulty concentrating, suicidal ideation. Reported side effects from the treatment: none.     Thyroid Review:  Patient is seen for followup of hypothyroidism.  +cold intolerance  Thyroid ROS: denies fatigue, weight changes, bowel/skin changes or CVS symptoms.     Osteoarthritis and Chronic Pain:  He has neuropathy and orthopedic, his pain has been controlled with gabapentin. Symptoms onset: problem is longstanding. Rheumatological ROS: ongoing significant pain in legs and feet and hip which is stable and controlled by PRN meds.    Response to treatment plan: gradually worsening.   ROS   General ROS: negative for - chills or fever  Psychological ROS: negative for - anxiety or depression  Ophthalmic ROS: negative for - blurry vision  ENT ROS: negative for - headaches, nasal congestion or sore throat  Endocrine ROS: negative for - polydipsia/polyuria or temperature intolerance  Respiratory ROS: no cough, shortness of breath, or wheezing  Cardiovascular ROS: no chest pain or dyspnea on exertion  Gastrointestinal ROS: no abdominal pain, change in bowel habits, or black or bloody stools  Genito-Urinary ROS: no dysuria, trouble voiding, or hematuria  Neurological ROS: positive for - numbness/tingling; syncopal episodes as per above. Dermatological ROS: well healing  All other systems reviewed and are negative     Objective:  Vitals:    07/01/21 1340   BP: (!) 151/96   Pulse: 66   Resp: 16   Temp: 97.6 °F (36.4 °C)   TempSrc: Temporal   SpO2: 100%   Weight: 298 lb (135.2 kg)   Height: 6' 2\" (1.88 m)   PainSc:   8   PainLoc: Generalized       alert, well appearing, and in no distress, oriented to person, place, and time and obese  Mental status - normal mood, behavior, speech, dress, motor activity, and thought processes  Chest - clear to auscultation, no wheezes, rales or rhonchi, symmetric air entry  Heart - normal rate, regular rhythm, normal S1, S2, no murmurs, rubs, clicks or gallops  Neurological - cranial nerves II through XII intact, antalgic gait    LABS   Urine drug screen  TESTS      Assessment/Plan:    1. Chronic diarrhea  Stop linzess; start lomotil at increased frequency for symptoms  - diphenoxylate-atropine (LOMOTIL) 2.5-0.025 mg per tablet; TAKE ONE TABLET BY MOUTH FOUR TIMES A DAY AS NEEDED FOR DIARRHEA  Dispense: 60 Tablet; Refill: 5    2. Hypertension, unspecified type  Goal <130/80    3. Cardiomyopathy, unspecified type [facility])  F/u with cardiology    4. ST elevation myocardial infarction (STEMI), unspecified artery [facility])  F/u with cardiology as scheduled; continue current management    5. Irritable bowel syndrome with diarrhea  Lomotil continued    6. Neuropathy  This is a chronic problem that is worsened. Per review of available records and patients , there are not sign of overuse, misuse, diversion, or concerning side effects. Today we reviewed: the risk of overdose, addiction, and dependency proper storage and disposal of medications the goals of treatment (improve functionality, quality of life, and pain)  The following changes were made to the patients current treatment plan: medications adjusted, see orders.          - gabapentin (NEURONTIN) 800 mg tablet; Take 1 tab by mouth 4 times a day for pain  Dispense: 120 Tablet; Refill: 3  - oxyCODONE-acetaminophen (PERCOCET) 5-325 mg per tablet; Take 1 Tablet by mouth every six (6) hours as needed for Pain for up to 30 days. Max Daily Amount: 4 Tablets. Dispense: 60 Tablet; Refill: 0    7. Psychophysiologic insomnia  Gabapentin for sleep    8. Alcoholism in recovery ([facility]  Stop naltrexone    9. Chronic pain syndrome    - 11-DRUG SCREEN, URINE; Future  - oxyCODONE-acetaminophen (PERCOCET) 5-325 mg per tablet; Take 1 Tablet by mouth every six (6) hours as needed for Pain for up to 30 days. Max Daily Amount: 4 Tablets. Dispense: 60 Tablet; Refill: 0    10. Chronic, continuous use of opioids    - oxyCODONE-acetaminophen (PERCOCET) 5-325 mg per tablet; Take 1 Tablet by mouth every six (6) hours as needed for Pain for up to 30 days. Max Daily Amount: 4 Tablets. Dispense: 60 Tablet; Refill: 0    11. Severe obesity (BMI 35.0-35.9 with comorbidity) ([facility]  I have reviewed/discussed the above normal BMI with the patient. I have recommended the following interventions: dietary management education, guidance, and counseling and monitor weight . .          Lab review: orders written for new lab studies as appropriate; see orders      I have discussed the diagnosis with the patient and the intended plan as seen in the above orders.   The patient has received an after-visit summary and questions were answered concerning future plans. I have discussed medication side effects and warnings with the patient as well. I have reviewed the plan of care with the patient, accepted their input and they are in agreement with the treatment goals.

## 2021-07-03 LAB
AMPHETAMINE SCREEN, URINE, 701828: ABNORMAL NG/ML
AMPHETAMINES UR QL SCN: NEGATIVE NG/ML
BARBITURATES SCREEN, URINE, 701831: ABNORMAL NG/ML
BARBITURATES UR QL SCN: NEGATIVE NG/ML
BENZODIAZ UR QL SCN: NEGATIVE NG/ML
BENZODIAZEPINES SCREEN, URINE, 701832: ABNORMAL NG/ML
BUPRENORPHINE UR QL: NEGATIVE NG/ML
BUPRENORPHINE, URINE: ABNORMAL NG/ML
BZE UR QL SCN: NEGATIVE NG/ML
CANNABINOID SCREEN, URINE, 701833: ABNORMAL NG/ML
CANNABINOIDS UR QL SCN: POSITIVE NG/ML
COCAINE METAB. SCREEN, URINE, 701834: ABNORMAL NG/ML
CREAT UR-MCNC: 34.1 MG/DL (ref 20–300)
METHADONE SCREEN, URINE, 701837: ABNORMAL NG/ML
METHADONE UR QL SCN: NEGATIVE NG/ML
OPIATE SCREEN, URINE, 701835: ABNORMAL NG/ML
OPIATES UR QL SCN: NEGATIVE NG/ML
OXYCODONE+OXYMORPHONE UR QL SCN: NEGATIVE NG/ML
OXYCODONE/OXYMORPHONE, URINE, 701858: ABNORMAL NG/ML
PCP UR QL: NEGATIVE NG/ML
PH UR: 5.4 [PH] (ref 4.5–8.9)
PHENCYCLIDINE SCREEN, URINE, 701836: ABNORMAL NG/ML
PLEASE NOTE:, 733163: ABNORMAL
PROPOXYPH UR QL SCN: NEGATIVE NG/ML
PROPOXYPHENE SCREEN, URINE, 701838: ABNORMAL NG/ML

## 2021-08-06 ENCOUNTER — OFFICE VISIT (OUTPATIENT)
Dept: CARDIOLOGY CLINIC | Age: 46
End: 2021-08-06
Payer: MEDICARE

## 2021-08-06 VITALS
BODY MASS INDEX: 37.11 KG/M2 | SYSTOLIC BLOOD PRESSURE: 152 MMHG | RESPIRATION RATE: 16 BRPM | WEIGHT: 289 LBS | OXYGEN SATURATION: 97 % | DIASTOLIC BLOOD PRESSURE: 101 MMHG | HEART RATE: 70 BPM | TEMPERATURE: 97.1 F

## 2021-08-06 DIAGNOSIS — Z95.5 S/P PRIMARY ANGIOPLASTY WITH CORONARY STENT: Primary | ICD-10-CM

## 2021-08-06 PROCEDURE — G8417 CALC BMI ABV UP PARAM F/U: HCPCS | Performed by: INTERNAL MEDICINE

## 2021-08-06 PROCEDURE — 99214 OFFICE O/P EST MOD 30 MIN: CPT | Performed by: INTERNAL MEDICINE

## 2021-08-06 PROCEDURE — G8510 SCR DEP NEG, NO PLAN REQD: HCPCS | Performed by: INTERNAL MEDICINE

## 2021-08-06 PROCEDURE — G8427 DOCREV CUR MEDS BY ELIG CLIN: HCPCS | Performed by: INTERNAL MEDICINE

## 2021-08-06 PROCEDURE — G8755 DIAS BP > OR = 90: HCPCS | Performed by: INTERNAL MEDICINE

## 2021-08-06 PROCEDURE — G8753 SYS BP > OR = 140: HCPCS | Performed by: INTERNAL MEDICINE

## 2021-08-06 NOTE — PROGRESS NOTES
Val Shore presents today for   Chief Complaint   Patient presents with    Follow-up     overdue       Val Shore preferred language for health care discussion is english/other. Personal Protective Equipment:   Personal Protective Equipment was used including: mask-surgical and hands-gloves. Patient was placed on no precaution(s). Patient was masked. Precautions:   Patient currently on None  Patient currently roomed with door closed    Is someone accompanying this pt? no    Is the patient using any DME equipment during [address]? no    Depression Screening:  3 most recent PHQ Screens 8/6/2021   PHQ Not Done -   Little interest or pleasure in doing things Not at all   Feeling down, depressed, irritable, or hopeless Not at all   Total Score PHQ 2 0       Learning Assessment:  Learning Assessment 11/6/2019   PRIMARY LEARNER Patient   HIGHEST LEVEL OF EDUCATION - PRIMARY LEARNER  -   BARRIERS PRIMARY LEARNER -   CO-LEARNER CAREGIVER -   PRIMARY LANGUAGE ENGLISH   LEARNER PREFERENCE PRIMARY LISTENING   ANSWERED BY patient   RELATIONSHIP SELF       Abuse Screening:  Abuse Screening Questionnaire 12/16/2020   Do you ever feel afraid of your partner? N   Are you in a relationship with someone who physically or mentally threatens you? N   Is it safe for you to go home? Y       Fall Risk  Fall Risk Assessment, last 12 mths 12/16/2020   Able to walk? Yes   Fall in past 12 months? No   Number of falls in past 12 months -   Fall with injury? -       Pt currently taking Anticoagulant therapy? no    Coordination of Care:  1. Have you been to the ER, urgent care clinic since your last visit? Hospitalized since your last visit? no    2. Have you seen or consulted any other health care providers outside of the [address] [place] [address] [place] since your last visit? Include any pap smears or colon screening.  no

## 2021-08-11 ENCOUNTER — OFFICE VISIT (OUTPATIENT)
Dept: FAMILY MEDICINE CLINIC | Age: 46
End: 2021-08-11
Payer: MEDICARE

## 2021-08-11 VITALS
SYSTOLIC BLOOD PRESSURE: 146 MMHG | BODY MASS INDEX: 37.22 KG/M2 | TEMPERATURE: 97.5 F | WEIGHT: 290 LBS | HEIGHT: 74 IN | OXYGEN SATURATION: 99 % | RESPIRATION RATE: 16 BRPM | DIASTOLIC BLOOD PRESSURE: 92 MMHG | HEART RATE: 61 BPM

## 2021-08-11 DIAGNOSIS — F11.90 CHRONIC, CONTINUOUS USE OF OPIOIDS: ICD-10-CM

## 2021-08-11 DIAGNOSIS — G62.9 NEUROPATHY: ICD-10-CM

## 2021-08-11 DIAGNOSIS — F11.99 OPIOID USE, UNSPECIFIED WITH UNSPECIFIED OPIOID-INDUCED DISORDER (HCC): ICD-10-CM

## 2021-08-11 DIAGNOSIS — I10 HYPERTENSION, UNSPECIFIED TYPE: ICD-10-CM

## 2021-08-11 DIAGNOSIS — I42.9 CARDIOMYOPATHY, UNSPECIFIED TYPE (HCC): ICD-10-CM

## 2021-08-11 DIAGNOSIS — E03.9 HYPOTHYROIDISM, UNSPECIFIED TYPE: ICD-10-CM

## 2021-08-11 DIAGNOSIS — G89.4 CHRONIC PAIN SYNDROME: ICD-10-CM

## 2021-08-11 DIAGNOSIS — M10.042 ACUTE IDIOPATHIC GOUT OF LEFT HAND: Primary | ICD-10-CM

## 2021-08-11 PROCEDURE — G8417 CALC BMI ABV UP PARAM F/U: HCPCS | Performed by: FAMILY MEDICINE

## 2021-08-11 PROCEDURE — G8755 DIAS BP > OR = 90: HCPCS | Performed by: FAMILY MEDICINE

## 2021-08-11 PROCEDURE — G8753 SYS BP > OR = 140: HCPCS | Performed by: FAMILY MEDICINE

## 2021-08-11 PROCEDURE — G8427 DOCREV CUR MEDS BY ELIG CLIN: HCPCS | Performed by: FAMILY MEDICINE

## 2021-08-11 PROCEDURE — 99214 OFFICE O/P EST MOD 30 MIN: CPT | Performed by: FAMILY MEDICINE

## 2021-08-11 PROCEDURE — G8510 SCR DEP NEG, NO PLAN REQD: HCPCS | Performed by: FAMILY MEDICINE

## 2021-08-11 RX ORDER — ENALAPRIL MALEATE 20 MG/1
20 TABLET ORAL 2 TIMES DAILY
Qty: 90 TABLET | Refills: 3 | Status: SHIPPED | OUTPATIENT
Start: 2021-08-11 | End: 2021-09-13 | Stop reason: SDUPTHER

## 2021-08-11 RX ORDER — INDOMETHACIN 25 MG/1
25 CAPSULE ORAL 3 TIMES DAILY
Qty: 30 CAPSULE | Refills: 0 | Status: SHIPPED | OUTPATIENT
Start: 2021-08-11 | End: 2021-12-13 | Stop reason: SDUPTHER

## 2021-08-11 RX ORDER — OXYCODONE AND ACETAMINOPHEN 5; 325 MG/1; MG/1
1 TABLET ORAL
Qty: 60 TABLET | Refills: 0 | Status: SHIPPED | OUTPATIENT
Start: 2021-08-11 | End: 2021-09-10

## 2021-08-11 NOTE — PROGRESS NOTES
Diandra Gallo presents today for   Chief Complaint   Patient presents with    Hypertension    Pain (Chronic)    Thyroid Problem    Anxiety    Insomnia       Is someone accompanying this pt? no    Is the patient using any DME equipment during OV? no    Depression Screening:  3 most recent PHQ Screens 8/11/2021   PHQ Not Done -   Little interest or pleasure in doing things Not at all   Feeling down, depressed, irritable, or hopeless Not at all   Total Score PHQ 2 0       Learning Assessment:  Learning Assessment 11/6/2019   PRIMARY LEARNER Patient   HIGHEST LEVEL OF EDUCATION - PRIMARY LEARNER  -   BARRIERS PRIMARY LEARNER -   CO-LEARNER CAREGIVER -   PRIMARY LANGUAGE ENGLISH   LEARNER PREFERENCE PRIMARY LISTENING   ANSWERED BY patient   RELATIONSHIP SELF       Abuse Screening:  Abuse Screening Questionnaire 12/16/2020   Do you ever feel afraid of your partner? N   Are you in a relationship with someone who physically or mentally threatens you? N   Is it safe for you to go home? Y       Fall Risk  Fall Risk Assessment, last 12 mths 12/16/2020   Able to walk? Yes   Fall in past 12 months? No   Number of falls in past 12 months -   Fall with injury? -       Health Maintenance reviewed and discussed and ordered per Provider. Health Maintenance Due   Topic Date Due    COVID-19 Vaccine (1) Never done    Lipid Screen  08/14/2021   . Coordination of Care:  1. Have you been to the ER, urgent care clinic since your last visit? Hospitalized since your last visit? Yes, Dr. Sher Carlos    2. Have you seen or consulted any other health care providers outside of the [address] [place] [address] since your last visit? Include any pap smears or colon screening. no      Last  Checked na  Last UDS Checked na  Last Pain contract signed: na    Patient presents in office today for routine care.   Patient concerns: med refills

## 2021-08-11 NOTE — PROGRESS NOTES
Regina Lopez is a 54 y.o.  male and presents with    Chief Complaint   Patient presents with    Hypertension    Pain (Chronic)    Thyroid Problem    Anxiety    Insomnia       Subjective:  He is here for f/u chronic pain and multiple chronic conditions. Cardiovascular Review:  The patient has hypertension, hyperlipidemia, CHF and obesity. Diet and Nabil Ulloa attempting to follow a low fat, low cholesterol diet, not attempting to follow a low sodium diet, does not rigorously follow a diabetic diet, sedentary, nonsmoker  Home BP Monitoring: is not measured at home. Pertinent ROS: taking medications as instructed, no medication side effects noted, no TIA's, no chest pain on exertion, no dyspnea on exertion, no swelling of ankles.      Anxiety Review:  Patient is seen for sleep disturbance. Current treatment of trazodone is effective along with individual therapy. Ongoing symptoms include: insomnia. Patient denies: palpitations, sweating, chest pain, shortness of breath, racing thoughts, psychomotor agitation, feelings of losing control, difficulty concentrating, suicidal ideation. Reported side effects from the treatment: none.     Thyroid Review:  Patient is seen for followup of hypothyroidism.  +cold intolerance  Thyroid ROS: denies fatigue, weight changes, bowel/skin changes or CVS symptoms.     Osteoarthritis and Chronic Pain:  He has neuropathy and orthopedic, his pain has been controlled with gabapentin. Symptoms onset: problem is longstanding. Rheumatological ROS: ongoing significant pain in legs and feet and hip which is stable and controlled by PRN meds.    Response to treatment plan: gradually worsening.   ROS   General ROS: negative for - chills or fever  Psychological ROS: negative for - anxiety or depression  Ophthalmic ROS: negative for - blurry vision  ENT ROS: negative for - headaches, nasal congestion or sore throat  Endocrine ROS: negative for - polydipsia/polyuria or temperature intolerance  Respiratory ROS: no cough, shortness of breath, or wheezing  Cardiovascular ROS: no chest pain or dyspnea on exertion  Gastrointestinal ROS: no abdominal pain, change in bowel habits, or black or bloody stools  Genito-Urinary ROS: no dysuria, trouble voiding, or hematuria  Neurological ROS: positive for - numbness/tingling; syncopal episodes as per above. Dermatological ROS: well healing  All other systems reviewed and are negative      Objective:  Vitals:    08/11/21 1209   BP: (!) 146/92   Pulse: 61   Resp: 16   Temp: 97.5 °F (36.4 °C)   TempSrc: Temporal   SpO2: 99%   Weight: 290 lb (131.5 kg)   Height: 6' 2\" (1.88 m)   PainSc:   5   PainLoc: Generalized       alert, well appearing, and in no distress, oriented to person, place, and time and obese  Mental status - normal mood, behavior, speech, dress, motor activity, and thought processes  Chest - clear to auscultation, no wheezes, rales or rhonchi, symmetric air entry  Heart - normal rate, regular rhythm, normal S1, S2, no murmurs, rubs, clicks or gallops  Neurological - cranial nerves II through XII intact, antalgic gait  Extremities - peripheral pulses normal, no pedal edema, no clubbing or cyanosis  msk - left hand edema with ttp  LABS     TESTS      Assessment/Plan:    1. Acute idiopathic gout of left hand  Abortive therapy ordered; reviewed diet  - indomethacin (INDOCIN) 25 mg capsule; Take 1 Capsule by mouth three (3) times daily. Dispense: 30 Capsule; Refill: 0    2. Cardiomyopathy, unspecified type [facility]  Follow up with Dr. Joe Millan in cardiology; continue current management  - enalapril (VASOTEC) 20 mg tablet; Take 1 Tablet by mouth two (2) times a day. Dispense: 90 Tablet; Refill: 3    3. Hypertension, unspecified type    Goal <130/80; increase dosing to twice a day for better control of hypertension  - enalapril (VASOTEC) 20 mg tablet; Take 1 Tablet by mouth two (2) times a day.   Dispense: 90 Tablet; Refill: 3    4. Hypothyroidism, unspecified type  Continue therapeutic dose    5. Neuropathy  This is a chronic problem that is stable. Per review of available records and patients , there are not sign of overuse, misuse, diversion, or concerning side effects. Today we reviewed: the risk of overdose, addiction, and dependency proper storage and disposal of medications the goals of treatment (improve functionality, quality of life, and pain)  The following changes were made to the patients current treatment plan: nothing, medications refilled. - oxyCODONE-acetaminophen (PERCOCET) 5-325 mg per tablet; Take 1 Tablet by mouth every six (6) hours as needed for Pain for up to 30 days. Max Daily Amount: 4 Tablets. Dispense: 60 Tablet; Refill: 0    6. Chronic pain syndrome    - oxyCODONE-acetaminophen (PERCOCET) 5-325 mg per tablet; Take 1 Tablet by mouth every six (6) hours as needed for Pain for up to 30 days. Max Daily Amount: 4 Tablets. Dispense: 60 Tablet; Refill: 0    7. Chronic, continuous use of opioids    - oxyCODONE-acetaminophen (PERCOCET) 5-325 mg per tablet; Take 1 Tablet by mouth every six (6) hours as needed for Pain for up to 30 days. Max Daily Amount: 4 Tablets. Dispense: 60 Tablet; Refill: 0    8. Opioid use, unspecified with unspecified opioid-induced disorder    Lab review: labs reviewed, I note that urine drug screen appropriate      I have discussed the diagnosis with the patient and the intended plan as seen in the above orders. The patient has received an after-visit summary and questions were answered concerning future plans. I have discussed medication side effects and warnings with the patient as well. I have reviewed the plan of care with the patient, accepted their input and they are in agreement with the treatment goals.

## 2021-08-16 NOTE — PROGRESS NOTES
Subjective:   Mr. Angel Hernandez is here today for cardiac reevaluation. He was hospitalized at [facility] in August 2018 with chest pain and an acute MI. His initial electrocardiogram was consistent with acute inferior STEMI. He was taken to cardiac cath lab and was found to have a totally occluded RCA. A RUT was placed. He also had some mild disease in a diagonal branch and in the circumflex coronary artery. He had an admission to [facility] in [facility] 2018 for syncope. At that time, he had a Hemoccult positive stool. He was evaluated by GI and underwent EGD which showed two G-J anastomotic ulcers without high risk stigmata, edges biopsied, gastric erosions, biopsies of gastric pouch for Hpylori, slight esophagitis and possible short segment Nunez's. He was told to avoid NSAID's, but that he could continue with ASA and Plavix. He did report that was was taking Ibuprofen \"like candy\" for cramps and right buttock pain. In the office today, he reports no chest pain. He has had no shortness of breath. He takes his Lasix only when he has ankle swelling, which is apparently is not very common. He is taking gabapentin which has helped  some with his pain.     Patient Active Problem List    Diagnosis Date Noted    Opioid use, unspecified with unspecified opioid-induced disorder 08/11/2021    Peripheral vascular disease ([address] 09/13/2019    S/P primary angioplasty with coronary stent 05/11/2019    Pain in right buttock 05/11/2019    History of ST elevation myocardial infarction (STEMI) 12/05/2018    Chronic pain syndrome 12/05/2018    Severe obesity ([address] 12/05/2018    Post corneal transplant 07/21/2014    Cardiomyopathy ([address] 07/21/2014    Gastric bypass status for obesity 08/08/2011    HTN (hypertension) 08/08/2011    Hypothyroid 08/08/2011     Current Outpatient Medications   Medication Sig Dispense Refill    atorvastatin (LIPITOR) 80 mg tablet TAKE ONE TABLET BY MOUTH DAILY 90 Tablet 4    diphenoxylate-atropine (LOMOTIL) 2.5-0.025 mg per tablet TAKE ONE TABLET BY MOUTH FOUR TIMES A DAY AS NEEDED FOR DIARRHEA 60 Tablet 5    gabapentin (NEURONTIN) 800 mg tablet Take 1 tab by mouth 4 times a day for pain 120 Tablet 3    aspirin delayed-release 81 mg tablet TAKE ONE TABLET BY MOUTH DAILY 90 Tablet 2    levothyroxine (SYNTHROID) 150 mcg tablet Take 1 Tab by mouth Daily (before breakfast). 90 Tab 1    furosemide (LASIX) 20 mg tablet Take 1 Tab by mouth daily. 90 Tab 3    potassium chloride (KLOR-CON) 10 mEq tablet TAKE ONE TABLET BY MOUTH DAILY 90 Tab 3    traZODone (DESYREL) 100 mg tablet       colchicine 0.6 mg tablet Take 1 Tab by mouth daily. 30 Tab 11    pantoprazole (PROTONIX) 40 mg tablet Take 1 Tab by mouth two (2) times a day. 180 Tab 4    metoprolol tartrate (LOPRESSOR) 25 mg tablet Take 1 Tab by mouth two (2) times a day. 180 Tab 2    indomethacin (INDOCIN) 25 mg capsule Take 1 Capsule by mouth three (3) times daily. 30 Capsule 0    enalapril (VASOTEC) 20 mg tablet Take 1 Tablet by mouth two (2) times a day. 90 Tablet 3    oxyCODONE-acetaminophen (PERCOCET) 5-325 mg per tablet Take 1 Tablet by mouth every six (6) hours as needed for Pain for up to 30 days. Max Daily Amount: 4 Tablets.  60 Tablet 0     No Known Allergies  Past Medical History:   Diagnosis Date    Cardiomyopathy ([facility] 7/21/2014    Chronic pain syndrome 12/5/2018    Gastric bypass status for obesity 8/8/2011    Heart failure ([address]     History of ST elevation myocardial infarction (STEMI) 12/5/2018    HTN (hypertension) 8/8/2011    Hypothyroid 8/8/2011    Ill-defined condition     Alcoholism    STEMI (ST elevation myocardial infarction) ([address]     08/2018     Past Surgical History:   Procedure Laterality Date    COLONOSCOPY N/A 10/9/2019    COLONOSCOPY performed by Mane Evangelista MD at [facility] GASTRIC BYPASS  2015    HX LAP GASTRIC BYPASS  2011    LA CARDIAC SURG PROCEDURE UNLIST 2018 Stent placed Coronary Artery      No family history on file. Social History     Tobacco Use   Smoking Status Never Smoker   Smokeless Tobacco Never Used          Review of Systems, additional:  Constitutional: negative  Eyes: negative  Respiratory: negative for dyspnea on exertion  Cardiovascular: per HPI  Gastrointestinal: negative  Musculoskeletal:+right buttock pain radiates to right thigh  Neurological: negative  Behvioral/Psych: negative  Endocrine: negative  ENT: negative    Objective:     Visit Vitals  BP (!) 152/101 (BP 1 Location: Left upper arm, BP Patient Position: Sitting, BP Cuff Size: Adult)   Pulse 70   Temp 97.1 °F (36.2 °C)   Resp 16   Wt 131.1 kg (289 lb)   SpO2 97%   BMI 37.11 kg/m²     General:  alert, cooperative, no distress, appears stated age   Chest Wall: inspection normal - no chest wall deformities or tenderness, respiratory effort normal   Lung: clear to auscultation bilaterally   Heart:  normal rate, regular rhythm, normal S1, S2, no murmurs, rubs, clicks or gallops   Abdomen: soft, non-tender. Bowel sounds normal. No masses,  no organomegaly   Extremities: extremities normal, atraumatic, no cyanosis or edema Skin: no rashes   Neuro: alert, oriented, normal speech, no focal findings or movement disorder noted       Assessment/Plan:         ICD-10-CM ICD-9-CM    1. Hypertension, unspecified type-- BP elevated in the office today. He was taken off chlorthalidone at a prior appointment of 12/18/2019 because his blood pressure was quite low and he was symptomatic. He will be seeing Dr. Oneil Blizzard soon and will have his blood pressure checked at that visit. I10 401.9 atorvastatin (LIPITOR) 80 mg tablet      metoprolol tartrate (LOPRESSOR) 100 mg IR tablet   2. Cardiomyopathy, unspecified type ([facility]-- EF 45%, advised of salt restriction. Continue with current cardiac regimen. I42.9 425.4 atorvastatin (LIPITOR) 80 mg tablet      metoprolol tartrate (LOPRESSOR) 100 mg IR tablet   3.  ST elevation myocardial infarction (STEMI), ([facility]-- S/P RUT to RCA Resolute Integrity 4X22 mm, on 8/13/2018. He is on ASA, enalapril, metoprolol and Atorvastatin. Continue  aspirin 81 mg daily. Return in 6 months I21.3 410.90 atorvastatin (LIPITOR) 80 mg tablet      metoprolol tartrate (LOPRESSOR) 100 mg IR tablet      REFERRAL TO CARDIAC REHAB   4. Hypothyroidism, unspecified type E03.9 244.9 atorvastatin (LIPITOR) 80 mg tablet      metoprolol tartrate (LOPRESSOR) 100 mg IR tablet   5.       Lumbar radiculopathy: , Patient taking Neurontin now

## 2021-09-13 ENCOUNTER — OFFICE VISIT (OUTPATIENT)
Dept: FAMILY MEDICINE CLINIC | Age: 46
End: 2021-09-13
Payer: MEDICARE

## 2021-09-13 VITALS
TEMPERATURE: 98.2 F | HEIGHT: 74 IN | SYSTOLIC BLOOD PRESSURE: 140 MMHG | WEIGHT: 285 LBS | BODY MASS INDEX: 36.57 KG/M2 | HEART RATE: 77 BPM | DIASTOLIC BLOOD PRESSURE: 90 MMHG | RESPIRATION RATE: 16 BRPM | OXYGEN SATURATION: 99 %

## 2021-09-13 DIAGNOSIS — M10.022 ACUTE IDIOPATHIC GOUT OF LEFT ELBOW: ICD-10-CM

## 2021-09-13 DIAGNOSIS — G62.9 NEUROPATHY: ICD-10-CM

## 2021-09-13 DIAGNOSIS — I10 HYPERTENSION, UNSPECIFIED TYPE: ICD-10-CM

## 2021-09-13 DIAGNOSIS — I21.3 ST ELEVATION MYOCARDIAL INFARCTION (STEMI), UNSPECIFIED ARTERY (HCC): ICD-10-CM

## 2021-09-13 DIAGNOSIS — E03.9 ACQUIRED HYPOTHYROIDISM: ICD-10-CM

## 2021-09-13 DIAGNOSIS — F51.04 PSYCHOPHYSIOLOGIC INSOMNIA: ICD-10-CM

## 2021-09-13 DIAGNOSIS — U07.1 COVID-19: Primary | ICD-10-CM

## 2021-09-13 DIAGNOSIS — I42.9 CARDIOMYOPATHY, UNSPECIFIED TYPE (HCC): ICD-10-CM

## 2021-09-13 DIAGNOSIS — F11.90 CHRONIC, CONTINUOUS USE OF OPIOIDS: ICD-10-CM

## 2021-09-13 DIAGNOSIS — G89.4 CHRONIC PAIN SYNDROME: ICD-10-CM

## 2021-09-13 DIAGNOSIS — R55 SYNCOPE AND COLLAPSE: ICD-10-CM

## 2021-09-13 DIAGNOSIS — Z23 NEEDS FLU SHOT: ICD-10-CM

## 2021-09-13 DIAGNOSIS — F10.21 ALCOHOLISM IN RECOVERY (HCC): ICD-10-CM

## 2021-09-13 PROCEDURE — G8417 CALC BMI ABV UP PARAM F/U: HCPCS | Performed by: FAMILY MEDICINE

## 2021-09-13 PROCEDURE — G8432 DEP SCR NOT DOC, RNG: HCPCS | Performed by: FAMILY MEDICINE

## 2021-09-13 PROCEDURE — G8427 DOCREV CUR MEDS BY ELIG CLIN: HCPCS | Performed by: FAMILY MEDICINE

## 2021-09-13 PROCEDURE — G8753 SYS BP > OR = 140: HCPCS | Performed by: FAMILY MEDICINE

## 2021-09-13 PROCEDURE — 90686 IIV4 VACC NO PRSV 0.5 ML IM: CPT | Performed by: FAMILY MEDICINE

## 2021-09-13 PROCEDURE — 99215 OFFICE O/P EST HI 40 MIN: CPT | Performed by: FAMILY MEDICINE

## 2021-09-13 PROCEDURE — G8755 DIAS BP > OR = 90: HCPCS | Performed by: FAMILY MEDICINE

## 2021-09-13 PROCEDURE — G0008 ADMIN INFLUENZA VIRUS VAC: HCPCS | Performed by: FAMILY MEDICINE

## 2021-09-13 RX ORDER — AMLODIPINE BESYLATE 5 MG/1
5 TABLET ORAL DAILY
Qty: 90 TABLET | Refills: 3 | Status: SHIPPED | OUTPATIENT
Start: 2021-09-13 | End: 2022-09-05 | Stop reason: SDUPTHER

## 2021-09-13 RX ORDER — OXYCODONE AND ACETAMINOPHEN 5; 325 MG/1; MG/1
1 TABLET ORAL
Qty: 60 TABLET | Refills: 0 | Status: SHIPPED | OUTPATIENT
Start: 2021-09-13 | End: 2021-10-13

## 2021-09-13 RX ORDER — ENALAPRIL MALEATE 20 MG/1
20 TABLET ORAL 2 TIMES DAILY
Qty: 180 TABLET | Refills: 3 | Status: SHIPPED | OUTPATIENT
Start: 2021-09-13 | End: 2022-10-11 | Stop reason: SDUPTHER

## 2021-09-13 RX ORDER — OXYCODONE AND ACETAMINOPHEN 5; 325 MG/1; MG/1
1 TABLET ORAL
Qty: 60 TABLET | Refills: 0 | Status: SHIPPED | OUTPATIENT
Start: 2021-10-13 | End: 2021-11-12 | Stop reason: SDUPTHER

## 2021-09-13 NOTE — PROGRESS NOTES
Tegan Newsome presents today for   Chief Complaint   Patient presents with    Elbow Swelling     left    Leg Pain     right leg       Is someone accompanying this pt? no    Is the patient using any DME equipment during OV? no    Depression Screening:  3 most recent PHQ Screens 8/11/2021   PHQ Not Done -   Little interest or pleasure in doing things Not at all   Feeling down, depressed, irritable, or hopeless Not at all   Total Score PHQ 2 0       Learning Assessment:  Learning Assessment 11/6/2019   PRIMARY LEARNER Patient   HIGHEST LEVEL OF EDUCATION - PRIMARY LEARNER  -   BARRIERS PRIMARY LEARNER -   CO-LEARNER CAREGIVER -   PRIMARY LANGUAGE ENGLISH   LEARNER PREFERENCE PRIMARY LISTENING   ANSWERED BY patient   RELATIONSHIP SELF       Abuse Screening:  Abuse Screening Questionnaire 12/16/2020   Do you ever feel afraid of your partner? N   Are you in a relationship with someone who physically or mentally threatens you? N   Is it safe for you to go home? Y       Fall Risk  Fall Risk Assessment, last 12 mths 12/16/2020   Able to walk? Yes   Fall in past 12 months? No   Number of falls in past 12 months -   Fall with injury? -       Health Maintenance reviewed and discussed and ordered per Provider. Health Maintenance Due   Topic Date Due    COVID-19 Vaccine (1) Never done    Flu Vaccine (1) 09/01/2021    Lipid Screen  08/14/2021    Medicare Yearly Exam  10/10/2021   . Coordination of Care:  1. Have you been to the ER, urgent care clinic since your last visit? Hospitalized since your last visit? no    2. Have you seen or consulted any other health care providers outside of the [address] [place] [address] since your last visit? Include any pap smears or colon screening. no      Last  Checked na  Last UDS Checked na  Last Pain contract signed: na    Patient presents in office today for routine care.   Patient concerns: med refills

## 2021-09-13 NOTE — PROGRESS NOTES
Yoel Coley is a 54 y.o.  male and presents with    Chief Complaint   Patient presents with    Elbow Swelling     left    Leg Pain     right leg     Subjective:  He has left elbow edema with warmth; he was been treated in the past for gout. He did have fall a couple of weeks ago. He was playing corn[employer]ru[employer] and his friends say he went white and fell to his knees. He reports that he was sweating before he fell. He had positive flu test 3 weeks ago. He had myalgias and fever. He then had a negative test last week. He had exposure to known covid case. Cardiovascular Review:  The patient has hypertension, hyperlipidemia, CHF and obesity. Diet and [address] attempting to follow a low fat, low cholesterol diet, not attempting to follow a low sodium diet, does not rigorously follow a diabetic diet, sedentary, nonsmoker  Home BP Monitoring: is not measured at home. Pertinent ROS: taking medications as instructed, no medication side effects noted, no TIA's, no chest pain on exertion, no dyspnea on exertion, no swelling of ankles.      Anxiety Review:  Patient is seen for sleep disturbance. Current treatment of trazodone is effective along with individual therapy. Ongoing symptoms include: insomnia. Patient denies: palpitations, sweating, chest pain, shortness of breath, racing thoughts, psychomotor agitation, feelings of losing control, difficulty concentrating, suicidal ideation. Reported side effects from the treatment: none.     Thyroid Review:  Patient is seen for followup of hypothyroidism.  +cold intolerance  Thyroid ROS: denies fatigue, weight changes, bowel/skin changes or CVS symptoms.     Osteoarthritis and Chronic Pain:  He has neuropathy and orthopedic, his pain has been controlled with gabapentin. Symptoms onset: problem is longstanding. Rheumatological ROS: ongoing significant pain in legs and feet and hip which is stable and controlled by PRN meds.    Response to treatment plan: gradually worsening.   ROS   General ROS: negative for - chills or fever  Psychological ROS: negative for - anxiety or depression  Ophthalmic ROS: negative for - blurry vision  ENT ROS: negative for - headaches, nasal congestion or sore throat  Endocrine ROS: negative for - polydipsia/polyuria or temperature intolerance  Respiratory ROS: no cough, shortness of breath, or wheezing  Cardiovascular ROS: no chest pain or dyspnea on exertion  Gastrointestinal ROS: no abdominal pain, change in bowel habits, or black or bloody stools  Genito-Urinary ROS: no dysuria, trouble voiding, or hematuria  Neurological ROS: positive for - numbness/tingling; syncopal episodes as per above. Dermatological ROS: well healing  All other systems reviewed and are negative    Objective:  Vitals:    09/13/21 1149   BP: (!) 155/98   Pulse: 77   Resp: 16   Temp: 98.2 °F (36.8 °C)   TempSrc: Temporal   SpO2: 99%   Weight: 285 lb (129.3 kg)   Height: 6' 2\" (1.88 m)   PainSc:   8   PainLoc: Generalized       alert, well appearing, and in no distress, oriented to person, place, and time and obese  Mental status - normal mood, behavior, speech, dress, motor activity, and thought processes  Chest - clear to auscultation, no wheezes, rales or rhonchi, symmetric air entry  Heart - normal rate, regular rhythm, normal S1, S2, no murmurs, rubs, clicks or gallops  Neurological - cranial nerves II through XII intact  Musculoskeletal - abnormal exam of left elbow with warmth and induration  LABS     TESTS      Assessment/Plan:    1. COVID-19  Pt has been retested and it was negative; he is waiting or the vaccine and now has to wait another 2 months for 90 days after infection    2. Cardiomyopathy, unspecified type ([facility]  Continue ace  - enalapril (VASOTEC) 20 mg tablet; Take 1 Tablet by mouth two (2) times a day. Dispense: 180 Tablet; Refill: 3    3. Acute idiopathic gout of left elbow  Colchicine started    4.  Psychophysiologic insomnia  Continue sleep aid    5. Alcoholism in recovery [facility])  sober    6. Acquired hypothyroidism  Continue treatment    7. Needs flu shot    - INFLUENZA VIRUS VAC QUAD,SPLIT,PRESV FREE SYRINGE IM  - ADMIN INFLUENZA VIRUS VAC    8. ST elevation myocardial infarction (STEMI), unspecified artery (HCC)  Assess efficacy of treatment  - LIPID PANEL; Future    9. Hypertension, unspecified type  Goal <130/80; increase amlodipine  - enalapril (VASOTEC) 20 mg tablet; Take 1 Tablet by mouth two (2) times a day. Dispense: 180 Tablet; Refill: 3  - amLODIPine (NORVASC) 5 mg tablet; Take 1 Tablet by mouth daily. Dispense: 90 Tablet; Refill: 3  - METABOLIC PANEL, COMPREHENSIVE; Future    10. Neuropathy  This is a chronic problem that is worsened. Per review of available records and patients , there are not sign of overuse, misuse, diversion, or concerning side effects. Today we reviewed: the risk of overdose, addiction, and dependency proper storage and disposal of medications the goals of treatment (improve functionality, quality of life, and pain)  The following changes were made to the patients current treatment plan: nothing, medications refilled. - oxyCODONE-acetaminophen (PERCOCET) 5-325 mg per tablet; Take 1 Tablet by mouth every six (6) hours as needed for Pain for up to 30 days. Max Daily Amount: 4 Tablets. Dispense: 60 Tablet; Refill: 0  - oxyCODONE-acetaminophen (PERCOCET) 5-325 mg per tablet; Take 1 Tablet by mouth every six (6) hours as needed for Pain for up to 30 days. Max Daily Amount: 4 Tablets. Dispense: 60 Tablet; Refill: 0    11. Chronic pain syndrome    - oxyCODONE-acetaminophen (PERCOCET) 5-325 mg per tablet; Take 1 Tablet by mouth every six (6) hours as needed for Pain for up to 30 days. Max Daily Amount: 4 Tablets. Dispense: 60 Tablet; Refill: 0  - oxyCODONE-acetaminophen (PERCOCET) 5-325 mg per tablet; Take 1 Tablet by mouth every six (6) hours as needed for Pain for up to 30 days. Max Daily Amount: 4 Tablets. Dispense: 60 Tablet; Refill: 0    12. Chronic, continuous use of opioids    - oxyCODONE-acetaminophen (PERCOCET) 5-325 mg per tablet; Take 1 Tablet by mouth every six (6) hours as needed for Pain for up to 30 days. Max Daily Amount: 4 Tablets. Dispense: 60 Tablet; Refill: 0  - oxyCODONE-acetaminophen (PERCOCET) 5-325 mg per tablet; Take 1 Tablet by mouth every six (6) hours as needed for Pain for up to 30 days. Max Daily Amount: 4 Tablets. Dispense: 60 Tablet; Refill: 0    13. Syncope and collapse  Evaluate electrolytes  - METABOLIC PANEL, COMPREHENSIVE; Future    Lab review: orders written for new lab studies as appropriate; see orders      I have discussed the diagnosis with the patient and the intended plan as seen in the above orders. The patient has received an after-visit summary and questions were answered concerning future plans. I have discussed medication side effects and warnings with the patient as well. I have reviewed the plan of care with the patient, accepted their input and they are in agreement with the treatment goals.

## 2021-09-29 ENCOUNTER — HOSPITAL ENCOUNTER (OUTPATIENT)
Dept: LAB | Age: 46
Discharge: HOME | End: 2021-09-29
Payer: MEDICARE

## 2021-09-29 DIAGNOSIS — I10 HYPERTENSION, UNSPECIFIED TYPE: ICD-10-CM

## 2021-09-29 DIAGNOSIS — I21.3 ST ELEVATION MYOCARDIAL INFARCTION (STEMI), UNSPECIFIED ARTERY (HCC): ICD-10-CM

## 2021-09-29 DIAGNOSIS — R55 SYNCOPE AND COLLAPSE: ICD-10-CM

## 2021-09-29 LAB
ALBUMIN SERPL-MCNC: 3.6 G/DL (ref 3.4–5)
ALBUMIN/GLOB SERPL: 0.9 {RATIO} (ref 0.8–1.7)
ALP SERPL-CCNC: 103 U/L (ref 45–117)
ALT SERPL-CCNC: 42 U/L (ref 16–61)
ANION GAP SERPL CALC-SCNC: 6 MMOL/L (ref 3–18)
AST SERPL-CCNC: 45 U/L (ref 10–38)
BILIRUB SERPL-MCNC: 1.2 MG/DL (ref 0.2–1)
BUN SERPL-MCNC: 18 MG/DL (ref 7–18)
BUN/CREAT SERPL: 17 (ref 12–20)
CALCIUM SERPL-MCNC: 9 MG/DL (ref 8.5–10.1)
CHLORIDE SERPL-SCNC: 102 MMOL/L (ref 100–111)
CHOLEST SERPL-MCNC: 159 MG/DL
CO2 SERPL-SCNC: 30 MMOL/L (ref 21–32)
CREAT SERPL-MCNC: 1.09 MG/DL (ref 0.6–1.3)
GLOBULIN SER CALC-MCNC: 3.8 G/DL (ref 2–4)
GLUCOSE SERPL-MCNC: 116 MG/DL (ref 74–99)
HDLC SERPL-MCNC: 82 MG/DL (ref 40–60)
HDLC SERPL: 1.9 {RATIO} (ref 0–5)
LDLC SERPL CALC-MCNC: 51.8 MG/DL (ref 0–100)
LIPID PROFILE,FLP: ABNORMAL
POTASSIUM SERPL-SCNC: 3.9 MMOL/L (ref 3.5–5.5)
PROT SERPL-MCNC: 7.4 G/DL (ref 6.4–8.2)
SODIUM SERPL-SCNC: 138 MMOL/L (ref 136–145)
TRIGL SERPL-MCNC: 126 MG/DL (ref ?–150)
VLDLC SERPL CALC-MCNC: 25.2 MG/DL

## 2021-09-29 PROCEDURE — 36415 COLL VENOUS BLD VENIPUNCTURE: CPT

## 2021-09-29 PROCEDURE — 80053 COMPREHEN METABOLIC PANEL: CPT

## 2021-09-29 PROCEDURE — 80061 LIPID PANEL: CPT

## 2021-11-05 DIAGNOSIS — G62.9 NEUROPATHY: ICD-10-CM

## 2021-11-09 RX ORDER — GABAPENTIN 800 MG/1
TABLET ORAL
Qty: 120 TABLET | Refills: 5 | Status: SHIPPED | OUTPATIENT
Start: 2021-11-09 | End: 2022-04-11 | Stop reason: SDUPTHER

## 2021-11-12 ENCOUNTER — VIRTUAL VISIT (OUTPATIENT)
Dept: FAMILY MEDICINE CLINIC | Age: 46
End: 2021-11-12
Payer: MEDICARE

## 2021-11-12 DIAGNOSIS — F11.90 CHRONIC, CONTINUOUS USE OF OPIOIDS: ICD-10-CM

## 2021-11-12 DIAGNOSIS — G89.4 CHRONIC PAIN SYNDROME: ICD-10-CM

## 2021-11-12 DIAGNOSIS — A08.4 VIRAL GASTROENTERITIS: Primary | ICD-10-CM

## 2021-11-12 DIAGNOSIS — I25.2 HISTORY OF ST ELEVATION MYOCARDIAL INFARCTION (STEMI): ICD-10-CM

## 2021-11-12 DIAGNOSIS — E66.01 SEVERE OBESITY (HCC): ICD-10-CM

## 2021-11-12 DIAGNOSIS — I42.9 CARDIOMYOPATHY, UNSPECIFIED TYPE (HCC): ICD-10-CM

## 2021-11-12 DIAGNOSIS — G62.9 NEUROPATHY: ICD-10-CM

## 2021-11-12 PROCEDURE — 99214 OFFICE O/P EST MOD 30 MIN: CPT | Performed by: FAMILY MEDICINE

## 2021-11-12 PROCEDURE — G8432 DEP SCR NOT DOC, RNG: HCPCS | Performed by: FAMILY MEDICINE

## 2021-11-12 PROCEDURE — G8427 DOCREV CUR MEDS BY ELIG CLIN: HCPCS | Performed by: FAMILY MEDICINE

## 2021-11-12 PROCEDURE — G8417 CALC BMI ABV UP PARAM F/U: HCPCS | Performed by: FAMILY MEDICINE

## 2021-11-12 PROCEDURE — G8756 NO BP MEASURE DOC: HCPCS | Performed by: FAMILY MEDICINE

## 2021-11-12 RX ORDER — PROMETHAZINE HYDROCHLORIDE 25 MG/1
25 TABLET ORAL
Qty: 20 TABLET | Refills: 0 | Status: SHIPPED | OUTPATIENT
Start: 2021-11-12

## 2021-11-12 RX ORDER — OXYCODONE AND ACETAMINOPHEN 5; 325 MG/1; MG/1
1 TABLET ORAL
Qty: 60 TABLET | Refills: 0 | Status: SHIPPED | OUTPATIENT
Start: 2021-11-12 | End: 2021-12-12

## 2021-11-12 NOTE — PROGRESS NOTES
Trice Molina is a 51 y.o.  male and presents with    Chief Complaint   Patient presents with    Pain (Chronic)    Hypertension    Anxiety    Thyroid Problem    Insomnia     Trice Molina, who was evaluated through a synchronous (real-time) audio-video encounter, and/or his healthcare decision maker, is aware that it is a billable service, with coverage as determined by his insurance carrier. He provided verbal consent to proceed: Yes, and patient identification was verified. This visit was conducted pursuant to the emergency declaration under the [address] [place] [address] and the [employer] and [employer] General Act. A caregiver was present when appropriate. Ability to conduct physical exam was limited. The patient was located in a state where the provider was credentialed to provide care. --Carlos Beckwith MD on 11/12/2021 at 10:30 AM    Subjective:  Gastroenteritis  Patient complains of diarrhea a few  times per day, nausea, vomiting for a few days. There is no report of blood in stool, melena, acholic stools, constipation, hematemesis. Patient's oral intake has been normal for liquids. Patient's urine output has changed with 8 voids in 24 hours, the last void was 15 minutes ago. Other contacts with similar symptoms include other: roommate. Patient denies recent travel history. Patient denies recent ingestion of possible contaminated food, toxic plants, inappropriate medications/poisons. + nasal congestion  Cardiovascular Review:  The patient has hypertension, hyperlipidemia, CHF and obesity. Diet and Fabien Ma attempting to follow a low fat, low cholesterol diet, not attempting to follow a low sodium diet, does not rigorously follow a diabetic diet, sedentary, nonsmoker  Home BP Monitoring: is not measured at home.   Pertinent ROS: taking medications as instructed, no medication side effects noted, no TIA's, no chest pain on exertion, no dyspnea on exertion, no swelling of ankles.      Anxiety Review:  Patient is seen for sleep disturbance. Current treatment of trazodone is effective along with individual therapy. Ongoing symptoms include: insomnia. Patient denies: palpitations, sweating, chest pain, shortness of breath, racing thoughts, psychomotor agitation, feelings of losing control, difficulty concentrating, suicidal ideation. Reported side effects from the treatment: none.     Thyroid Review:  Patient is seen for followup of hypothyroidism.  +cold intolerance  Thyroid ROS: denies fatigue, weight changes, bowel/skin changes or CVS symptoms.     Osteoarthritis and Chronic Pain:  He has neuropathy and orthopedic, his pain has been controlled with gabapentin. Symptoms onset: problem is longstanding. Rheumatological ROS: ongoing significant pain in legs and feet and hip which is stable and controlled by PRN meds. Response to treatment plan: gradually worsening.     ROS   General ROS: positive for - chills or fever  Psychological ROS: negative for - anxiety or depression  Ophthalmic ROS: negative for - blurry vision  ENT ROS: negative for - headaches, nasal congestion or sore throat  Endocrine ROS: negative for - polydipsia/polyuria or temperature intolerance  Respiratory ROS: no cough, shortness of breath, or wheezing  Cardiovascular ROS: no chest pain or dyspnea on exertion  Gastrointestinal ROS: see HPI  Genito-Urinary ROS: no dysuria, trouble voiding, or hematuria  Neurological ROS: positive for - numbness/tingling; syncopal episodes as per above. Dermatological ROS: well healing  All other systems reviewed and are negative      Objective: There were no vitals filed for this visit.     alert, ill appearing, and in no distress, oriented to person, place, and time and obese  Mental status - slurred speech  Chest - normal work of breathing  Neurological - cranial nerves II through XII intact    LABS     TESTS      Assessment/Plan:    1. Neuropathy  This is a chronic problem that is worsened. Per review of available records and patients , there are not sign of overuse, misuse, diversion, or concerning side effects. Today we reviewed: the risk of overdose, addiction, and dependency proper storage and disposal of medications the goals of treatment (improve functionality, quality of life, and pain)  The following changes were made to the patients current treatment plan: nothing, medications refilled. - oxyCODONE-acetaminophen (PERCOCET) 5-325 mg per tablet; Take 1 Tablet by mouth every six (6) hours as needed for Pain for up to 30 days. Max Daily Amount: 4 Tablets. Dispense: 60 Tablet; Refill: 0    2. Chronic pain syndrome    - oxyCODONE-acetaminophen (PERCOCET) 5-325 mg per tablet; Take 1 Tablet by mouth every six (6) hours as needed for Pain for up to 30 days. Max Daily Amount: 4 Tablets. Dispense: 60 Tablet; Refill: 0    3. Chronic, continuous use of opioids    - oxyCODONE-acetaminophen (PERCOCET) 5-325 mg per tablet; Take 1 Tablet by mouth every six (6) hours as needed for Pain for up to 30 days. Max Daily Amount: 4 Tablets. Dispense: 60 Tablet; Refill: 0    4. Viral gastroenteritis  Continue with brat diet and hydration as tolerated; antiemetic prescribed  - promethazine (PHENERGAN) 25 mg tablet; Take 1 Tablet by mouth every six (6) hours as needed for Nausea. Dispense: 20 Tablet; Refill: 0    5. Cardiomyopathy, unspecified type ([facility]    - LIPID PANEL; Future    6. Severe obesity ([facility]    - CBC WITH AUTOMATED DIFF; Future    7. History of ST elevation myocardial infarction (STEMI)    - LIPID PANEL; Future      Lab review: orders written for new lab studies as appropriate; see orders      I have discussed the diagnosis with the patient and the intended plan as seen in the above orders. I have discussed medication side effects and warnings with the patient as well.  I have reviewed the plan of care with the patient, accepted their input and they are in agreement with the treatment goals.

## 2021-11-12 NOTE — Clinical Note
Please schedule pt for face to face in 1 month and remind him to go to lab prior to appointment.   Thank you

## 2021-11-12 NOTE — PROGRESS NOTES
Landen Carpenter presents today for   Chief Complaint   Patient presents with    Pain (Chronic)    Hypertension    Anxiety    Thyroid Problem    Insomnia       Is someone accompanying this pt? na    Is the patient using any DME equipment during OV? na    Depression Screening:  3 most recent PHQ Screens 8/11/2021   PHQ Not Done -   Little interest or pleasure in doing things Not at all   Feeling down, depressed, irritable, or hopeless Not at all   Total Score PHQ 2 0       Learning Assessment:  Learning Assessment 11/6/2019   PRIMARY LEARNER Patient   HIGHEST LEVEL OF EDUCATION - PRIMARY LEARNER  -   BARRIERS PRIMARY LEARNER -   CO-LEARNER CAREGIVER -   PRIMARY LANGUAGE ENGLISH   LEARNER PREFERENCE PRIMARY LISTENING   ANSWERED BY patient   RELATIONSHIP SELF       Abuse Screening:  Abuse Screening Questionnaire 12/16/2020   Do you ever feel afraid of your partner? N   Are you in a relationship with someone who physically or mentally threatens you? N   Is it safe for you to go home? Y       Fall Risk  Fall Risk Assessment, last 12 mths 12/16/2020   Able to walk? Yes   Fall in past 12 months? No   Number of falls in past 12 months -   Fall with injury? -       Health Maintenance reviewed and discussed and ordered per Provider. Health Maintenance Due   Topic Date Due    COVID-19 Vaccine (1) Never done    Medicare Yearly Exam  10/10/2021   . Coordination of Care:  1. Have you been to the ER, urgent care clinic since your last visit? Hospitalized since your last visit? no    2. Have you seen or consulted any other health care providers outside of the [address] [place] [address] since your last visit? Include any pap smears or colon screening.  no      Last  Checked na  Last UDS Checked na  Last Pain contract signed: na    Patients concerns today:  Med refills

## 2021-11-19 NOTE — PROGRESS NOTES
Appt scheduled.      Future Appointments   Date Time Provider Enzo Bernal   12/13/2021 10:15 AM Robbie Velasco MD [facility] BS AMB   2/11/2022 10:00 AM Andrew Chambers MD [facility] BS AMB

## 2021-11-20 DIAGNOSIS — E03.9 ACQUIRED HYPOTHYROIDISM: ICD-10-CM

## 2021-11-22 RX ORDER — LEVOTHYROXINE SODIUM 150 UG/1
TABLET ORAL
Qty: 90 TABLET | Refills: 1 | Status: SHIPPED | OUTPATIENT
Start: 2021-11-22 | End: 2022-05-31 | Stop reason: SDUPTHER

## 2021-12-08 ENCOUNTER — HOSPITAL ENCOUNTER (OUTPATIENT)
Dept: LAB | Age: 46
Discharge: HOME | End: 2021-12-08
Payer: MEDICARE

## 2021-12-08 DIAGNOSIS — I42.9 CARDIOMYOPATHY, UNSPECIFIED TYPE (HCC): ICD-10-CM

## 2021-12-08 DIAGNOSIS — E66.01 SEVERE OBESITY (HCC): ICD-10-CM

## 2021-12-08 DIAGNOSIS — I25.2 HISTORY OF ST ELEVATION MYOCARDIAL INFARCTION (STEMI): ICD-10-CM

## 2021-12-08 LAB
BASOPHILS # BLD: 0 K/UL (ref 0–0.1)
BASOPHILS NFR BLD: 1 % (ref 0–2)
CHOLEST SERPL-MCNC: 122 MG/DL
DIFFERENTIAL METHOD BLD: ABNORMAL
EOSINOPHIL # BLD: 0.5 K/UL (ref 0–0.4)
EOSINOPHIL NFR BLD: 8 % (ref 0–5)
ERYTHROCYTE [DISTWIDTH] IN BLOOD BY AUTOMATED COUNT: 14 % (ref 11.6–14.5)
HCT VFR BLD AUTO: 45.8 % (ref 36–48)
HDLC SERPL-MCNC: 84 MG/DL (ref 40–60)
HDLC SERPL: 1.5 {RATIO} (ref 0–5)
HGB BLD-MCNC: 14.5 G/DL (ref 13–16)
IMM GRANULOCYTES # BLD AUTO: 0 K/UL (ref 0–0.04)
IMM GRANULOCYTES NFR BLD AUTO: 0 % (ref 0–0.5)
LDLC SERPL CALC-MCNC: 18.2 MG/DL (ref 0–100)
LIPID PROFILE,FLP: ABNORMAL
LYMPHOCYTES # BLD: 1.8 K/UL (ref 0.9–3.6)
LYMPHOCYTES NFR BLD: 31 % (ref 21–52)
MCH RBC QN AUTO: 29.9 PG (ref 24–34)
MCHC RBC AUTO-ENTMCNC: 31.7 G/DL (ref 31–37)
MCV RBC AUTO: 94.4 FL (ref 78–100)
MONOCYTES # BLD: 0.5 K/UL (ref 0.05–1.2)
MONOCYTES NFR BLD: 9 % (ref 3–10)
NEUTS SEG # BLD: 2.9 K/UL (ref 1.8–8)
NEUTS SEG NFR BLD: 52 % (ref 40–73)
NRBC # BLD: 0 K/UL (ref 0–0.01)
NRBC BLD-RTO: 0 PER 100 WBC
PLATELET # BLD AUTO: 178 K/UL (ref 135–420)
PMV BLD AUTO: 10.3 FL (ref 9.2–11.8)
RBC # BLD AUTO: 4.85 M/UL (ref 4.35–5.65)
TRIGL SERPL-MCNC: 99 MG/DL (ref ?–150)
VLDLC SERPL CALC-MCNC: 19.8 MG/DL
WBC # BLD AUTO: 5.7 K/UL (ref 4.6–13.2)

## 2021-12-08 PROCEDURE — 36415 COLL VENOUS BLD VENIPUNCTURE: CPT

## 2021-12-08 PROCEDURE — 85025 COMPLETE CBC W/AUTO DIFF WBC: CPT

## 2021-12-08 PROCEDURE — 80061 LIPID PANEL: CPT

## 2021-12-13 ENCOUNTER — OFFICE VISIT (OUTPATIENT)
Dept: FAMILY MEDICINE CLINIC | Age: 46
End: 2021-12-13
Payer: MEDICARE

## 2021-12-13 DIAGNOSIS — G89.4 CHRONIC PAIN SYNDROME: ICD-10-CM

## 2021-12-13 DIAGNOSIS — Z00.00 MEDICARE ANNUAL WELLNESS VISIT, SUBSEQUENT: Primary | ICD-10-CM

## 2021-12-13 DIAGNOSIS — E03.9 ACQUIRED HYPOTHYROIDISM: ICD-10-CM

## 2021-12-13 DIAGNOSIS — F51.04 PSYCHOPHYSIOLOGIC INSOMNIA: ICD-10-CM

## 2021-12-13 DIAGNOSIS — G62.9 NEUROPATHY: ICD-10-CM

## 2021-12-13 DIAGNOSIS — Z71.89 ADVANCE CARE PLANNING: ICD-10-CM

## 2021-12-13 DIAGNOSIS — M10.042 ACUTE IDIOPATHIC GOUT OF LEFT HAND: ICD-10-CM

## 2021-12-13 PROCEDURE — G0439 PPPS, SUBSEQ VISIT: HCPCS | Performed by: FAMILY MEDICINE

## 2021-12-13 PROCEDURE — 99214 OFFICE O/P EST MOD 30 MIN: CPT | Performed by: FAMILY MEDICINE

## 2021-12-13 PROCEDURE — G8752 SYS BP LESS 140: HCPCS | Performed by: FAMILY MEDICINE

## 2021-12-13 PROCEDURE — G8427 DOCREV CUR MEDS BY ELIG CLIN: HCPCS | Performed by: FAMILY MEDICINE

## 2021-12-13 PROCEDURE — G8754 DIAS BP LESS 90: HCPCS | Performed by: FAMILY MEDICINE

## 2021-12-13 PROCEDURE — G8432 DEP SCR NOT DOC, RNG: HCPCS | Performed by: FAMILY MEDICINE

## 2021-12-13 PROCEDURE — G8417 CALC BMI ABV UP PARAM F/U: HCPCS | Performed by: FAMILY MEDICINE

## 2021-12-13 RX ORDER — NORTRIPTYLINE HYDROCHLORIDE 25 MG/1
25 CAPSULE ORAL
Qty: 30 CAPSULE | Refills: 2 | Status: SHIPPED
Start: 2021-12-13 | End: 2022-04-11

## 2021-12-13 RX ORDER — OXYCODONE AND ACETAMINOPHEN 5; 325 MG/1; MG/1
1 TABLET ORAL
Qty: 60 TABLET | Refills: 0 | Status: SHIPPED | OUTPATIENT
Start: 2021-12-13 | End: 2022-01-12

## 2021-12-13 RX ORDER — OXYCODONE AND ACETAMINOPHEN 5; 325 MG/1; MG/1
1 TABLET ORAL
Qty: 60 TABLET | Refills: 0 | Status: SHIPPED | OUTPATIENT
Start: 2022-01-12 | End: 2022-02-11 | Stop reason: SDUPTHER

## 2021-12-13 RX ORDER — INDOMETHACIN 25 MG/1
25 CAPSULE ORAL 3 TIMES DAILY
Qty: 30 CAPSULE | Refills: 0 | Status: SHIPPED | OUTPATIENT
Start: 2021-12-13

## 2021-12-13 NOTE — PROGRESS NOTES
(AWV) The Medicare Annual Wellness Exam PROGRESS NOTE    This is a Medicare Annual Wellness Exam (AWV)    I have reviewed the patient's medical history in detail and updated the computerized patient record. Jahaira Villareal is a 51 y.o.  male and presents for an annual wellness exam     ROS   General ROS: positive for - chills or fever  Psychological ROS: negative for - anxiety or depression  Ophthalmic ROS: negative for - blurry vision  ENT ROS: negative for - headaches, nasal congestion or sore throat  Endocrine ROS: negative for - polydipsia/polyuria or temperature intolerance  Respiratory ROS: no cough, shortness of breath, or wheezing  Cardiovascular ROS: no chest pain or dyspnea on exertion  Gastrointestinal ROS: using lomotil for chronic diarrhea  Genito-Urinary ROS: no dysuria, trouble voiding, or hematuria  Neurological ROS: positive for - numbness/tingling; syncopal episodes as per above. Dermatological ROS: well healing    All other systems reviewed and are negative.     History     Past Medical History:   Diagnosis Date    Cardiomyopathy ([facility] [address] 7/21/2014    Chronic pain syndrome 12/5/2018    Gastric bypass status for obesity 8/8/2011    Heart failure ([facility] [address]     History of ST elevation myocardial infarction (STEMI) 12/5/2018    HTN (hypertension) 8/8/2011    Hypothyroid 8/8/2011    Ill-defined condition     Alcoholism    STEMI (ST elevation myocardial infarction) ([facility] [address]     08/2018      Past Surgical History:   Procedure Laterality Date    COLONOSCOPY N/A 10/9/2019    COLON[facility]a Burgess [facility] ENDOSCOPY    HX GASTRIC BYPASS  2015    HX LAP GASTRIC BYPASS  2011    NE CARDIAC SURG PROCEDURE UNLIST  2018    Stent placed Coronary Artery      Current Outpatient Medications   Medication Sig Dispense Refill    levothyroxine (SYNTHROID) 150 mcg tablet TAKE ONE TABLET BY MOUTH EVERY MORNING BEFORE BREAKFAST 90 Tablet 1    promethazine (PHENERGAN) 25 mg tablet Take 1 Tablet by mouth every six (6) hours as needed for Nausea. 20 Tablet 0    gabapentin (NEURONTIN) 800 mg tablet TAKE ONE TABLET BY MOUTH FOUR TIMES A DAY AS NEEDED FOR PAIN 120 Tablet 5    enalapril (VASOTEC) 20 mg tablet Take 1 Tablet by mouth two (2) times a day. 180 Tablet 3    amLODIPine (NORVASC) 5 mg tablet Take 1 Tablet by mouth daily. 90 Tablet 3    metoprolol tartrate (LOPRESSOR) 25 mg tablet TAKE ONE TABLET BY MOUTH TWICE A  Tablet 4    indomethacin (INDOCIN) 25 mg capsule Take 1 Capsule by mouth three (3) times daily. 30 Capsule 0    atorvastatin (LIPITOR) 80 mg tablet TAKE ONE TABLET BY MOUTH DAILY 90 Tablet 4    diphenoxylate-atropine (LOMOTIL) 2.5-0.025 mg per tablet TAKE ONE TABLET BY MOUTH FOUR TIMES A DAY AS NEEDED FOR DIARRHEA 60 Tablet 5    aspirin delayed-release 81 mg tablet TAKE ONE TABLET BY MOUTH DAILY 90 Tablet 2    furosemide (LASIX) 20 mg tablet Take 1 Tab by mouth daily. 90 Tab 3    traZODone (DESYREL) 100 mg tablet       colchicine 0.6 mg tablet Take 1 Tab by mouth daily. 30 Tab 11    pantoprazole (PROTONIX) 40 mg tablet Take 1 Tab by mouth two (2) times a day. 180 Tab 4    potassium chloride (KLOR-CON) 10 mEq tablet TAKE ONE TABLET BY MOUTH DAILY 90 Tab 3     No Known Allergies  No family history on file. Social History     Tobacco Use    Smoking status: Never Smoker    Smokeless tobacco: Never Used   Substance Use Topics    Alcohol use:  Yes     Alcohol/week: 6.0 standard drinks     Types: 6 Cans of beer per week     Comment: Daily     Patient Active Problem List   Diagnosis Code    Gastric bypass status for obesity Z98.84    HTN (hypertension) I10    Hypothyroid E03.9    Post corneal transplant Z94.7    Cardiomyopathy ([facility] [address] I42.9    History of ST elevation myocardial infarction (STEMI) I25.2    Chronic pain syndrome G89.4    Severe obesity (HCC) E66.01    S/P primary angioplasty with coronary stent Z95.5    Pain in right buttock M79.18    Peripheral vascular disease (HCC) I73.9    Opioid use, unspecified with unspecified opioid-induced disorder F11.99       Health Maintenance History  Immunizations reviewed, dtap up to date , pneumovax up to date, flu up to date, zoster n/a  Colonoscopy: up to date,   Eye exam: up to date  covid due        Depression Risk Factor Screening:      Patient Health Questionnaire (PHQ-2)   Over the last 2 weeks, how often have you been bothered by any of the following problems? · Little interest or pleasure in doing things? · Not at all. [0]  · Feeling down, depressed, or hopeless? · Not at all. [0]    Total Score: 0/6  PHQ-2 Assessment Scoring:   A score of 2 or more requires further screening with the PHQ-9    Alcohol Risk Factor Screening:     Men: On any occasion during the past 3 months, have you had more than 4 drinks containing alcohol? No, he is recovering alcoholic and is followed by [facility]  Do you average more than 14 drinks per week? Functional Ability and Level of Safety:     Hearing Loss    Hearing is good. Activities of Daily Living   Self-care. Requires assistance with: no ADLs    Fall Risk   Secondary diagnoses (15 pts)  Score: 15    Abuse Screen   Patient is not abused    Examination   Physical Examination  Vitals:    12/13/21 1002 12/13/21 1005   BP: (!) 172/107 (!) 134/92   Pulse: 65    Resp: 16    Temp: 97.2 °F (36.2 °C)    TempSrc: Temporal    SpO2: 98%    Weight: 280 lb (127 kg)    Height: 6' 2\" (1.88 m)    PainSc:   7       Body mass index is 35.95 kg/m².      Evaluation of Cognitive Function:  Mood/affect:good mood with appropriate affect  Appearance: well kempt  Family member/caregiver input: n/a    alert, well appearing, and in no distress, oriented to person, place, and time and obese    Patient Care Team:  Brannon Hood MD as PCP - General (Family Medicine)  Brannon Hood MD as PCP - [facility] Empaneled Provider  Africa Yan MD (Orthopedic Surgery)  Ronni Wharton MD (General Surgery)  Keely Doshi MD (Ophthalmology)  Keila Hummel MD (Cardiology)  Ynug Townsend MD (Physical Medicine and Rehabilitation)    End-of-life planning  Advanced Directive in the case than an injury or illness causes the patient to be unable to make health care decisions    Health Care Directive or Living Will: yes    Advice/Referrals/Counselling/Plan:   Education and counseling provided:  End-of-Life planning (with patient's consent)  Diabetes screening test  covid vaccine  Include in education list (weight loss, physical activity, smoking cessation, fall prevention, and nutrition)    ICD-10-CM ICD-9-CM    1. Medicare annual wellness visit, subsequent  Z00.00 V70.0    2. Advance care planning  Z71.89 V65.49    3. Psychophysiologic insomnia  F51.04 307.42 nortriptyline (PAMELOR) 25 mg capsule   4. Acquired hypothyroidism  E03.9 244.9    5. Neuropathy  G62.9 355.9 oxyCODONE-acetaminophen (PERCOCET) 5-325 mg per tablet   6. Chronic pain syndrome  G89.4 338.4 oxyCODONE-acetaminophen (PERCOCET) 5-325 mg per tablet      oxyCODONE-acetaminophen (PERCOCET) 5-325 mg per tablet   7. Acute idiopathic gout of left hand  M10.042 274.01 indomethacin (INDOCIN) 25 mg capsule   . Brief written plan, checklist    I have discussed the diagnosis with the patient and the intended plan as seen in the above orders. The patient has received an after-visit summary and questions were answered concerning future plans. I have discussed medication side effects and warnings with the patient as well. I have reviewed the plan of care with the patient, accepted their input and they are in agreement with the treatment goals.                ____________________________________________________________    Problem Assessment    for treatment of   Chief Complaint   Patient presents with    Medication Refill    Results    Medication Evaluation         SUBJECTIVE    Well Adult Physical   Patient here for a comprehensive physical exam.The patient reports problems - insmonia, CHF, HTN, alcoholism  Do you take any herbs or supplements that were not prescribed by a doctor? no Are you taking calcium supplements? no Are you taking aspirin daily? yes    Cardiovascular Review:  The patient has hypertension, hyperlipidemia, CHF and obesity. Diet and Lossie Myers attempting to follow a low fat, low cholesterol diet, not attempting to follow a low sodium diet, does not rigorously follow a diabetic diet, sedentary, nonsmoker  Home BP Monitoring: is not measured at home. Pertinent ROS: taking medications as instructed, no medication side effects noted, no TIA's, no chest pain on exertion, no dyspnea on exertion, no swelling of ankles.      Anxiety Review:  Patient is seen for sleep disturbance. Current treatment of trazodone is effective along with individual therapy. Ongoing symptoms include: insomnia. Patient denies: palpitations, sweating, chest pain, shortness of breath, racing thoughts, psychomotor agitation, feelings of losing control, difficulty concentrating, suicidal ideation. Reported side effects from the treatment: none.     Thyroid Review:  Patient is seen for followup of hypothyroidism.  +cold intolerance  Thyroid ROS: denies fatigue, weight changes, bowel/skin changes or CVS symptoms.     Osteoarthritis and Chronic Pain:  He has neuropathy and orthopedic, his pain has been controlled with gabapentin. Symptoms onset: problem is longstanding. Rheumatological ROS: ongoing significant pain in legs and feet and hip which is stable and controlled by PRN meds. Response to treatment plan: gradually worsening.        Visit Vitals  /88 (BP 1 Location: Left upper arm, BP Patient Position: Sitting)   Pulse 65   Temp 97.2 °F (36.2 °C) (Temporal)   Resp 16   Ht 6' 2\" (1.88 m)   Wt 280 lb (127 kg)   SpO2 98%   BMI 35.95 kg/m²     General:  Alert, cooperative, no distress, appears stated age.    Head:  Normocephalic, without obvious abnormality, atraumatic. Eyes:  Conjunctivae/corneas clear. PERRL, EOMs intact. Ears:  Normal TMs and external ear canals both ears. Nose: Nares normal. Septum midline. Mucosa normal. No drainage or sinus tenderness. Throat: Lips, mucosa, and tongue normal. Teeth and gums normal.   Neck: Supple, symmetrical, trachea midline, no adenopathy, thyroid: no enlargement/tenderness/nodules no JVD. Back:   Symmetric, no curvature. ROM normal. No CVA tenderness. Lungs:   Clear to auscultation bilaterally. Chest wall:  No tenderness or deformity. Heart:  Regular rate and rhythm, S1, S2 normal, no murmur, click, rub or gallop. Abdomen:   Soft, non-tender. Bowel sounds normal. No masses,  No organomegaly. Genitalia:  deferred   Rectal:  deferred   Extremities: Extremities normal, atraumatic, no cyanosis or edema. Pulses: 2+ and symmetric all extremities. Skin: Skin color, texture, turgor normal. No rashes or lesions   Lymph nodes: Cervical, supraclavicular, and axillary nodes normal.   Neurologic: CNII-XII intact. Normal strength, sensation and reflexes throughout. LABS     TESTS      Assessment/Plan:    1. Medicare annual wellness visit, subsequent  Reviewed preventive recommendations    2. Advance care planning  See ACP    3. Psychophysiologic insomnia  TCA started to assist with sleep and neuropathy  - nortriptyline (PAMELOR) 25 mg capsule; Take 1 Capsule by mouth nightly. Dispense: 30 Capsule; Refill: 2    4. Acquired hypothyroidism  Continue therapeutic dose    5. Neuropathy  Continue pain management  - oxyCODONE-acetaminophen (PERCOCET) 5-325 mg per tablet; Take 1 Tablet by mouth every eight (8) hours as needed for Pain for up to 30 days. Max Daily Amount: 3 Tablets. Dispense: 60 Tablet; Refill: 0    6. Chronic pain syndrome  This is a chronic problem that is stable.   Per review of available records and patients , there are not sign of overuse, misuse, diversion, or concerning side effects. Today we reviewed: the risk of overdose, addiction, and dependency proper storage and disposal of medications the goals of treatment (improve functionality, quality of life, and pain)  The following changes were made to the patients current treatment plan: nothing, medications refilled. - oxyCODONE-acetaminophen (PERCOCET) 5-325 mg per tablet; Take 1 Tablet by mouth every eight (8) hours as needed for Pain for up to 30 days. Max Daily Amount: 3 Tablets. Dispense: 60 Tablet; Refill: 0  - oxyCODONE-acetaminophen (PERCOCET) 5-325 mg per tablet; Take 1 Tablet by mouth every eight (8) hours as needed for Pain for up to 30 days. Max Daily Amount: 3 Tablets. Dispense: 60 Tablet; Refill: 0    7. Acute idiopathic gout of left hand  Continue nsaid for use as needed for flare  - indomethacin (INDOCIN) 25 mg capsule; Take 1 Capsule by mouth three (3) times daily. Dispense: 30 Capsule;  Refill: 0    Lab review: labs reviewed, I note that lipids LDL result meets goal, HDL normal, triglycerides normal

## 2021-12-13 NOTE — PROGRESS NOTES
Laura Campos is a 51 y.o. male (: 1975) presenting to address:    Chief Complaint   Patient presents with    Medication Refill    Results       There were no vitals filed for this visit. Hearing/Vision:   No exam data present    Learning Assessment:     Learning Assessment 2019   PRIMARY LEARNER Patient   HIGHEST LEVEL OF EDUCATION - PRIMARY LEARNER  -   BARRIERS PRIMARY LEARNER -   CO-LEARNER CAREGIVER -   PRIMARY LANGUAGE ENGLISH   LEARNER PREFERENCE PRIMARY LISTENING   ANSWERED BY patient   RELATIONSHIP SELF     Depression Screening:     3 most recent PHQ Screens 2021   PHQ Not Done -   Little interest or pleasure in doing things Not at all   Feeling down, depressed, irritable, or hopeless Not at all   Total Score PHQ 2 0     Fall Risk Assessment:     Fall Risk Assessment, last 12 mths 2020   Able to walk? Yes   Fall in past 12 months? No   Number of falls in past 12 months -   Fall with injury? -     Abuse Screening:     Abuse Screening Questionnaire 2020   Do you ever feel afraid of your partner? N   Are you in a relationship with someone who physically or mentally threatens you? N   Is it safe for you to go home? Y     ADL Assessment:     ADL Assessment 2019   Feeding yourself No Help Needed   Getting from bed to chair No Help Needed   Getting dressed No Help Needed   Bathing or showering No Help Needed   Walk across the room (includes cane/walker) No Help Needed   Using the telphone No Help Needed   Taking your medications No Help Needed   Preparing meals No Help Needed   Managing money (expenses/bills) No Help Needed   Moderately strenuous housework (laundry) No Help Needed   Shopping for personal items (toiletries/medicines) No Help Needed   Shopping for groceries No Help Needed   Driving Help Needed   Climbing a flight of stairs No Help Needed   Getting to places beyond walking distances Help Needed        Coordination of Care Questionaire:   1.  Have you been to the ER, urgent care clinic since your last visit? Hospitalized since your last visit? NO    2. Have you seen or consulted any other health care providers outside of the [address] [place] [address] since your last visit? Include any pap smears or colon screening. NO    Advanced Directive:   1. Do you have an Advanced Directive? NO    2. Would you like information on Advanced Directives?  YES

## 2021-12-17 VITALS
HEIGHT: 74 IN | BODY MASS INDEX: 35.94 KG/M2 | SYSTOLIC BLOOD PRESSURE: 134 MMHG | RESPIRATION RATE: 16 BRPM | TEMPERATURE: 97.2 F | OXYGEN SATURATION: 98 % | HEART RATE: 65 BPM | DIASTOLIC BLOOD PRESSURE: 88 MMHG | WEIGHT: 280 LBS

## 2021-12-23 DIAGNOSIS — K21.9 GASTROESOPHAGEAL REFLUX DISEASE WITHOUT ESOPHAGITIS: ICD-10-CM

## 2021-12-23 RX ORDER — PANTOPRAZOLE SODIUM 40 MG/1
TABLET, DELAYED RELEASE ORAL
Qty: 180 TABLET | Refills: 4 | Status: SHIPPED | OUTPATIENT
Start: 2021-12-23

## 2022-02-07 DIAGNOSIS — K52.9 CHRONIC DIARRHEA: ICD-10-CM

## 2022-02-09 RX ORDER — DIPHENOXYLATE HYDROCHLORIDE AND ATROPINE SULFATE 2.5; .025 MG/1; MG/1
TABLET ORAL
Qty: 60 TABLET | Refills: 1 | Status: SHIPPED | OUTPATIENT
Start: 2022-02-09 | End: 2022-05-20

## 2022-02-11 ENCOUNTER — OFFICE VISIT (OUTPATIENT)
Dept: FAMILY MEDICINE CLINIC | Age: 47
End: 2022-02-11
Payer: MEDICARE

## 2022-02-11 ENCOUNTER — HOSPITAL ENCOUNTER (OUTPATIENT)
Dept: LAB | Age: 47
Discharge: HOME | End: 2022-02-11
Payer: MEDICARE

## 2022-02-11 VITALS
DIASTOLIC BLOOD PRESSURE: 88 MMHG | HEIGHT: 74 IN | RESPIRATION RATE: 17 BRPM | OXYGEN SATURATION: 100 % | SYSTOLIC BLOOD PRESSURE: 124 MMHG | TEMPERATURE: 96.8 F | HEART RATE: 92 BPM | BODY MASS INDEX: 35.73 KG/M2 | WEIGHT: 278.4 LBS

## 2022-02-11 DIAGNOSIS — I73.9 PERIPHERAL VASCULAR DISEASE (HCC): ICD-10-CM

## 2022-02-11 DIAGNOSIS — E03.9 ACQUIRED HYPOTHYROIDISM: Primary | ICD-10-CM

## 2022-02-11 DIAGNOSIS — E66.01 SEVERE OBESITY (BMI 35.0-39.9) WITH COMORBIDITY (HCC): ICD-10-CM

## 2022-02-11 DIAGNOSIS — I42.9 CARDIOMYOPATHY, UNSPECIFIED TYPE (HCC): ICD-10-CM

## 2022-02-11 DIAGNOSIS — F10.21 ALCOHOLISM IN RECOVERY (HCC): ICD-10-CM

## 2022-02-11 DIAGNOSIS — G89.4 CHRONIC PAIN SYNDROME: ICD-10-CM

## 2022-02-11 DIAGNOSIS — E03.9 ACQUIRED HYPOTHYROIDISM: ICD-10-CM

## 2022-02-11 LAB — TSH SERPL DL<=0.05 MIU/L-ACNC: 0.64 UIU/ML (ref 0.36–3.74)

## 2022-02-11 PROCEDURE — 84443 ASSAY THYROID STIM HORMONE: CPT

## 2022-02-11 PROCEDURE — G8427 DOCREV CUR MEDS BY ELIG CLIN: HCPCS | Performed by: FAMILY MEDICINE

## 2022-02-11 PROCEDURE — G8510 SCR DEP NEG, NO PLAN REQD: HCPCS | Performed by: FAMILY MEDICINE

## 2022-02-11 PROCEDURE — G8754 DIAS BP LESS 90: HCPCS | Performed by: FAMILY MEDICINE

## 2022-02-11 PROCEDURE — G8417 CALC BMI ABV UP PARAM F/U: HCPCS | Performed by: FAMILY MEDICINE

## 2022-02-11 PROCEDURE — 99214 OFFICE O/P EST MOD 30 MIN: CPT | Performed by: FAMILY MEDICINE

## 2022-02-11 PROCEDURE — 36415 COLL VENOUS BLD VENIPUNCTURE: CPT

## 2022-02-11 PROCEDURE — G8752 SYS BP LESS 140: HCPCS | Performed by: FAMILY MEDICINE

## 2022-02-11 RX ORDER — OXYCODONE AND ACETAMINOPHEN 5; 325 MG/1; MG/1
1 TABLET ORAL
Qty: 60 TABLET | Refills: 0 | Status: SHIPPED | OUTPATIENT
Start: 2022-02-11 | End: 2022-04-11 | Stop reason: SDUPTHER

## 2022-02-11 RX ORDER — POTASSIUM CHLORIDE 750 MG/1
TABLET, EXTENDED RELEASE ORAL
Qty: 90 TABLET | Refills: 3 | Status: SHIPPED | OUTPATIENT
Start: 2022-02-11

## 2022-02-11 NOTE — PROGRESS NOTES
Lauren Miller is a 51 y.o. male (: 1975) presenting to address:    Chief Complaint   Patient presents with    Medication Refill       There were no vitals filed for this visit. Hearing/Vision:   No exam data present    Learning Assessment:     Learning Assessment 2019   PRIMARY LEARNER Patient   HIGHEST LEVEL OF EDUCATION - PRIMARY LEARNER  -   BARRIERS PRIMARY LEARNER -   CO-LEARNER CAREGIVER -   PRIMARY LANGUAGE ENGLISH   LEARNER PREFERENCE PRIMARY LISTENING   ANSWERED BY patient   RELATIONSHIP SELF     Depression Screening:     3 most recent PHQ Screens 2022   PHQ Not Done -   Little interest or pleasure in doing things Not at all   Feeling down, depressed, irritable, or hopeless Not at all   Total Score PHQ 2 0     Fall Risk Assessment:     Fall Risk Assessment, last 12 mths 2020   Able to walk? Yes   Fall in past 12 months? No   Number of falls in past 12 months -   Fall with injury? -     Abuse Screening:     Abuse Screening Questionnaire 2020   Do you ever feel afraid of your partner? N   Are you in a relationship with someone who physically or mentally threatens you? N   Is it safe for you to go home? Y     ADL Assessment:     ADL Assessment 2019   Feeding yourself No Help Needed   Getting from bed to chair No Help Needed   Getting dressed No Help Needed   Bathing or showering No Help Needed   Walk across the room (includes cane/walker) No Help Needed   Using the telphone No Help Needed   Taking your medications No Help Needed   Preparing meals No Help Needed   Managing money (expenses/bills) No Help Needed   Moderately strenuous housework (laundry) No Help Needed   Shopping for personal items (toiletries/medicines) No Help Needed   Shopping for groceries No Help Needed   Driving Help Needed   Climbing a flight of stairs No Help Needed   Getting to places beyond walking distances Help Needed        Coordination of Care Questionaire:   1.  Have you been to the ER, urgent care clinic since your last visit? Hospitalized since your last visit? NO    2. Have you seen or consulted any other health care providers outside of the [address] [place] [address] since your last visit? Include any pap smears or colon screening.  NO

## 2022-03-02 DIAGNOSIS — M10.9 GOUTY ARTHRITIS OF RIGHT HAND: ICD-10-CM

## 2022-03-04 RX ORDER — COLCHICINE 0.6 MG/1
TABLET ORAL
Qty: 30 TABLET | Refills: 11 | Status: SHIPPED | OUTPATIENT
Start: 2022-03-04 | End: 2022-03-09

## 2022-03-06 DIAGNOSIS — M10.9 GOUTY ARTHRITIS OF RIGHT HAND: ICD-10-CM

## 2022-03-09 DIAGNOSIS — I42.9 CARDIOMYOPATHY, UNSPECIFIED TYPE (HCC): ICD-10-CM

## 2022-03-09 DIAGNOSIS — I21.3 ST ELEVATION MYOCARDIAL INFARCTION (STEMI), UNSPECIFIED ARTERY (HCC): ICD-10-CM

## 2022-03-09 DIAGNOSIS — I10 HYPERTENSION, UNSPECIFIED TYPE: ICD-10-CM

## 2022-03-09 RX ORDER — COLCHICINE 0.6 MG/1
TABLET ORAL
Qty: 30 TABLET | Refills: 11 | Status: SHIPPED | OUTPATIENT
Start: 2022-03-09

## 2022-03-10 RX ORDER — ASPIRIN 81 MG/1
TABLET ORAL
Qty: 90 TABLET | Refills: 2 | Status: SHIPPED | OUTPATIENT
Start: 2022-03-10

## 2022-03-18 PROBLEM — F11.99 OPIOID USE, UNSPECIFIED WITH UNSPECIFIED OPIOID-INDUCED DISORDER (HCC): Status: ACTIVE | Noted: 2021-08-11

## 2022-03-19 PROBLEM — Z95.5 S/P PRIMARY ANGIOPLASTY WITH CORONARY STENT: Status: ACTIVE | Noted: 2019-05-11

## 2022-03-19 PROBLEM — M79.18 PAIN IN RIGHT BUTTOCK: Status: ACTIVE | Noted: 2019-05-11

## 2022-03-19 PROBLEM — G89.4 CHRONIC PAIN SYNDROME: Status: ACTIVE | Noted: 2018-12-05

## 2022-03-19 PROBLEM — E66.01 SEVERE OBESITY (HCC): Status: ACTIVE | Noted: 2018-12-05

## 2022-03-19 PROBLEM — I73.9 PERIPHERAL VASCULAR DISEASE (HCC): Status: ACTIVE | Noted: 2019-09-13

## 2022-03-20 PROBLEM — I25.2 HISTORY OF ST ELEVATION MYOCARDIAL INFARCTION (STEMI): Status: ACTIVE | Noted: 2018-12-05

## 2022-04-11 ENCOUNTER — OFFICE VISIT (OUTPATIENT)
Dept: FAMILY MEDICINE CLINIC | Age: 47
End: 2022-04-11
Payer: MEDICARE

## 2022-04-11 VITALS
BODY MASS INDEX: 36.68 KG/M2 | DIASTOLIC BLOOD PRESSURE: 94 MMHG | OXYGEN SATURATION: 98 % | HEART RATE: 76 BPM | SYSTOLIC BLOOD PRESSURE: 138 MMHG | WEIGHT: 285.8 LBS | TEMPERATURE: 97 F | RESPIRATION RATE: 17 BRPM | HEIGHT: 74 IN

## 2022-04-11 DIAGNOSIS — I10 HYPERTENSION, UNSPECIFIED TYPE: Primary | ICD-10-CM

## 2022-04-11 DIAGNOSIS — F10.21 ALCOHOLISM IN RECOVERY (HCC): ICD-10-CM

## 2022-04-11 DIAGNOSIS — G89.4 CHRONIC PAIN SYNDROME: ICD-10-CM

## 2022-04-11 DIAGNOSIS — I73.9 PERIPHERAL VASCULAR DISEASE (HCC): ICD-10-CM

## 2022-04-11 DIAGNOSIS — I42.9 CARDIOMYOPATHY, UNSPECIFIED TYPE (HCC): ICD-10-CM

## 2022-04-11 DIAGNOSIS — G62.9 NEUROPATHY: ICD-10-CM

## 2022-04-11 DIAGNOSIS — F12.10 MARIJUANA ABUSE: ICD-10-CM

## 2022-04-11 DIAGNOSIS — F51.04 PSYCHOPHYSIOLOGIC INSOMNIA: ICD-10-CM

## 2022-04-11 DIAGNOSIS — I21.3 ST ELEVATION MYOCARDIAL INFARCTION (STEMI), UNSPECIFIED ARTERY (HCC): ICD-10-CM

## 2022-04-11 PROCEDURE — G8510 SCR DEP NEG, NO PLAN REQD: HCPCS | Performed by: FAMILY MEDICINE

## 2022-04-11 PROCEDURE — G8752 SYS BP LESS 140: HCPCS | Performed by: FAMILY MEDICINE

## 2022-04-11 PROCEDURE — G8755 DIAS BP > OR = 90: HCPCS | Performed by: FAMILY MEDICINE

## 2022-04-11 PROCEDURE — G8427 DOCREV CUR MEDS BY ELIG CLIN: HCPCS | Performed by: FAMILY MEDICINE

## 2022-04-11 PROCEDURE — G8417 CALC BMI ABV UP PARAM F/U: HCPCS | Performed by: FAMILY MEDICINE

## 2022-04-11 PROCEDURE — 99214 OFFICE O/P EST MOD 30 MIN: CPT | Performed by: FAMILY MEDICINE

## 2022-04-11 RX ORDER — GABAPENTIN 800 MG/1
TABLET ORAL
Qty: 120 TABLET | Refills: 5 | Status: SHIPPED | OUTPATIENT
Start: 2022-04-11 | End: 2022-10-17

## 2022-04-11 RX ORDER — OXYCODONE AND ACETAMINOPHEN 5; 325 MG/1; MG/1
1 TABLET ORAL
Qty: 60 TABLET | Refills: 0 | Status: SHIPPED | OUTPATIENT
Start: 2022-04-11 | End: 2022-05-11 | Stop reason: SDUPTHER

## 2022-04-11 RX ORDER — ZOLPIDEM TARTRATE 5 MG/1
5 TABLET ORAL
Qty: 30 TABLET | Refills: 5 | Status: SHIPPED
Start: 2022-04-11 | End: 2022-07-11

## 2022-04-11 RX ORDER — CLONIDINE HYDROCHLORIDE 0.1 MG/1
0.1 TABLET ORAL 2 TIMES DAILY
Qty: 180 TABLET | Refills: 3 | Status: SHIPPED | OUTPATIENT
Start: 2022-04-11

## 2022-04-11 NOTE — PROGRESS NOTES
Stefanie Russo is a 51 y.o. male (: 1975) presenting to address:    Chief Complaint   Patient presents with    Medication Refill       There were no vitals filed for this visit. Hearing/Vision:   No exam data present    Learning Assessment:     Learning Assessment 2019   PRIMARY LEARNER Patient   HIGHEST LEVEL OF EDUCATION - PRIMARY LEARNER  -   BARRIERS PRIMARY LEARNER -   CO-LEARNER CAREGIVER -   PRIMARY LANGUAGE ENGLISH   LEARNER PREFERENCE PRIMARY LISTENING   ANSWERED BY patient   RELATIONSHIP SELF     Depression Screening:     3 most recent PHQ Screens 2022   PHQ Not Done -   Little interest or pleasure in doing things Not at all   Feeling down, depressed, irritable, or hopeless Not at all   Total Score PHQ 2 0     Fall Risk Assessment:     Fall Risk Assessment, last 12 mths 2020   Able to walk? Yes   Fall in past 12 months? No   Number of falls in past 12 months -   Fall with injury? -     Abuse Screening:     Abuse Screening Questionnaire 2020   Do you ever feel afraid of your partner? N   Are you in a relationship with someone who physically or mentally threatens you? N   Is it safe for you to go home? Y     ADL Assessment:     ADL Assessment 2019   Feeding yourself No Help Needed   Getting from bed to chair No Help Needed   Getting dressed No Help Needed   Bathing or showering No Help Needed   Walk across the room (includes cane/walker) No Help Needed   Using the telphone No Help Needed   Taking your medications No Help Needed   Preparing meals No Help Needed   Managing money (expenses/bills) No Help Needed   Moderately strenuous housework (laundry) No Help Needed   Shopping for personal items (toiletries/medicines) No Help Needed   Shopping for groceries No Help Needed   Driving Help Needed   Climbing a flight of stairs No Help Needed   Getting to places beyond walking distances Help Needed        Coordination of Care Questionaire:   1.  Have you been to the ER, urgent care clinic since your last visit? Hospitalized since your last visit? NO    2. Have you seen or consulted any other health care providers outside of the [address] [place] [address] since your last visit? Include any pap smears or colon screening.  NO

## 2022-04-11 NOTE — PROGRESS NOTES
History and Physical    Patient: Yuliya Ware MRN: [id number]  SSN: [id number]    YOB: 1975  Age: 51 y.o. Sex: male      C/C : Medication Refill     Subjective:      Yuliya Ware is a 51 y.o. male who presents to the clinic for medication renewal. Pt is complaining of right hip pain. The pain stays in the right hip and does not radiate. He describes it as a throbbing aching pain. The pain makes it difficult for him to sit in one position for very long and wakes him up from sleep at night. He rates the pain a 8-9/10. The pain is usually controlled by gabapentin and percocet but he has not had the percocet for 2 weeks. Denies numbness, tingling, weakness and loss of sensation in the right leg. Pt is checking his BP at home. Avg systolic is around 409 with diastolic btw . He took his medication this morning before coming to his appointment. Pt had not had a drink in almost a year but started drinking again this past weekend. He had a 6 pack of beer and 2 tall ones (as he described it). Pt deneis increased stress, anxiety or changes in his mood. Past Medical History:   Diagnosis Date    Cardiomyopathy [facility]) 7/21/2014    Chronic pain syndrome 12/5/2018    Gastric bypass status for obesity 8/8/2011    [facility] ([facility]     History of ST elevation myocardial infarction (STEMI) 12/5/2018    HTN (hypertension) 8/8/2011    Hypothyroid 8/8/2011    Ill-defined condition     Alcoholism    STEMI (ST elevation myocardial infarction) ([facility]     08/2018     Past Surgical History:   Procedure Laterality Date    COLONOSCOPY N/A 10/9/2019    COLONOSCOPY performed by May Ceballos MD at [facility] ENDOSCOPY    HX GASTRIC BYPASS  2015    HX LAP GASTRIC BYPASS  2011    [place] CARDIAC SURG PROCEDURE UNLIST  2018    Stent placed Coronary Artery       No family history on file.   Social History     Tobacco Use    Smoking status: Never Smoker    Smokeless tobacco: Never Used Substance Use Topics    Alcohol use: Yes     Alcohol/week: 6.0 standard drinks     Types: 6 Cans of beer per week     Comment: Daily      Prior to Admission medications    Medication Sig Start Date End Date Taking? Authorizing Provider   aspirin delayed-release 81 mg tablet TAKE ONE TABLET BY MOUTH DAILY 3/10/22  Yes Prashant Munoz MD   colchicine 0.6 mg tablet TAKE ONE TABLET BY MOUTH DAILY 3/9/22  Yes Prashant Munoz MD   potassium chloride (KLOR-CON M10) 10 mEq tablet TAKE ONE TABLET BY MOUTH DAILY 2/11/22  Yes Prashant Munoz MD   diphenoxylate-atropine (LOMOTIL) 2.5-0.025 mg per tablet TAKE ONE TABLET BY MOUTH FOUR TIMES A DAY AS NEEDED FOR DIARRHEA 2/9/22  Yes Prashant Munoz MD   pantoprazole (PROTONIX) 40 mg tablet TAKE ONE TABLET BY MOUTH TWICE A DAY 12/23/21  Yes Prashant Munoz MD   nortriptyline (PAMELOR) 25 mg capsule Take 1 Capsule by mouth nightly. 12/13/21  Yes Prashant Munoz MD   indomethacin (INDOCIN) 25 mg capsule Take 1 Capsule by mouth three (3) times daily. 12/13/21  Yes Prashant Munoz MD   levothyroxine (SYNTHROID) 150 mcg tablet TAKE ONE TABLET BY MOUTH EVERY MORNING BEFORE BREAKFAST 11/22/21  Yes Neo Basilio MD   promethazine (PHENERGAN) 25 mg tablet Take 1 Tablet by mouth every six (6) hours as needed for Nausea. 11/12/21  Yes Prashant Munoz MD   gabapentin (NEURONTIN) 800 mg tablet TAKE ONE TABLET BY MOUTH FOUR TIMES A DAY AS NEEDED FOR PAIN 11/9/21  Yes Neo Basilio MD   enalapril (VASOTEC) 20 mg tablet Take 1 Tablet by mouth two (2) times a day. 9/13/21  Yes Prashant Munoz MD   amLODIPine (NORVASC) 5 mg tablet Take 1 Tablet by mouth daily. 9/13/21  Yes Prashant Munoz MD   metoprolol tartrate (LOPRESSOR) 25 mg tablet TAKE ONE TABLET BY MOUTH TWICE A DAY 9/4/21  Yes Prashant Munoz MD   atorvastatin (LIPITOR) 80 mg tablet TAKE ONE TABLET BY MOUTH DAILY 7/6/21  Yes Neo Basilio MD   furosemide (LASIX) 20 mg tablet Take 1 Tab by mouth daily.  3/19/21  Yes Prashant Munoz MD        No Known Allergies        Review of Systems:  positive responses in bold type   Constitutional: Negative for fever, chills, diaphoresis and unexpected weight change. HENT: Negative for ear pain, congestion, sore throat, rhinorrhea, drooling, trouble swallowing, neck pain and tinnitus. Eyes: Negative for photophobia, pain, redness and visual disturbance. Respiratory: negative for shortness of breath, cough, choking, chest tightness, wheezing or stridor. Cardiovascular: Negative for chest pain, palpitations and leg swelling. Gastrointestinal: Negative for nausea, vomiting, abdominal pain, diarrhea, constipation, blood in stool, abdominal distention and anal bleeding. Genitourinary: Negative for dysuria, urgency, frequency, hematuria, flank pain and difficulty urinating. Musculoskeletal:+ right hip pain Negative for back pain and arthralgias. Skin: Negative for color change, rash and wound. Neurological: Negative for dizziness, seizures, syncope, speech difficulty, light-headedness or headaches. Hematological: Does not bruise/bleed easily. Psychiatric/Behavioral: Negative for suicidal ideas, hallucinations, behavioral problems, self-injury or agitation         Objective: Body mass index is 36.69 kg/m².   Vitals:    04/11/22 0942 04/11/22 0944   BP: (!) 141/99 (!) 150/102   Pulse: 76    Resp: 17    Temp: 97 °F (36.1 °C)    TempSrc: Temporal    SpO2: 98%    Weight: 285 lb 12.8 oz (129.6 kg)    Height: 6' 2\" (1.88 m)         Physical Exam:  General appearance - alert, well appearing, and in no distress, oriented to person, place, and time and overweight  Mental status - alert, oriented to person, place, and time, normal mood, behavior, speech, dress, motor activity, and thought processes  Chest - clear to auscultation, no wheezes, rales or rhonchi, symmetric air entry  Heart - normal rate, regular rhythm, normal S1, S2, no murmurs, rubs, clicks or gallops  Musculoskeletal - no joint tenderness, deformity or swelling, abnormal exam of right hip, pain with F/E/I and E rotation   Extremities - peripheral pulses normal, no pedal edema, no clubbing or cyanosis  Skin - normal coloration and turgor, no rashes, no suspicious skin lesions noted            Assessment And  Plan:     1. Hypertension, unspecified type  - uncontrolled, Clonidine added   - pt encouraged to eat a low sodium diet and exercise   - cloNIDine HCL (CATAPRES) 0.1 mg tablet; Take 1 Tablet by mouth two (2) times a day. Dispense: 180 Tablet; Refill: 3    2. Cardiomyopathy, unspecified type ([facility]  - Continue treatment; f/u with cardiology as scheduled    3. ST elevation myocardial infarction (STEMI), unspecified artery ([facility]  - Continue treatment; f/u with cardiology as scheduled  - Continue aspirin     4. Alcoholism in recovery [facility])  - relapsed last weekend drinking a 6 pack   - pt encouraged that he can stop drinking because he has done it previously   - pt counseled to attend AA meetings      5. Peripheral vascular disease ([facility]  - Continue blood pressure control and aspirin therapy and statin therapy    6. Psychophysiologic insomnia  - nortriptyline was not helping so medication changed to Ambien   - zolpidem (AMBIEN) 5 mg tablet; Take 1 Tablet by mouth nightly as needed for Sleep. Max Daily Amount: 5 mg. Dispense: 30 Tablet; Refill: 5    7. Neuropathy  - gabapentin (NEURONTIN) 800 mg tablet; TAKE ONE TABLET BY MOUTH FOUR TIMES A DAY AS NEEDED FOR PAIN  Dispense: 120 Tablet; Refill: 5  - 11-DRUG SCREEN, URINE; Future    8. Chronic pain syndrome  - oxyCODONE-acetaminophen (PERCOCET) 5-325 mg per tablet; Take 1 Tablet by mouth every eight (8) hours as needed for Pain for up to 30 days. Max Daily Amount: 3 Tablets. Dispense: 60 Tablet; Refill: 0  - 11-DRUG SCREEN, URINE; Future    9. Marijuana abuse  - pt counseled on the adverse effects and complications from the use of drugs and smoking.      Signed By: Stefanie Bailey     April 11, 2022 *ATTENTION:  This note has been created by a medical student for educational purposes only. Please do not refer to the content of this note for clinical decision-making, billing, or other purposes. Please see attending physicians note to obtain clinical information on this patient. *

## 2022-04-11 NOTE — PROGRESS NOTES
Meaghan Spears is a 51 y.o.  male and presents with    Chief Complaint   Patient presents with    Pain (Chronic)    Alcohol Problem     Subjective:  Pt is following up for right hip pain; he has been taking gabapentin with minimal relief; he reports that he drank alcohol this weekend after almost a year of sobriety. He does not go to [facility]. He smokes 1-2 joints of marijuana a day. He has monitored blood pressure at home and has 130s/100s; he avoids salt and goes for a walk. Osteoarthritis and Chronic Pain:  He has neuropathy and orthopedic, his pain has been controlled with gabapentin. Symptoms onset: problem is longstanding. Rheumatological ROS: ongoing significant pain in legs and feet and hip which is stable and controlled by PRN meds. Response to treatment plan: gradually worsening.      Cardiovascular Review:  The patient has hypertension, hyperlipidemia, CHF and obesity. Diet and [facility] attempting to follow a low fat, low cholesterol diet, not attempting to follow a low sodium diet, does not rigorously follow a diabetic diet, sedentary, nonsmoker  Home BP Monitoring: is not measured at home. Pertinent ROS: taking medications as instructed, no medication side effects noted, no TIA's, no chest pain on exertion, no dyspnea on exertion, no swelling of ankles.      Anxiety Review:  Patient is seen for sleep disturbance. Current treatment of trazodone is effective along with individual therapy. Ongoing symptoms include: insomnia. Patient denies: palpitations, sweating, chest pain, shortness of breath, racing thoughts, psychomotor agitation, feelings of losing control, difficulty concentrating, suicidal ideation.    Reported side effects from the treatment: none.     Thyroid Review:  Patient is seen for followup of hypothyroidism.  +cold intolerance  Thyroid ROS: denies fatigue, weight changes, bowel/skin changes or CVS symptoms.     ROS   General ROS: positive for - chills or fever  Psychological ROS: negative for - anxiety or depression; + insomnia  Ophthalmic ROS: negative for - blurry vision  ENT ROS: negative for - headaches, nasal congestion or sore throat  Endocrine ROS: negative for - polydipsia/polyuria or temperature intolerance  Respiratory ROS: no cough, shortness of breath, or wheezing  Cardiovascular ROS: no chest pain or dyspnea on exertion  Gastrointestinal ROS: using lomotil for chronic diarrhea  Genito-Urinary ROS: no dysuria, trouble voiding, or hematuria  Neurological ROS: positive for - numbness/tingling; syncopal episodes as per above. Dermatological ROS: well healing       All other systems reviewed and are negative. Objective:  Vitals:    04/11/22 0942 04/11/22 0944   BP: (!) 141/99 (!) 138/94   Pulse: 76    Resp: 17    Temp: 97 °F (36.1 °C)    TempSrc: Temporal    SpO2: 98%    Weight: 285 lb 12.8 oz (129.6 kg)    Height: 6' 2\" (1.88 m)    PainSc:   8    PainLoc: Hip        alert, well appearing, and in no distress, oriented to person, place, and time and obese  Mental status - depressed mood  Chest - clear to auscultation, no wheezes, rales or rhonchi, symmetric air entry  Heart - normal rate, regular rhythm, normal S1, S2, no murmurs, rubs, clicks or gallops  Neurological - cranial nerves II through XII intact, antalgic gait    LABS     TESTS      Assessment/Plan:    1. Hypertension, unspecified type  Goal <130/80; borderline controlled; start clonidine for improvement  - cloNIDine HCL (CATAPRES) 0.1 mg tablet; Take 1 Tablet by mouth two (2) times a day. Dispense: 180 Tablet; Refill: 3    2. Cardiomyopathy, unspecified type [facility])  F/u with cardiology    3. ST elevation myocardial infarction (STEMI), unspecified artery (HCC)  Continue medications as prescribed    4. Alcoholism in recovery [facility])  Encourage support in [place] [address] therapy;     5. Peripheral vascular disease ([facility]  Continue treatment    6.  Psychophysiologic insomnia  TCA not effective; start zolpidem  - zolpidem (AMBIEN) 5 mg tablet; Take 1 Tablet by mouth nightly as needed for Sleep. Max Daily Amount: 5 mg. Dispense: 30 Tablet; Refill: 5    7. Neuropathy  Urine drug screen ordered  - gabapentin (NEURONTIN) 800 mg tablet; TAKE ONE TABLET BY MOUTH FOUR TIMES A DAY AS NEEDED FOR PAIN  Dispense: 120 Tablet; Refill: 5  - 11-DRUG SCREEN, URINE; Future    8. Chronic pain syndrome  This is a chronic problem that is worsened. Per review of available records and patients , there are not sign of overuse, misuse, diversion, or concerning side effects. Today we reviewed: the goals of treatment (improve functionality, quality of life, and pain) alternative treatment options including non-narcotic modalities the risks and benefits of continuing with a narcotic based pain regimen  The following changes were made to the patients current treatment plan: nothing, medications refilled. - oxyCODONE-acetaminophen (PERCOCET) 5-325 mg per tablet; Take 1 Tablet by mouth every eight (8) hours as needed for Pain for up to 30 days. Max Daily Amount: 3 Tablets. Dispense: 60 Tablet; Refill: 0  - 11-DRUG SCREEN, URINE; Future    9. Marijuana abuse        Lab review: orders written for new lab studies as appropriate; see orders      I have discussed the diagnosis with the patient and the intended plan as seen in the above orders. The patient has received an after-visit summary and questions were answered concerning future plans. I have discussed medication side effects and warnings with the patient as well. I have reviewed the plan of care with the patient, accepted their input and they are in agreement with the treatment goals.

## 2022-04-25 ENCOUNTER — HOSPITAL ENCOUNTER (OUTPATIENT)
Dept: LAB | Age: 47
Discharge: HOME | End: 2022-04-25
Payer: MEDICARE

## 2022-04-25 DIAGNOSIS — G89.4 CHRONIC PAIN SYNDROME: ICD-10-CM

## 2022-04-25 DIAGNOSIS — G62.9 NEUROPATHY: ICD-10-CM

## 2022-04-25 PROCEDURE — 80307 DRUG TEST PRSMV CHEM ANLYZR: CPT

## 2022-04-26 LAB
AMPHETAMINES UR QL SCN: NEGATIVE NG/ML
BARBITURATES UR QL SCN: NEGATIVE NG/ML
BENZODIAZ UR QL SCN: NEGATIVE NG/ML
BUPRENORPHINE UR QL: NEGATIVE NG/ML
BZE UR QL SCN: NEGATIVE NG/ML
CANNABINOIDS UR QL SCN: POSITIVE NG/ML
CREAT UR-MCNC: 52.6 MG/DL (ref 20–300)
METHADONE UR QL SCN: NEGATIVE NG/ML
OPIATES UR QL SCN: NEGATIVE NG/ML
OXYCODONE+OXYMORPHONE UR QL SCN: POSITIVE NG/ML
PCP UR QL: NEGATIVE NG/ML
PH UR: 5 [PH] (ref 4.5–8.9)
PLEASE NOTE:, 733163: ABNORMAL
PROPOXYPH UR QL SCN: NEGATIVE NG/ML

## 2022-05-11 ENCOUNTER — OFFICE VISIT (OUTPATIENT)
Dept: FAMILY MEDICINE CLINIC | Age: 47
End: 2022-05-11
Payer: MEDICARE

## 2022-05-11 ENCOUNTER — OFFICE VISIT (OUTPATIENT)
Dept: CARDIOLOGY CLINIC | Age: 47
End: 2022-05-11

## 2022-05-11 VITALS
OXYGEN SATURATION: 95 % | DIASTOLIC BLOOD PRESSURE: 89 MMHG | SYSTOLIC BLOOD PRESSURE: 142 MMHG | BODY MASS INDEX: 37.23 KG/M2 | HEART RATE: 61 BPM | WEIGHT: 290 LBS | RESPIRATION RATE: 16 BRPM | TEMPERATURE: 98.6 F

## 2022-05-11 VITALS
WEIGHT: 290.2 LBS | RESPIRATION RATE: 17 BRPM | HEIGHT: 74 IN | DIASTOLIC BLOOD PRESSURE: 84 MMHG | SYSTOLIC BLOOD PRESSURE: 138 MMHG | TEMPERATURE: 98.1 F | HEART RATE: 70 BPM | OXYGEN SATURATION: 99 % | BODY MASS INDEX: 37.24 KG/M2

## 2022-05-11 DIAGNOSIS — H53.9 VISION CHANGES: ICD-10-CM

## 2022-05-11 DIAGNOSIS — G89.4 CHRONIC PAIN SYNDROME: ICD-10-CM

## 2022-05-11 DIAGNOSIS — I42.9 CARDIOMYOPATHY, UNSPECIFIED TYPE (HCC): ICD-10-CM

## 2022-05-11 DIAGNOSIS — F10.21 ALCOHOLISM IN RECOVERY (HCC): ICD-10-CM

## 2022-05-11 DIAGNOSIS — I10 HYPERTENSION, UNSPECIFIED TYPE: Primary | ICD-10-CM

## 2022-05-11 DIAGNOSIS — I21.3 ST ELEVATION MYOCARDIAL INFARCTION (STEMI), UNSPECIFIED ARTERY (HCC): ICD-10-CM

## 2022-05-11 PROCEDURE — 93000 ELECTROCARDIOGRAM COMPLETE: CPT | Performed by: INTERNAL MEDICINE

## 2022-05-11 PROCEDURE — G8510 SCR DEP NEG, NO PLAN REQD: HCPCS | Performed by: INTERNAL MEDICINE

## 2022-05-11 PROCEDURE — G8510 SCR DEP NEG, NO PLAN REQD: HCPCS | Performed by: FAMILY MEDICINE

## 2022-05-11 PROCEDURE — G8754 DIAS BP LESS 90: HCPCS | Performed by: INTERNAL MEDICINE

## 2022-05-11 PROCEDURE — G8427 DOCREV CUR MEDS BY ELIG CLIN: HCPCS | Performed by: INTERNAL MEDICINE

## 2022-05-11 PROCEDURE — G8417 CALC BMI ABV UP PARAM F/U: HCPCS | Performed by: INTERNAL MEDICINE

## 2022-05-11 PROCEDURE — 99214 OFFICE O/P EST MOD 30 MIN: CPT | Performed by: FAMILY MEDICINE

## 2022-05-11 PROCEDURE — 99214 OFFICE O/P EST MOD 30 MIN: CPT | Performed by: INTERNAL MEDICINE

## 2022-05-11 PROCEDURE — G8754 DIAS BP LESS 90: HCPCS | Performed by: FAMILY MEDICINE

## 2022-05-11 PROCEDURE — G8417 CALC BMI ABV UP PARAM F/U: HCPCS | Performed by: FAMILY MEDICINE

## 2022-05-11 PROCEDURE — G8752 SYS BP LESS 140: HCPCS | Performed by: FAMILY MEDICINE

## 2022-05-11 PROCEDURE — G8752 SYS BP LESS 140: HCPCS | Performed by: INTERNAL MEDICINE

## 2022-05-11 PROCEDURE — G8427 DOCREV CUR MEDS BY ELIG CLIN: HCPCS | Performed by: FAMILY MEDICINE

## 2022-05-11 RX ORDER — OXYCODONE AND ACETAMINOPHEN 5; 325 MG/1; MG/1
1 TABLET ORAL
Qty: 60 TABLET | Refills: 0 | Status: SHIPPED | OUTPATIENT
Start: 2022-05-11 | End: 2022-07-11 | Stop reason: SDUPTHER

## 2022-05-11 RX ORDER — ASCORBIC ACID 500 MG
500 TABLET ORAL 2 TIMES DAILY
Qty: 60 TABLET | Refills: 5
Start: 2022-05-11

## 2022-05-11 RX ORDER — OXYCODONE AND ACETAMINOPHEN 5; 325 MG/1; MG/1
1 TABLET ORAL
Qty: 60 TABLET | Refills: 0 | Status: SHIPPED | OUTPATIENT
Start: 2022-06-10 | End: 2022-07-11 | Stop reason: SDUPTHER

## 2022-05-11 NOTE — PROGRESS NOTES
Keely Small is a 51 y.o. male (: 1975) presenting to address:    Chief Complaint   Patient presents with    Medication Refill       There were no vitals filed for this visit. Hearing/Vision:   No exam data present    Learning Assessment:     Learning Assessment 2019   PRIMARY LEARNER Patient   HIGHEST LEVEL OF EDUCATION - PRIMARY LEARNER  -   BARRIERS PRIMARY LEARNER -   CO-LEARNER CAREGIVER -   PRIMARY LANGUAGE ENGLISH   LEARNER PREFERENCE PRIMARY LISTENING   ANSWERED BY patient   RELATIONSHIP SELF     Depression Screening:     3 most recent PHQ Screens 2022   PHQ Not Done -   Little interest or pleasure in doing things Not at all   Feeling down, depressed, irritable, or hopeless Not at all   Total Score PHQ 2 0     Fall Risk Assessment:     Fall Risk Assessment, last 12 mths 2020   Able to walk? Yes   Fall in past 12 months? No   Number of falls in past 12 months -   Fall with injury? -     Abuse Screening:     Abuse Screening Questionnaire 2020   Do you ever feel afraid of your partner? N   Are you in a relationship with someone who physically or mentally threatens you? N   Is it safe for you to go home? Y     ADL Assessment:     ADL Assessment 2019   Feeding yourself No Help Needed   Getting from bed to chair No Help Needed   Getting dressed No Help Needed   Bathing or showering No Help Needed   Walk across the room (includes cane/walker) No Help Needed   Using the telphone No Help Needed   Taking your medications No Help Needed   Preparing meals No Help Needed   Managing money (expenses/bills) No Help Needed   Moderately strenuous housework (laundry) No Help Needed   Shopping for personal items (toiletries/medicines) No Help Needed   Shopping for groceries No Help Needed   Driving Help Needed   Climbing a flight of stairs No Help Needed   Getting to places beyond walking distances Help Needed        Coordination of Care Questionaire:     1.  \"Have you been to the ER, urgent care clinic since your last visit? Hospitalized since your last visit? \" No    2. \"Have you seen or consulted any other health care providers outside of the [address] [place] [address] since your last visit? \" Yes Where: Dr Ca Mcneill     3. For patients aged 39-70: Has the patient had a colonoscopy / FIT/ Cologuard? Yes - no Care Gap present      If the patient is female:    4. For patients aged 41-77: Has the patient had a mammogram within the past 2 years? NA - based on age or sex      11. For patients aged 21-65: Has the patient had a pap smear?  NA - based on age or sex

## 2022-05-11 NOTE — PROGRESS NOTES
Identified pt with two pt identifiers(name and ). Reviewed record in preparation for visit and have obtained necessary documentation. Yeny Miranda presents today for   Chief Complaint   Patient presents with    Follow-up     6m       Pt denies  DIZZINESS, SOB, CHEST PAIN/ PRESSURE, FATIGUE/WEAKNESS, HEADACHES, SWELLING. Yeny Miranda preferred language for health care discussion is english/other. Personal Protective Equipment:   Personal Protective Equipment was used including: mask-surgical and hands-gloves. Patient was placed on no precaution(s). Patient was masked. Precautions:   Patient currently on None  Patient currently roomed with door closed. Is someone accompanying this pt? no    Is the patient using any DME equipment during [address]? no    Depression Screening:  3 most recent PHQ Screens 2022   PHQ Not Done -   Little interest or pleasure in doing things Not at all   Feeling down, depressed, irritable, or hopeless Not at all   Total Score PHQ 2 0       Learning Assessment:  Learning Assessment 2019   PRIMARY LEARNER Patient   HIGHEST LEVEL OF EDUCATION - PRIMARY LEARNER  -   BARRIERS PRIMARY LEARNER -   CO-LEARNER CAREGIVER -   PRIMARY LANGUAGE ENGLISH   LEARNER PREFERENCE PRIMARY LISTENING   ANSWERED BY patient   RELATIONSHIP SELF       Abuse Screening:  Abuse Screening Questionnaire 2020   Do you ever feel afraid of your partner? N   Are you in a relationship with someone who physically or mentally threatens you? N   Is it safe for you to go home? Y       Fall Risk  Fall Risk Assessment, last 12 mths 2020   Able to walk? Yes   Fall in past 12 months? No   Number of falls in past 12 months -   Fall with injury? -       Pt currently taking Anticoagulant therapy? no  Pt currently taking Antiplatelet therapy? no    Coordination of Care:  1. Have you been to the ER, urgent care clinic since your last visit? Hospitalized since your last visit? no    2. Have you seen or consulted any other health care providers outside of the [address] [place] [address] since your last visit? Include any pap smears or colon screening. no      Please see Red banners under Allergies and Med Rec to remove outside inquires. All correct information has been verified with patient and added to chart.      Medication's patient's would liked removed has been marked not taking to be removed per Verbal order and read back per Za Mahajan MD

## 2022-05-17 NOTE — PROGRESS NOTES
Subjective:   Mr. Twila Lara is here today for cardiac reevaluation. He was hospitalized at [facility] in August 2018 with chest pain and an acute MI. His initial electrocardiogram was consistent with acute inferior STEMI. He was taken to cardiac cath lab and was found to have a totally occluded RCA. A RUT was placed. He also had some mild disease in a diagonal branch and in the circumflex coronary artery. He had an admission to [facility] in [facility] 2018 for syncope. At that time, he had a Hemoccult positive stool. He was evaluated by GI and underwent EGD which showed two G-J anastomotic ulcers without high risk stigmata, edges biopsied, gastric erosions, biopsies of gastric pouch for Hpylori, slight esophagitis and possible short segment Nunez's. In the office today, he reports no chest pain or shortness of breath. He has been doing yard work without any limiting symptoms. He has had no recent peripheral swelling. Patient Active Problem List    Diagnosis Date Noted    Opioid use, unspecified with unspecified opioid-induced disorder 08/11/2021    Peripheral vascular disease ([facility] [address] 09/13/2019    S/P primary angioplasty with coronary stent 05/11/2019    Pain in right buttock 05/11/2019    History of ST elevation myocardial infarction (STEMI) 12/05/2018    Chronic pain syndrome 12/05/2018    Severe obesity ([address] 12/05/2018    Post corneal transplant 07/21/2014    Cardiomyopathy ([facility] 07/21/2014    Gastric bypass status for obesity 08/08/2011    HTN (hypertension) 08/08/2011    Hypothyroid 08/08/2011     Current Outpatient Medications   Medication Sig Dispense Refill    cloNIDine HCL (CATAPRES) 0.1 mg tablet Take 1 Tablet by mouth two (2) times a day. 180 Tablet 3    gabapentin (NEURONTIN) 800 mg tablet TAKE ONE TABLET BY MOUTH FOUR TIMES A DAY AS NEEDED FOR PAIN 120 Tablet 5    zolpidem (AMBIEN) 5 mg tablet Take 1 Tablet by mouth nightly as needed for Sleep.  Max Daily Amount: 5 mg. 30 Tablet 5    aspirin delayed-release 81 mg tablet TAKE ONE TABLET BY MOUTH DAILY 90 Tablet 2    colchicine 0.6 mg tablet TAKE ONE TABLET BY MOUTH DAILY 30 Tablet 11    potassium chloride (KLOR-CON M10) 10 mEq tablet TAKE ONE TABLET BY MOUTH DAILY 90 Tablet 3    diphenoxylate-atropine (LOMOTIL) 2.5-0.025 mg per tablet TAKE ONE TABLET BY MOUTH FOUR TIMES A DAY AS NEEDED FOR DIARRHEA 60 Tablet 1    pantoprazole (PROTONIX) 40 mg tablet TAKE ONE TABLET BY MOUTH TWICE A  Tablet 4    indomethacin (INDOCIN) 25 mg capsule Take 1 Capsule by mouth three (3) times daily. 30 Capsule 0    levothyroxine (SYNTHROID) 150 mcg tablet TAKE ONE TABLET BY MOUTH EVERY MORNING BEFORE BREAKFAST 90 Tablet 1    promethazine (PHENERGAN) 25 mg tablet Take 1 Tablet by mouth every six (6) hours as needed for Nausea. 20 Tablet 0    enalapril (VASOTEC) 20 mg tablet Take 1 Tablet by mouth two (2) times a day. 180 Tablet 3    amLODIPine (NORVASC) 5 mg tablet Take 1 Tablet by mouth daily. 90 Tablet 3    metoprolol tartrate (LOPRESSOR) 25 mg tablet TAKE ONE TABLET BY MOUTH TWICE A  Tablet 4    atorvastatin (LIPITOR) 80 mg tablet TAKE ONE TABLET BY MOUTH DAILY 90 Tablet 4    furosemide (LASIX) 20 mg tablet Take 1 Tab by mouth daily. 90 Tab 3    [START ON 6/10/2022] oxyCODONE-acetaminophen (PERCOCET) 5-325 mg per tablet Take 1 Tablet by mouth every eight (8) hours as needed for Pain for up to 30 days. Max Daily Amount: 3 Tablets. 60 Tablet 0    oxyCODONE-acetaminophen (PERCOCET) 5-325 mg per tablet Take 1 Tablet by mouth every eight (8) hours as needed for Pain for up to 30 days. Max Daily Amount: 3 Tablets. 60 Tablet 0    ascorbic acid, vitamin C, (VITAMIN C) 500 mg tablet Take 1 Tablet by mouth two (2) times a day.  60 Tablet 5     No Known Allergies  Past Medical History:   Diagnosis Date    [facility]omy[facility] 7/21/2014    Chronic pain syndrome 12/5/2018    Gastric bypass status for obesity 8/8/2011  Heart failure ([facility]     History of ST elevation myocardial infarction (STEMI) 12/5/2018    HTN (hypertension) 8/8/2011    Hypothyroid 8/8/2011    Ill-defined condition     Alcoholism    STEMI (ST elevation myocardial infarction) ([facility] [address]     08/2018     Past Surgical History:   Procedure Laterality Date    COLONOSCOPY N/A 10/9/2019    COLONOSCOPY performed by Erna Fisher MD at [facility] ENDOSCOPY    HX GASTRIC BYPASS  2015    HX LAP GASTRIC BYPASS  2011    [place] CARDIAC SURG PROCEDURE UNLIST  2018    Stent placed Coronary Artery      No family history on file. Social History     Tobacco Use   Smoking Status Never Smoker   Smokeless Tobacco Never Used          Review of Systems, additional:  Constitutional: negative  Eyes: negative  Respiratory: negative for dyspnea on exertion  Cardiovascular: per HPI  Gastrointestinal: negative  Musculoskeletal:+right buttock pain radiates to right thigh  Neurological: negative  Behvioral/Psych: negative  Endocrine: negative  ENT: negative    Objective:     Visit Vitals  BP (!) 142/89   Pulse 61   Temp 98.6 °F (37 °C) (Temporal)   Resp 16   Wt 131.5 kg (290 lb)   SpO2 95%   BMI 37.23 kg/m²     General:  alert, cooperative, no distress, appears stated age   Chest Wall: inspection normal - no chest wall deformities or tenderness, respiratory effort normal   Lung: clear to auscultation bilaterally   Heart:  normal rate, regular rhythm, normal S1, S2, no murmurs, rubs, clicks or gallops   Abdomen: soft, non-tender. Bowel sounds normal. No masses,  no organomegaly   Extremities: extremities normal, atraumatic, no cyanosis or edema Skin: no rashes   Neuro: alert, oriented, normal speech, no focal findings or movement disorder noted       Assessment/Plan:         ICD-10-CM ICD-9-CM    1. Hypertension, unspecified type-- BP controlled in the office today.   Follows up with Dr. Adrian Rooney later today I10 401.9 atorvastatin (LIPITOR) 80 mg tablet      metoprolol tartrate (LOPRESSOR) 100 mg IR tablet   2. Cardiomyopathy, unspecified type ([facility]-- EF 45%, advised of salt restriction. Continue with current cardiac regimen. I42.9 425.4 atorvastatin (LIPITOR) 80 mg tablet      metoprolol tartrate (LOPRESSOR) 100 mg IR tablet   3. ST elevation myocardial infarction (STEMI), ([facility]-- S/P RUT to RCA Resolute Integrity 4X22 mm, on 8/13/2018. He is on ASA, enalapril, metoprolol and Atorvastatin. Return in 6 months I21.3 410.90 atorvastatin (LIPITOR) 80 mg tablet      metoprolol tartrate (LOPRESSOR) 100 mg IR tablet      REFERRAL TO CARDIAC REHAB   4. Hypothyroidism, unspecified type E03.9 244.9 atorvastatin (LIPITOR) 80 mg tablet      metoprolol tartrate (LOPRESSOR) 100 mg IR tablet   5. Lumbar radiculopathy, taking gabapentin and Indocin  6. Dyslipidemia, 12/8/2021. Total cholesterol 122. HDL 84. LDL 18.2. Triglycerides 99.

## 2022-05-19 DIAGNOSIS — K52.9 CHRONIC DIARRHEA: ICD-10-CM

## 2022-05-20 RX ORDER — DIPHENOXYLATE HYDROCHLORIDE AND ATROPINE SULFATE 2.5; .025 MG/1; MG/1
TABLET ORAL
Qty: 60 TABLET | Refills: 2 | Status: SHIPPED | OUTPATIENT
Start: 2022-05-20

## 2022-05-24 NOTE — PROGRESS NOTES
Frances Coats is a 51 y.o.  male and presents with    Chief Complaint   Patient presents with    Pain (Chronic)       Subjective:  Pt is following up for chronic pain; he continues gabapentin with minimal relief; He smokes 1-2 joints of marijuana a day. he was prescribed #60 tabs oxycodone 30 days ago and took his last tablet this morning.     He has monitored blood pressure at home and has 130s/100s; he avoids salt and goes for a walk.       Osteoarthritis and Chronic Pain:  He has neuropathy and orthopedic, his pain has been controlled with gabapentin. Symptoms onset: problem is longstanding. Rheumatological ROS: ongoing significant pain in legs and feet and hip which is stable and controlled by PRN meds. Response to treatment plan: gradually worsening.      Cardiovascular Review:  The patient has hypertension, hyperlipidemia, CHF and obesity. Diet and [place] attempting to follow a low fat, low cholesterol diet, not attempting to follow a low sodium diet, does not rigorously follow a diabetic diet, sedentary, nonsmoker  Home BP Monitoring: is not measured at home. Pertinent ROS: taking medications as instructed, no medication side effects noted, no TIA's, no chest pain on exertion, no dyspnea on exertion, no swelling of ankles.      Anxiety Review:  Patient is seen for sleep disturbance. Current treatment of trazodone is effective along with individual therapy. Ongoing symptoms include: insomnia. Patient denies: palpitations, sweating, chest pain, shortness of breath, racing thoughts, psychomotor agitation, feelings of losing control, difficulty concentrating, suicidal ideation.    Reported side effects from the treatment: none.     Thyroid Review:  Patient is seen for followup of hypothyroidism.   Thyroid ROS: denies cold intolerance fatigue, weight changes, bowel/skin changes or CVS symptoms.     ROS   General ROS: positive for - chills or fever  Psychological ROS: negative for - anxiety or depression; + insomnia  Ophthalmic ROS: negative for - blurry vision  ENT ROS: negative for - headaches, nasal congestion or sore throat  Endocrine ROS: negative for - polydipsia/polyuria or temperature intolerance  Respiratory ROS: no cough, shortness of breath, or wheezing  Cardiovascular ROS: no chest pain or dyspnea on exertion  Gastrointestinal ROS: using lomotil for chronic diarrhea  Genito-Urinary ROS: no dysuria, trouble voiding, or hematuria  Neurological ROS: positive for - numbness/tingling; syncopal episodes as per above. Dermatological ROS: well healing     All other systems reviewed and are negative. Objective:  Vitals:    05/11/22 1456 05/11/22 1458   BP: (!) 150/101 138/84   Pulse: 70    Resp: 17    Temp: 98.1 °F (36.7 °C)    TempSrc: Temporal    SpO2: 99%    Weight: 290 lb 3.2 oz (131.6 kg)    Height: 6' 2\" (1.88 m)    PainSc:   7        alert, well appearing, and in no distress, oriented to person, place, and time and obese  Mental status - normal mood, behavior, speech, dress, motor activity, and thought processes  Chest - clear to auscultation, no wheezes, rales or rhonchi, symmetric air entry  Heart - normal rate, regular rhythm, normal S1, S2, no murmurs, rubs, clicks or gallops  Neurological - cranial nerves II through XII intact, antalgic gait  Extremities - peripheral pulses normal, no pedal edema, no clubbing or cyanosis    LABS     TESTS      Assessment/Plan:    1. Chronic pain syndrome  This is a chronic problem that is stable. Per review of available records and patients , there are not sign of overuse, misuse, diversion, or concerning side effects. Today we reviewed: the risk of overdose, addiction, and dependency proper storage and disposal of medications the goals of treatment (improve functionality, quality of life, and pain)  The following changes were made to the patients current treatment plan: nothing, medications refilled.       - oxyCODONE-acetaminophen (PERCOCET) 5-325 mg per tablet; Take 1 Tablet by mouth every eight (8) hours as needed for Pain for up to 30 days. Max Daily Amount: 3 Tablets. Dispense: 60 Tablet; Refill: 0  - oxyCODONE-acetaminophen (PERCOCET) 5-325 mg per tablet; Take 1 Tablet by mouth every eight (8) hours as needed for Pain for up to 30 days. Max Daily Amount: 3 Tablets. Dispense: 60 Tablet; Refill: 0    2. Hypertension, unspecified type  Goal <130/80; borderline controlled    3. Cardiomyopathy, unspecified type ([facility]  Continue current medications and f/u with with cardiologist    4. ST elevation myocardial infarction (STEMI), unspecified artery ([facility] [address]  Continue current treatment    5. Alcoholism in recovery [facility])  Encourage attendance at [place] [id number]    6. Vision changes  Continue treatment; f/u with ophthalmology  - ascorbic acid, vitamin C, (VITAMIN C) 500 mg tablet; Take 1 Tablet by mouth two (2) times a day. Dispense: 60 Tablet; Refill: 5      Lab review: labs reviewed, I note that TSH therapeutic      I have discussed the diagnosis with the patient and the intended plan as seen in the above orders. The patient has received an after-visit summary and questions were answered concerning future plans. I have discussed medication side effects and warnings with the patient as well. I have reviewed the plan of care with the patient, accepted their input and they are in agreement with the treatment goals. Follow-up and Dispositions    · Return in about 2 months (around 7/11/2022) for medication evaluation, results.

## 2022-05-29 DIAGNOSIS — E03.9 ACQUIRED HYPOTHYROIDISM: ICD-10-CM

## 2022-05-31 RX ORDER — LEVOTHYROXINE SODIUM 150 UG/1
TABLET ORAL
Qty: 90 TABLET | Refills: 1 | Status: SHIPPED | OUTPATIENT
Start: 2022-05-31

## 2022-07-11 ENCOUNTER — OFFICE VISIT (OUTPATIENT)
Dept: FAMILY MEDICINE CLINIC | Age: 47
End: 2022-07-11
Payer: MEDICARE

## 2022-07-11 VITALS
WEIGHT: 286.4 LBS | HEIGHT: 74 IN | BODY MASS INDEX: 36.76 KG/M2 | HEART RATE: 56 BPM | DIASTOLIC BLOOD PRESSURE: 80 MMHG | TEMPERATURE: 97.7 F | SYSTOLIC BLOOD PRESSURE: 121 MMHG | RESPIRATION RATE: 17 BRPM | OXYGEN SATURATION: 100 %

## 2022-07-11 DIAGNOSIS — G89.4 CHRONIC PAIN SYNDROME: ICD-10-CM

## 2022-07-11 DIAGNOSIS — F51.04 PSYCHOPHYSIOLOGIC INSOMNIA: Primary | ICD-10-CM

## 2022-07-11 DIAGNOSIS — I42.9 CARDIOMYOPATHY, UNSPECIFIED TYPE (HCC): ICD-10-CM

## 2022-07-11 DIAGNOSIS — E03.9 ACQUIRED HYPOTHYROIDISM: ICD-10-CM

## 2022-07-11 DIAGNOSIS — I10 HYPERTENSION, UNSPECIFIED TYPE: ICD-10-CM

## 2022-07-11 PROCEDURE — G8510 SCR DEP NEG, NO PLAN REQD: HCPCS | Performed by: FAMILY MEDICINE

## 2022-07-11 PROCEDURE — G8417 CALC BMI ABV UP PARAM F/U: HCPCS | Performed by: FAMILY MEDICINE

## 2022-07-11 PROCEDURE — G8752 SYS BP LESS 140: HCPCS | Performed by: FAMILY MEDICINE

## 2022-07-11 PROCEDURE — 99214 OFFICE O/P EST MOD 30 MIN: CPT | Performed by: FAMILY MEDICINE

## 2022-07-11 PROCEDURE — G8754 DIAS BP LESS 90: HCPCS | Performed by: FAMILY MEDICINE

## 2022-07-11 PROCEDURE — G8427 DOCREV CUR MEDS BY ELIG CLIN: HCPCS | Performed by: FAMILY MEDICINE

## 2022-07-11 RX ORDER — OXYCODONE AND ACETAMINOPHEN 5; 325 MG/1; MG/1
1 TABLET ORAL
Qty: 60 TABLET | Refills: 0 | Status: SHIPPED | OUTPATIENT
Start: 2022-08-10 | End: 2022-10-11 | Stop reason: SDUPTHER

## 2022-07-11 RX ORDER — OXYCODONE AND ACETAMINOPHEN 5; 325 MG/1; MG/1
1 TABLET ORAL
Qty: 60 TABLET | Refills: 0 | Status: SHIPPED | OUTPATIENT
Start: 2022-07-11 | End: 2022-10-11 | Stop reason: SDUPTHER

## 2022-07-11 RX ORDER — TRAZODONE HYDROCHLORIDE 150 MG/1
150 TABLET ORAL
Qty: 30 TABLET | Refills: 2 | Status: SHIPPED | OUTPATIENT
Start: 2022-07-11 | End: 2022-10-07

## 2022-07-11 RX ORDER — OXYCODONE AND ACETAMINOPHEN 5; 325 MG/1; MG/1
1 TABLET ORAL
Qty: 60 TABLET | Refills: 0 | Status: SHIPPED | OUTPATIENT
Start: 2022-09-09 | End: 2022-10-11 | Stop reason: SDUPTHER

## 2022-07-11 NOTE — PROGRESS NOTES
Rl Mancuso is a 51 y.o. male (: 1975) presenting to address:    Chief Complaint   Patient presents with    Annual Wellness Visit    Medication Refill       There were no vitals filed for this visit. Hearing/Vision:   No exam data present    Learning Assessment:     Learning Assessment 2019   PRIMARY LEARNER Patient   HIGHEST LEVEL OF EDUCATION - PRIMARY LEARNER  -   BARRIERS PRIMARY LEARNER -   CO-LEARNER CAREGIVER -   PRIMARY LANGUAGE ENGLISH   LEARNER PREFERENCE PRIMARY LISTENING   ANSWERED BY patient   RELATIONSHIP SELF     Depression Screening:     3 most recent PHQ Screens 2022   PHQ Not Done -   Little interest or pleasure in doing things Not at all   Feeling down, depressed, irritable, or hopeless Not at all   Total Score PHQ 2 0     Fall Risk Assessment:     Fall Risk Assessment, last 12 mths 2020   Able to walk? Yes   Fall in past 12 months? No   Number of falls in past 12 months -   Fall with injury? -     Abuse Screening:     Abuse Screening Questionnaire 2020   Do you ever feel afraid of your partner? N   Are you in a relationship with someone who physically or mentally threatens you? N   Is it safe for you to go home?  Y     ADL Assessment:     ADL Assessment 2019   Feeding yourself No Help Needed   Getting from bed to chair No Help Needed   Getting dressed No Help Needed   Bathing or showering No Help Needed   Walk across the room (includes cane/walker) No Help Needed   Using the telphone No Help Needed   Taking your medications No Help Needed   Preparing meals No Help Needed   Managing money (expenses/bills) No Help Needed   Moderately strenuous housework (laundry) No Help Needed   Shopping for personal items (toiletries/medicines) No Help Needed   Shopping for groceries No Help Needed   Driving Help Needed   Climbing a flight of stairs No Help Needed   Getting to places beyond walking distances Help Needed        Coordination of Care Questionaire: 1. \"Have you been to the ER, urgent care clinic since your last visit? Hospitalized since your last visit? \" No    2. \"Have you seen or consulted any other health care providers outside of the [address] [place] [address] since your last visit? \" No     3. For patients aged 39-70: Has the patient had a colonoscopy / FIT/ Cologuard? Yes - no Care Gap present      If the patient is female:    4. For patients aged 41-77: Has the patient had a mammogram within the past 2 years? NA - based on age or sex      11. For patients aged 21-65: Has the patient had a pap smear?  NA - based on age or sex

## 2022-07-11 NOTE — PROGRESS NOTES
Annelise Dos Santos is a 51 y.o.  male and presents with    Chief Complaint   Patient presents with    Pain (Chronic)    Hip Pain     Subjective:  Pt is following up for chronic pain; he continues gabapentin with minimal relief; He smokes 1-2 joints of marijuana a day.  he was prescribed #60 tabs oxycodone 30 days ago and took his last tablet this morning.     He has monitored blood pressure at home and has 130s/100s; he avoids salt and goes for a walk.       Osteoarthritis and Chronic Pain:  He has neuropathy and orthopedic, his pain has been controlled with gabapentin. Symptoms onset: problem is longstanding. Rheumatological ROS: ongoing significant pain in legs and feet and hip which is stable and controlled by PRN meds. Response to treatment plan: gradually worsening.      Cardiovascular Review:  The patient has hypertension, hyperlipidemia, CHF and obesity. Diet and [place] attempting to follow a low fat, low cholesterol diet, not attempting to follow a low sodium diet, does not rigorously follow a diabetic diet, sedentary, nonsmoker  Home BP Monitoring: is not measured at home. Pertinent ROS: taking medications as instructed, no medication side effects noted, no TIA's, no chest pain on exertion, no dyspnea on exertion, no swelling of ankles.      Anxiety Review:  Patient is seen for sleep disturbance. Current treatment of ambien is not effective along with individual therapy. Ongoing symptoms include: insomnia. Patient denies: palpitations, sweating, chest pain, shortness of breath, racing thoughts, psychomotor agitation, feelings of losing control, difficulty concentrating, suicidal ideation.    Reported side effects from the treatment: none.     Thyroid Review:  Patient is seen for followup of hypothyroidism.   Thyroid ROS: denies cold intolerance fatigue, weight changes, bowel/skin changes or CVS symptoms.     ROS   General ROS: positive for - chills or fever  Psychological ROS: negative for - anxiety or depression; + insomnia with racing thoughts and Roman Herrmann is not effective  Ophthalmic ROS: negative for - blurry vision  ENT ROS: negative for - headaches, nasal congestion or sore throat  Endocrine ROS: negative for - polydipsia/polyuria or temperature intolerance  Respiratory ROS: no cough, shortness of breath, or wheezing  Cardiovascular ROS: no chest pain or dyspnea on exertion  Gastrointestinal ROS: using lomotil for chronic diarrhea  Genito-Urinary ROS: no dysuria, trouble voiding, or hematuria  Neurological ROS: positive for - numbness/tingling; syncopal episodes as per above. Dermatological ROS: well healing     All other systems reviewed and are negative. Objective:  Vitals:    07/11/22 1023   BP: 121/80   Pulse: (!) 56   Resp: 17   Temp: 97.7 °F (36.5 °C)   TempSrc: Temporal   SpO2: 100%   Weight: 286 lb 6.4 oz (129.9 kg)   Height: 6' 2\" (1.88 m)   PainSc:   8       alert, well appearing, and in no distress, oriented to person, place, and time and obese  Mental status - normal mood, behavior, speech, dress, motor activity, and thought processes  Chest - clear to auscultation, no wheezes, rales or rhonchi, symmetric air entry  Heart - normal rate, regular rhythm, normal S1, S2, no murmurs, rubs, clicks or gallops  Extremities - peripheral pulses normal, no pedal edema, no clubbing or cyanosis    LABS     TESTS      Assessment/Plan:    1. Chronic pain syndrome  This is a chronic problem that is stable. Per review of available records and patients , there are not sign of overuse, misuse, diversion, or concerning side effects. Today we reviewed: the risk of overdose, addiction, and dependency proper storage and disposal of medications the goals of treatment (improve functionality, quality of life, and pain)  The following changes were made to the patients current treatment plan: nothing, medications refilled.           - oxyCODONE-acetaminophen (PERCOCET) 5-325 mg per tablet; Take 1 Tablet by mouth every eight (8) hours as needed for Pain for up to 30 days. Max Daily Amount: 3 Tablets. Dispense: 60 Tablet; Refill: 0  - oxyCODONE-acetaminophen (PERCOCET) 5-325 mg per tablet; Take 1 Tablet by mouth every eight (8) hours as needed for Pain for up to 30 days. Max Daily Amount: 3 Tablets. Dispense: 60 Tablet; Refill: 0  - oxyCODONE-acetaminophen (PERCOCET) 5-325 mg per tablet; Take 1 Tablet by mouth every eight (8) hours as needed for Pain for up to 30 days. Max Daily Amount: 3 Tablets. Dispense: 60 Tablet; Refill: 0    2. Psychophysiologic insomnia  Increase dose for better control  - traZODone (DESYREL) 150 mg tablet; Take 1 Tablet by mouth nightly. Dispense: 30 Tablet; Refill: 2    3. Acquired hypothyroidism  Continue levothyroxine    4. Hypertension, unspecified type  Goal <130/80; at goal    5. Cardiomyopathy, unspecified type [facility])  Continue treatment and f/u with cardiologist      Lab review: orders written for new lab studies as appropriate; see orders      I have discussed the diagnosis with the patient and the intended plan as seen in the above orders. The patient has received an after-visit summary and questions were answered concerning future plans. I have discussed medication side effects and warnings with the patient as well. I have reviewed the plan of care with the patient, accepted their input and they are in agreement with the treatment goals.

## 2022-09-05 DIAGNOSIS — I10 HYPERTENSION, UNSPECIFIED TYPE: ICD-10-CM

## 2022-09-05 RX ORDER — AMLODIPINE BESYLATE 5 MG/1
TABLET ORAL
Qty: 90 TABLET | Refills: 3 | Status: SHIPPED | OUTPATIENT
Start: 2022-09-05

## 2022-10-02 DIAGNOSIS — I21.3 ST ELEVATION MYOCARDIAL INFARCTION (STEMI), UNSPECIFIED ARTERY (HCC): ICD-10-CM

## 2022-10-04 RX ORDER — ATORVASTATIN CALCIUM 80 MG/1
TABLET, FILM COATED ORAL
Qty: 90 TABLET | Refills: 4 | Status: SHIPPED | OUTPATIENT
Start: 2022-10-04

## 2022-10-05 DIAGNOSIS — F51.04 PSYCHOPHYSIOLOGIC INSOMNIA: ICD-10-CM

## 2022-10-07 RX ORDER — TRAZODONE HYDROCHLORIDE 150 MG/1
TABLET ORAL
Qty: 30 TABLET | Refills: 2 | Status: SHIPPED
Start: 2022-10-07 | End: 2022-10-11

## 2022-10-11 ENCOUNTER — HOSPITAL ENCOUNTER (OUTPATIENT)
Dept: LAB | Age: 47
Discharge: HOME | End: 2022-10-11
Payer: MEDICARE

## 2022-10-11 ENCOUNTER — OFFICE VISIT (OUTPATIENT)
Dept: FAMILY MEDICINE CLINIC | Age: 47
End: 2022-10-11
Payer: MEDICARE

## 2022-10-11 VITALS
OXYGEN SATURATION: 100 % | HEIGHT: 74 IN | RESPIRATION RATE: 17 BRPM | WEIGHT: 281.6 LBS | BODY MASS INDEX: 36.14 KG/M2 | HEART RATE: 71 BPM | SYSTOLIC BLOOD PRESSURE: 126 MMHG | TEMPERATURE: 97.9 F | DIASTOLIC BLOOD PRESSURE: 89 MMHG

## 2022-10-11 DIAGNOSIS — G89.4 CHRONIC PAIN SYNDROME: ICD-10-CM

## 2022-10-11 DIAGNOSIS — Z23 NEEDS FLU SHOT: ICD-10-CM

## 2022-10-11 DIAGNOSIS — F51.04 PSYCHOPHYSIOLOGIC INSOMNIA: Primary | ICD-10-CM

## 2022-10-11 DIAGNOSIS — I42.9 CARDIOMYOPATHY, UNSPECIFIED TYPE (HCC): ICD-10-CM

## 2022-10-11 DIAGNOSIS — F10.21 ALCOHOLISM IN RECOVERY (HCC): ICD-10-CM

## 2022-10-11 DIAGNOSIS — I10 HYPERTENSION, UNSPECIFIED TYPE: ICD-10-CM

## 2022-10-11 PROCEDURE — G8417 CALC BMI ABV UP PARAM F/U: HCPCS | Performed by: FAMILY MEDICINE

## 2022-10-11 PROCEDURE — G8510 SCR DEP NEG, NO PLAN REQD: HCPCS | Performed by: FAMILY MEDICINE

## 2022-10-11 PROCEDURE — G8754 DIAS BP LESS 90: HCPCS | Performed by: FAMILY MEDICINE

## 2022-10-11 PROCEDURE — G8752 SYS BP LESS 140: HCPCS | Performed by: FAMILY MEDICINE

## 2022-10-11 PROCEDURE — 90686 IIV4 VACC NO PRSV 0.5 ML IM: CPT | Performed by: FAMILY MEDICINE

## 2022-10-11 PROCEDURE — 3078F DIAST BP <80 MM HG: CPT | Performed by: FAMILY MEDICINE

## 2022-10-11 PROCEDURE — G0008 ADMIN INFLUENZA VIRUS VAC: HCPCS | Performed by: FAMILY MEDICINE

## 2022-10-11 PROCEDURE — 3074F SYST BP LT 130 MM HG: CPT | Performed by: FAMILY MEDICINE

## 2022-10-11 PROCEDURE — 80307 DRUG TEST PRSMV CHEM ANLYZR: CPT

## 2022-10-11 PROCEDURE — G8427 DOCREV CUR MEDS BY ELIG CLIN: HCPCS | Performed by: FAMILY MEDICINE

## 2022-10-11 PROCEDURE — 99214 OFFICE O/P EST MOD 30 MIN: CPT | Performed by: FAMILY MEDICINE

## 2022-10-11 RX ORDER — METOPROLOL TARTRATE 25 MG/1
25 TABLET, FILM COATED ORAL 2 TIMES DAILY
Qty: 180 TABLET | Refills: 4 | Status: SHIPPED | OUTPATIENT
Start: 2022-10-11

## 2022-10-11 RX ORDER — OXYCODONE AND ACETAMINOPHEN 5; 325 MG/1; MG/1
1 TABLET ORAL
Qty: 60 TABLET | Refills: 0 | Status: SHIPPED | OUTPATIENT
Start: 2022-12-10 | End: 2023-01-09

## 2022-10-11 RX ORDER — FUROSEMIDE 20 MG/1
20 TABLET ORAL DAILY
Qty: 90 TABLET | Refills: 3 | Status: SHIPPED | OUTPATIENT
Start: 2022-10-11

## 2022-10-11 RX ORDER — ENALAPRIL MALEATE 20 MG/1
20 TABLET ORAL 2 TIMES DAILY
Qty: 180 TABLET | Refills: 3 | Status: SHIPPED | OUTPATIENT
Start: 2022-10-11

## 2022-10-11 RX ORDER — OXYCODONE AND ACETAMINOPHEN 5; 325 MG/1; MG/1
1 TABLET ORAL
Qty: 60 TABLET | Refills: 0 | Status: SHIPPED | OUTPATIENT
Start: 2022-10-11 | End: 2022-11-10

## 2022-10-11 RX ORDER — OXYCODONE AND ACETAMINOPHEN 5; 325 MG/1; MG/1
1 TABLET ORAL
Qty: 60 TABLET | Refills: 0 | Status: SHIPPED | OUTPATIENT
Start: 2022-11-10 | End: 2022-12-10

## 2022-10-11 NOTE — PROGRESS NOTES
Zoie Martinez is a 49 y.o. male (: 1975) presenting to address:    Chief Complaint   Patient presents with    Medication Refill       There were no vitals filed for this visit. Hearing/Vision:   No results found. Learning Assessment:     Learning Assessment 2019   PRIMARY LEARNER Patient   HIGHEST LEVEL OF EDUCATION - PRIMARY LEARNER  -   BARRIERS PRIMARY LEARNER -   CO-LEARNER CAREGIVER -   PRIMARY LANGUAGE ENGLISH   LEARNER PREFERENCE PRIMARY LISTENING   ANSWERED BY patient   RELATIONSHIP SELF     Depression Screening:     3 most recent PHQ Screens 10/11/2022   PHQ Not Done -   Little interest or pleasure in doing things Not at all   Feeling down, depressed, irritable, or hopeless Not at all   Total Score PHQ 2 0     Fall Risk Assessment:     Fall Risk Assessment, last 12 mths 2020   Able to walk? Yes   Fall in past 12 months? No   Number of falls in past 12 months -   Fall with injury? -     Abuse Screening:     Abuse Screening Questionnaire 2020   Do you ever feel afraid of your partner? N   Are you in a relationship with someone who physically or mentally threatens you? N   Is it safe for you to go home? Y     ADL Assessment:     ADL Assessment 2019   Feeding yourself No Help Needed   Getting from bed to chair No Help Needed   Getting dressed No Help Needed   Bathing or showering No Help Needed   Walk across the room (includes cane/walker) No Help Needed   Using the telphone No Help Needed   Taking your medications No Help Needed   Preparing meals No Help Needed   Managing money (expenses/bills) No Help Needed   Moderately strenuous housework (laundry) No Help Needed   Shopping for personal items (toiletries/medicines) No Help Needed   Shopping for groceries No Help Needed   Driving Help Needed   Climbing a flight of stairs No Help Needed   Getting to places beyond walking distances Help Needed        Coordination of Care Questionaire:     1.  \"Have you been to the ER, urgent care clinic since your last visit? Hospitalized since your last visit? \" No    2. \"Have you seen or consulted any other health care providers outside of the [address] [place] [address] since your last visit? \" No     3. For patients aged 39-70: Has the patient had a colonoscopy / FIT/ Cologuard? Yes - no Care Gap present      If the patient is female:    4. For patients aged 41-77: Has the patient had a mammogram within the past 2 years? NA - based on age or sex      11. For patients aged 21-65: Has the patient had a pap smear?  NA - based on age or sex

## 2022-10-11 NOTE — PROGRESS NOTES
Dae Degroot is a 49 y.o.  male and presents with    Chief Complaint   Patient presents with    Medication Refill    Sleep Problem       Subjective:  He c/o insomnia; he has been using trazodone but stopped this over a month ago and notices not change; he falls asleep at 4 AM then wakes between 11 AM and 12 PM. He is worried about the living situation he is in. He practices good sleep hygiene. He has been sober for >7 months. Pt is following up for chronic pain; he continues gabapentin with minimal relief; He smokes 1-2 joints of marijuana a day. he was prescribed #60 tabs oxycodone 30 days ago and took his last tablet this morning. He has monitored blood pressure at home and has 130s/100s; he avoids salt and goes for a walk. Osteoarthritis and Chronic Pain:  He has neuropathy and orthopedic, his pain has been controlled with gabapentin. Symptoms onset: problem is longstanding. Rheumatological ROS: ongoing significant pain in legs and feet and hip which is stable and controlled by PRN meds. Response to treatment plan: gradually worsening. Cardiovascular Review:  The patient has hypertension, hyperlipidemia, CHF and obesity. Diet and Lifestyle: not attempting to follow a low fat, low cholesterol diet, not attempting to follow a low sodium diet, does not rigorously follow a diabetic diet, sedentary, nonsmoker  Home BP Monitoring: is not measured at home. Pertinent ROS: taking medications as instructed, no medication side effects noted, no TIA's, no chest pain on exertion, no dyspnea on exertion, no swelling of ankles. Anxiety Review:  Patient is seen for sleep disturbance. Current treatment of Sari Johnson is not effective along with individual therapy. Ongoing symptoms include: insomnia.    Patient denies: palpitations, sweating, chest pain, shortness of breath, racing thoughts, psychomotor agitation, feelings of losing control, difficulty concentrating, suicidal ideation. Reported side effects from the treatment: none. Thyroid Review:  Patient is seen for followup of hypothyroidism. Thyroid ROS: denies cold intolerance fatigue, weight changes, bowel/skin changes or CVS symptoms. ROS   General ROS: positive for - chills or fever  Psychological ROS: negative for - anxiety or depression; + insomnia with racing thoughts and [place] is not effective  Ophthalmic ROS: negative for - blurry vision  ENT ROS: negative for - headaches, nasal congestion or sore throat  Endocrine ROS: negative for - polydipsia/polyuria or temperature intolerance  Respiratory ROS: no cough, shortness of breath, or wheezing  Cardiovascular ROS: no chest pain or dyspnea on exertion  Gastrointestinal ROS: using lomotil for chronic diarrhea  Genito-Urinary ROS: no dysuria, trouble voiding, or hematuria  Neurological ROS: positive for - numbness/tingling; syncopal episodes as per above. Dermatological ROS: well healing    All other systems reviewed and are negative. Objective:  Vitals:    10/11/22 1029   BP: 126/89   Pulse: 71   Resp: 17   Temp: 97.9 °F (36.6 °C)   TempSrc: Temporal   SpO2: 100%   Weight: 281 lb 9.6 oz (127.7 kg)   Height: 6' 2\" (1.88 m)       alert, well appearing, and in no distress, oriented to person, place, and time, and obese  Mental status - normal mood, behavior, speech, dress, motor activity, and thought processes  Chest - clear to auscultation, no wheezes, rales or rhonchi, symmetric air entry  Heart - normal rate, regular rhythm, normal S1, S2, no murmurs, rubs, clicks or gallops  Abdomen - soft, nontender, nondistended, no masses or organomegaly  Skin - normal coloration and turgor, no rashes, no suspicious skin lesions noted    LABS     TESTS      Assessment/Plan:    1. Cardiomyopathy, unspecified type ([address]  Continue blood pressure control and volume control  - enalapril (VASOTEC) 20 mg tablet; Take 1 Tablet by mouth two (2) times a day.   Dispense: 180 Tablet; Refill: 3  - furosemide (LASIX) 20 mg tablet; Take 1 Tablet by mouth daily. Dispense: 90 Tablet; Refill: 3    2. Hypertension, unspecified type  Goal <130/80; at goal with medications  - enalapril (VASOTEC) 20 mg tablet; Take 1 Tablet by mouth two (2) times a day. Dispense: 180 Tablet; Refill: 3    3. Psychophysiologic insomnia  Trial of alternative sleep aid  - suvorexant (Belsomra) 10 mg tablet; Take 1 Tablet by mouth nightly as needed for Insomnia. Max Daily Amount: 10 mg. Dispense: 30 Tablet; Refill: 5    4. Chronic pain syndrome  This is a chronic problem that is stable. Per review of available records and patients , there are not sign of overuse, misuse, diversion, or concerning side effects. Today we reviewed: the risk of overdose, addiction, and dependency proper storage and disposal of medications the goals of treatment (improve functionality, quality of life, and pain)  The following changes were made to the patients current treatment plan: nothing, medications refilled. - oxyCODONE-acetaminophen (PERCOCET) 5-325 mg per tablet; Take 1 Tablet by mouth every eight (8) hours as needed for Pain for up to 30 days. Max Daily Amount: 3 Tablets. Dispense: 60 Tablet; Refill: 0  - oxyCODONE-acetaminophen (PERCOCET) 5-325 mg per tablet; Take 1 Tablet by mouth every eight (8) hours as needed for Pain for up to 30 days. Max Daily Amount: 3 Tablets. Dispense: 60 Tablet; Refill: 0  - oxyCODONE-acetaminophen (PERCOCET) 5-325 mg per tablet; Take 1 Tablet by mouth every eight (8) hours as needed for Pain for up to 30 days. Max Daily Amount: 3 Tablets. Dispense: 60 Tablet; Refill: 0  - 11-DRUG SCREEN, URINE; Future    5. Needs flu shot    - INFLUENZA, FLUARIX, FLULAVAL, FLUZONE (AGE 6 MO+), AFLURIA(AGE 3Y+) IM, PF, 0.5 ML  - ADMIN INFLUENZA VIRUS VAC    6.  Alcoholism in recovery ([facility] [address]  7 months sober      Lab review: orders written for new lab studies as appropriate; see orders      I have discussed the diagnosis with the patient and the intended plan as seen in the above orders. The patient has received an after-visit summary and questions were answered concerning future plans. I have discussed medication side effects and warnings with the patient as well. I have reviewed the plan of care with the patient, accepted their input and they are in agreement with the treatment goals.

## 2022-10-12 DIAGNOSIS — G62.9 NEUROPATHY: ICD-10-CM

## 2022-10-12 LAB
AMPHETAMINES UR QL SCN: NEGATIVE NG/ML
BARBITURATES UR QL SCN: NEGATIVE NG/ML
BENZODIAZ UR QL SCN: NEGATIVE NG/ML
BUPRENORPHINE UR QL: NEGATIVE NG/ML
BZE UR QL SCN: NEGATIVE NG/ML
CANNABINOIDS UR QL SCN: POSITIVE NG/ML
CREAT UR-MCNC: 69.8 MG/DL (ref 20–300)
METHADONE UR QL SCN: NEGATIVE NG/ML
OPIATES UR QL SCN: NEGATIVE NG/ML
OXYCODONE+OXYMORPHONE UR QL SCN: NEGATIVE NG/ML
PCP UR QL: NEGATIVE NG/ML
PH UR: 5 [PH] (ref 4.5–8.9)
PLEASE NOTE:, 733163: ABNORMAL
PROPOXYPH UR QL SCN: NEGATIVE NG/ML

## 2022-10-12 NOTE — TELEPHONE ENCOUNTER
This patient contacted the office for the following prescriptions to be refilled:    Medication requested :   Requested Prescriptions     Pending Prescriptions Disp Refills    gabapentin (NEURONTIN) 800 mg tablet 120 Tablet 5     Sig: TAKE ONE TABLET BY MOUTH FOUR TIMES A DAY AS NEEDED FOR PAIN      PCP: Holly Tavares MD  LOV: 10/11/2022  NOV DMA: 1/11/2023  FUTURE APPT:   Future Appointments   Date Time Provider Enzo Bernal   11/16/2022 10:00 AM Julio César Martin MD . [address] BS AMB   1/11/2023 10:00 AM Justin Basilio MD DMA BS AMB         Thank you.

## 2022-10-17 RX ORDER — GABAPENTIN 800 MG/1
TABLET ORAL
Qty: 120 TABLET | Refills: 5 | OUTPATIENT
Start: 2022-10-17

## 2022-10-17 RX ORDER — GABAPENTIN 800 MG/1
TABLET ORAL
Qty: 120 TABLET | Refills: 5 | Status: SHIPPED | OUTPATIENT
Start: 2022-10-17

## 2022-12-03 DIAGNOSIS — E03.9 ACQUIRED HYPOTHYROIDISM: ICD-10-CM

## 2022-12-06 RX ORDER — LEVOTHYROXINE SODIUM 150 UG/1
TABLET ORAL
Qty: 90 TABLET | Refills: 1 | Status: SHIPPED | OUTPATIENT
Start: 2022-12-06

## 2022-12-07 DIAGNOSIS — I21.3 ST ELEVATION MYOCARDIAL INFARCTION (STEMI), UNSPECIFIED ARTERY (HCC): ICD-10-CM

## 2022-12-07 DIAGNOSIS — I42.9 CARDIOMYOPATHY, UNSPECIFIED TYPE (HCC): ICD-10-CM

## 2022-12-07 DIAGNOSIS — I10 HYPERTENSION, UNSPECIFIED TYPE: ICD-10-CM

## 2022-12-09 RX ORDER — ASPIRIN 81 MG/1
TABLET ORAL
Qty: 90 TABLET | Refills: 2 | Status: SHIPPED | OUTPATIENT
Start: 2022-12-09

## 2022-12-15 DIAGNOSIS — K52.9 CHRONIC DIARRHEA: ICD-10-CM

## 2022-12-16 RX ORDER — DIPHENOXYLATE HYDROCHLORIDE AND ATROPINE SULFATE 2.5; .025 MG/1; MG/1
TABLET ORAL
Qty: 60 TABLET | Refills: 1 | Status: SHIPPED | OUTPATIENT
Start: 2022-12-16

## 2023-01-11 ENCOUNTER — OFFICE VISIT (OUTPATIENT)
Dept: FAMILY MEDICINE CLINIC | Age: 48
End: 2023-01-11
Payer: MEDICARE

## 2023-01-11 ENCOUNTER — HOSPITAL ENCOUNTER (OUTPATIENT)
Dept: LAB | Age: 48
Discharge: HOME | End: 2023-01-11
Payer: MEDICARE

## 2023-01-11 VITALS
TEMPERATURE: 96.8 F | WEIGHT: 285.2 LBS | OXYGEN SATURATION: 100 % | HEIGHT: 74 IN | DIASTOLIC BLOOD PRESSURE: 73 MMHG | SYSTOLIC BLOOD PRESSURE: 103 MMHG | BODY MASS INDEX: 36.6 KG/M2 | HEART RATE: 69 BPM | RESPIRATION RATE: 17 BRPM

## 2023-01-11 DIAGNOSIS — Z13.220 ENCOUNTER FOR LIPID SCREENING FOR CARDIOVASCULAR DISEASE: ICD-10-CM

## 2023-01-11 DIAGNOSIS — Z00.00 MEDICARE ANNUAL WELLNESS VISIT, SUBSEQUENT: ICD-10-CM

## 2023-01-11 DIAGNOSIS — K21.9 GASTROESOPHAGEAL REFLUX DISEASE WITHOUT ESOPHAGITIS: ICD-10-CM

## 2023-01-11 DIAGNOSIS — Z13.6 ENCOUNTER FOR LIPID SCREENING FOR CARDIOVASCULAR DISEASE: ICD-10-CM

## 2023-01-11 DIAGNOSIS — F51.04 PSYCHOPHYSIOLOGIC INSOMNIA: ICD-10-CM

## 2023-01-11 DIAGNOSIS — I42.9 CARDIOMYOPATHY, UNSPECIFIED TYPE (HCC): ICD-10-CM

## 2023-01-11 DIAGNOSIS — E03.9 ACQUIRED HYPOTHYROIDISM: ICD-10-CM

## 2023-01-11 DIAGNOSIS — G89.4 CHRONIC PAIN SYNDROME: ICD-10-CM

## 2023-01-11 DIAGNOSIS — F10.21 ALCOHOLISM IN RECOVERY (HCC): ICD-10-CM

## 2023-01-11 DIAGNOSIS — I73.9 PERIPHERAL VASCULAR DISEASE (HCC): ICD-10-CM

## 2023-01-11 DIAGNOSIS — Z71.89 ADVANCE CARE PLANNING: Primary | ICD-10-CM

## 2023-01-11 DIAGNOSIS — K52.9 CHRONIC DIARRHEA: ICD-10-CM

## 2023-01-11 DIAGNOSIS — E66.01 SEVERE OBESITY (BMI 35.0-39.9) WITH COMORBIDITY (HCC): ICD-10-CM

## 2023-01-11 LAB
CHOLEST SERPL-MCNC: 102 MG/DL
HDLC SERPL-MCNC: 55 MG/DL (ref 40–60)
HDLC SERPL: 1.9 (ref 0–5)
LDLC SERPL CALC-MCNC: 25.8 MG/DL (ref 0–100)
LIPID PROFILE,FLP: NORMAL
TRIGL SERPL-MCNC: 106 MG/DL (ref ?–150)
TSH SERPL DL<=0.05 MIU/L-ACNC: 2.38 UIU/ML (ref 0.36–3.74)
VLDLC SERPL CALC-MCNC: 21.2 MG/DL

## 2023-01-11 PROCEDURE — 99214 OFFICE O/P EST MOD 30 MIN: CPT | Performed by: FAMILY MEDICINE

## 2023-01-11 PROCEDURE — 80061 LIPID PANEL: CPT

## 2023-01-11 PROCEDURE — G8427 DOCREV CUR MEDS BY ELIG CLIN: HCPCS | Performed by: FAMILY MEDICINE

## 2023-01-11 PROCEDURE — 36415 COLL VENOUS BLD VENIPUNCTURE: CPT

## 2023-01-11 PROCEDURE — G8417 CALC BMI ABV UP PARAM F/U: HCPCS | Performed by: FAMILY MEDICINE

## 2023-01-11 PROCEDURE — 80307 DRUG TEST PRSMV CHEM ANLYZR: CPT

## 2023-01-11 PROCEDURE — G8510 SCR DEP NEG, NO PLAN REQD: HCPCS | Performed by: FAMILY MEDICINE

## 2023-01-11 PROCEDURE — G0439 PPPS, SUBSEQ VISIT: HCPCS | Performed by: FAMILY MEDICINE

## 2023-01-11 PROCEDURE — 84443 ASSAY THYROID STIM HORMONE: CPT

## 2023-01-11 RX ORDER — NORTRIPTYLINE HYDROCHLORIDE 25 MG/1
25 CAPSULE ORAL
Qty: 90 CAPSULE | Refills: 3 | Status: SHIPPED | OUTPATIENT
Start: 2023-01-11

## 2023-01-11 RX ORDER — PANTOPRAZOLE SODIUM 40 MG/1
40 TABLET, DELAYED RELEASE ORAL 2 TIMES DAILY
Qty: 180 TABLET | Refills: 4 | Status: SHIPPED | OUTPATIENT
Start: 2023-01-11

## 2023-01-11 RX ORDER — OXYCODONE AND ACETAMINOPHEN 5; 325 MG/1; MG/1
1 TABLET ORAL
Qty: 60 TABLET | Refills: 0 | Status: SHIPPED | OUTPATIENT
Start: 2023-02-10 | End: 2023-03-12

## 2023-01-11 RX ORDER — LEVOTHYROXINE SODIUM 150 UG/1
150 TABLET ORAL
Qty: 90 TABLET | Refills: 3 | Status: SHIPPED | OUTPATIENT
Start: 2023-01-11

## 2023-01-11 RX ORDER — OXYCODONE AND ACETAMINOPHEN 5; 325 MG/1; MG/1
1 TABLET ORAL
Qty: 60 TABLET | Refills: 0 | Status: SHIPPED | OUTPATIENT
Start: 2023-01-11 | End: 2023-02-10

## 2023-01-11 NOTE — LETTER
NOTIFICATION     1/11/2023 11:21 AM    Mr. Fifi Walter  [address] [place] [phone]      To Whom It May Concern:    Fifi Walter is currently under the care of [address] [place] [address]. He  Is recommended for housing assistance; he can walk up a flight of stairs. If there are questions or concerns please have the patient contact our office.         Sincerely,      Krupa Grigsby MD

## 2023-01-11 NOTE — PROGRESS NOTES
HISTORY OF PRESENT ILLNESS  Claudeen Ferrier is a 49 y.o. male with pmh/o hypothyroidism and HTN presenting for annual wellness visit. No new significant concerns today. He says his dog ate his contacts so his vision is blurry but he is otherwise doing well. Medication Refill  Pertinent negatives include no chest pain, no abdominal pain, no headaches and no shortness of breath. Review of Systems   Constitutional:  Negative for malaise/fatigue. HENT:  Negative for congestion, sinus pain and sore throat. Eyes:  Positive for blurred vision. Negative for double vision. Respiratory:  Negative for cough, shortness of breath and wheezing. Cardiovascular:  Negative for chest pain, orthopnea and leg swelling. Gastrointestinal:  Negative for abdominal pain, constipation, diarrhea, heartburn, nausea and vomiting. Genitourinary:  Negative for dysuria. Neurological:  Negative for dizziness and headaches. All other systems reviewed and are negative. Physical Exam  Constitutional:       Appearance: Normal appearance. HENT:      Head: Normocephalic and atraumatic. Mouth/Throat:      Mouth: Mucous membranes are moist.      Pharynx: Oropharynx is clear. Cardiovascular:      Rate and Rhythm: Normal rate and regular rhythm. Pulses: Normal pulses. Heart sounds: Normal heart sounds. Pulmonary:      Effort: Pulmonary effort is normal.      Breath sounds: Normal breath sounds. Abdominal:      General: Abdomen is flat. Palpations: Abdomen is soft. Skin:     General: Skin is warm and dry. Neurological:      General: No focal deficit present. Mental Status: He is alert. ASSESSMENT and PLAN  1.  Health maintenance - check labs, colon screening, refill medications  Overall clinically stable, continue medications as prescribed  RTC as scheduled

## 2023-01-11 NOTE — PROGRESS NOTES
Darwin Lynn is a 49 y.o. male (: 1975) presenting to address:    Chief Complaint   Patient presents with    Annual Wellness Visit    Medication Refill       There were no vitals filed for this visit. Hearing/Vision:   No results found. Learning Assessment:     Learning Assessment 2019   PRIMARY LEARNER Patient   HIGHEST LEVEL OF EDUCATION - PRIMARY LEARNER  -   BARRIERS PRIMARY LEARNER -   CO-LEARNER CAREGIVER -   PRIMARY LANGUAGE ENGLISH   LEARNER PREFERENCE PRIMARY LISTENING   ANSWERED BY patient   RELATIONSHIP SELF     Depression Screening:     3 most recent PHQ Screens 2023   PHQ Not Done -   Little interest or pleasure in doing things Not at all   Feeling down, depressed, irritable, or hopeless Not at all   Total Score PHQ 2 0     Fall Risk Assessment:     Fall Risk Assessment, last 12 mths 2020   Able to walk? Yes   Fall in past 12 months? No   Number of falls in past 12 months -   Fall with injury? -     Abuse Screening:     Abuse Screening Questionnaire 2020   Do you ever feel afraid of your partner? N   Are you in a relationship with someone who physically or mentally threatens you? N   Is it safe for you to go home? Y     ADL Assessment:     ADL Assessment 2019   Feeding yourself No Help Needed   Getting from bed to chair No Help Needed   Getting dressed No Help Needed   Bathing or showering No Help Needed   Walk across the room (includes cane/walker) No Help Needed   Using the telphone No Help Needed   Taking your medications No Help Needed   Preparing meals No Help Needed   Managing money (expenses/bills) No Help Needed   Moderately strenuous housework (laundry) No Help Needed   Shopping for personal items (toiletries/medicines) No Help Needed   Shopping for groceries No Help Needed   Driving Help Needed   Climbing a flight of stairs No Help Needed   Getting to places beyond walking distances Help Needed        Coordination of Care Questionaire:     1. \"Have you been to the ER, urgent care clinic since your last visit? Hospitalized since your last visit? \" No    2. \"Have you seen or consulted any other health care providers outside of the [address] [place] [address] since your last visit? \" No     3. For patients aged 39-70: Has the patient had a colonoscopy / FIT/ Cologuard? Yes - no Care Gap present      If the patient is female:    4. For patients aged 41-77: Has the patient had a mammogram within the past 2 years? NA - based on age or sex      11. For patients aged 21-65: Has the patient had a pap smear?  NA - based on age or sex

## 2023-01-11 NOTE — ACP (ADVANCE CARE PLANNING)
Advance Care Planning     Advance Care Planning (ACP) Physician/NP/PA Conversation      Date of Conversation: 1/11/2023  Conducted with: Patient with Decision Making Capacity    Healthcare Decision Maker:     Primary Decision Maker: Austin Calixto - [phone]    Secondary Decision Maker: Ranjeet Diaz - [phone]  Click here to complete [address] including selection of the Healthcare Decision Maker Relationship (ie \"Primary\")      Today we documented Decision Maker(s) consistent with Legal Next of Kin hierarchy. Care Preferences:    Hospitalization: \"If your health worsens and it becomes clear that your chance of recovery is unlikely, what would be your preference regarding hospitalization? \"  The patient would prefer hospitalization. Ventilation: \"If you were unable to breathe on your own and your chance of recovery was unlikely, what would be your preference about the use of a ventilator (breathing machine) if it was available to you? \"   The patient would desire the use of a ventilator. Resuscitation: \"In the event your heart stopped as a result of an underlying serious health condition, would you want attempts to be made to restart your heart, or would you prefer a natural death? \"   Yes, attempt to resuscitate.     Additional topics discussed: treatment goals, benefit/burden of treatment options, ventilation preferences, hospitalization preferences, and resuscitation preferences    Conversation Outcomes / Follow-Up Plan:   ACP complete - no further action today  Reviewed DNR/DNI and patient elects Full Code (Attempt Resuscitation)     Length of Voluntary ACP Conversation in minutes:  16 minutes    Karthik Farah MD

## 2023-01-11 NOTE — PROGRESS NOTES
(AWV) The Medicare Annual Wellness Exam PROGRESS NOTE    This is a Medicare Annual Wellness Exam (AWV)    I have reviewed the patient's medical history in detail and updated the computerized patient record. Jorge Lux is a 49 y.o.  male and presents for an annual wellness exam     ROS   General ROS: negative for - chills or fever or weight changes  Psychological ROS: negative for - anxiety or depression; + insomnia with racing thoughts and Gerldine Jump is not effective  Ophthalmic ROS: negative for - blurry vision  ENT ROS: negative for - headaches, nasal congestion or sore throat  Endocrine ROS: negative for - polydipsia/polyuria or temperature intolerance  Respiratory ROS: no cough, shortness of breath, or wheezing  Cardiovascular ROS: no chest pain or dyspnea on exertion  Gastrointestinal ROS: using lomotil for chronic diarrhea  Genito-Urinary ROS: no dysuria, trouble voiding, or hematuria  Neurological ROS: positive for - numbness/tingling; syncopal episodes as per above. Dermatological ROS: negative for skin lesions    All other systems reviewed and are negative.     History     Past Medical History:   Diagnosis Date    Cardiomyopathy ([facility] 7/21/2014    Chronic pain syndrome 12/5/2018    Gastric bypass status for obesity 8/8/2011    Heart failure ([facility]     History of ST elevation myocardial infarction (STEMI) 12/5/2018    HTN (hypertension) 8/8/2011    Hypothyroid 8/8/2011    Ill-defined condition     Alcoholism    STEMI (ST elevation myocardial infarction) ([facility]     08/2018      Past Surgical History:   Procedure Laterality Date    COLONOSCOPY N/A 10/9/2019    COLONOSCOPY performed by Iban Sifuentes MD at [place] [address]  2015    HX LAP GASTRIC BYPASS  2011    [place] UNLISTED PROCEDURE CARDIAC SURGERY  2018    Stent placed Coronary Artery      Current Outpatient Medications   Medication Sig Dispense Refill    diphenoxylate-atropine (LOMOTIL) 2.5-0.025 mg per tablet TAKE ONE TABLET BY MOUTH FOUR TIMES A DAY AS NEEDED 60 Tablet 1    aspirin delayed-release 81 mg tablet TAKE ONE TABLET BY MOUTH DAILY 90 Tablet 2    levothyroxine (SYNTHROID) 150 mcg tablet TAKE ONE TABLET BY MOUTH EVERY MORNING BEFORE BREAKFAST 90 Tablet 1    gabapentin (NEURONTIN) 800 mg tablet TAKE ONE TABLET BY MOUTH FOUR TIMES A DAY AS NEEDED 120 Tablet 5    enalapril (VASOTEC) 20 mg tablet Take 1 Tablet by mouth two (2) times a day. 180 Tablet 3    metoprolol tartrate (LOPRESSOR) 25 mg tablet Take 1 Tablet by mouth two (2) times a day. 180 Tablet 4    furosemide (LASIX) 20 mg tablet Take 1 Tablet by mouth daily. 90 Tablet 3    suvorexant (Belsomra) 10 mg tablet Take 1 Tablet by mouth nightly as needed for Insomnia. Max Daily Amount: 10 mg. 30 Tablet 5    atorvastatin (LIPITOR) 80 mg tablet TAKE ONE TABLET BY MOUTH DAILY 90 Tablet 4    amLODIPine (NORVASC) 5 mg tablet TAKE ONE TABLET BY MOUTH DAILY 90 Tablet 3    ascorbic acid, vitamin C, (VITAMIN C) 500 mg tablet Take 1 Tablet by mouth two (2) times a day. 60 Tablet 5    cloNIDine HCL (CATAPRES) 0.1 mg tablet Take 1 Tablet by mouth two (2) times a day. 180 Tablet 3    colchicine 0.6 mg tablet TAKE ONE TABLET BY MOUTH DAILY 30 Tablet 11    potassium chloride (KLOR-CON M10) 10 mEq tablet TAKE ONE TABLET BY MOUTH DAILY 90 Tablet 3    pantoprazole (PROTONIX) 40 mg tablet TAKE ONE TABLET BY MOUTH TWICE A  Tablet 4    indomethacin (INDOCIN) 25 mg capsule Take 1 Capsule by mouth three (3) times daily. 30 Capsule 0    promethazine (PHENERGAN) 25 mg tablet Take 1 Tablet by mouth every six (6) hours as needed for Nausea. 20 Tablet 0     No Known Allergies  No family history on file. Social History     Tobacco Use    Smoking status: Never    Smokeless tobacco: Never   Substance Use Topics    Alcohol use:  Yes     Alcohol/week: 6.0 standard drinks     Types: 6 Cans of beer per week     Comment: Daily     Patient Active Problem List   Diagnosis Code    Gastric bypass status for obesity Z98.84    HTN (hypertension) I10    Hypothyroid E03.9    Post corneal transplant Z94.7    Cardiomyopathy ([facility] I42.9    History of ST elevation myocardial infarction (STEMI) I25.2    Chronic pain syndrome G89.4    Severe obesity (HCC) E66.01    S/P primary angioplasty with coronary stent Z95.5    Pain in right buttock M79.18    Peripheral vascular disease (HCC) I73.9    Opioid use, unspecified with unspecified opioid-induced disorder F11.99       Health Maintenance History  Immunizations reviewed, dtap up to date , pneumovax up to date, flu up to date, zoster n/a  colonoscopy: due and scheduled,   Eye exam: up to date    Depression Risk Factor Screening:      Patient Health Questionnaire (PHQ-2)   Over the last 2 weeks, how often have you been bothered by any of the following problems? Little interest or pleasure in doing things? Not at all. [0]  Feeling down, depressed, or hopeless? Not at all. [0]    Total Score: 0/6  PHQ-2 Assessment Scoring:   A score of 2 or more requires further screening with the PHQ-9    Alcohol Risk Factor Screening:     Men: On any occasion during the past 3 months, have you had more than 4 drinks containing alcohol? no  Do you average more than 14 drinks per week? no    Functional Ability and Level of Safety:     Hearing Loss    Hearing is good. Activities of Daily Living   Self-care. Requires assistance with: no ADLs    Fall Risk   Secondary diagnoses (15 pts)  Score: 15    Abuse Screen   Patient is not abused    Examination   Physical Examination  Vitals:    01/11/23 1027 01/11/23 1028   BP: (!) 160/105 103/73   Pulse: 69    Resp: 17    Temp: 96.8 °F (36 °C)    TempSrc: Temporal    SpO2: 100%    Weight: 285 lb 3.2 oz (129.4 kg)    Height: 6' 2\" (1.88 m)       Body mass index is 36.62 kg/m².      Evaluation of Cognitive Function:  Mood/affect:good mood  Appearance: well kempt  Family member/caregiver input: n/a    alert, well appearing, and in no distress, oriented to person, place, and time, and obese    Patient Care Team:  Karoline Rodas MD as PCP - General (Family Medicine)  Karoline Rodas MD as PCP - [facility]  Zoey Benz MD (Orthopedic Surgery)  Humberto Gamble MD (General Surgery)  Gama Oglesby MD (Ophthalmology)  Layo Bianchi MD (Cardiovascular Disease Physician)  Valery Sloan MD (Physical Medicine and Rehabilitation Physician)    End-of-life planning  Advanced Directive in the case than an injury or illness causes the patient to be unable to make health care decisions    Health Care Directive or Living Will: yes    Advice/Referrals/Counselling/Plan:   Education and counseling provided:  End-of-Life planning (with patient's consent)  Influenza Vaccine  Cardiovascular screening blood test  Diabetes screening test  Include in education list (weight loss, physical activity, smoking cessation, fall prevention, and nutrition)    ICD-10-CM ICD-9-CM    1. Advance care planning  Z71.89 V65.49 ADVANCE CARE PLANNING FIRST 30 MINS      2. Cardiomyopathy, unspecified type ([facility]  I42.9 425.4 LIPID PANEL      3. Alcoholism in recovery ([facility]  F10.21 303.93       4. Peripheral vascular disease (HCC)  I73.9 443.9       5. Chronic diarrhea  K52.9 787.91       6. Acquired hypothyroidism  E03.9 244.9 levothyroxine (SYNTHROID) 150 mcg tablet      TSH 3RD GENERATION      7. Gastroesophageal reflux disease without esophagitis  K21.9 530.81 pantoprazole (PROTONIX) 40 mg tablet      8. Chronic pain syndrome  G89.4 338.4 oxyCODONE-acetaminophen (PERCOCET) 5-325 mg per tablet      oxyCODONE-acetaminophen (PERCOCET) 5-325 mg per tablet      9. Severe obesity (BMI 35.0-39. 9) with comorbidity ([facility]  E66.01 278.01       10. Psychophysiologic insomnia  F51.04 307.42 nortriptyline (PAMELOR) 25 mg capsule      11. Medicare annual wellness visit, subsequent  Z00.00 V70.0 [id number] [employer]      12.  Encounter for lipid screening for cardiovascular disease  Z13.220 V77.91 LIPID PANEL    Z13.6 V81.2       . Brief written plan, checklist    I have discussed the diagnosis with the patient and the intended plan as seen in the above orders. The patient has received an after-visit summary and questions were answered concerning future plans. I have discussed medication side effects and warnings with the patient as well. I have reviewed the plan of care with the patient, accepted their input and they are in agreement with the treatment goals. ____________________________________________________________    Problem Assessment    for treatment of   Chief Complaint   Patient presents with    Annual Wellness Visit    Medication Refill         SUBJECTIVE    Well Adult Physical   Patient here for a comprehensive physical exam.The patient reports problems - congestive heart failure  Do you take any herbs or supplements that were not prescribed by a doctor? no Are you taking calcium supplements? no Are you taking aspirin daily? yes    He has been sober for > 1 year. Osteoarthritis and Chronic Pain:  He has neuropathy and orthopedic, his pain has been controlled with gabapentin. Symptoms onset: problem is longstanding. Rheumatological ROS: ongoing significant pain in legs and feet and hip which is stable and controlled by PRN meds. Response to treatment plan: gradually worsening. Cardiovascular Review:  The patient has hypertension, hyperlipidemia, CHF and obesity. Diet and Lifestyle: not attempting to follow a low fat, low cholesterol diet, not attempting to follow a low sodium diet, does not rigorously follow a diabetic diet, sedentary, nonsmoker  Home BP Monitoring: is not measured at home. Pertinent ROS: taking medications as instructed, no medication side effects noted, no TIA's, no chest pain on exertion, no dyspnea on exertion, no swelling of ankles. Anxiety Review:  Patient is seen for sleep disturbance.  Current treatment of belsomra is not effective along with individual therapy. Ongoing symptoms include: insomnia. Patient denies: palpitations, sweating, chest pain, shortness of breath, racing thoughts, psychomotor agitation, feelings of losing control, difficulty concentrating, suicidal ideation. Reported side effects from the treatment: none. Thyroid Review:  Patient is seen for followup of hypothyroidism. Thyroid ROS: denies cold intolerance fatigue, weight changes, bowel/skin changes or CVS symptoms. Visit Vitals  /73 (BP 1 Location: Left upper arm)   Pulse 69   Temp 96.8 °F (36 °C) (Temporal)   Resp 17   Ht 6' 2\" (1.88 m)   Wt 285 lb 3.2 oz (129.4 kg)   SpO2 100%   BMI 36.62 kg/m²     General:  Alert, cooperative, no distress, appears stated age. Head:  Normocephalic, without obvious abnormality, atraumatic. Eyes:  Conjunctivae/corneas clear. PERRL, EOMs intact. Ears:  Normal TMs and external ear canals both ears. Nose: Nares normal. Septum midline. Mucosa normal. No drainage or sinus tenderness. Throat: Lips, mucosa, and tongue normal. Teeth and gums normal.   Neck: Supple, symmetrical, trachea midline, no adenopathy, thyroid: no enlargement/tenderness/nodules and no JVD. Back:   Symmetric, no curvature. ROM normal. No CVA tenderness. Lungs:   Clear to auscultation bilaterally. Chest wall:  No tenderness or deformity. Heart:  Regular rate and rhythm, S1, S2 normal, no murmur, click, rub or gallop. Abdomen:   Soft, non-tender. Bowel sounds normal. No masses,  No organomegaly. Genitalia:  deferred   Rectal:  deferred   Extremities: Extremities normal, atraumatic, no cyanosis or edema. Pulses: 2+ and symmetric all extremities. Skin: Skin color, texture, turgor normal. No rashes or lesions   Lymph nodes: Cervical, supraclavicular, and axillary nodes normal.   Neurologic: CNII-XII intact. Normal strength, sensation and reflexes throughout.        LABS TESTS      Assessment/Plan:    1. Cardiomyopathy, unspecified type ([facility]  Follow up with cardiology  - LIPID PANEL; Future    2. Alcoholism in recovery [facility])  Congratulated on > 1 year sobriety    3. Peripheral vascular disease ([facility]  Continue current treatment    4. Chronic diarrhea  improved    5. Acquired hypothyroidism  Continue therapeutic dosing  - levothyroxine (SYNTHROID) 150 mcg tablet; Take 1 Tablet by mouth Daily (before breakfast). Dispense: 90 Tablet; Refill: 3    6. Gastroesophageal reflux disease without esophagitis  Ppi continued  - pantoprazole (PROTONIX) 40 mg tablet; Take 1 Tablet by mouth two (2) times a day. Dispense: 180 Tablet; Refill: 4    7. Chronic pain syndrome  This is a chronic problem that is stable. Per review of available records and patients , there are not sign of overuse, misuse, diversion, or concerning side effects. Today we reviewed: the risk of overdose, addiction, and dependency proper storage and disposal of medications the goals of treatment (improve functionality, quality of life, and pain)  The following changes were made to the patients current treatment plan: nothing, medications refilled. - oxyCODONE-acetaminophen (PERCOCET) 5-325 mg per tablet; Take 1 Tablet by mouth every eight (8) hours as needed for Pain for up to 30 days. Max Daily Amount: 3 Tablets. Dispense: 60 Tablet; Refill: 0  - oxyCODONE-acetaminophen (PERCOCET) 5-325 mg per tablet; Take 1 Tablet by mouth every eight (8) hours as needed for Pain for up to 30 days. Max Daily Amount: 3 Tablets. Dispense: 60 Tablet; Refill: 0    8. Severe obesity (BMI 35.0-39. 9) with comorbidity ([facility]  I have reviewed/discussed the above normal BMI with the patient. I have recommended the following interventions: dietary management education, guidance, and counseling and encourage exercise . Gabe Huang 9. Psychophysiologic insomnia  Continue treatment  - nortriptyline (PAMELOR) 25 mg capsule;  Take 1 Capsule by mouth nightly. Dispense: 90 Capsule; Refill: 3    10. Advance care planning    - ADVANCE CARE PLANNING FIRST 30 MINS    11. Medicare annual wellness visit, subsequent  Reviewed preventive recommendations  - [id number] [facility] [employer]    12. Encounter for lipid screening for cardiovascular disease    - LIPID PANEL;  Future      Lab review: orders written for new lab studies as appropriate; see orders

## 2023-01-13 LAB
AMPHETAMINES UR QL SCN: NEGATIVE NG/ML
BARBITURATES UR QL SCN: NEGATIVE NG/ML
BENZODIAZ UR QL SCN: NEGATIVE NG/ML
BUPRENORPHINE UR QL: NEGATIVE NG/ML
BZE UR QL SCN: NEGATIVE NG/ML
CANNABINOIDS UR QL SCN: POSITIVE NG/ML
CREAT UR-MCNC: 83 MG/DL (ref 20–300)
METHADONE UR QL SCN: NEGATIVE NG/ML
OPIATES UR QL SCN: NEGATIVE NG/ML
OXYCODONE+OXYMORPHONE UR QL SCN: POSITIVE NG/ML
PCP UR QL: NEGATIVE NG/ML
PH UR: 5.2 (ref 4.5–8.9)
PLEASE NOTE:, 733163: ABNORMAL
PROPOXYPH UR QL SCN: NEGATIVE NG/ML

## 2023-01-30 ENCOUNTER — OFFICE VISIT (OUTPATIENT)
Dept: CARDIOLOGY CLINIC | Age: 48
End: 2023-01-30
Payer: MEDICARE

## 2023-01-30 VITALS
HEART RATE: 72 BPM | WEIGHT: 292 LBS | BODY MASS INDEX: 37.49 KG/M2 | OXYGEN SATURATION: 98 % | DIASTOLIC BLOOD PRESSURE: 86 MMHG | SYSTOLIC BLOOD PRESSURE: 126 MMHG

## 2023-01-30 DIAGNOSIS — I25.2 HISTORY OF ST ELEVATION MYOCARDIAL INFARCTION (STEMI): Primary | ICD-10-CM

## 2023-01-30 NOTE — PATIENT INSTRUCTIONS
New Location Address- projected for the month of March 2023    [address] [facility] [address], [place] [address]

## 2023-01-30 NOTE — PROGRESS NOTES
Identified pt with two pt identifiers(name and ). Reviewed record in preparation for visit and have obtained necessary documentation. Claudeen Ferrier presents today for   Chief Complaint   Patient presents with    Follow-up     6 month        Pt denies  DIZZINESS, SOB, CHEST PAIN/ PRESSURE, FATIGUE/WEAKNESS, HEADACHES, SWELLING. Claudeen Ferrier preferred language for health care discussion is english/other. Personal Protective Equipment:   Personal Protective Equipment was used including: mask-surgical and hands-gloves. Patient was placed on no precaution(s). Patient was masked. Precautions:   Patient currently on None  Patient currently roomed with door closed. Is someone accompanying this pt? no    Is the patient using any DME equipment during [address]? no    Depression Screening:  3 most recent PHQ Screens 2023   PHQ Not Done -   Little interest or pleasure in doing things Not at all   Feeling down, depressed, irritable, or hopeless Not at all   Total Score PHQ 2 0       Learning Assessment:  Learning Assessment 2019   PRIMARY LEARNER Patient   HIGHEST LEVEL OF EDUCATION - PRIMARY LEARNER  -   BARRIERS PRIMARY LEARNER -   CO-LEARNER CAREGIVER -   PRIMARY LANGUAGE ENGLISH   LEARNER PREFERENCE PRIMARY LISTENING   ANSWERED BY patient   RELATIONSHIP SELF       Abuse Screening:  Abuse Screening Questionnaire 2020   Do you ever feel afraid of your partner? N   Are you in a relationship with someone who physically or mentally threatens you? N   Is it safe for you to go home? Y       Fall Risk  Fall Risk Assessment, last 12 mths 2020   Able to walk? Yes   Fall in past 12 months? No   Number of falls in past 12 months -   Fall with injury? -       Pt currently taking Anticoagulant therapy? no  Pt currently taking Antiplatelet therapy? yes    Coordination of Care:  1. Have you been to the ER, urgent care clinic since your last visit?  Hospitalized since your last visit? no    2. Have you seen or consulted any other health care providers outside of the [address] [place] [address] since your last visit? Include any pap smears or colon screening. No       Please see Red banners under Allergies and Med Rec to remove outside inquires. All correct information has been verified with patient and added to chart.      Medication's patient's would liked removed has been marked not taking to be removed per Verbal order and read back per Isra Bain MD

## 2023-02-02 DIAGNOSIS — I42.9 CARDIOMYOPATHY, UNSPECIFIED TYPE (HCC): ICD-10-CM

## 2023-02-03 RX ORDER — POTASSIUM CHLORIDE 750 MG/1
TABLET, EXTENDED RELEASE ORAL
Qty: 90 TABLET | Refills: 3 | Status: SHIPPED | OUTPATIENT
Start: 2023-02-03

## 2023-02-05 NOTE — PROGRESS NOTES
Subjective:   Mr. Prasanna Wallace is here today for cardiac reevaluation. He was hospitalized at [facility] in August 2018 with chest pain and an acute MI. His initial electrocardiogram was consistent with acute inferior STEMI. He was taken to cardiac cath lab and was found to have a totally occluded RCA. A RUT was placed. He also had  mild disease in a diagonal branch and in the circumflex coronary artery. He had an admission to [facility] in [facility] 2018 for syncope. At that time, he had a Hemoccult positive stool. He was evaluated by GI and underwent EGD which showed two G-J anastomotic ulcers without high risk stigmata, edges biopsied, gastric erosions, biopsies of gastric pouch for H pylori, slight esophagitis and possible short segment Nunez's. In the office today, he reports that he is doing well. He has had no chest pain or shortness of breath. He has had no palpitations, near-syncope or syncope. He reports his blood pressure is usually controlled when he checks it    Patient Active Problem List    Diagnosis Date Noted    Opioid use, unspecified with unspecified opioid-induced disorder 08/11/2021    Peripheral vascular disease ([facility] 09/13/2019    S/P primary angioplasty with coronary stent 05/11/2019    Pain in right buttock 05/11/2019    History of ST elevation myocardial infarction (STEMI) 12/05/2018    Chronic pain syndrome 12/05/2018    Severe obesity ([facility] 12/05/2018    Post corneal transplant 07/21/2014    Cardiomyopathy ([facility] 07/21/2014    Gastric bypass status for obesity 08/08/2011    HTN (hypertension) 08/08/2011    Hypothyroid 08/08/2011     Current Outpatient Medications   Medication Sig Dispense Refill    potassium chloride (KLOR-CON M10) 10 mEq tablet TAKE ONE TABLET BY MOUTH DAILY 90 Tablet 3    levothyroxine (SYNTHROID) 150 mcg tablet Take 1 Tablet by mouth Daily (before breakfast).  90 Tablet 3    pantoprazole (PROTONIX) 40 mg tablet Take 1 Tablet by mouth two (2) times a day.   180 Tablet 4    [START ON 2/10/2023] oxyCODONE-acetaminophen (PERCOCET) 5-325 mg per tablet Take 1 Tablet by mouth every eight (8) hours as needed for Pain for up to 30 days. Max Daily Amount: 3 Tablets. 60 Tablet 0    nortriptyline (PAMELOR) 25 mg capsule Take 1 Capsule by mouth nightly. 90 Capsule 3    aspirin delayed-release 81 mg tablet TAKE ONE TABLET BY MOUTH DAILY 90 Tablet 2    gabapentin (NEURONTIN) 800 mg tablet TAKE ONE TABLET BY MOUTH FOUR TIMES A DAY AS NEEDED 120 Tablet 5    enalapril (VASOTEC) 20 mg tablet Take 1 Tablet by mouth two (2) times a day. 180 Tablet 3    metoprolol tartrate (LOPRESSOR) 25 mg tablet Take 1 Tablet by mouth two (2) times a day. 180 Tablet 4    furosemide (LASIX) 20 mg tablet Take 1 Tablet by mouth daily. 90 Tablet 3    atorvastatin (LIPITOR) 80 mg tablet TAKE ONE TABLET BY MOUTH DAILY 90 Tablet 4    amLODIPine (NORVASC) 5 mg tablet TAKE ONE TABLET BY MOUTH DAILY 90 Tablet 3    ascorbic acid, vitamin C, (VITAMIN C) 500 mg tablet Take 1 Tablet by mouth two (2) times a day. 60 Tablet 5    cloNIDine HCL (CATAPRES) 0.1 mg tablet Take 1 Tablet by mouth two (2) times a day. 180 Tablet 3    colchicine 0.6 mg tablet TAKE ONE TABLET BY MOUTH DAILY 30 Tablet 11    indomethacin (INDOCIN) 25 mg capsule Take 1 Capsule by mouth three (3) times daily. 30 Capsule 0    promethazine (PHENERGAN) 25 mg tablet Take 1 Tablet by mouth every six (6) hours as needed for Nausea.  20 Tablet 0     No Known Allergies  Past Medical History:   Diagnosis Date    Cardiomyopathy ([address] 7/21/2014    Chronic pain syndrome 12/5/2018    Gastric bypass status for obesity 8/8/2011    Heart failure ([address]     History of ST elevation myocardial infarction (STEMI) 12/5/2018    HTN (hypertension) 8/8/2011    Hypothyroid 8/8/2011    Ill-defined condition     Alcoholism    STEMI (ST elevation myocardial infarction) ([address]     08/2018     Past Surgical History:   Procedure Laterality Date    COLONOSCOPY N/A 10/9/2019 COLONOSCOPY performed by Becca Davila MD at [address] ENDOSCOPY    HX GASTRIC BYPASS  2015    HX LAP GASTRIC BYPASS  2011    [place] UNLISTED PROCEDURE CARDIAC SURGERY  2018    Stent placed Coronary Artery      No family history on file. Social History     Tobacco Use   Smoking Status Never   Smokeless Tobacco Never          Review of Systems, additional:  Constitutional: negative  Eyes: negative  Respiratory: negative for dyspnea on exertion  Cardiovascular:  per HPI  Gastrointestinal: negative  Musculoskeletal:+right buttock pain radiates to right thigh  Neurological: negative  Behvioral/Psych: negative  Endocrine: negative  ENT: negative    Objective:     Visit Vitals  /86   Pulse 72   Wt 132.5 kg (292 lb)   SpO2 98%   BMI 37.49 kg/m²     General:  alert, cooperative, no distress, appears stated age   Chest Wall: inspection normal - no chest wall deformities or tenderness, respiratory effort normal   Lung: clear to auscultation bilaterally   Heart:  normal rate, regular rhythm, normal S1, S2, no murmurs, rubs, clicks or gallops   Abdomen: soft, non-tender. Bowel sounds normal. No masses,  no organomegaly   Extremities: extremities normal, atraumatic, no cyanosis or edema Skin: no rashes   Neuro: alert, oriented, normal speech, no focal findings or movement disorder noted       Assessment/Plan:         ICD-10-CM ICD-9-CM    1. Hypertension, unspecified type-- BP controlled in the office today. I10 401.9 atorvastatin (LIPITOR) 80 mg tablet      metoprolol tartrate (LOPRESSOR) 100 mg IR tablet   2. Cardiomyopathy, unspecified type ([facility]-- EF 45%, advised of salt restriction. Continue with current cardiac regimen. I42.9 425.4 atorvastatin (LIPITOR) 80 mg tablet      metoprolol tartrate (LOPRESSOR) 100 mg IR tablet   3. ST elevation myocardial infarction (STEMI), ([facility]-- S/P RUT to RCA Resolute Integrity 4X22 mm, on 8/13/2018. He is on ASA, enalapril, metoprolol and Atorvastatin.     Return in 9 months I21.3 410.90 atorvastatin (LIPITOR) 80 mg tablet      metoprolol tartrate (LOPRESSOR) 100 mg IR tablet      REFERRAL TO CARDIAC REHAB   4. Hypothyroidism, unspecified type E03.9 244.9 atorvastatin (LIPITOR) 80 mg tablet      metoprolol tartrate (LOPRESSOR) 100 mg IR tablet   5. Lumbar radiculopathy, taking gabapentin and Indocin  6. Dyslipidemia, 1/11/2023. Total cholesterol 102. HDL 55. LDL 25.8. Triglycerides 106. Taking atorvastatin 80 mg daily.

## 2023-03-13 ENCOUNTER — OFFICE VISIT (OUTPATIENT)
Facility: CLINIC | Age: 48
End: 2023-03-13
Payer: MEDICARE

## 2023-03-13 VITALS
HEART RATE: 74 BPM | TEMPERATURE: 98 F | DIASTOLIC BLOOD PRESSURE: 92 MMHG | BODY MASS INDEX: 37.6 KG/M2 | RESPIRATION RATE: 17 BRPM | OXYGEN SATURATION: 98 % | SYSTOLIC BLOOD PRESSURE: 128 MMHG | HEIGHT: 74 IN | WEIGHT: 293 LBS

## 2023-03-13 DIAGNOSIS — M51.36 DDD (DEGENERATIVE DISC DISEASE), LUMBAR: ICD-10-CM

## 2023-03-13 DIAGNOSIS — M17.12 PRIMARY OSTEOARTHRITIS OF LEFT KNEE: ICD-10-CM

## 2023-03-13 DIAGNOSIS — M1A.0710 CHRONIC IDIOPATHIC GOUT INVOLVING TOE OF RIGHT FOOT WITHOUT TOPHUS: ICD-10-CM

## 2023-03-13 DIAGNOSIS — I73.9 PERIPHERAL VASCULAR DISEASE (HCC): Primary | ICD-10-CM

## 2023-03-13 DIAGNOSIS — G89.29 INSOMNIA SECONDARY TO CHRONIC PAIN: ICD-10-CM

## 2023-03-13 DIAGNOSIS — G89.4 CHRONIC PAIN SYNDROME: ICD-10-CM

## 2023-03-13 DIAGNOSIS — I10 PRIMARY HYPERTENSION: ICD-10-CM

## 2023-03-13 DIAGNOSIS — I50.22 CHRONIC SYSTOLIC (CONGESTIVE) HEART FAILURE (HCC): ICD-10-CM

## 2023-03-13 DIAGNOSIS — G47.01 INSOMNIA SECONDARY TO CHRONIC PAIN: ICD-10-CM

## 2023-03-13 PROCEDURE — 3074F SYST BP LT 130 MM HG: CPT | Performed by: FAMILY MEDICINE

## 2023-03-13 PROCEDURE — 3078F DIAST BP <80 MM HG: CPT | Performed by: FAMILY MEDICINE

## 2023-03-13 PROCEDURE — 99214 OFFICE O/P EST MOD 30 MIN: CPT | Performed by: FAMILY MEDICINE

## 2023-03-13 RX ORDER — COLCHICINE 0.6 MG/1
TABLET ORAL
Qty: 90 TABLET | Refills: 3 | Status: SHIPPED | OUTPATIENT
Start: 2023-03-13 | End: 2023-03-17

## 2023-03-13 RX ORDER — OXYCODONE HYDROCHLORIDE AND ACETAMINOPHEN 5; 325 MG/1; MG/1
TABLET ORAL
COMMUNITY
Start: 2023-01-11 | End: 2023-03-13 | Stop reason: SDUPTHER

## 2023-03-13 RX ORDER — OXYCODONE HYDROCHLORIDE AND ACETAMINOPHEN 5; 325 MG/1; MG/1
1 TABLET ORAL EVERY 8 HOURS PRN
Qty: 60 TABLET | Refills: 0 | Status: SHIPPED | OUTPATIENT
Start: 2023-04-12 | End: 2023-05-12

## 2023-03-13 RX ORDER — OXYCODONE HYDROCHLORIDE AND ACETAMINOPHEN 5; 325 MG/1; MG/1
1 TABLET ORAL EVERY 8 HOURS PRN
Qty: 60 TABLET | Refills: 0 | Status: SHIPPED | OUTPATIENT
Start: 2023-03-13 | End: 2023-04-12

## 2023-03-13 RX ORDER — NORTRIPTYLINE HYDROCHLORIDE 25 MG/1
CAPSULE ORAL
COMMUNITY
Start: 2023-01-11 | End: 2023-03-13 | Stop reason: SDUPTHER

## 2023-03-13 RX ORDER — NORTRIPTYLINE HYDROCHLORIDE 25 MG/1
25 CAPSULE ORAL NIGHTLY
Qty: 90 CAPSULE | Refills: 3 | Status: SHIPPED | OUTPATIENT
Start: 2023-03-13

## 2023-03-13 SDOH — ECONOMIC STABILITY: FOOD INSECURITY: WITHIN THE PAST 12 MONTHS, THE FOOD YOU BOUGHT JUST DIDN'T LAST AND YOU DIDN'T HAVE MONEY TO GET MORE.: NEVER TRUE

## 2023-03-13 SDOH — ECONOMIC STABILITY: HOUSING INSECURITY
IN THE LAST 12 MONTHS, WAS THERE A TIME WHEN YOU DID NOT HAVE A STEADY PLACE TO SLEEP OR SLEPT IN A SHELTER (INCLUDING NOW)?: NO

## 2023-03-13 SDOH — ECONOMIC STABILITY: FOOD INSECURITY: WITHIN THE PAST 12 MONTHS, YOU WORRIED THAT YOUR FOOD WOULD RUN OUT BEFORE YOU GOT MONEY TO BUY MORE.: NEVER TRUE

## 2023-03-13 SDOH — ECONOMIC STABILITY: INCOME INSECURITY: HOW HARD IS IT FOR YOU TO PAY FOR THE VERY BASICS LIKE FOOD, HOUSING, MEDICAL CARE, AND HEATING?: NOT HARD AT ALL

## 2023-03-13 ASSESSMENT — ANXIETY QUESTIONNAIRES
GAD7 TOTAL SCORE: 0
7. FEELING AFRAID AS IF SOMETHING AWFUL MIGHT HAPPEN: 0
2. NOT BEING ABLE TO STOP OR CONTROL WORRYING: 0
6. BECOMING EASILY ANNOYED OR IRRITABLE: 0
IF YOU CHECKED OFF ANY PROBLEMS ON THIS QUESTIONNAIRE, HOW DIFFICULT HAVE THESE PROBLEMS MADE IT FOR YOU TO DO YOUR WORK, TAKE CARE OF THINGS AT HOME, OR GET ALONG WITH OTHER PEOPLE: NOT DIFFICULT AT ALL
4. TROUBLE RELAXING: 0
3. WORRYING TOO MUCH ABOUT DIFFERENT THINGS: 0
5. BEING SO RESTLESS THAT IT IS HARD TO SIT STILL: 0
1. FEELING NERVOUS, ANXIOUS, OR ON EDGE: 0

## 2023-03-13 ASSESSMENT — ENCOUNTER SYMPTOMS
COUGH: 0
DIARRHEA: 1
SINUS PRESSURE: 0
BLOOD IN STOOL: 0
EYE PAIN: 0

## 2023-03-13 ASSESSMENT — PATIENT HEALTH QUESTIONNAIRE - PHQ9
2. FEELING DOWN, DEPRESSED OR HOPELESS: 0
SUM OF ALL RESPONSES TO PHQ QUESTIONS 1-9: 0
SUM OF ALL RESPONSES TO PHQ QUESTIONS 1-9: 0
SUM OF ALL RESPONSES TO PHQ9 QUESTIONS 1 & 2: 0
1. LITTLE INTEREST OR PLEASURE IN DOING THINGS: 0
SUM OF ALL RESPONSES TO PHQ QUESTIONS 1-9: 0
SUM OF ALL RESPONSES TO PHQ QUESTIONS 1-9: 0

## 2023-03-13 NOTE — PROGRESS NOTES
Win Murphy is a 49 y.o. presents today for   Chief Complaint   Patient presents with    Medication Refill         Depression Screening:   PHQ-9 Questionaire 3/13/2023 1/30/2023 1/11/2023 10/11/2022 7/11/2022 5/11/2022 5/11/2022   Little interest or pleasure in doing things 0 0 0 0 0 0 0   Feeling down, depressed, or hopeless 0 0 0 0 0 0 0   PHQ-9 Total Score 0 0 0 0 0 0 0       Abuse Screening:  No flowsheet data found. Learning Assessment:  No question data found. Fall Risk:  No flowsheet data found. Coordination of Care:   1. \"Have you been to the ER, urgent care clinic since your last visit? Hospitalized since your last visit? \" no    2. \"Have you seen or consulted any other health care providers outside of the [address] [place] [address] since your last visit? \" no    3. For patients aged 39-70: Has the patient had a colonoscopy / FIT/ Cologuard? yes    If the patient is female:    4. For patients aged 41-77: Has the patient had a mammogram within the past 2 years? no    5. For patients aged 21-65: Has the patient had a pap smear? no    Health Maintenance: reviewed and discussed and ordered per Provider.     Health Maintenance Due   Topic Date Due    HIV screen  Never done    Pneumococcal 0-64 years Vaccine (2 - PCV) 09/13/2020
Daily Amount: 3 tablets  Dispense: 60 tablet; Refill: 0    5. Primary osteoarthritis of left knee    - oxyCODONE-acetaminophen (PERCOCET) 5-325 MG per tablet; Take 1 tablet by mouth every 8 hours as needed for Pain for up to 30 days. Max Daily Amount: 3 tablets  Dispense: 60 tablet; Refill: 0  - oxyCODONE-acetaminophen (PERCOCET) 5-325 MG per tablet; Take 1 tablet by mouth every 8 hours as needed for Pain for up to 30 days. Max Daily Amount: 3 tablets  Dispense: 60 tablet; Refill: 0    6. Insomnia secondary to chronic pain  Continue TCA for sleep aid  - nortriptyline (PAMELOR) 25 MG capsule; Take 1 capsule by mouth nightly  Dispense: 90 capsule; Refill: 3    7. Chronic idiopathic gout involving toe of right foot without tophus    - nortriptyline (PAMELOR) 25 MG capsule; Take 1 capsule by mouth nightly  Dispense: 90 capsule; Refill: 3    8.  Chronic systolic (congestive) heart failure  Follow up with cardiology; continue current management

## 2023-03-17 DIAGNOSIS — M1A.0710 CHRONIC IDIOPATHIC GOUT INVOLVING TOE OF RIGHT FOOT WITHOUT TOPHUS: ICD-10-CM

## 2023-03-17 RX ORDER — COLCHICINE 0.6 MG/1
TABLET ORAL
Qty: 30 TABLET | Refills: 5 | Status: SHIPPED | OUTPATIENT
Start: 2023-03-17

## 2023-05-11 ENCOUNTER — OFFICE VISIT (OUTPATIENT)
Facility: CLINIC | Age: 48
End: 2023-05-11
Payer: MEDICARE

## 2023-05-11 VITALS
BODY MASS INDEX: 38.12 KG/M2 | SYSTOLIC BLOOD PRESSURE: 132 MMHG | DIASTOLIC BLOOD PRESSURE: 88 MMHG | WEIGHT: 297 LBS | RESPIRATION RATE: 16 BRPM | TEMPERATURE: 97.9 F | OXYGEN SATURATION: 100 % | HEART RATE: 78 BPM | HEIGHT: 74 IN

## 2023-05-11 DIAGNOSIS — G62.9 POLYNEUROPATHY, UNSPECIFIED: Primary | ICD-10-CM

## 2023-05-11 DIAGNOSIS — I50.22 CHRONIC SYSTOLIC (CONGESTIVE) HEART FAILURE (HCC): ICD-10-CM

## 2023-05-11 DIAGNOSIS — E66.01 MORBID (SEVERE) OBESITY DUE TO EXCESS CALORIES (HCC): ICD-10-CM

## 2023-05-11 DIAGNOSIS — M17.12 PRIMARY OSTEOARTHRITIS OF LEFT KNEE: ICD-10-CM

## 2023-05-11 DIAGNOSIS — I10 PRIMARY HYPERTENSION: ICD-10-CM

## 2023-05-11 DIAGNOSIS — M51.36 DDD (DEGENERATIVE DISC DISEASE), LUMBAR: ICD-10-CM

## 2023-05-11 DIAGNOSIS — G89.4 CHRONIC PAIN SYNDROME: ICD-10-CM

## 2023-05-11 PROCEDURE — 99214 OFFICE O/P EST MOD 30 MIN: CPT | Performed by: FAMILY MEDICINE

## 2023-05-11 PROCEDURE — 3075F SYST BP GE 130 - 139MM HG: CPT | Performed by: FAMILY MEDICINE

## 2023-05-11 PROCEDURE — 3079F DIAST BP 80-89 MM HG: CPT | Performed by: FAMILY MEDICINE

## 2023-05-11 RX ORDER — TIRZEPATIDE 2.5 MG/.5ML
2.5 INJECTION, SOLUTION SUBCUTANEOUS WEEKLY
Qty: 2 ML | Refills: 1 | Status: SHIPPED | OUTPATIENT
Start: 2023-05-11

## 2023-05-11 RX ORDER — OXYCODONE HYDROCHLORIDE AND ACETAMINOPHEN 5; 325 MG/1; MG/1
1 TABLET ORAL EVERY 8 HOURS PRN
Qty: 60 TABLET | Refills: 0 | Status: SHIPPED | OUTPATIENT
Start: 2023-05-11 | End: 2023-06-10

## 2023-05-11 RX ORDER — POTASSIUM CHLORIDE 750 MG/1
10 TABLET, EXTENDED RELEASE ORAL DAILY
Qty: 90 TABLET | Refills: 3 | Status: SHIPPED | OUTPATIENT
Start: 2023-05-11

## 2023-05-11 SDOH — ECONOMIC STABILITY: INCOME INSECURITY: HOW HARD IS IT FOR YOU TO PAY FOR THE VERY BASICS LIKE FOOD, HOUSING, MEDICAL CARE, AND HEATING?: NOT HARD AT ALL

## 2023-05-11 SDOH — ECONOMIC STABILITY: FOOD INSECURITY: WITHIN THE PAST 12 MONTHS, YOU WORRIED THAT YOUR FOOD WOULD RUN OUT BEFORE YOU GOT MONEY TO BUY MORE.: NEVER TRUE

## 2023-05-11 SDOH — ECONOMIC STABILITY: FOOD INSECURITY: WITHIN THE PAST 12 MONTHS, THE FOOD YOU BOUGHT JUST DIDN'T LAST AND YOU DIDN'T HAVE MONEY TO GET MORE.: NEVER TRUE

## 2023-05-11 ASSESSMENT — PATIENT HEALTH QUESTIONNAIRE - PHQ9
2. FEELING DOWN, DEPRESSED OR HOPELESS: 0
SUM OF ALL RESPONSES TO PHQ9 QUESTIONS 1 & 2: 0
1. LITTLE INTEREST OR PLEASURE IN DOING THINGS: 0
SUM OF ALL RESPONSES TO PHQ QUESTIONS 1-9: 0

## 2023-05-11 NOTE — PROGRESS NOTES
Jeffrey Rdz presents today for   Chief Complaint   Patient presents with    Chronic Pain     Med refills    Anxiety    Thyroid Problem       Is someone accompanying this pt? No    Is the patient using any DME equipment during OV? No    Depression Screening:  No flowsheet data found. Learning Assessment:  No flowsheet data found. Abuse Screening:  No flowsheet data found. Fall Risk  No flowsheet data found. Health Maintenance reviewed and discussed and ordered per Provider. Health Maintenance Due   Topic Date Due    HIV screen  Never done    Pneumococcal 0-64 years Vaccine (2 - PCV) 09/13/2020   .        1. \"Have you been to the ER, urgent care clinic since your last visit? Hospitalized since your last visit? \" No    2. \"Have you seen or consulted any other health care providers outside of the [address] [place] [address] since your last visit? \" No    3. For patients over 45: Has the patient had a colonoscopy? NA - based on age or sex     If the patient is female:    3. For patients over 36: Has the patient had a mammogram? NA - based on age or sex    11. For patients over 21: Has the patient had a pap smear?  NA - based on age or sex

## 2023-05-11 NOTE — PROGRESS NOTES
Tres Daly is a 49 y.o.  male and presents with    Chief Complaint   Patient presents with    Chronic Pain     Med refills    Anxiety    Thyroid Problem     Subjective:  Mr. Trevor Britton is following up for chronic pain; he is interesting lowing weight due to chronic medical conditions including heart failure. Osteoarthritis and Chronic Pain:  He has neuropathy and orthopedic, his pain has been controlled with gabapentin. Symptoms onset: problem is longstanding. Rheumatological ROS: ongoing significant pain in legs and feet and hip which is stable and controlled by PRN meds. Response to treatment plan: gradually worsening. Cardiovascular Review:  The patient has hypertension, hyperlipidemia, CHF and obesity. Diet and Lifestyle: not attempting to follow a low fat, low cholesterol diet, not attempting to follow a low sodium diet, does not rigorously follow a diabetic diet, sedentary, nonsmoker  Home BP Monitoring: is not measured at home. Pertinent ROS: taking medications as instructed, no medication side effects noted, no TIA's, no chest pain on exertion, no dyspnea on exertion, no swelling of ankles. Anxiety Review:  Patient is seen for sleep disturbance. Current treatment of belsomra is not effective along with individual therapy. Ongoing symptoms include: insomnia. Patient denies: palpitations, sweating, chest pain, shortness of breath, racing thoughts, psychomotor agitation, feelings of losing control, difficulty concentrating, suicidal ideation. Reported side effects from the treatment: none. Thyroid Review:  Patient is seen for followup of hypothyroidism. Thyroid ROS: denies cold intolerance fatigue, weight changes, bowel/skin changes or CVS symptoms.     ROS   General ROS: negative for - chills or fever or weight changes  Psychological ROS: negative for - anxiety or depression; + insomnia with racing thoughts and Jimenez Balo is not effective  Ophthalmic ROS: negative

## 2023-06-19 ENCOUNTER — OFFICE VISIT (OUTPATIENT)
Facility: CLINIC | Age: 48
End: 2023-06-19
Payer: MEDICARE

## 2023-06-19 VITALS
RESPIRATION RATE: 17 BRPM | HEART RATE: 112 BPM | DIASTOLIC BLOOD PRESSURE: 86 MMHG | HEIGHT: 74 IN | TEMPERATURE: 97 F | OXYGEN SATURATION: 98 % | SYSTOLIC BLOOD PRESSURE: 122 MMHG | BODY MASS INDEX: 36.81 KG/M2 | WEIGHT: 286.8 LBS

## 2023-06-19 DIAGNOSIS — M51.36 DDD (DEGENERATIVE DISC DISEASE), LUMBAR: ICD-10-CM

## 2023-06-19 DIAGNOSIS — G62.9 POLYNEUROPATHY, UNSPECIFIED: ICD-10-CM

## 2023-06-19 DIAGNOSIS — I10 PRIMARY HYPERTENSION: ICD-10-CM

## 2023-06-19 DIAGNOSIS — E66.01 SEVERE OBESITY (HCC): ICD-10-CM

## 2023-06-19 DIAGNOSIS — I50.22 CHRONIC SYSTOLIC (CONGESTIVE) HEART FAILURE (HCC): ICD-10-CM

## 2023-06-19 DIAGNOSIS — E66.01 MORBID (SEVERE) OBESITY DUE TO EXCESS CALORIES (HCC): ICD-10-CM

## 2023-06-19 DIAGNOSIS — Z09 HOSPITAL DISCHARGE FOLLOW-UP: ICD-10-CM

## 2023-06-19 DIAGNOSIS — S01.111A: Primary | ICD-10-CM

## 2023-06-19 DIAGNOSIS — G89.4 CHRONIC PAIN SYNDROME: ICD-10-CM

## 2023-06-19 PROCEDURE — 1111F DSCHRG MED/CURRENT MED MERGE: CPT | Performed by: FAMILY MEDICINE

## 2023-06-19 PROCEDURE — 99214 OFFICE O/P EST MOD 30 MIN: CPT | Performed by: FAMILY MEDICINE

## 2023-06-19 PROCEDURE — 3074F SYST BP LT 130 MM HG: CPT | Performed by: FAMILY MEDICINE

## 2023-06-19 PROCEDURE — 3079F DIAST BP 80-89 MM HG: CPT | Performed by: FAMILY MEDICINE

## 2023-06-19 RX ORDER — TIRZEPATIDE 5 MG/.5ML
5 INJECTION, SOLUTION SUBCUTANEOUS WEEKLY
Qty: 2 ML | Refills: 5 | Status: SHIPPED | OUTPATIENT
Start: 2023-06-19

## 2023-06-19 RX ORDER — METOPROLOL TARTRATE 100 MG/1
TABLET ORAL
COMMUNITY
Start: 2023-06-12

## 2023-06-19 RX ORDER — LEVOTHYROXINE SODIUM 0.2 MG/1
TABLET ORAL
COMMUNITY
Start: 2023-06-12

## 2023-06-19 RX ORDER — FUROSEMIDE 20 MG/1
20 TABLET ORAL DAILY
Qty: 90 TABLET | Refills: 3 | Status: SHIPPED | OUTPATIENT
Start: 2023-06-19

## 2023-06-19 NOTE — PROGRESS NOTES
Juan Mars is a 49 y.o.  male and presents with    Chief Complaint   Patient presents with    Follow-Up from Hospital    Medication Refill       6/9/2023 admitted to [facility], operated by [facility] for canalicular laceration  7/41/2296 discharged to home    Subjective:  Mr. Eli Reis is here for follow up after hospitalization for syncope and right eye laceration; he had stint placed in right tear duct to allow for repair. He is doing well. He denies post op pain. He had medications adjusted while in patient. Osteoarthritis and Chronic Pain:  He has neuropathy and orthopedic, his pain has been controlled with gabapentin. Symptoms onset: problem is longstanding. Rheumatological ROS: ongoing significant pain in legs and feet and hip which is stable and controlled by PRN meds. Response to treatment plan: gradually worsening. Cardiovascular Review:  The patient has hypertension, hyperlipidemia, CHF and obesity. Diet and Lifestyle: not attempting to follow a low fat, low cholesterol diet, not attempting to follow a low sodium diet, does not rigorously follow a diabetic diet, sedentary, nonsmoker  Home BP Monitoring: is not measured at home. Pertinent ROS: taking medications as instructed, no medication side effects noted, no TIA's, no chest pain on exertion, no dyspnea on exertion, no swelling of ankles. Anxiety Review:  Patient is seen for sleep disturbance. Current treatment of belsomra is not effective along with individual therapy. Ongoing symptoms include: insomnia. Patient denies: palpitations, sweating, chest pain, shortness of breath, racing thoughts, psychomotor agitation, feelings of losing control, difficulty concentrating, suicidal ideation. Reported side effects from the treatment: none. Thyroid Review:  Patient is seen for followup of hypothyroidism. Thyroid ROS: denies cold intolerance fatigue, weight changes, bowel/skin changes or CVS symptoms.      ROS

## 2023-06-19 NOTE — PROGRESS NOTES
Parks Koyanagi is a 49 y.o. presents today for No chief complaint on file. Depression Screening:   PHQ-9 Questionaire 5/11/2023 3/13/2023 1/30/2023 1/11/2023 10/11/2022 7/11/2022 5/11/2022   Little interest or pleasure in doing things 0 0 0 0 0 0 0   Feeling down, depressed, or hopeless 0 0 0 0 0 0 0   PHQ-9 Total Score 0 0 0 0 0 0 0       Abuse Screening: AMB Abuse Screening 3/13/2023   Do you ever feel afraid of your partner? N   Are you in a relationship with someone who physically or mentally threatens you? N   Is it safe for you to go home? Y       Learning Assessment:  No question data found. Fall Risk:  Fall Risk 3/13/2023   2 or more falls in past year? no   Fall with injury in past year? no           Coordination of Care:   1. \"Have you been to the ER, urgent care clinic since your last visit? Hospitalized since your last visit? \" NO    2. \"Have you seen or consulted any other health care providers outside of the [address] [place] [address] since your last visit? \" NO    3. For patients aged 39-70: Has the patient had a colonoscopy / FIT/ Cologuard? YES    If the patient is female:    4. For patients aged 41-77: Has the patient had a mammogram within the past 2 years? NO    5. For patients aged 21-65: Has the patient had a pap smear? NO    Health Maintenance: reviewed and discussed and ordered per Provider.     Health Maintenance Due   Topic Date Due    HIV screen  Never done    Pneumococcal 0-64 years Vaccine (2 - PCV) 09/13/2020

## 2023-07-25 NOTE — TELEPHONE ENCOUNTER
PLEASE FORWARD TO PCP WITH MEDICATIONS ATTACHED TO MESSAGE. This patient contacted the office for the following prescriptions to be refilled:    Medication requested :     DrugName: metoprolol  Dosage- 100mg 2xday    2. DrugName: levothyroxine  Dosage- 200mcg      Pharmacy: [facility] [id number] - [id number], [address] [place] [address] [phone] - F [phone]    PCP: Jose Luis Leblanc MD  LOV: 06/19/2023   NOV DMA: 9/19/2023  FUTURE APPT:   Future Appointments   Date Time Provider [address]   9/19/2023 10:45 AM Jose Luis Leblanc MD [facility] BS AMB   10/30/2023  2:00 PM Alma Rosa Huizar MD [facility] BS AMB         Thank you.

## 2023-07-26 RX ORDER — METOPROLOL TARTRATE 100 MG/1
100 TABLET ORAL 2 TIMES DAILY
Qty: 180 TABLET | Refills: 3 | Status: SHIPPED | OUTPATIENT
Start: 2023-07-26

## 2023-07-26 RX ORDER — LEVOTHYROXINE SODIUM 0.2 MG/1
200 TABLET ORAL DAILY
Qty: 90 TABLET | Refills: 3 | Status: SHIPPED | OUTPATIENT
Start: 2023-07-26

## 2023-09-06 RX ORDER — AMLODIPINE BESYLATE 5 MG/1
TABLET ORAL
Qty: 90 TABLET | Refills: 3 | Status: SHIPPED | OUTPATIENT
Start: 2023-09-06

## 2023-09-19 ENCOUNTER — OFFICE VISIT (OUTPATIENT)
Facility: CLINIC | Age: 48
End: 2023-09-19
Payer: MEDICARE

## 2023-09-19 VITALS
HEIGHT: 74 IN | SYSTOLIC BLOOD PRESSURE: 126 MMHG | WEIGHT: 274.4 LBS | RESPIRATION RATE: 20 BRPM | TEMPERATURE: 97.6 F | OXYGEN SATURATION: 96 % | DIASTOLIC BLOOD PRESSURE: 92 MMHG | HEART RATE: 74 BPM | BODY MASS INDEX: 35.22 KG/M2

## 2023-09-19 DIAGNOSIS — G62.9 POLYNEUROPATHY, UNSPECIFIED: ICD-10-CM

## 2023-09-19 DIAGNOSIS — F51.04 PSYCHOPHYSIOLOGIC INSOMNIA: ICD-10-CM

## 2023-09-19 DIAGNOSIS — L03.116 LEFT LEG CELLULITIS: Primary | ICD-10-CM

## 2023-09-19 DIAGNOSIS — M51.36 DDD (DEGENERATIVE DISC DISEASE), LUMBAR: ICD-10-CM

## 2023-09-19 DIAGNOSIS — Z23 NEEDS FLU SHOT: ICD-10-CM

## 2023-09-19 DIAGNOSIS — I10 PRIMARY HYPERTENSION: ICD-10-CM

## 2023-09-19 DIAGNOSIS — G89.4 CHRONIC PAIN SYNDROME: ICD-10-CM

## 2023-09-19 PROCEDURE — 3074F SYST BP LT 130 MM HG: CPT | Performed by: FAMILY MEDICINE

## 2023-09-19 PROCEDURE — G0008 ADMIN INFLUENZA VIRUS VAC: HCPCS | Performed by: FAMILY MEDICINE

## 2023-09-19 PROCEDURE — 99214 OFFICE O/P EST MOD 30 MIN: CPT | Performed by: FAMILY MEDICINE

## 2023-09-19 PROCEDURE — 90674 CCIIV4 VAC NO PRSV 0.5 ML IM: CPT | Performed by: FAMILY MEDICINE

## 2023-09-19 PROCEDURE — 3078F DIAST BP <80 MM HG: CPT | Performed by: FAMILY MEDICINE

## 2023-09-19 RX ORDER — CEPHALEXIN 500 MG/1
CAPSULE ORAL
COMMUNITY
Start: 2023-09-12

## 2023-09-19 RX ORDER — ZOLPIDEM TARTRATE 5 MG/1
5 TABLET ORAL NIGHTLY PRN
Qty: 30 TABLET | Refills: 5 | Status: SHIPPED | OUTPATIENT
Start: 2023-09-19 | End: 2024-03-17

## 2023-09-19 RX ORDER — CLONIDINE HYDROCHLORIDE 0.1 MG/1
TABLET ORAL
COMMUNITY
Start: 2023-07-12

## 2023-09-19 NOTE — PROGRESS NOTES
Chely Eric is a 49 y.o.  male and presents with    Chief Complaint   Patient presents with    Other     MED EVAL/ ED F/U CELLULITIS LLE 9/11/2023  COMPLETED ABT        Subjective:  Mr. Darwin Mariee presents for follow up after ER visit; he had insect bite after being outside and developed cellulitis. He has completed course of antibiotics;  he has pain on the outside of the lower leg. He was discharged to home without oxycodone; he has been using gabapentin for pain with some relief. 3 months ago he had laceration to eye and had canalicular stent placed. Osteoarthritis and Chronic Pain:  He has neuropathy and orthopedic, his pain has been controlled with gabapentin. Symptoms onset: problem is longstanding. Rheumatological ROS: ongoing significant pain in legs and feet and hip which is stable and controlled by PRN meds. Response to treatment plan: gradually worsening. Cardiovascular Review:  The patient has hypertension, hyperlipidemia, CHF and obesity. Diet and Lifestyle: not attempting to follow a low fat, low cholesterol diet, not attempting to follow a low sodium diet, does not rigorously follow a diabetic diet, sedentary, nonsmoker  Home BP Monitoring: is not measured at home. Pertinent ROS: taking medications as instructed, no medication side effects noted, no TIA's, no chest pain on exertion, no dyspnea on exertion, no swelling of ankles. Anxiety Review:  Patient is seen for sleep disturbance. Current treatment of belsomra is not effective along with individual therapy. Ongoing symptoms include: insomnia. Patient denies: palpitations, sweating, chest pain, shortness of breath, racing thoughts, psychomotor agitation, feelings of losing control, difficulty concentrating, suicidal ideation. Reported side effects from the treatment: none. Thyroid Review:  Patient is seen for followup of hypothyroidism.    Thyroid ROS: denies cold intolerance fatigue, weight

## 2023-09-19 NOTE — PROGRESS NOTES
Angel Luis Olmedo is a 49 y.o. presents today for No chief complaint on file. Is someone accompanying this pt? NO    Is the patient using any DME equipment during OV? NO    Depression Screenin/11/2023    10:24 AM 3/13/2023    11:28 AM 2023     1:59 PM 2023    10:25 AM 10/11/2022    10:27 AM 2022    10:22 AM 2022     2:54 PM   PHQ-9 Questionaire   Little interest or pleasure in doing things 0 0 0 0 0 0 0   Feeling down, depressed, or hopeless 0 0 0 0 0 0 0   PHQ-9 Total Score 0 0 0 0 0 0 0       Abuse Screening:      3/13/2023    11:00 AM   AMB Abuse Screening   Do you ever feel afraid of your partner? N   Are you in a relationship with someone who physically or mentally threatens you? N   Is it safe for you to go home? Y       Learning Assessment:  No question data found. Fall Risk:      3/13/2023    11:30 AM   Fall Risk   2 or more falls in past year? no   Fall with injury in past year? no           Coordination of Care:   1. \"Have you been to the ER, urgent care clinic since your last visit? Hospitalized since your last visit? \" [facility] ED FOR CELLULITIS LLE     2. \"Have you seen or consulted any other health care providers outside of the [address] [place] [address] since your last visit? \" NO    3. For patients aged 43-73: Has the patient had a colonoscopy / FIT/ Cologuard? YES    If the patient is female:    4. For patients aged 43-66: Has the patient had a mammogram within the past 2 years? NO    5. For patients aged 21-65: Has the patient had a pap smear? NO    Health Maintenance: reviewed and discussed and ordered per Provider.     Health Maintenance Due   Topic Date Due    Hepatitis B vaccine (1 of 3 - 3-dose series) Never done    HIV screen  Never done    Pneumococcal 0-64 years Vaccine (2 - PCV) 2020    Flu vaccine (1) 2023    Diabetes screen  2023        [place]ittaney [place]  [address] [facility] [place]  Phone: [phone]  Fax:

## 2023-10-04 DIAGNOSIS — L03.116 LEFT LEG CELLULITIS: ICD-10-CM

## 2023-10-04 RX ORDER — CEPHALEXIN 500 MG/1
500 CAPSULE ORAL 2 TIMES DAILY
Qty: 60 CAPSULE | Refills: 0 | Status: SHIPPED | OUTPATIENT
Start: 2023-10-04

## 2023-10-09 RX ORDER — ASPIRIN 81 MG/1
81 TABLET, COATED ORAL DAILY
Qty: 90 TABLET | Refills: 1 | Status: SHIPPED | OUTPATIENT
Start: 2023-10-09

## 2023-10-09 NOTE — TELEPHONE ENCOUNTER
Medication requested :   Requested Prescriptions     Pending Prescriptions Disp Refills    ASPIRIN LOW DOSE 81 MG EC tablet [Pharmacy Med Name: ASPIRIN EC 81 MG TABLET] 90 tablet 1     Sig: TAKE ONE TABLET BY MOUTH DAILY      PCP: Ligia King MD  LOV:           9/19/2023   NOV DMA: 10/10/2023  FUTURE APPT:   Future Appointments   Date Time Provider [address] [place] [address]   10/10/2023 10:45 AM Ligia Knig MD DMA BS AMB   10/30/2023  2:00 PM Cooper Hensley MD [facility] BS AMB       Thank you.

## 2023-10-10 ENCOUNTER — OFFICE VISIT (OUTPATIENT)
Facility: CLINIC | Age: 48
End: 2023-10-10
Payer: MEDICARE

## 2023-10-10 VITALS
HEART RATE: 74 BPM | SYSTOLIC BLOOD PRESSURE: 119 MMHG | BODY MASS INDEX: 34.52 KG/M2 | RESPIRATION RATE: 16 BRPM | OXYGEN SATURATION: 98 % | HEIGHT: 74 IN | DIASTOLIC BLOOD PRESSURE: 85 MMHG | WEIGHT: 269 LBS | TEMPERATURE: 97.3 F

## 2023-10-10 DIAGNOSIS — G62.9 POLYNEUROPATHY, UNSPECIFIED: ICD-10-CM

## 2023-10-10 DIAGNOSIS — I10 PRIMARY HYPERTENSION: ICD-10-CM

## 2023-10-10 DIAGNOSIS — M17.12 PRIMARY OSTEOARTHRITIS OF LEFT KNEE: ICD-10-CM

## 2023-10-10 DIAGNOSIS — G89.4 CHRONIC PAIN SYNDROME: ICD-10-CM

## 2023-10-10 DIAGNOSIS — M51.36 DDD (DEGENERATIVE DISC DISEASE), LUMBAR: ICD-10-CM

## 2023-10-10 DIAGNOSIS — L03.116 LEFT LEG CELLULITIS: Primary | ICD-10-CM

## 2023-10-10 PROCEDURE — 3078F DIAST BP <80 MM HG: CPT | Performed by: FAMILY MEDICINE

## 2023-10-10 PROCEDURE — 3074F SYST BP LT 130 MM HG: CPT | Performed by: FAMILY MEDICINE

## 2023-10-10 PROCEDURE — 99214 OFFICE O/P EST MOD 30 MIN: CPT | Performed by: FAMILY MEDICINE

## 2023-10-10 RX ORDER — OXYCODONE HYDROCHLORIDE AND ACETAMINOPHEN 5; 325 MG/1; MG/1
1 TABLET ORAL EVERY 8 HOURS PRN
Qty: 30 TABLET | Refills: 0 | Status: SHIPPED | OUTPATIENT
Start: 2023-10-10 | End: 2023-11-09

## 2023-10-10 RX ORDER — GABAPENTIN 800 MG/1
TABLET ORAL
Qty: 120 TABLET | Refills: 5 | Status: SHIPPED | OUTPATIENT
Start: 2023-10-10 | End: 2024-04-10

## 2023-10-23 RX ORDER — ENALAPRIL MALEATE 20 MG/1
20 TABLET ORAL 2 TIMES DAILY
Qty: 120 TABLET | Refills: 3 | Status: SHIPPED | OUTPATIENT
Start: 2023-10-23

## 2023-10-23 NOTE — TELEPHONE ENCOUNTER
Requested Prescriptions     Pending Prescriptions Disp Refills    enalapril (VASOTEC) 20 MG tablet [Pharmacy Med Name: ENALAPRIL MALEATE 20 MG TAB] 120 tablet      Sig: TAKE ONE TABLET BY MOUTH TWICE A DAY     Last seen 10/10  Next appnt 11/10

## 2023-11-10 ENCOUNTER — OFFICE VISIT (OUTPATIENT)
Facility: CLINIC | Age: 48
End: 2023-11-10
Payer: MEDICARE

## 2023-11-10 VITALS
TEMPERATURE: 98.1 F | HEIGHT: 74 IN | OXYGEN SATURATION: 98 % | WEIGHT: 266 LBS | SYSTOLIC BLOOD PRESSURE: 125 MMHG | BODY MASS INDEX: 34.14 KG/M2 | RESPIRATION RATE: 20 BRPM | DIASTOLIC BLOOD PRESSURE: 86 MMHG | HEART RATE: 76 BPM

## 2023-11-10 DIAGNOSIS — M17.12 PRIMARY OSTEOARTHRITIS OF LEFT KNEE: ICD-10-CM

## 2023-11-10 DIAGNOSIS — G89.4 CHRONIC PAIN SYNDROME: ICD-10-CM

## 2023-11-10 DIAGNOSIS — G62.9 POLYNEUROPATHY, UNSPECIFIED: ICD-10-CM

## 2023-11-10 DIAGNOSIS — I25.2 HISTORY OF ST ELEVATION MYOCARDIAL INFARCTION (STEMI): Primary | ICD-10-CM

## 2023-11-10 DIAGNOSIS — M51.36 DDD (DEGENERATIVE DISC DISEASE), LUMBAR: ICD-10-CM

## 2023-11-10 PROCEDURE — 99214 OFFICE O/P EST MOD 30 MIN: CPT | Performed by: FAMILY MEDICINE

## 2023-11-10 PROCEDURE — 3074F SYST BP LT 130 MM HG: CPT | Performed by: FAMILY MEDICINE

## 2023-11-10 PROCEDURE — 3078F DIAST BP <80 MM HG: CPT | Performed by: FAMILY MEDICINE

## 2023-11-10 RX ORDER — ATORVASTATIN CALCIUM 80 MG/1
80 TABLET, FILM COATED ORAL DAILY
Qty: 90 TABLET | Refills: 3 | Status: SHIPPED | OUTPATIENT
Start: 2023-11-10

## 2023-11-10 RX ORDER — OXYCODONE HYDROCHLORIDE AND ACETAMINOPHEN 5; 325 MG/1; MG/1
1 TABLET ORAL EVERY 8 HOURS PRN
Qty: 60 TABLET | Refills: 0 | Status: SHIPPED | OUTPATIENT
Start: 2023-11-10 | End: 2023-12-10

## 2023-11-10 RX ORDER — OXYCODONE HYDROCHLORIDE AND ACETAMINOPHEN 5; 325 MG/1; MG/1
1 TABLET ORAL EVERY 8 HOURS PRN
Qty: 30 TABLET | Refills: 0 | Status: SHIPPED | OUTPATIENT
Start: 2023-12-10 | End: 2024-01-09

## 2023-11-10 ASSESSMENT — ENCOUNTER SYMPTOMS
SHORTNESS OF BREATH: 0
BLURRED VISION: 0
DIARRHEA: 0
CONSTIPATION: 0
NAUSEA: 0
ABDOMINAL PAIN: 0
VOMITING: 0
COUGH: 0

## 2023-11-10 NOTE — PROGRESS NOTES
Noe Ponce is a 51 y.o. presents today for   Chief Complaint   Patient presents with    Follow-up    Medication Check       Is someone accompanying this pt? no    Is the patient using any DME equipment during OV? no    Depression Screenin/11/2023    10:24 AM 3/13/2023    11:28 AM 2023     1:59 PM 2023    10:25 AM 10/11/2022    10:27 AM 2022    10:22 AM 2022     2:54 PM   PHQ-9 Questionaire   Little interest or pleasure in doing things 0 0 0 0 0 0 0   Feeling down, depressed, or hopeless 0 0 0 0 0 0 0   PHQ-9 Total Score 0 0 0 0 0 0 0       Abuse Screening:      3/13/2023    11:00 AM   AMB Abuse Screening   Do you ever feel afraid of your partner? N   Are you in a relationship with someone who physically or mentally threatens you? N   Is it safe for you to go home? Y       Learning Assessment:  No question data found. Fall Risk:      3/13/2023    11:30 AM   Fall Risk   2 or more falls in past year? no   Fall with injury in past year? no           Coordination of Care:   1. \"Have you been to the ER, urgent care clinic since your last visit? Hospitalized since your last visit? \" NO    2. \"Have you seen or consulted any other health care providers outside of the [address] [place] [address] since your last visit? \" NO    3. For patients aged 43-73: Has the patient had a colonoscopy / FIT/ Cologuard? YES    If the patient is female:    4. For patients aged 43-66: Has the patient had a mammogram within the past 2 years? NO    5. For patients aged 21-65: Has the patient had a pap smear? NO    Health Maintenance: reviewed and discussed and ordered per Provider.     Health Maintenance Due   Topic Date Due    Hepatitis B vaccine (1 of 3 - 3-dose series) Never done    HIV screen  Never done    Pneumococcal 0-64 years Vaccine (2 - PCV) 2020    Diabetes screen  2023    COVID-19 Vaccine (2023- season) 2023        [address] [facility] [place]

## 2023-11-10 NOTE — PROGRESS NOTES
Isrrael Archuleta  48 y.o.male  11/10/23  11:21 AM      HPI:  Mr. Aziza Montiel is here for a medication evaluation and refill of his pain medications. He has polyneuropathy and chronic pain syndrome for which he takes gabapentin and percocet. He ran out of percocet 2 days ago. When he takes both medications his pain is at a 4/10. Now that he only has gabapentin his pain is at an 8/10.      PMH:  Past Medical History:   Diagnosis Date    Cardiomyopathy ([address]) 7/21/2014    Chronic pain syndrome 12/5/2018    Gastric bypass status for obesity 8/8/2011    Heart failure ([address])     History of ST elevation myocardial infarction (STEMI) 12/5/2018    HTN (hypertension) 8/8/2011    Hypothyroid 8/8/2011    Ill-defined condition     Alcoholism    STEMI (ST elevation myocardial infarction) ([address])     08/2018           MEDS:  Current Outpatient Medications   Medication Sig Dispense Refill    gabapentin (NEURONTIN) 800 MG tablet TAKE ONE TABLET BY MOUTH FOUR TIMES A DAY AS NEEDED 120 tablet 5    ASPIRIN LOW DOSE 81 MG EC tablet TAKE ONE TABLET BY MOUTH DAILY 90 tablet 1    amLODIPine (NORVASC) 5 MG tablet TAKE ONE TABLET BY MOUTH DAILY 90 tablet 3    levothyroxine (SYNTHROID) 200 MCG tablet Take 1 tablet by mouth Daily 90 tablet 3    metoprolol (LOPRESSOR) 100 MG tablet Take 1 tablet by mouth 2 times daily 180 tablet 3    Tirzepatide (MOUNJARO) 5 MG/0.5ML SOPN SC injection Inject 0.5 mLs into the skin once a week 2 mL 5    furosemide (LASIX) 20 MG tablet Take 1 tablet by mouth daily 90 tablet 3    potassium chloride (KLOR-CON M) 10 MEQ extended release tablet Take 1 tablet by mouth daily 90 tablet 3    colchicine (COLCRYS) 0.6 MG tablet TAKE ONE TABLET BY MOUTH DAILY 30 tablet 5    atorvastatin (LIPITOR) 80 MG tablet TAKE ONE TABLET BY MOUTH DAILY      indomethacin (INDOCIN) 25 MG capsule Take 1 capsule by mouth 3 times daily      pantoprazole (PROTONIX) 40 MG tablet TAKE ONE TABLET BY MOUTH TWICE A DAY      enalapril (VASOTEC) 20 MG

## 2023-11-15 ENCOUNTER — TELEPHONE (OUTPATIENT)
Dept: PHARMACY | Facility: CLINIC | Age: 48
End: 2023-11-15

## 2023-11-15 NOTE — TELEPHONE ENCOUNTER
[facility] CLINICAL PHARMACY: ADHERENCE REVIEW  Identified care gap per [employer]: fills at [address] : ACE/ARB adherence    Patient also appears to be prescribed: Diabetes and Statin    ASSESSMENT    ACE/ARB ADHERENCE    Insurance Records claims through  23  (Prior Year  [address] = not reported; YTD [address] [place] [address] = 77%; Potential Fail Date: 11.15.23):   ENALAPRIL TAB 20 MG last filled on 23 for 60 day supply. Next refill due: 10.21.23 -PAST DUE 25 DAYS     Prescribed si tablet/capsule twice daily    Per Insurer Portal: last filled on 23 for 60 day supply. BP Readings from Last 3 Encounters:   11/10/23 125/86   10/10/23 119/85   23 (!) 126/92     CrCl cannot be calculated (Patient's most recent lab result is older than the maximum 180 days allowed. ). Lab Results   Component Value Date    CREATININE 1.09 2021     Lab Results   Component Value Date    K 3.9 2021     DIABETES ADHERENCE    Insurance Records claims through  23  (Prior Year  [address] = not reported; YTD PDC = 100% - PASSED): MOUNJARO INJ 5MG/0.5 ML last filled on 23 for 28 day supply. Next refill due: 23    Prescribed sig:  Inject 0.5 mLs into the skin once a week    Per Insurer Portal: last filled on 23 for 28 day supply. Per [address]: last picked up on 23 for 28 day supply. Billed through [employer] [place] ([place]). 2 refills remaining. [address] Records claims through  23  (Prior Year  [address] = not reported; YTD PDC = 85% - PASSED):   ATORVASTATIN TAB 80 MG last filled on 11.10.23 for 90 day supply. Next refill due: 24    Prescribed si tablet/capsule daily    Per Insurer Portal: last filled on 11.10.23 for 90 day supply. Per [address]: last picked up on 11.10.23 for 90 day supply. Billed through [employer] ([facility]). 3 refills remaining.     Lab Results   Component Value Date    CHOL 102 2023    TRIG 106 2023    HDL 55 2023    LDLCALC

## 2023-12-03 DIAGNOSIS — E66.01 SEVERE OBESITY (HCC): ICD-10-CM

## 2023-12-04 NOTE — TELEPHONE ENCOUNTER
Medication(s) requesting:   Requested Prescriptions     Pending Prescriptions Disp Refills    MOUNJARO 5 MG/0.5ML SOPN SC injection [Pharmacy Med Name: MOUNJARO 5 MG/0.5 ML PEN] 2 mL 5     Sig: INJECT 0.5 ML (5 MG) UNDER THE SKIN ONCE WEEKLY       Last office visit:  11/10/2023  Next office visit DMA: 1/10/2024

## 2023-12-05 RX ORDER — TIRZEPATIDE 5 MG/.5ML
INJECTION, SOLUTION SUBCUTANEOUS
Qty: 2 ML | Refills: 5 | Status: SHIPPED | OUTPATIENT
Start: 2023-12-05

## 2024-01-10 ENCOUNTER — OFFICE VISIT (OUTPATIENT)
Facility: CLINIC | Age: 49
End: 2024-01-10

## 2024-01-10 ENCOUNTER — HOSPITAL ENCOUNTER (OUTPATIENT)
Facility: HOSPITAL | Age: 49
Setting detail: SPECIMEN
Discharge: HOME | End: 2024-01-13
Payer: MEDICARE

## 2024-01-10 VITALS
SYSTOLIC BLOOD PRESSURE: 120 MMHG | TEMPERATURE: 97.9 F | OXYGEN SATURATION: 97 % | HEIGHT: 74 IN | DIASTOLIC BLOOD PRESSURE: 84 MMHG | BODY MASS INDEX: 33.93 KG/M2 | RESPIRATION RATE: 17 BRPM | WEIGHT: 264.4 LBS | HEART RATE: 69 BPM

## 2024-01-10 DIAGNOSIS — M51.36 DDD (DEGENERATIVE DISC DISEASE), LUMBAR: ICD-10-CM

## 2024-01-10 DIAGNOSIS — I25.2 HISTORY OF ST ELEVATION MYOCARDIAL INFARCTION (STEMI): ICD-10-CM

## 2024-01-10 DIAGNOSIS — E66.01 SEVERE OBESITY (HCC): ICD-10-CM

## 2024-01-10 DIAGNOSIS — Z00.00 MEDICARE ANNUAL WELLNESS VISIT, SUBSEQUENT: Primary | ICD-10-CM

## 2024-01-10 DIAGNOSIS — I10 PRIMARY HYPERTENSION: ICD-10-CM

## 2024-01-10 DIAGNOSIS — Z71.89 ADVANCE CARE PLANNING: ICD-10-CM

## 2024-01-10 DIAGNOSIS — G89.4 CHRONIC PAIN SYNDROME: ICD-10-CM

## 2024-01-10 DIAGNOSIS — M17.12 PRIMARY OSTEOARTHRITIS OF LEFT KNEE: ICD-10-CM

## 2024-01-10 LAB
ALBUMIN SERPL-MCNC: 4.2 G/DL (ref 3.4–5)
ALBUMIN/GLOB SERPL: 1.2 (ref 0.8–1.7)
ALP SERPL-CCNC: 144 U/L (ref 45–117)
ALT SERPL-CCNC: 43 U/L (ref 16–61)
ANION GAP SERPL CALC-SCNC: 6 MMOL/L (ref 3–18)
AST SERPL-CCNC: 30 U/L (ref 10–38)
BILIRUB SERPL-MCNC: 1.3 MG/DL (ref 0.2–1)
BUN SERPL-MCNC: 29 MG/DL (ref 7–18)
BUN/CREAT SERPL: 25 (ref 12–20)
CALCIUM SERPL-MCNC: 10 MG/DL (ref 8.5–10.1)
CHLORIDE SERPL-SCNC: 106 MMOL/L (ref 100–111)
CHOLEST SERPL-MCNC: 137 MG/DL
CO2 SERPL-SCNC: 28 MMOL/L (ref 21–32)
CREAT SERPL-MCNC: 1.16 MG/DL (ref 0.6–1.3)
GLOBULIN SER CALC-MCNC: 3.6 G/DL (ref 2–4)
GLUCOSE SERPL-MCNC: 107 MG/DL (ref 74–99)
HDLC SERPL-MCNC: 73 MG/DL (ref 40–60)
HDLC SERPL: 1.9 (ref 0–5)
LDLC SERPL CALC-MCNC: 34.6 MG/DL (ref 0–100)
LIPID PANEL: ABNORMAL
POTASSIUM SERPL-SCNC: 4.9 MMOL/L (ref 3.5–5.5)
PROT SERPL-MCNC: 7.8 G/DL (ref 6.4–8.2)
SODIUM SERPL-SCNC: 140 MMOL/L (ref 136–145)
TRIGL SERPL-MCNC: 147 MG/DL
VLDLC SERPL CALC-MCNC: 29.4 MG/DL

## 2024-01-10 PROCEDURE — 36415 COLL VENOUS BLD VENIPUNCTURE: CPT

## 2024-01-10 PROCEDURE — 80061 LIPID PANEL: CPT

## 2024-01-10 PROCEDURE — 80053 COMPREHEN METABOLIC PANEL: CPT

## 2024-01-10 PROCEDURE — 80307 DRUG TEST PRSMV CHEM ANLYZR: CPT

## 2024-01-10 RX ORDER — OXYCODONE HYDROCHLORIDE AND ACETAMINOPHEN 5; 325 MG/1; MG/1
1 TABLET ORAL EVERY 8 HOURS PRN
Qty: 30 TABLET | Refills: 0 | Status: SHIPPED | OUTPATIENT
Start: 2024-01-10 | End: 2024-02-09

## 2024-01-10 RX ORDER — OXYCODONE HYDROCHLORIDE AND ACETAMINOPHEN 5; 325 MG/1; MG/1
1 TABLET ORAL EVERY 8 HOURS PRN
Qty: 60 TABLET | Refills: 0 | Status: SHIPPED | OUTPATIENT
Start: 2024-03-10 | End: 2024-04-09

## 2024-01-10 RX ORDER — OXYCODONE HYDROCHLORIDE AND ACETAMINOPHEN 5; 325 MG/1; MG/1
1 TABLET ORAL EVERY 8 HOURS PRN
Qty: 30 TABLET | Refills: 0 | Status: SHIPPED | OUTPATIENT
Start: 2024-02-09 | End: 2024-03-10

## 2024-01-10 SDOH — ECONOMIC STABILITY: FOOD INSECURITY: WITHIN THE PAST 12 MONTHS, THE FOOD YOU BOUGHT JUST DIDN'T LAST AND YOU DIDN'T HAVE MONEY TO GET MORE.: NEVER TRUE

## 2024-01-10 SDOH — ECONOMIC STABILITY: INCOME INSECURITY: HOW HARD IS IT FOR YOU TO PAY FOR THE VERY BASICS LIKE FOOD, HOUSING, MEDICAL CARE, AND HEATING?: NOT HARD AT ALL

## 2024-01-10 SDOH — ECONOMIC STABILITY: FOOD INSECURITY: WITHIN THE PAST 12 MONTHS, YOU WORRIED THAT YOUR FOOD WOULD RUN OUT BEFORE YOU GOT MONEY TO BUY MORE.: NEVER TRUE

## 2024-01-10 ASSESSMENT — ANXIETY QUESTIONNAIRES
6. BECOMING EASILY ANNOYED OR IRRITABLE: 0
5. BEING SO RESTLESS THAT IT IS HARD TO SIT STILL: 0
1. FEELING NERVOUS, ANXIOUS, OR ON EDGE: 0
2. NOT BEING ABLE TO STOP OR CONTROL WORRYING: 0
3. WORRYING TOO MUCH ABOUT DIFFERENT THINGS: 0
IF YOU CHECKED OFF ANY PROBLEMS ON THIS QUESTIONNAIRE, HOW DIFFICULT HAVE THESE PROBLEMS MADE IT FOR YOU TO DO YOUR WORK, TAKE CARE OF THINGS AT HOME, OR GET ALONG WITH OTHER PEOPLE: NOT DIFFICULT AT ALL
GAD7 TOTAL SCORE: 0
4. TROUBLE RELAXING: 0
7. FEELING AFRAID AS IF SOMETHING AWFUL MIGHT HAPPEN: 0

## 2024-01-10 ASSESSMENT — PATIENT HEALTH QUESTIONNAIRE - PHQ9
2. FEELING DOWN, DEPRESSED OR HOPELESS: 0
1. LITTLE INTEREST OR PLEASURE IN DOING THINGS: 0
SUM OF ALL RESPONSES TO PHQ QUESTIONS 1-9: 0
SUM OF ALL RESPONSES TO PHQ9 QUESTIONS 1 & 2: 0
SUM OF ALL RESPONSES TO PHQ QUESTIONS 1-9: 0

## 2024-01-10 ASSESSMENT — LIFESTYLE VARIABLES
HOW OFTEN DO YOU HAVE A DRINK CONTAINING ALCOHOL: 2-4 TIMES A MONTH
HOW MANY STANDARD DRINKS CONTAINING ALCOHOL DO YOU HAVE ON A TYPICAL DAY: 3 OR 4
HAS A RELATIVE, FRIEND, DOCTOR, OR ANOTHER HEALTH PROFESSIONAL EXPRESSED CONCERN ABOUT YOUR DRINKING OR SUGGESTED YOU CUT DOWN: 0
HOW OFTEN DURING THE LAST YEAR HAVE YOU FAILED TO DO WHAT WAS NORMALLY EXPECTED FROM YOU BECAUSE OF DRINKING: 0
HAVE YOU OR SOMEONE ELSE BEEN INJURED AS A RESULT OF YOUR DRINKING: 0
HOW OFTEN DURING THE LAST YEAR HAVE YOU NEEDED AN ALCOHOLIC DRINK FIRST THING IN THE MORNING TO GET YOURSELF GOING AFTER A NIGHT OF HEAVY DRINKING: 0
HOW OFTEN DURING THE LAST YEAR HAVE YOU FOUND THAT YOU WERE NOT ABLE TO STOP DRINKING ONCE YOU HAD STARTED: 0
HOW OFTEN DURING THE LAST YEAR HAVE YOU BEEN UNABLE TO REMEMBER WHAT HAPPENED THE NIGHT BEFORE BECAUSE YOU HAD BEEN DRINKING: 0

## 2024-01-10 NOTE — PATIENT INSTRUCTIONS
9 Ways to Cut Back on Drinking  Maybe you've found yourself drinking more alcohol than you'd prefer. If you want to cut back, here are some ideas to try.    Think before you drink.  Do you really want a drink, or is it just a habit? If you're used to having a drink at a certain time, try doing something else then.     Look for substitutes.  Find some no-alcohol drinks that you enjoy, like flavored seltzer water, tea with honey, or tonic with a slice of lime. Or try alcohol-free beer or \"virgin\" cocktails (without the alcohol).     Drink more water.  Use water to quench your thirst. Drink a glass of water before you have any alcohol. Have another glass along with every drink or between drinks.     Shrink your drink.  For example, have a bottle of beer instead of a pint. Use a smaller glass for wine. Choose drinks with lower alcohol content (ABV%). Or use less liquor and more mixer in cocktails.     Slow down.  It's easy to drink quickly and without thinking about it. Pay attention, and make each drink last longer.     Do the math.  Total up how much you spend on alcohol each month. How much is that a year? If you cut back, what could you do with the money you save?     Take a break.  Choose a day or two each week when you won't drink at all. Notice how you feel on those days, physically and emotionally. How did you sleep? Do you feel better? Over time, add more break days.     Count calories.  Would you like to lose some weight? For some people that's a good motivator for cutting back. Figure out how many calories are in each drink. How many does that add up to in a day? In a week? In a month?     Practice saying no.  Be ready when someone offers you a drink. Try: \"Thanks, I've had enough.\" Or \"Thanks, but I'm cutting back.\" Or \"No, thanks. I feel better when I drink less.\"   Current as of: March 21, 2023               Content Version: 13.9  © 7296-7912 [employer].   Care instructions adapted under

## 2024-01-12 LAB
AMPHETAMINES UR QL SCN: NEGATIVE NG/ML
BARBITURATES UR QL SCN: NEGATIVE NG/ML
BENZODIAZ UR QL SCN: NEGATIVE NG/ML
BUPRENORPHINE UR QL: NEGATIVE NG/ML
BZE UR QL SCN: NEGATIVE NG/ML
CANNABINOIDS UR QL SCN: POSITIVE NG/ML
CREAT UR-MCNC: 117 MG/DL (ref 20–300)
LABORATORY COMMENT REPORT: ABNORMAL
METHADONE UR QL SCN: NEGATIVE NG/ML
OPIATES UR QL SCN: NEGATIVE NG/ML
OXYCODONE+OXYMORPHONE UR QL SCN: NEGATIVE NG/ML
PCP UR QL: NEGATIVE NG/ML
PH UR: 5 (ref 4.5–8.9)
PROPOXYPH UR QL SCN: NEGATIVE NG/ML

## 2024-01-26 NOTE — PROGRESS NOTES
Brent Harris is a 48 y.o.  male and presents with    Chief Complaint   Patient presents with    Medicare AWV    Medication Refill           Subjective:  {HPI:09310}    Pt presets today for a medication refill on his chronic pain. The pain is generally well-controlled with percocet and pregabalin, but intermittently spikes to a 10/10 on the pain scale. The pain occasionally causes sleep disturbances. Denies medication side effects.     He also recently discontinued his mounjaro. He has not noticed any weight loss on it and his insurance has increased the out of pocket pay for the medication.     He has a f/u with his cardiologist in a couple of weeks.     Social hx:   - slight improvement of diet since the last visit  - continues to walk as exercise  - recreational marijuana use  - no alcohol use; for the past 2 years         Additional Concerns: ***         {SmartCarweez choices:0262899}    ROS   {ros master:584339}    All other systems reviewed and are negative.      Objective:  Vitals:    01/10/24 1026   BP: 120/84   Pulse: 69   Resp: 17   Temp: 97.9 °F (36.6 °C)   SpO2: 97%         {appearance:264687}  {PE ADULT/PEDS  MALE/FEMALE:87331}  {Exam, Complete:12694}  {cvs disease exams:502310}    LABS   ***  TESTS  ***    Assessment/Plan:         Lab review: {lab reviewed:929501}      I have discussed the diagnosis with the patient and the intended plan as seen in the above orders.  The patient has received an after-visit summary and questions were answered concerning future plans.  I have discussed medication side effects and warnings with the patient as well. I have reviewed the plan of care with the patient, accepted their input and they are in agreement with the treatment goals.     No follow-up provider specified.  *** More than 1/2 of this *** minute visit was spent in counselling and coordination of care, as described above.    
Medicare Annual Wellness Visit    Brent Harris is here for Medicare AWV and Medication Refill    Assessment & Plan   Medicare annual wellness visit, subsequent  Advance care planning  Severe obesity (HCC)  DDD (degenerative disc disease), lumbar  -     oxyCODONE-acetaminophen (PERCOCET) 5-325 MG per tablet; Take 1 tablet by mouth every 8 hours as needed for Pain for up to 30 days. Max Daily Amount: 3 tablets, Disp-30 tablet, R-0Normal  -     oxyCODONE-acetaminophen (PERCOCET) 5-325 MG per tablet; Take 1 tablet by mouth every 8 hours as needed for Pain for up to 30 days. Max Daily Amount: 3 tablets, Disp-30 tablet, R-0Normal  -     oxyCODONE-acetaminophen (PERCOCET) 5-325 MG per tablet; Take 1 tablet by mouth every 8 hours as needed for Pain for up to 30 days. Max Daily Amount: 3 tablets, Disp-60 tablet, R-0Normal  Chronic pain syndrome  -     oxyCODONE-acetaminophen (PERCOCET) 5-325 MG per tablet; Take 1 tablet by mouth every 8 hours as needed for Pain for up to 30 days. Max Daily Amount: 3 tablets, Disp-30 tablet, R-0Normal  -     oxyCODONE-acetaminophen (PERCOCET) 5-325 MG per tablet; Take 1 tablet by mouth every 8 hours as needed for Pain for up to 30 days. Max Daily Amount: 3 tablets, Disp-30 tablet, R-0Normal  -     oxyCODONE-acetaminophen (PERCOCET) 5-325 MG per tablet; Take 1 tablet by mouth every 8 hours as needed for Pain for up to 30 days. Max Daily Amount: 3 tablets, Disp-60 tablet, R-0Normal  History of ST elevation myocardial infarction (STEMI)  Primary osteoarthritis of left knee  -     oxyCODONE-acetaminophen (PERCOCET) 5-325 MG per tablet; Take 1 tablet by mouth every 8 hours as needed for Pain for up to 30 days. Max Daily Amount: 3 tablets, Disp-30 tablet, R-0Normal  -     oxyCODONE-acetaminophen (PERCOCET) 5-325 MG per tablet; Take 1 tablet by mouth every 8 hours as needed for Pain for up to 30 days. Max Daily Amount: 3 tablets, Disp-30 tablet, R-0Normal  -     oxyCODONE-acetaminophen 
Appearance:  Alert, cooperative, no distress, appears stated age   Head:  Normocephalic, without obvious abnormality, atraumatic   Eyes:  PERRL, conjunctiva/corneas clear, EOM's intact, fundi benign, both eyes   Ears:  Normal TM's and external ear canals, both ears   Nose: Nares normal, septum midline, mucosa normal, no drainage or sinus tenderness   Throat: Lips, mucosa, and tongue normal; teeth and gums normal   Neck: Supple, symmetrical, trachea midline, no adenopathy, thyroid: not enlarged, symmetric, no tenderness/mass/nodules, no carotid bruit or JVD   Back:   Symmetric, no curvature, ROM normal, no CVA tenderness   Lungs:   Clear to auscultation bilaterally, respirations unlabored   Chest Wall:  No tenderness or deformity   Heart:  Regular rate and rhythm, S1, S2 normal, no murmur, rub or gallop   Abdomen:   Soft, non-tender, bowel sounds active all four quadrants,  no masses, no organomegaly   Genitalia:  deferred   Rectal:  deferred   Extremities: Extremities normal, atraumatic, no cyanosis or edema   Pulses: 2+ and symmetric   Skin: Skin color, texture, turgor normal, no rashes or lesions   Lymph nodes: Cervical, supraclavicular, and axillary nodes normal   Neurologic: Normal     LABS   Urine drug screen  TESTS      Assessment/Plan:     1. Medicare annual wellness visit, subsequent  Reviewed preventive recommendations    2. Advance care planning  See ACP    3. Severe obesity (HCC)  Encourage low carb    4. DDD (degenerative disc disease), lumbar  This is a chronic problem that is stable.  Per review of available records and patient’s , there are not sign of overuse, misuse, diversion, or concerning side effects. Today we reviewed: the risk of overdose, addiction, and dependency proper storage and disposal of medications the goals of treatment (improve functionality, quality of life, and pain) alternative treatment options including non-narcotic modalities the risks and benefits of continuing with a 
patient had a mammogram within the past 2 years? NA - based on age or sex    5. For patients aged 21-65: Has the patient had a pap smear? NA - based on age or sex    Advanced Directive:  1. Do you have an Advanced Directive? No     2. Would you like information on Advanced Directives? No

## 2024-03-10 DIAGNOSIS — L03.116 LEFT LEG CELLULITIS: ICD-10-CM

## 2024-03-11 RX ORDER — CEPHALEXIN 500 MG/1
500 CAPSULE ORAL 2 TIMES DAILY
Qty: 60 CAPSULE | Refills: 0 | OUTPATIENT
Start: 2024-03-11

## 2024-03-18 DIAGNOSIS — M1A.0710 CHRONIC IDIOPATHIC GOUT INVOLVING TOE OF RIGHT FOOT WITHOUT TOPHUS: ICD-10-CM

## 2024-03-19 RX ORDER — COLCHICINE 0.6 MG/1
TABLET ORAL
Qty: 90 TABLET | Refills: 3 | Status: SHIPPED | OUTPATIENT
Start: 2024-03-19

## 2024-04-06 RX ORDER — ASPIRIN 81 MG/1
81 TABLET, COATED ORAL DAILY
Qty: 90 TABLET | Refills: 1 | Status: SHIPPED | OUTPATIENT
Start: 2024-04-06

## 2024-04-08 DIAGNOSIS — G62.9 POLYNEUROPATHY, UNSPECIFIED: ICD-10-CM

## 2024-04-08 RX ORDER — GABAPENTIN 800 MG/1
TABLET ORAL
Qty: 120 TABLET | Refills: 5 | Status: SHIPPED | OUTPATIENT
Start: 2024-04-08 | End: 2024-10-08

## 2024-04-10 ENCOUNTER — OFFICE VISIT (OUTPATIENT)
Facility: CLINIC | Age: 49
End: 2024-04-10
Payer: MEDICARE

## 2024-04-10 VITALS
DIASTOLIC BLOOD PRESSURE: 84 MMHG | RESPIRATION RATE: 20 BRPM | BODY MASS INDEX: 34.37 KG/M2 | HEART RATE: 65 BPM | WEIGHT: 267.8 LBS | HEIGHT: 74 IN | TEMPERATURE: 97.2 F | SYSTOLIC BLOOD PRESSURE: 117 MMHG | OXYGEN SATURATION: 93 %

## 2024-04-10 DIAGNOSIS — M79.18 PAIN IN RIGHT BUTTOCK: Primary | ICD-10-CM

## 2024-04-10 DIAGNOSIS — F11.99 OPIOID USE, UNSPECIFIED WITH UNSPECIFIED OPIOID-INDUCED DISORDER (HCC): ICD-10-CM

## 2024-04-10 DIAGNOSIS — M51.36 DDD (DEGENERATIVE DISC DISEASE), LUMBAR: ICD-10-CM

## 2024-04-10 DIAGNOSIS — F10.21 ALCOHOL DEPENDENCE, IN REMISSION (HCC): ICD-10-CM

## 2024-04-10 DIAGNOSIS — I50.22 CHRONIC SYSTOLIC (CONGESTIVE) HEART FAILURE (HCC): ICD-10-CM

## 2024-04-10 DIAGNOSIS — M17.12 PRIMARY OSTEOARTHRITIS OF LEFT KNEE: ICD-10-CM

## 2024-04-10 DIAGNOSIS — I73.9 PERIPHERAL VASCULAR DISEASE (HCC): ICD-10-CM

## 2024-04-10 DIAGNOSIS — G89.4 CHRONIC PAIN SYNDROME: ICD-10-CM

## 2024-04-10 DIAGNOSIS — F12.90 MARIJUANA USE: ICD-10-CM

## 2024-04-10 PROCEDURE — 3074F SYST BP LT 130 MM HG: CPT | Performed by: FAMILY MEDICINE

## 2024-04-10 PROCEDURE — 3079F DIAST BP 80-89 MM HG: CPT | Performed by: FAMILY MEDICINE

## 2024-04-10 PROCEDURE — 99214 OFFICE O/P EST MOD 30 MIN: CPT | Performed by: FAMILY MEDICINE

## 2024-04-10 RX ORDER — OXYCODONE HYDROCHLORIDE AND ACETAMINOPHEN 5; 325 MG/1; MG/1
1 TABLET ORAL EVERY 8 HOURS PRN
Qty: 30 TABLET | Refills: 0 | Status: SHIPPED | OUTPATIENT
Start: 2024-04-10 | End: 2024-05-10

## 2024-04-10 RX ORDER — OXYCODONE HYDROCHLORIDE AND ACETAMINOPHEN 5; 325 MG/1; MG/1
1 TABLET ORAL EVERY 8 HOURS PRN
Qty: 60 TABLET | Refills: 0 | Status: SHIPPED | OUTPATIENT
Start: 2024-06-09 | End: 2024-07-09

## 2024-04-10 RX ORDER — OXYCODONE HYDROCHLORIDE AND ACETAMINOPHEN 5; 325 MG/1; MG/1
1 TABLET ORAL EVERY 8 HOURS PRN
Qty: 30 TABLET | Refills: 0 | Status: SHIPPED | OUTPATIENT
Start: 2024-05-10 | End: 2024-06-09

## 2024-04-10 NOTE — PROGRESS NOTES
Brent Harris is a 48 y.o. presents today for   Chief Complaint   Patient presents with    Follow-up    Medication Refill    Results     LAB RESULTS/REVIEW       Is someone accompanying this pt? NO    Is the patient using any DME equipment during OV? NO    Depression Screenin/10/2024    10:13 AM 2023    10:24 AM 3/13/2023    11:28 AM 2023     1:59 PM 2023    10:25 AM 10/11/2022    10:27 AM 2022    10:22 AM   PHQ-9 Questionaire   Little interest or pleasure in doing things 0 0 0 0 0 0 0   Feeling down, depressed, or hopeless 0 0 0 0 0 0 0   PHQ-9 Total Score 0 0 0 0 0 0 0       Abuse Screenin/10/2024    10:00 AM 3/13/2023    11:00 AM   AMB Abuse Screening   Do you ever feel afraid of your partner? N N   Are you in a relationship with someone who physically or mentally threatens you? N N   Is it safe for you to go home? Y Y       Learning Assessment:  No question data found.    Fall Risk:      1/10/2024    10:14 AM 3/13/2023    11:30 AM   Fall Risk   2 or more falls in past year? no no   Fall with injury in past year? no no           Coordination of Care:   1. \"Have you been to the ER, urgent care clinic since your last visit?  Hospitalized since your last visit?\" NO    2. \"Have you seen or consulted any other health care providers outside of the [facility] since your last visit?\" NO    3. For patients aged 45-75: Has the patient had a colonoscopy / FIT/ Cologuard? YES    If the patient is female:    4. For patients aged 40-74: Has the patient had a mammogram within the past 2 years? NO    5. For patients aged 21-65: Has the patient had a pap smear? NO    Health Maintenance: reviewed and discussed and ordered per Provider.    Health Maintenance Due   Topic Date Due    Hepatitis B vaccine (1 of 3 - 3-dose series) Never done    HIV screen  Never done    Pneumococcal 0-64 years Vaccine (2 of 2 - PCV) 2020        Janelle Long 
I note that lipids LDL result meets goal, HDL normal, triglycerides normal, comprehensive metabolic panel mildly abnormal but acceptable      I have discussed the diagnosis with the patient and the intended plan as seen in the above orders.  The patient has received an after-visit summary and questions were answered concerning future plans.  I have discussed medication side effects and warnings with the patient as well. I have reviewed the plan of care with the patient, accepted their input and they are in agreement with the treatment goals.

## 2024-05-03 DIAGNOSIS — I50.22 CHRONIC SYSTOLIC (CONGESTIVE) HEART FAILURE (HCC): ICD-10-CM

## 2024-05-03 RX ORDER — POTASSIUM CHLORIDE 750 MG/1
10 TABLET, EXTENDED RELEASE ORAL DAILY
Qty: 90 TABLET | Refills: 3 | Status: SHIPPED | OUTPATIENT
Start: 2024-05-03

## 2024-06-05 DIAGNOSIS — I50.22 CHRONIC SYSTOLIC (CONGESTIVE) HEART FAILURE (HCC): ICD-10-CM

## 2024-06-06 RX ORDER — FUROSEMIDE 20 MG/1
20 TABLET ORAL DAILY
Qty: 90 TABLET | Refills: 3 | Status: SHIPPED | OUTPATIENT
Start: 2024-06-06

## 2024-06-06 NOTE — TELEPHONE ENCOUNTER
Medication requested :   Requested Prescriptions     Pending Prescriptions Disp Refills    furosemide (LASIX) 20 MG tablet [Pharmacy Med Name: FUROSEMIDE 20 MG TABLET] 90 tablet 3     Sig: TAKE 1 TABLET BY MOUTH DAILY      PCP: Elvin Noel MD  LOV:           4/10/2024   NOV DMA: 7/10/2024  FUTURE APPT:   Future Appointments   Date Time Provider Department Center   7/10/2024 10:15 AM Elvin Noel MD [facility] BS AMB   9/16/2024 11:20 AM Lawrence Monge MD [facility] BS AMB       Thank you.

## 2024-07-10 ENCOUNTER — OFFICE VISIT (OUTPATIENT)
Facility: CLINIC | Age: 49
End: 2024-07-10
Payer: MEDICARE

## 2024-07-10 ENCOUNTER — HOSPITAL ENCOUNTER (OUTPATIENT)
Facility: HOSPITAL | Age: 49
Setting detail: SPECIMEN
Discharge: HOME | End: 2024-07-13
Payer: MEDICARE

## 2024-07-10 VITALS
DIASTOLIC BLOOD PRESSURE: 75 MMHG | TEMPERATURE: 97.1 F | HEART RATE: 62 BPM | HEIGHT: 74 IN | WEIGHT: 268.4 LBS | OXYGEN SATURATION: 95 % | RESPIRATION RATE: 18 BRPM | BODY MASS INDEX: 34.44 KG/M2 | SYSTOLIC BLOOD PRESSURE: 100 MMHG

## 2024-07-10 DIAGNOSIS — I95.89 HYPOTENSION, IATROGENIC: ICD-10-CM

## 2024-07-10 DIAGNOSIS — M51.36 DDD (DEGENERATIVE DISC DISEASE), LUMBAR: ICD-10-CM

## 2024-07-10 DIAGNOSIS — F12.90 MARIJUANA USE: ICD-10-CM

## 2024-07-10 DIAGNOSIS — E03.9 ACQUIRED HYPOTHYROIDISM: ICD-10-CM

## 2024-07-10 DIAGNOSIS — I50.22 CHRONIC SYSTOLIC (CONGESTIVE) HEART FAILURE (HCC): Primary | ICD-10-CM

## 2024-07-10 DIAGNOSIS — M79.18 PAIN IN RIGHT BUTTOCK: ICD-10-CM

## 2024-07-10 DIAGNOSIS — F10.21 ALCOHOL DEPENDENCE, IN REMISSION (HCC): ICD-10-CM

## 2024-07-10 DIAGNOSIS — G89.4 CHRONIC PAIN SYNDROME: ICD-10-CM

## 2024-07-10 DIAGNOSIS — M17.12 PRIMARY OSTEOARTHRITIS OF LEFT KNEE: ICD-10-CM

## 2024-07-10 DIAGNOSIS — F11.99 OPIOID USE, UNSPECIFIED WITH UNSPECIFIED OPIOID-INDUCED DISORDER (HCC): ICD-10-CM

## 2024-07-10 DIAGNOSIS — I73.9 PERIPHERAL VASCULAR DISEASE (HCC): ICD-10-CM

## 2024-07-10 LAB — TSH SERPL DL<=0.05 MIU/L-ACNC: 0.03 UIU/ML (ref 0.36–3.74)

## 2024-07-10 PROCEDURE — 36415 COLL VENOUS BLD VENIPUNCTURE: CPT

## 2024-07-10 PROCEDURE — 3074F SYST BP LT 130 MM HG: CPT | Performed by: FAMILY MEDICINE

## 2024-07-10 PROCEDURE — 3078F DIAST BP <80 MM HG: CPT | Performed by: FAMILY MEDICINE

## 2024-07-10 PROCEDURE — 99214 OFFICE O/P EST MOD 30 MIN: CPT | Performed by: FAMILY MEDICINE

## 2024-07-10 PROCEDURE — 84443 ASSAY THYROID STIM HORMONE: CPT

## 2024-07-10 RX ORDER — OXYCODONE HYDROCHLORIDE AND ACETAMINOPHEN 5; 325 MG/1; MG/1
1 TABLET ORAL EVERY 8 HOURS PRN
Qty: 30 TABLET | Refills: 0 | Status: SHIPPED | OUTPATIENT
Start: 2024-07-10 | End: 2024-08-09

## 2024-07-10 RX ORDER — LEVOTHYROXINE SODIUM 0.2 MG/1
200 TABLET ORAL DAILY
Qty: 90 TABLET | Refills: 3 | Status: SHIPPED | OUTPATIENT
Start: 2024-07-10

## 2024-07-10 RX ORDER — OXYCODONE HYDROCHLORIDE AND ACETAMINOPHEN 5; 325 MG/1; MG/1
1 TABLET ORAL EVERY 8 HOURS PRN
Qty: 30 TABLET | Refills: 0 | Status: SHIPPED | OUTPATIENT
Start: 2024-08-09 | End: 2024-09-08

## 2024-07-10 RX ORDER — METOPROLOL SUCCINATE 200 MG/1
100 TABLET, EXTENDED RELEASE ORAL DAILY
Qty: 90 TABLET | Refills: 3
Start: 2024-07-10

## 2024-07-10 SDOH — ECONOMIC STABILITY: FOOD INSECURITY: WITHIN THE PAST 12 MONTHS, THE FOOD YOU BOUGHT JUST DIDN'T LAST AND YOU DIDN'T HAVE MONEY TO GET MORE.: NEVER TRUE

## 2024-07-10 SDOH — ECONOMIC STABILITY: INCOME INSECURITY: HOW HARD IS IT FOR YOU TO PAY FOR THE VERY BASICS LIKE FOOD, HOUSING, MEDICAL CARE, AND HEATING?: NOT HARD AT ALL

## 2024-07-10 SDOH — ECONOMIC STABILITY: FOOD INSECURITY: WITHIN THE PAST 12 MONTHS, YOU WORRIED THAT YOUR FOOD WOULD RUN OUT BEFORE YOU GOT MONEY TO BUY MORE.: NEVER TRUE

## 2024-07-10 ASSESSMENT — PATIENT HEALTH QUESTIONNAIRE - PHQ9
SUM OF ALL RESPONSES TO PHQ9 QUESTIONS 1 & 2: 0
SUM OF ALL RESPONSES TO PHQ QUESTIONS 1-9: 0
2. FEELING DOWN, DEPRESSED OR HOPELESS: NOT AT ALL
1. LITTLE INTEREST OR PLEASURE IN DOING THINGS: NOT AT ALL
SUM OF ALL RESPONSES TO PHQ QUESTIONS 1-9: 0

## 2024-07-10 NOTE — PROGRESS NOTES
Brent Harris is a 48 y.o. presents today for   Chief Complaint   Patient presents with    Medication Refill     Is someone accompanying this pt? no    Is the patient using any DME equipment during OV? no  There were no vitals filed for this visit.    Depression Screenin/10/2024    10:13 AM 2023    10:24 AM 3/13/2023    11:28 AM 2023     1:59 PM 2023    10:25 AM 10/11/2022    10:27 AM 2022    10:22 AM   PHQ-9 Questionaire   Little interest or pleasure in doing things 0 0 0 0 0 0 0   Feeling down, depressed, or hopeless 0 0 0 0 0 0 0   PHQ-9 Total Score 0 0 0 0 0 0 0        Abuse Screenin/10/2024    10:00 AM 3/13/2023    11:00 AM   AMB Abuse Screening   Do you ever feel afraid of your partner? N N   Are you in a relationship with someone who physically or mentally threatens you? N N   Is it safe for you to go home? Y Y        Learning Assessment Screening:   No question data found.     Fall Risk Screenin/10/2024    10:14 AM 3/13/2023    11:30 AM   Fall Risk   2 or more falls in past year? no no   Fall with injury in past year? no no           Health Maintenance: reviewed and discussed and ordered per Provider.    Health Maintenance Due   Topic Date Due    Hepatitis B vaccine (1 of 3 - 3-dose series) Never done    HIV screen  Never done    Pneumococcal 0-64 years Vaccine (2 of 2 - PCV) 2020         Coordination of Care:   1. \"Have you been to the ER, urgent care clinic since your last visit?  Hospitalized since your last visit?\" no    2. \"Have you seen or consulted any other health care providers outside of the [facility] since your last visit?\" no    3. For patients aged 45-75: Has the patient had a colonoscopy / FIT/ Cologuard?Yes - no Care Gap present  If the patient is female:    4. For patients aged 40-74: Has the patient had a mammogram within the past 2 years? NA - based on age or sex    5. For patients aged 21-65: Has the patient 
or rhonchi, symmetric air entry  Heart - normal rate, regular rhythm, normal S1, S2, no murmurs, rubs, clicks or gallops  Back exam - pain with motion noted during exam  Musculoskeletal - abnormal exam of right hip with pain with motion  Ext - no edema    LABS     TESTS      Assessment/Plan:    1. Chronic systolic (congestive) heart failure (HCC)  Follow up with Dr. Monge scheduled in September 2024; currently using beta blocker; needs updated echocardiogram    - metoprolol succinate (TOPROL XL) 200 MG extended release tablet; Take 0.5 tablets by mouth daily  Dispense: 90 tablet; Refill: 3    2. Pain in right buttock  This is a chronic problem that is stable.  Per review of available records and patient’s , there are not sign of overuse, misuse, diversion, or concerning side effects. Today we reviewed: the risk of overdose, addiction, and dependency proper storage and disposal of medications the goals of treatment (improve functionality, quality of life, and pain) alternative treatment options including non-narcotic modalities the risks and benefits of continuing with a narcotic based pain regimen considering narcotic therapy discontinuation if benefits do not outweigh risks tapering to a lower dose  The following changes were made to the patient’s current treatment plan: nothing, medications refilled.      - oxyCODONE-acetaminophen (PERCOCET) 5-325 MG per tablet; Take 1 tablet by mouth every 8 hours as needed for Pain for up to 30 days. Max Daily Amount: 3 tablets  Dispense: 30 tablet; Refill: 0  - oxyCODONE-acetaminophen (PERCOCET) 5-325 MG per tablet; Take 1 tablet by mouth every 8 hours as needed for Pain for up to 30 days. Max Daily Amount: 3 tablets  Dispense: 30 tablet; Refill: 0    3. DDD (degenerative disc disease), lumbar    - oxyCODONE-acetaminophen (PERCOCET) 5-325 MG per tablet; Take 1 tablet by mouth every 8 hours as needed for Pain for up to 30 days. Max Daily Amount: 3 tablets  Dispense: 30

## 2024-08-30 ENCOUNTER — TELEPHONE (OUTPATIENT)
Age: 49
End: 2024-08-30

## 2024-08-30 NOTE — TELEPHONE ENCOUNTER
Attempted to contact patient to reschedule kvng 9/16 appt with Dr. Monge due to him being out of the office that day. Left detailed voicemail, advised patient appt is being cancelled and to contact us to reschedule.

## 2024-09-03 RX ORDER — AMLODIPINE BESYLATE 5 MG/1
5 TABLET ORAL DAILY
Qty: 90 TABLET | Refills: 3 | Status: SHIPPED | OUTPATIENT
Start: 2024-09-03

## 2024-09-11 ENCOUNTER — OFFICE VISIT (OUTPATIENT)
Facility: CLINIC | Age: 49
End: 2024-09-11

## 2024-09-11 VITALS
RESPIRATION RATE: 18 BRPM | HEART RATE: 88 BPM | HEIGHT: 74 IN | OXYGEN SATURATION: 98 % | DIASTOLIC BLOOD PRESSURE: 88 MMHG | SYSTOLIC BLOOD PRESSURE: 130 MMHG | TEMPERATURE: 98 F | WEIGHT: 264 LBS | BODY MASS INDEX: 33.88 KG/M2

## 2024-09-11 DIAGNOSIS — M79.18 PAIN IN RIGHT BUTTOCK: ICD-10-CM

## 2024-09-11 DIAGNOSIS — G89.4 CHRONIC PAIN SYNDROME: ICD-10-CM

## 2024-09-11 DIAGNOSIS — M51.36 DDD (DEGENERATIVE DISC DISEASE), LUMBAR: Primary | ICD-10-CM

## 2024-09-11 DIAGNOSIS — E03.9 ACQUIRED HYPOTHYROIDISM: ICD-10-CM

## 2024-09-11 DIAGNOSIS — I50.22 CHRONIC SYSTOLIC (CONGESTIVE) HEART FAILURE (HCC): ICD-10-CM

## 2024-09-11 DIAGNOSIS — M17.12 PRIMARY OSTEOARTHRITIS OF LEFT KNEE: ICD-10-CM

## 2024-09-11 RX ORDER — OXYCODONE AND ACETAMINOPHEN 5; 325 MG/1; MG/1
1 TABLET ORAL EVERY 8 HOURS PRN
Qty: 30 TABLET | Refills: 0 | Status: SHIPPED | OUTPATIENT
Start: 2024-11-10 | End: 2024-12-10

## 2024-09-11 RX ORDER — OXYCODONE AND ACETAMINOPHEN 5; 325 MG/1; MG/1
1 TABLET ORAL EVERY 8 HOURS PRN
Qty: 30 TABLET | Refills: 0 | Status: SHIPPED | OUTPATIENT
Start: 2024-10-11 | End: 2024-11-10

## 2024-09-11 RX ORDER — OXYCODONE AND ACETAMINOPHEN 5; 325 MG/1; MG/1
1 TABLET ORAL EVERY 8 HOURS PRN
Qty: 30 TABLET | Refills: 0 | Status: SHIPPED | OUTPATIENT
Start: 2024-09-11 | End: 2024-10-11

## 2024-09-11 SDOH — ECONOMIC STABILITY: INCOME INSECURITY: HOW HARD IS IT FOR YOU TO PAY FOR THE VERY BASICS LIKE FOOD, HOUSING, MEDICAL CARE, AND HEATING?: NOT VERY HARD

## 2024-09-11 SDOH — ECONOMIC STABILITY: FOOD INSECURITY: WITHIN THE PAST 12 MONTHS, YOU WORRIED THAT YOUR FOOD WOULD RUN OUT BEFORE YOU GOT MONEY TO BUY MORE.: NEVER TRUE

## 2024-09-11 SDOH — ECONOMIC STABILITY: FOOD INSECURITY: WITHIN THE PAST 12 MONTHS, THE FOOD YOU BOUGHT JUST DIDN'T LAST AND YOU DIDN'T HAVE MONEY TO GET MORE.: NEVER TRUE

## 2024-09-11 ASSESSMENT — PATIENT HEALTH QUESTIONNAIRE - PHQ9
SUM OF ALL RESPONSES TO PHQ9 QUESTIONS 1 & 2: 0
2. FEELING DOWN, DEPRESSED OR HOPELESS: NOT AT ALL
1. LITTLE INTEREST OR PLEASURE IN DOING THINGS: NOT AT ALL
SUM OF ALL RESPONSES TO PHQ QUESTIONS 1-9: 0

## 2024-10-04 NOTE — TELEPHONE ENCOUNTER
Medication Refill Requests    Medication(s) requesting:   Requested Prescriptions        Pending Prescriptions Disp Refills   ASPIRIN LOW DOSE 81 MG EC tablet 90 tabs 1       Last OV: 9/11/24  Next Appt: 12/18/24

## 2024-10-07 RX ORDER — ASPIRIN 81 MG/1
81 TABLET ORAL DAILY
Qty: 90 TABLET | Refills: 1 | Status: SHIPPED | OUTPATIENT
Start: 2024-10-07

## 2024-10-08 DIAGNOSIS — G62.9 POLYNEUROPATHY, UNSPECIFIED: ICD-10-CM

## 2024-10-08 NOTE — TELEPHONE ENCOUNTER
Medication requested :   Requested Prescriptions     Pending Prescriptions Disp Refills    gabapentin (NEURONTIN) 800 MG tablet [Pharmacy Med Name: GABAPENTIN 800 MG TABLET] 120 tablet      Sig: TAKE 1 TABLET BY MOUTH 4 TIMES A DAY AS NEEDED      PCP: Elvin Noel MD  LOV:           9/11/2024   NOV DMA: 12/18/2024  FUTURE APPT:   Future Appointments   Date Time Provider Department Center   10/21/2024  1:20 PM Lawrence Monge MD [facility]   12/18/2024 10:45 AM Elvin Noel MD [facility]       Thank you.

## 2024-10-09 RX ORDER — GABAPENTIN 800 MG/1
TABLET ORAL
Qty: 120 TABLET | Refills: 5 | Status: SHIPPED | OUTPATIENT
Start: 2024-10-09 | End: 2025-04-09

## 2024-10-10 DIAGNOSIS — I50.22 CHRONIC SYSTOLIC (CONGESTIVE) HEART FAILURE (HCC): ICD-10-CM

## 2024-10-10 DIAGNOSIS — I95.89 HYPOTENSION, IATROGENIC: ICD-10-CM

## 2024-10-15 RX ORDER — METOPROLOL SUCCINATE 200 MG/1
100 TABLET, EXTENDED RELEASE ORAL DAILY
Qty: 90 TABLET | Refills: 3 | Status: SHIPPED | OUTPATIENT
Start: 2024-10-15

## 2024-11-04 RX ORDER — ATORVASTATIN CALCIUM 80 MG/1
80 TABLET, FILM COATED ORAL DAILY
Qty: 90 TABLET | Refills: 3 | Status: SHIPPED | OUTPATIENT
Start: 2024-11-04

## 2024-11-06 ENCOUNTER — HOSPITAL ENCOUNTER (OUTPATIENT)
Facility: HOSPITAL | Age: 49
Setting detail: SPECIMEN
Discharge: HOME | End: 2024-11-09
Payer: MEDICARE

## 2024-11-06 ENCOUNTER — OFFICE VISIT (OUTPATIENT)
Age: 49
End: 2024-11-06

## 2024-11-06 VITALS
HEART RATE: 72 BPM | WEIGHT: 265 LBS | OXYGEN SATURATION: 96 % | BODY MASS INDEX: 34.01 KG/M2 | HEIGHT: 74 IN | DIASTOLIC BLOOD PRESSURE: 75 MMHG | SYSTOLIC BLOOD PRESSURE: 106 MMHG

## 2024-11-06 DIAGNOSIS — I73.9 PERIPHERAL VASCULAR DISEASE (HCC): Primary | ICD-10-CM

## 2024-11-06 DIAGNOSIS — I10 HYPERTENSION, UNSPECIFIED TYPE: ICD-10-CM

## 2024-11-06 DIAGNOSIS — I25.2 OLD MYOCARDIAL INFARCTION: ICD-10-CM

## 2024-11-06 LAB
CHOLEST SERPL-MCNC: 169 MG/DL
HDLC SERPL-MCNC: 90 MG/DL (ref 40–60)
HDLC SERPL: 1.9 (ref 0–5)
LDLC SERPL CALC-MCNC: 47.6 MG/DL (ref 0–100)
LIPID PANEL: ABNORMAL
TRIGL SERPL-MCNC: 157 MG/DL
VLDLC SERPL CALC-MCNC: 31.4 MG/DL

## 2024-11-06 PROCEDURE — 80061 LIPID PANEL: CPT

## 2024-11-06 PROCEDURE — 36415 COLL VENOUS BLD VENIPUNCTURE: CPT

## 2024-11-06 NOTE — PATIENT INSTRUCTIONS
Labs   lipid panel    *[facility], [address]  ( M - Thur 8am to 3:30pm.) - You must call to make an appointment for blood work at this site.   Contact :[phone]

## 2024-11-06 NOTE — PROGRESS NOTES
Identified pt with two pt identifiers(name and ). Reviewed record in preparation for visit and have obtained necessary documentation.    Brent Harris presents today for   Chief Complaint   Patient presents with    Follow-up      9m         Pt denied DIZZINESS, SOB, CHEST PAIN/ PRESSURE, FATIGUE/WEAKNESS, HEADACHES, SWELLING.             Brent Harris preferred language for health care discussion is english/other.    Personal Protective Equipment:   Personal Protective Equipment was used including: mask-surgical and hands-gloves. Patient was placed on no precaution(s). Patient was not masked.    Precautions:   Patient currently on None  Patient currently roomed with door closed.    Is someone accompanying this pt? no    Is the patient using any DME equipment during OV? no    Depression Screenin/11/2024    10:40 AM 7/10/2024    10:24 AM 1/10/2024    10:13 AM 2023    10:24 AM 3/13/2023    11:28 AM 2023     1:59 PM 2023    10:25 AM   PHQ-9 Questionaire   Little interest or pleasure in doing things 0 0 0 0 0 0 0   Feeling down, depressed, or hopeless 0 0 0 0 0 0 0   PHQ-9 Total Score 0 0 0 0 0 0 0        Learning Assessment:  No question data found.    Abuse Screenin/6/2024    11:00 AM 1/10/2024    10:00 AM 3/13/2023    11:00 AM   AMB Abuse Screening   Do you ever feel afraid of your partner? N N N   Are you in a relationship with someone who physically or mentally threatens you? N N N   Is it safe for you to go home? Y Y Y          Fall Risk      2024    11:19 AM 1/10/2024    10:14 AM 3/13/2023    11:30 AM   Fall Risk   Do you feel unsteady or are you worried about falling?  no no no   2 or more falls in past year? no no no   Fall with injury in past year? no no no         Pt currently taking Anticoagulant /Antiplatelet therapy? aspirin    Coordination of Care:  1. Have you been to the ER, urgent care clinic since your last visit? Hospitalized since your

## 2024-11-07 ENCOUNTER — TELEPHONE (OUTPATIENT)
Age: 49
End: 2024-11-07

## 2024-11-07 NOTE — TELEPHONE ENCOUNTER
Verbal order and read back per Lawrence Monge MD  Lipid panel is WNL. No new orders.     Patient made aware of results via [employer].

## 2024-12-18 ENCOUNTER — OFFICE VISIT (OUTPATIENT)
Facility: CLINIC | Age: 49
End: 2024-12-18

## 2024-12-18 ENCOUNTER — HOSPITAL ENCOUNTER (OUTPATIENT)
Facility: HOSPITAL | Age: 49
Setting detail: SPECIMEN
Discharge: HOME | End: 2024-12-21
Payer: MEDICARE

## 2024-12-18 VITALS
DIASTOLIC BLOOD PRESSURE: 88 MMHG | TEMPERATURE: 97.7 F | HEIGHT: 74 IN | WEIGHT: 265 LBS | HEART RATE: 84 BPM | OXYGEN SATURATION: 97 % | SYSTOLIC BLOOD PRESSURE: 138 MMHG | BODY MASS INDEX: 34.01 KG/M2 | RESPIRATION RATE: 18 BRPM

## 2024-12-18 DIAGNOSIS — I95.89 HYPOTENSION, IATROGENIC: ICD-10-CM

## 2024-12-18 DIAGNOSIS — E03.9 ACQUIRED HYPOTHYROIDISM: Primary | ICD-10-CM

## 2024-12-18 DIAGNOSIS — M25.811 SHOULDER IMPINGEMENT, RIGHT: ICD-10-CM

## 2024-12-18 DIAGNOSIS — I10 PRIMARY HYPERTENSION: ICD-10-CM

## 2024-12-18 DIAGNOSIS — G89.4 CHRONIC PAIN SYNDROME: ICD-10-CM

## 2024-12-18 DIAGNOSIS — I50.22 CHRONIC SYSTOLIC (CONGESTIVE) HEART FAILURE (HCC): ICD-10-CM

## 2024-12-18 DIAGNOSIS — M79.18 PAIN IN RIGHT BUTTOCK: ICD-10-CM

## 2024-12-18 DIAGNOSIS — M51.360 DEGENERATION OF INTERVERTEBRAL DISC OF LUMBAR REGION WITH DISCOGENIC BACK PAIN: ICD-10-CM

## 2024-12-18 DIAGNOSIS — M17.12 PRIMARY OSTEOARTHRITIS OF LEFT KNEE: ICD-10-CM

## 2024-12-18 DIAGNOSIS — R73.01 IMPAIRED FASTING GLUCOSE: ICD-10-CM

## 2024-12-18 LAB
EST. AVERAGE GLUCOSE BLD GHB EST-MCNC: 100 MG/DL
HBA1C MFR BLD: 5.1 % (ref 4.2–5.6)

## 2024-12-18 PROCEDURE — 83036 HEMOGLOBIN GLYCOSYLATED A1C: CPT

## 2024-12-18 PROCEDURE — 36415 COLL VENOUS BLD VENIPUNCTURE: CPT

## 2024-12-18 RX ORDER — OXYCODONE AND ACETAMINOPHEN 5; 325 MG/1; MG/1
1 TABLET ORAL EVERY 8 HOURS PRN
Qty: 30 TABLET | Refills: 0 | Status: SHIPPED | OUTPATIENT
Start: 2025-02-16 | End: 2025-03-18

## 2024-12-18 RX ORDER — CLONIDINE HYDROCHLORIDE 0.1 MG/1
0.1 TABLET ORAL 2 TIMES DAILY
Qty: 180 TABLET | Refills: 3 | Status: SHIPPED | OUTPATIENT
Start: 2024-12-18

## 2024-12-18 RX ORDER — METOPROLOL SUCCINATE 200 MG/1
200 TABLET, EXTENDED RELEASE ORAL DAILY
Qty: 90 TABLET | Refills: 3 | Status: SHIPPED | OUTPATIENT
Start: 2024-12-18

## 2024-12-18 RX ORDER — OXYCODONE AND ACETAMINOPHEN 5; 325 MG/1; MG/1
1 TABLET ORAL EVERY 8 HOURS PRN
Qty: 30 TABLET | Refills: 0 | Status: SHIPPED | OUTPATIENT
Start: 2025-01-17 | End: 2025-02-16

## 2024-12-18 RX ORDER — OXYCODONE AND ACETAMINOPHEN 5; 325 MG/1; MG/1
1 TABLET ORAL EVERY 8 HOURS PRN
Qty: 30 TABLET | Refills: 0 | Status: SHIPPED | OUTPATIENT
Start: 2024-12-18 | End: 2025-01-17

## 2024-12-18 SDOH — ECONOMIC STABILITY: FOOD INSECURITY: WITHIN THE PAST 12 MONTHS, THE FOOD YOU BOUGHT JUST DIDN'T LAST AND YOU DIDN'T HAVE MONEY TO GET MORE.: NEVER TRUE

## 2024-12-18 SDOH — ECONOMIC STABILITY: FOOD INSECURITY: WITHIN THE PAST 12 MONTHS, YOU WORRIED THAT YOUR FOOD WOULD RUN OUT BEFORE YOU GOT MONEY TO BUY MORE.: NEVER TRUE

## 2024-12-18 SDOH — ECONOMIC STABILITY: INCOME INSECURITY: HOW HARD IS IT FOR YOU TO PAY FOR THE VERY BASICS LIKE FOOD, HOUSING, MEDICAL CARE, AND HEATING?: NOT VERY HARD

## 2024-12-18 ASSESSMENT — PATIENT HEALTH QUESTIONNAIRE - PHQ9
SUM OF ALL RESPONSES TO PHQ QUESTIONS 1-9: 0
2. FEELING DOWN, DEPRESSED OR HOPELESS: NOT AT ALL
SUM OF ALL RESPONSES TO PHQ QUESTIONS 1-9: 0
SUM OF ALL RESPONSES TO PHQ9 QUESTIONS 1 & 2: 0
SUM OF ALL RESPONSES TO PHQ QUESTIONS 1-9: 0
SUM OF ALL RESPONSES TO PHQ QUESTIONS 1-9: 0
1. LITTLE INTEREST OR PLEASURE IN DOING THINGS: NOT AT ALL

## 2025-01-01 NOTE — PROGRESS NOTES
Brent Harris is a 49 y.o. year old male who presents today for   Chief Complaint   Patient presents with    Medication Refill        \"Have you been to the ER, urgent care clinic since your last visit?  Hospitalized since your last visit?\"   NO     “Have you seen or consulted any other health care providers outside our system since your last visit?”   NO           [facility] Ureña [facility]  [address] [place] [address] [id number]  Ph: [phone]  Direct Fax: [phone]  
Brent Harris is a 49 y.o.  male and presents with    Chief Complaint   Patient presents with    Medication Refill    Discuss Medications    Shoulder Pain           Subjective:  Shoulder Pain  Patient complains of right side shoulder pain. The symptoms began 2 weeks ago. Pain is described as overhead movements. Symptoms were incited by repetitive activity, with swinging a sledgehammer for work. No numbness or tingling to R arm.     Hx of CHF and recently seen a cardiologist last month without any acute changes. Pt ran out of metoprolol 5 days ago. BP is elevated today but reports no dizziness, HA, CP, or SOB.    Hx of chronic pain due to lumbar DDD that is controlled and stable with percocet. No constipation or other abdominal issues.            Patient Active Problem List   Diagnosis    Opioid use, unspecified with unspecified opioid-induced disorder (HCC)    Peripheral vascular disease (HCC)    Hypothyroid    Gastric bypass status for obesity    HTN (hypertension)    Post corneal transplant    Chronic pain syndrome    Severe obesity    Pain in right buttock    S/P primary angioplasty with coronary stent    Cardiomyopathy (HCC)    History of ST elevation myocardial infarction (STEMI)    Chronic systolic (congestive) heart failure     Current Outpatient Medications   Medication Sig Dispense Refill    metoprolol succinate (TOPROL XL) 200 MG extended release tablet Take 1 tablet by mouth daily 90 tablet 3    cloNIDine (CATAPRES) 0.1 MG tablet Take 1 tablet by mouth 2 times daily 180 tablet 3    [START ON 2/16/2025] oxyCODONE-acetaminophen (PERCOCET) 5-325 MG per tablet Take 1 tablet by mouth every 8 hours as needed for Pain for up to 30 days. Max Daily Amount: 3 tablets 30 tablet 0    [START ON 1/17/2025] oxyCODONE-acetaminophen (PERCOCET) 5-325 MG per tablet Take 1 tablet by mouth every 8 hours as needed for Pain for up to 30 days. Max Daily Amount: 3 tablets 30 tablet 0    oxyCODONE-acetaminophen 
questions were answered concerning future plans.  I have discussed medication side effects and warnings with the patient as well. I have reviewed the plan of care with the patient, accepted their input and they are in agreement with the treatment goals.

## 2025-03-18 ENCOUNTER — HOSPITAL ENCOUNTER (OUTPATIENT)
Facility: HOSPITAL | Age: 50
Setting detail: SPECIMEN
Discharge: HOME | End: 2025-03-21
Payer: MEDICARE

## 2025-03-18 ENCOUNTER — OFFICE VISIT (OUTPATIENT)
Facility: CLINIC | Age: 50
End: 2025-03-18

## 2025-03-18 VITALS
HEART RATE: 68 BPM | WEIGHT: 273.8 LBS | RESPIRATION RATE: 20 BRPM | SYSTOLIC BLOOD PRESSURE: 116 MMHG | TEMPERATURE: 98 F | DIASTOLIC BLOOD PRESSURE: 84 MMHG | HEIGHT: 74 IN | OXYGEN SATURATION: 98 % | BODY MASS INDEX: 35.14 KG/M2

## 2025-03-18 DIAGNOSIS — G89.4 CHRONIC PAIN SYNDROME: ICD-10-CM

## 2025-03-18 DIAGNOSIS — Z71.89 ADVANCE CARE PLANNING: ICD-10-CM

## 2025-03-18 DIAGNOSIS — F11.99 OPIOID USE, UNSPECIFIED WITH UNSPECIFIED OPIOID-INDUCED DISORDER: ICD-10-CM

## 2025-03-18 DIAGNOSIS — I50.22 CHRONIC SYSTOLIC (CONGESTIVE) HEART FAILURE: ICD-10-CM

## 2025-03-18 DIAGNOSIS — Z00.00 MEDICARE ANNUAL WELLNESS VISIT, SUBSEQUENT: Primary | ICD-10-CM

## 2025-03-18 DIAGNOSIS — M17.12 PRIMARY OSTEOARTHRITIS OF LEFT KNEE: ICD-10-CM

## 2025-03-18 DIAGNOSIS — M51.360 DEGENERATION OF INTERVERTEBRAL DISC OF LUMBAR REGION WITH DISCOGENIC BACK PAIN: ICD-10-CM

## 2025-03-18 DIAGNOSIS — H18.603 KERATOCONUS OF BOTH EYES: ICD-10-CM

## 2025-03-18 DIAGNOSIS — M79.18 PAIN IN RIGHT BUTTOCK: ICD-10-CM

## 2025-03-18 PROCEDURE — 80307 DRUG TEST PRSMV CHEM ANLYZR: CPT

## 2025-03-18 RX ORDER — OXYCODONE AND ACETAMINOPHEN 5; 325 MG/1; MG/1
1 TABLET ORAL EVERY 8 HOURS PRN
Qty: 30 TABLET | Refills: 0 | Status: SHIPPED | OUTPATIENT
Start: 2025-04-17 | End: 2025-05-17

## 2025-03-18 RX ORDER — OXYCODONE AND ACETAMINOPHEN 5; 325 MG/1; MG/1
1 TABLET ORAL EVERY 8 HOURS PRN
Qty: 30 TABLET | Refills: 0 | Status: SHIPPED | OUTPATIENT
Start: 2025-05-17 | End: 2025-06-16

## 2025-03-18 RX ORDER — OXYCODONE AND ACETAMINOPHEN 5; 325 MG/1; MG/1
1 TABLET ORAL EVERY 8 HOURS PRN
Qty: 30 TABLET | Refills: 0 | Status: SHIPPED | OUTPATIENT
Start: 2025-03-18 | End: 2025-04-17

## 2025-03-18 SDOH — ECONOMIC STABILITY: FOOD INSECURITY: WITHIN THE PAST 12 MONTHS, THE FOOD YOU BOUGHT JUST DIDN'T LAST AND YOU DIDN'T HAVE MONEY TO GET MORE.: NEVER TRUE

## 2025-03-18 SDOH — ECONOMIC STABILITY: FOOD INSECURITY: WITHIN THE PAST 12 MONTHS, YOU WORRIED THAT YOUR FOOD WOULD RUN OUT BEFORE YOU GOT MONEY TO BUY MORE.: NEVER TRUE

## 2025-03-18 ASSESSMENT — PATIENT HEALTH QUESTIONNAIRE - PHQ9
1. LITTLE INTEREST OR PLEASURE IN DOING THINGS: NOT AT ALL
SUM OF ALL RESPONSES TO PHQ QUESTIONS 1-9: 0
2. FEELING DOWN, DEPRESSED OR HOPELESS: NOT AT ALL
SUM OF ALL RESPONSES TO PHQ QUESTIONS 1-9: 0

## 2025-03-18 ASSESSMENT — LIFESTYLE VARIABLES
HOW MANY STANDARD DRINKS CONTAINING ALCOHOL DO YOU HAVE ON A TYPICAL DAY: PATIENT DOES NOT DRINK
HOW OFTEN DO YOU HAVE A DRINK CONTAINING ALCOHOL: NEVER

## 2025-03-18 NOTE — PROGRESS NOTES
Brent Harris is a 49 y.o. year old male who presents today for No chief complaint on file.       \"Have you been to the ER, urgent care clinic since your last visit?  Hospitalized since your last visit?\"   NO     “Have you seen or consulted any other health care providers outside our system since your last visit?”   NO           Janelle Long [facility]  Phone: [phone]  Fax: [phone]  
region with discogenic back pain  This is a chronic problem that is stable.  Per review of available records and patient’s , there are not sign of overuse, misuse, diversion, or concerning side effects. Today we reviewed: the risk of overdose, addiction, and dependency proper storage and disposal of medications the goals of treatment (improve functionality, quality of life, and pain) alternative treatment options including non-narcotic modalities the risks and benefits of continuing with a narcotic based pain regimen considering narcotic therapy discontinuation if benefits do not outweigh risks tapering to a lower dose  The following changes were made to the patient’s current treatment plan: nothing, medications refilled.        - oxyCODONE-acetaminophen (PERCOCET) 5-325 MG per tablet; Take 1 tablet by mouth every 8 hours as needed for Pain for up to 30 days. Max Daily Amount: 3 tablets  Dispense: 30 tablet; Refill: 0  - oxyCODONE-acetaminophen (PERCOCET) 5-325 MG per tablet; Take 1 tablet by mouth every 8 hours as needed for Pain for up to 30 days. Max Daily Amount: 3 tablets  Dispense: 30 tablet; Refill: 0  - oxyCODONE-acetaminophen (PERCOCET) 5-325 MG per tablet; Take 1 tablet by mouth every 8 hours as needed for Pain for up to 30 days. Max Daily Amount: 3 tablets  Dispense: 30 tablet; Refill: 0  - 11-Drug Screen, Urine; Future    2. Primary osteoarthritis of left knee    - oxyCODONE-acetaminophen (PERCOCET) 5-325 MG per tablet; Take 1 tablet by mouth every 8 hours as needed for Pain for up to 30 days. Max Daily Amount: 3 tablets  Dispense: 30 tablet; Refill: 0  - oxyCODONE-acetaminophen (PERCOCET) 5-325 MG per tablet; Take 1 tablet by mouth every 8 hours as needed for Pain for up to 30 days. Max Daily Amount: 3 tablets  Dispense: 30 tablet; Refill: 0  - oxyCODONE-acetaminophen (PERCOCET) 5-325 MG per tablet; Take 1 tablet by mouth every 8 hours as needed for Pain for up to 30 days. Max Daily Amount: 3 tablets

## 2025-03-18 NOTE — PATIENT INSTRUCTIONS
important things you can do to protect your heart. It is never too late to quit. Try to avoid secondhand smoke too.     Stay at a weight that's healthy for you. Talk to your doctor if you need help losing weight.     Try to get 7 to 9 hours of sleep each night.     Limit alcohol to 2 drinks a day for men and 1 drink a day for women. Too much alcohol can cause health problems.     Manage other health problems such as diabetes, high blood pressure, and high cholesterol. If you think you may have a problem with alcohol or drug use, talk to your doctor.   Medicines    Take your medicines exactly as prescribed. Call your doctor if you think you are having a problem with your medicine.     If your doctor recommends aspirin, take the amount directed each day. Make sure you take aspirin and not another kind of pain reliever, such as acetaminophen (Tylenol).   When should you call for help?   Call 911 if you have symptoms of a heart attack. These may include:    Chest pain or pressure, or a strange feeling in the chest.     Sweating.     Shortness of breath.     Pain, pressure, or a strange feeling in the back, neck, jaw, or upper belly or in one or both shoulders or arms.     Lightheadedness or sudden weakness.     A fast or irregular heartbeat.   After you call 911, the  may tell you to chew 1 adult-strength or 2 to 4 low-dose aspirin. Wait for an ambulance. Do not try to drive yourself.  Watch closely for changes in your health, and be sure to contact your doctor if you have any problems.  Where can you learn more?  Go to https://www.[employer].net/patientEd and enter F075 to learn more about \"A Healthy Heart: Care Instructions.\"  Current as of: July 31, 2024  Content Version: 14.4  © 1732-5963 [employer].   Care instructions adapted under license by [employer]. If you have questions about a medical condition or this instruction, always ask your healthcare professional. [employer],

## 2025-03-20 LAB
AMPHETAMINES UR QL SCN: NEGATIVE NG/ML
BARBITURATES UR QL SCN: NEGATIVE NG/ML
BENZODIAZ UR QL SCN: NEGATIVE NG/ML
BUPRENORPHINE UR QL: NEGATIVE NG/ML
BZE UR QL SCN: NEGATIVE NG/ML
CANNABINOIDS UR QL SCN: POSITIVE NG/ML
CREAT UR-MCNC: 267.6 MG/DL (ref 20–300)
LABORATORY COMMENT REPORT: ABNORMAL
METHADONE UR QL SCN: NEGATIVE NG/ML
OPIATES UR QL SCN: POSITIVE NG/ML
OXYCODONE+OXYMORPHONE UR QL SCN: POSITIVE NG/ML
PCP UR QL: NEGATIVE NG/ML
PH UR: 5 (ref 4.5–8.9)
PROPOXYPH UR QL SCN: NEGATIVE NG/ML

## 2025-04-08 DIAGNOSIS — M1A.0710 CHRONIC IDIOPATHIC GOUT INVOLVING TOE OF RIGHT FOOT WITHOUT TOPHUS: ICD-10-CM

## 2025-04-08 RX ORDER — ASPIRIN 81 MG/1
81 TABLET, COATED ORAL DAILY
Qty: 90 TABLET | Refills: 1 | Status: SHIPPED | OUTPATIENT
Start: 2025-04-08

## 2025-04-08 NOTE — TELEPHONE ENCOUNTER
Medication(s) requesting:   Requested Prescriptions     Pending Prescriptions Disp Refills    ASPIRIN LOW DOSE 81 MG EC tablet [Pharmacy Med Name: ASPIRIN EC 81 MG TABLET] 90 tablet 1     Sig: TAKE 1 TABLET BY MOUTH DAILY       Last office visit:  3.18.25  Next office visit DMA: 6/23/2025

## 2025-04-09 DIAGNOSIS — G62.9 POLYNEUROPATHY, UNSPECIFIED: ICD-10-CM

## 2025-04-09 NOTE — TELEPHONE ENCOUNTER
Medication requested :   Requested Prescriptions     Pending Prescriptions Disp Refills    gabapentin (NEURONTIN) 800 MG tablet [Pharmacy Med Name: GABAPENTIN 800 MG TABLET] 120 tablet      Sig: TAKE 1 TABLET BY MOUTH 4 TIMES A DAY AS NEEDED      PCP: Elvin Noel MD  LOV:           3/18/2025   NOV DMA: 6/23/2025  FUTURE APPT:   Future Appointments   Date Time Provider Department Center   5/7/2025 11:00 AM [facility]   6/23/2025 10:45 AM Elvin Noel MD [facility] DEP       Thank you.

## 2025-04-11 RX ORDER — COLCHICINE 0.6 MG/1
0.6 TABLET ORAL DAILY
Qty: 90 TABLET | Refills: 3 | Status: SHIPPED | OUTPATIENT
Start: 2025-04-11

## 2025-04-11 RX ORDER — GABAPENTIN 800 MG/1
TABLET ORAL
Qty: 120 TABLET | Refills: 5 | Status: SHIPPED | OUTPATIENT
Start: 2025-04-11 | End: 2025-10-11

## 2025-05-09 DIAGNOSIS — I50.22 CHRONIC SYSTOLIC (CONGESTIVE) HEART FAILURE (HCC): ICD-10-CM

## 2025-05-09 NOTE — TELEPHONE ENCOUNTER
Medication requested :   Requested Prescriptions     Pending Prescriptions Disp Refills    potassium chloride (KLOR-CON M) 10 MEQ extended release tablet [Pharmacy Med Name: POTASSIUM CHLORIDE ER M-10 MEQ TAB] 90 tablet 3     Sig: TAKE 1 TABLET BY MOUTH DAILY      PCP: Elvin Noel MD  LOV:           3/18/2025   NOV DMA: 6/23/2025  FUTURE APPT:   Future Appointments   Date Time Provider Department Center   6/23/2025 10:45 AM Elvin Noel MD [facility] DEP       Thank you.

## 2025-05-13 RX ORDER — POTASSIUM CHLORIDE 750 MG/1
10 TABLET, EXTENDED RELEASE ORAL DAILY
Qty: 90 TABLET | Refills: 3 | Status: SHIPPED | OUTPATIENT
Start: 2025-05-13

## 2025-06-04 DIAGNOSIS — I50.22 CHRONIC SYSTOLIC (CONGESTIVE) HEART FAILURE (HCC): ICD-10-CM

## 2025-06-04 NOTE — TELEPHONE ENCOUNTER
Medication(s) requesting:   Requested Prescriptions     Pending Prescriptions Disp Refills    furosemide (LASIX) 20 MG tablet [Pharmacy Med Name: FUROSEMIDE 20 MG TABLET] 90 tablet 3     Sig: TAKE 1 TABLET BY MOUTH DAILY       Last office visit:  03/18/2025  Next office visit DMA: 6/23/2025

## 2025-06-06 RX ORDER — FUROSEMIDE 20 MG/1
20 TABLET ORAL DAILY
Qty: 90 TABLET | Refills: 3 | Status: SHIPPED | OUTPATIENT
Start: 2025-06-06

## 2025-06-23 ENCOUNTER — HOSPITAL ENCOUNTER (OUTPATIENT)
Facility: HOSPITAL | Age: 50
Setting detail: SPECIMEN
Discharge: HOME | End: 2025-06-26
Payer: MEDICARE

## 2025-06-23 ENCOUNTER — OFFICE VISIT (OUTPATIENT)
Facility: CLINIC | Age: 50
End: 2025-06-23
Payer: MEDICARE

## 2025-06-23 VITALS
TEMPERATURE: 97.4 F | HEIGHT: 74 IN | DIASTOLIC BLOOD PRESSURE: 82 MMHG | OXYGEN SATURATION: 100 % | BODY MASS INDEX: 34.44 KG/M2 | SYSTOLIC BLOOD PRESSURE: 109 MMHG | RESPIRATION RATE: 20 BRPM | WEIGHT: 268.4 LBS | HEART RATE: 55 BPM

## 2025-06-23 DIAGNOSIS — E03.9 ACQUIRED HYPOTHYROIDISM: ICD-10-CM

## 2025-06-23 DIAGNOSIS — M79.18 PAIN IN RIGHT BUTTOCK: ICD-10-CM

## 2025-06-23 DIAGNOSIS — G89.4 CHRONIC PAIN SYNDROME: ICD-10-CM

## 2025-06-23 DIAGNOSIS — M17.12 PRIMARY OSTEOARTHRITIS OF LEFT KNEE: ICD-10-CM

## 2025-06-23 DIAGNOSIS — E03.9 ACQUIRED HYPOTHYROIDISM: Primary | ICD-10-CM

## 2025-06-23 DIAGNOSIS — M51.360 DEGENERATION OF INTERVERTEBRAL DISC OF LUMBAR REGION WITH DISCOGENIC BACK PAIN: ICD-10-CM

## 2025-06-23 LAB — TSH, 3RD GENERATION: 0.56 UIU/ML (ref 0.27–4.2)

## 2025-06-23 PROCEDURE — 84443 ASSAY THYROID STIM HORMONE: CPT

## 2025-06-23 PROCEDURE — 99214 OFFICE O/P EST MOD 30 MIN: CPT | Performed by: FAMILY MEDICINE

## 2025-06-23 PROCEDURE — 3074F SYST BP LT 130 MM HG: CPT | Performed by: FAMILY MEDICINE

## 2025-06-23 PROCEDURE — 36415 COLL VENOUS BLD VENIPUNCTURE: CPT

## 2025-06-23 PROCEDURE — 3079F DIAST BP 80-89 MM HG: CPT | Performed by: FAMILY MEDICINE

## 2025-06-23 RX ORDER — OXYCODONE AND ACETAMINOPHEN 5; 325 MG/1; MG/1
1 TABLET ORAL EVERY 8 HOURS PRN
Qty: 30 TABLET | Refills: 0 | Status: SHIPPED | OUTPATIENT
Start: 2025-06-23 | End: 2025-07-23

## 2025-06-23 RX ORDER — OXYCODONE AND ACETAMINOPHEN 5; 325 MG/1; MG/1
1 TABLET ORAL EVERY 8 HOURS PRN
Qty: 30 TABLET | Refills: 0 | Status: SHIPPED | OUTPATIENT
Start: 2025-07-23 | End: 2025-08-22

## 2025-06-23 RX ORDER — OXYCODONE AND ACETAMINOPHEN 5; 325 MG/1; MG/1
1 TABLET ORAL EVERY 8 HOURS PRN
Qty: 30 TABLET | Refills: 0 | Status: SHIPPED | OUTPATIENT
Start: 2025-08-22 | End: 2025-09-21

## 2025-06-23 SDOH — ECONOMIC STABILITY: FOOD INSECURITY: WITHIN THE PAST 12 MONTHS, THE FOOD YOU BOUGHT JUST DIDN'T LAST AND YOU DIDN'T HAVE MONEY TO GET MORE.: NEVER TRUE

## 2025-06-23 ASSESSMENT — PATIENT HEALTH QUESTIONNAIRE - PHQ9
1. LITTLE INTEREST OR PLEASURE IN DOING THINGS: NOT AT ALL
SUM OF ALL RESPONSES TO PHQ QUESTIONS 1-9: 0
SUM OF ALL RESPONSES TO PHQ QUESTIONS 1-9: 0
2. FEELING DOWN, DEPRESSED OR HOPELESS: NOT AT ALL
SUM OF ALL RESPONSES TO PHQ QUESTIONS 1-9: 0
SUM OF ALL RESPONSES TO PHQ QUESTIONS 1-9: 0

## 2025-06-23 NOTE — PROGRESS NOTES
Brent Harris is a 49 y.o.  male and presents with    Chief Complaint   Patient presents with    Medication Refill     Subjective:  Chronic pain  He has chronic right leg pain that is 2/10 in severity today. He is taking Gabapentin and Oxycodone 5 mg with good efficacy. He feels the pain is stable and manageable.     For anxiety, he has been using Clonidine and marijuana. No bothersome side effects, happy with this current management.     His weight has been stable over the past year. He has good energy. No side effects associated with Levothyroxine.     Since beginning Lasix, he has noticed he gets up twice during the night to void. This is not bothersome to him.     No recent gout flares, well managed on Colchicine.     Do you take any herbs or supplements that were not prescribed by a doctor? yes Are you taking calcium supplements? no Are you taking aspirin daily? Yes      ROS   General ROS: positive for  - anxiety   negative for - chills, fatigue, fever, hot flashes, or weight gain    All other systems reviewed and are negative.      Objective:  Vitals:    06/23/25 1102   BP: 109/82   Pulse: 55   Resp: 20   Temp: 97.4 °F (36.3 °C)   SpO2: 100%     alert, well appearing, and in no distress and oriented to person, place, and time  Mental status - normal mood, behavior, speech, dress, motor activity, and thought processes  Back - pain with motion    LABS     TESTS      Assessment/Plan:    1. Degeneration of intervertebral disc of lumbar region with discogenic back pain  This is a chronic problem that is stable.  Per review of available records and patient’s , there are not sign of overuse, misuse, diversion, or concerning side effects. Today we reviewed: the risk of overdose, addiction, and dependency proper storage and disposal of medications the goals of treatment (improve functionality, quality of life, and pain) alternative treatment options including non-narcotic modalities the risks and

## 2025-06-23 NOTE — PROGRESS NOTES
Brent Harris is a 49 y.o. year old male who presents today for   Chief Complaint   Patient presents with    Medication Refill        \"Have you been to the ER, urgent care clinic since your last visit?  Hospitalized since your last visit?\"   NO     “Have you seen or consulted any other health care providers outside our system since your last visit?”   NO           [facility]  Phone: [phone]  Fax: [phone]

## 2025-06-23 NOTE — PROGRESS NOTES
Brent Harris is a 49 y.o.  male and presents with    Chief Complaint   Patient presents with    Medication Refill     Subjective:  {HPI:14599}          ROS   {ros master:092198}    All other systems reviewed and are negative.      Objective:  Vitals:    06/23/25 1102   BP: 109/82   Pulse: 55   Resp: 20   Temp: 97.4 °F (36.3 °C)   SpO2: 100%         alert, well appearing, and in no distress and oriented to person, place, and time  Mental status - normal mood, behavior, speech, dress, motor activity, and thought processes  Back - pain with motion    LABS     TESTS      Assessment/Plan:    There are no diagnoses linked to this encounter.     Lab review: {lab reviewed:386321}      I have discussed the diagnosis with the patient and the intended plan as seen in the above orders.  The patient has received an after-visit summary and questions were answered concerning future plans.  I have discussed medication side effects and warnings with the patient as well. I have reviewed the plan of care with the patient, accepted their input and they are in agreement with the treatment goals.

## 2025-06-30 ENCOUNTER — RESULTS FOLLOW-UP (OUTPATIENT)
Facility: CLINIC | Age: 50
End: 2025-06-30

## 2025-08-05 DIAGNOSIS — E03.9 ACQUIRED HYPOTHYROIDISM: ICD-10-CM

## 2025-08-05 RX ORDER — LEVOTHYROXINE SODIUM 200 UG/1
200 TABLET ORAL DAILY
Qty: 90 TABLET | Refills: 3 | Status: SHIPPED | OUTPATIENT
Start: 2025-08-05

## 2025-09-05 RX ORDER — AMLODIPINE BESYLATE 5 MG/1
5 TABLET ORAL DAILY
Qty: 90 TABLET | Refills: 3 | Status: SHIPPED | OUTPATIENT
Start: 2025-09-05

## (undated) DEVICE — BLADE ASSEMB CLP HAIR FINE --

## (undated) DEVICE — SUTURE VLOC 90 2/0 VL 6 GS-22 VLOCM2105

## (undated) DEVICE — INTENDED FOR TISSUE SEPARATION, AND OTHER PROCEDURES THAT REQUIRE A SHARP SURGICAL BLADE TO PUNCTURE OR CUT.: Brand: BARD-PARKER ® CARBON RIB-BACK BLADES

## (undated) DEVICE — CONTAINER PREFIL FRMLN 15ML --

## (undated) DEVICE — MEDI-VAC NON-CONDUCTIVE SUCTION TUBING: Brand: CARDINAL HEALTH

## (undated) DEVICE — PREP SKN CHLRAPRP 26ML TNT -- CONVERT TO ITEM 373320

## (undated) DEVICE — INTENDED FOR TISSUE SEPARATION, AND OTHER PROCEDURES THAT REQUIRE A SHARP SURGICAL BLADE TO PUNCTURE OR CUT.: Brand: BARD-PARKER ®  SAFETY SCALPED

## (undated) DEVICE — Device

## (undated) DEVICE — LIGHT HANDLE: Brand: DEVON

## (undated) DEVICE — PRESSURE MONITORING SET: Brand: TRUWAVE

## (undated) DEVICE — KENDALL SCD EXPRESS SLEEVES, KNEE LENGTH, MEDIUM: Brand: KENDALL SCD

## (undated) DEVICE — AIRLIFE™ ADULT CUSHION NASAL CANNULA 14 FOOT (4.3) CRUSH-RESISTANT OXYGEN TUBING, AND U/CONNECT-IT ADAPTER: Brand: AIRLIFE™

## (undated) DEVICE — PERCUTANEOUS ENTRY THINWALL NEEDLE  ONE-PART: Brand: COOK

## (undated) DEVICE — SEAL UNIV 5-8MM DISP BX/10 -- DA VINCI XI - SNGL USE

## (undated) DEVICE — SURGICAL PROCEDURE KIT LT HRT CUST

## (undated) DEVICE — SUTURE VCRL SZ 2-0 L27IN ABSRB UD L26MM SH 1/2 CIR J417H

## (undated) DEVICE — STERILE POLYISOPRENE POWDER-FREE SURGICAL GLOVES: Brand: PROTEXIS

## (undated) DEVICE — BASIN EMESIS 500CC ROSE 250/CS 60/PLT: Brand: MEDEGEN MEDICAL PRODUCTS, LLC

## (undated) DEVICE — BITE BLK ENDOSCP AD 54FR GRN POLYETH ENDOSCP W STRP SLD

## (undated) DEVICE — SYR 3ML LL TIP 1/10ML GRAD --

## (undated) DEVICE — TUBING, SUCTION, 3/16" X 6', STRAIGHT: Brand: MEDLINE

## (undated) DEVICE — ANTI-FOG SOLUTION WITH FOAM PAD: Brand: DEVON

## (undated) DEVICE — KENDALL 500 SERIES DIAPHORETIC FOAM MONITORING ELECTRODE - TEAR DROP SHAPE ( 5/PK): Brand: KENDALL

## (undated) DEVICE — DEVICE INFL W/ HEM VLV TORQ

## (undated) DEVICE — DERMABOND SKIN ADH 0.7ML -- DERMABOND ADVANCED 12/BX

## (undated) DEVICE — SYR 50ML SLIP TIP NSAF LF STRL --

## (undated) DEVICE — COPILOT BLEEDBACK CONTROL VALVE: Brand: COPILOT

## (undated) DEVICE — INTRODUCER SHTH 6FR CANN L11CM W/ MINI GWIRE UNIQUE SLIX

## (undated) DEVICE — ARM DRAPE

## (undated) DEVICE — SYRINGE MED 20ML STD CLR PLAS LUERLOCK TIP N CTRL DISP

## (undated) DEVICE — NC TREK CORONARY DILATATION CATHETER 4.0 MM X 15 MM / RAPID-EXCHANGE: Brand: NC TREK

## (undated) DEVICE — TREK CORONARY DILATATION CATHETER 3.0 MM X 15 MM / RAPID-EXCHANGE: Brand: TREK

## (undated) DEVICE — FLEX ADVANTAGE 1500CC: Brand: FLEX ADVANTAGE

## (undated) DEVICE — REM POLYHESIVE ADULT PATIENT RETURN ELECTRODE: Brand: VALLEYLAB

## (undated) DEVICE — KENDALL 500 SERIES DIAPHORETIC FOAM MONITORING ELECTRODE - TEAR DROP SHAPE ( 30/PK): Brand: KENDALL

## (undated) DEVICE — CATHETER GUID AD 6FR L100CM GRN NYL PTFE 3DRC WLLMS TECH

## (undated) DEVICE — BLADELESS OBTURATOR: Brand: WECK VISTA

## (undated) DEVICE — SUTURE MCRYL SZ 4-0 L18IN ABSRB UD L19MM PS-2 3/8 CIR PRIM Y496G

## (undated) DEVICE — FORCEPS BX L240CM JAW DIA2.8MM L CAP W/ NDL MIC MESH TOOTH

## (undated) DEVICE — COLUMN DRAPE

## (undated) DEVICE — DRAPE,UTILITY,TAPE,15X26,STERILE: Brand: MEDLINE

## (undated) DEVICE — HEARTSTREAM ADULT/CHILD RADIOLUCENT PADS

## (undated) DEVICE — KIT COLON W/ 1.1OZ LUB AND 2 END

## (undated) DEVICE — CATHETER DIAG AD 6FR L110CM 145DEG COR GRN HYDRPHLC NYL

## (undated) DEVICE — NDL PRT INJ NSAF BLNT 18GX1.5 --

## (undated) DEVICE — TUBING PRSS MON L6IN PVC M FEM CONN

## (undated) DEVICE — Device: Brand: PROWATERFLEX

## (undated) DEVICE — SOL IRR NACL 0.9% 500ML POUR --

## (undated) DEVICE — SET ANGIO L6.5IN L BOR 3 W STPCOCK SPIK TBNG

## (undated) DEVICE — CATHETER DIAG 6FR L100CM COR NYL JUDKINS L 4 RADPQ W/O SIDE